# Patient Record
Sex: FEMALE | Race: WHITE | NOT HISPANIC OR LATINO | Employment: OTHER | URBAN - METROPOLITAN AREA
[De-identification: names, ages, dates, MRNs, and addresses within clinical notes are randomized per-mention and may not be internally consistent; named-entity substitution may affect disease eponyms.]

---

## 2017-01-09 ENCOUNTER — OFFICE VISIT (OUTPATIENT)
Dept: FAMILY MEDICINE | Facility: CLINIC | Age: 62
End: 2017-01-09
Payer: COMMERCIAL

## 2017-01-09 VITALS
TEMPERATURE: 98.7 F | WEIGHT: 219.4 LBS | DIASTOLIC BLOOD PRESSURE: 74 MMHG | HEIGHT: 66 IN | HEART RATE: 68 BPM | SYSTOLIC BLOOD PRESSURE: 136 MMHG | BODY MASS INDEX: 35.26 KG/M2

## 2017-01-09 DIAGNOSIS — R82.90 NONSPECIFIC FINDING ON EXAMINATION OF URINE: ICD-10-CM

## 2017-01-09 DIAGNOSIS — N30.00 ACUTE CYSTITIS WITHOUT HEMATURIA: Primary | ICD-10-CM

## 2017-01-09 LAB
ALBUMIN UR-MCNC: NEGATIVE MG/DL
APPEARANCE UR: ABNORMAL
BACTERIA #/AREA URNS HPF: ABNORMAL /HPF
BILIRUB UR QL STRIP: NEGATIVE
COLOR UR AUTO: YELLOW
GLUCOSE UR STRIP-MCNC: NEGATIVE MG/DL
HGB UR QL STRIP: ABNORMAL
KETONES UR STRIP-MCNC: NEGATIVE MG/DL
LEUKOCYTE ESTERASE UR QL STRIP: ABNORMAL
NITRATE UR QL: NEGATIVE
NON-SQ EPI CELLS #/AREA URNS LPF: ABNORMAL /LPF
PH UR STRIP: 7 PH (ref 5–7)
RBC #/AREA URNS AUTO: ABNORMAL /HPF (ref 0–2)
SP GR UR STRIP: 1.01 (ref 1–1.03)
URN SPEC COLLECT METH UR: ABNORMAL
UROBILINOGEN UR STRIP-ACNC: 0.2 EU/DL (ref 0.2–1)
WBC #/AREA URNS AUTO: ABNORMAL /HPF (ref 0–2)

## 2017-01-09 PROCEDURE — 87088 URINE BACTERIA CULTURE: CPT | Performed by: FAMILY MEDICINE

## 2017-01-09 PROCEDURE — 87086 URINE CULTURE/COLONY COUNT: CPT | Mod: 90 | Performed by: FAMILY MEDICINE

## 2017-01-09 PROCEDURE — 81001 URINALYSIS AUTO W/SCOPE: CPT | Performed by: FAMILY MEDICINE

## 2017-01-09 PROCEDURE — 87186 SC STD MICRODIL/AGAR DIL: CPT | Performed by: FAMILY MEDICINE

## 2017-01-09 PROCEDURE — 99213 OFFICE O/P EST LOW 20 MIN: CPT | Performed by: FAMILY MEDICINE

## 2017-01-09 PROCEDURE — 99000 SPECIMEN HANDLING OFFICE-LAB: CPT | Performed by: FAMILY MEDICINE

## 2017-01-09 RX ORDER — CIPROFLOXACIN 500 MG/1
500 TABLET, FILM COATED ORAL 2 TIMES DAILY
Qty: 14 TABLET | Refills: 0 | Status: SHIPPED | OUTPATIENT
Start: 2017-01-09 | End: 2017-04-20

## 2017-01-09 NOTE — PROGRESS NOTES
"  SUBJECTIVE  Mine presents with the following concerns:    SYMPTOMS CC PRESENT ABSENT COMMENT   Increased urinary frequency  X     Increased urinary urgency  X     Pain on urination x X     AGE < 18 OR > 65 OR POSITIVE SYMPTOMS BELOW THIS LINE NEED TO BE SEEN BY PROVIDER   Fever over 101 or chills   X    Sx longer than 7d   X    New flank or back pain   X    Vomiting or severe nausea   X    Abdominal pain   X    4 UTIs in last 12 months   X    Known kidney or bladder abnormality   X    Failure of treatment in last 4 weeks   X    Pyelo in last 3 months   X    Discharged from hospital or NH in last 2 weeks   X    History of kidney stones   X    Urinary catheter (or recent cystoscopy)   X    Unusual Vaginal Symptoms   X    Pregnant   X    STD or new partner without barrier method last 3 months   X    Diabetes   X    Immunosupressed (prednisone or chemo)   X      PAST MEDICAL HISTORY  UTI history indicates rare; cannot remember the last one    REVIEW OF SYSTEMS  Medications updated and reviewed.  Past, family and surgical history is updated and reviewed in the record.  Other than noted above, general, HEENT, respiratory, cardiac and gastrointestinal systems are negative.    OBJECTIVE  /74 mmHg  Pulse 68  Temp(Src) 98.7  F (37.1  C) (Tympanic)  Ht 5' 5.5\" (1.664 m)  Wt 219 lb 6.4 oz (99.519 kg)  BMI 35.94 kg/m2  GENERAL APPEARANCE ADULT: Alert, no acute distress  EYES: PERRL, EOM normal, conjunctiva and lids normal  ABDOMEN: tender suprapubic mild , no CVA tenderness   Lab tests:   Results for orders placed or performed in visit on 01/09/17   *UA reflex to Microscopic and Culture ([facility] and [facility] (except [place] and [place])   Result Value Ref Range    Color Urine Yellow     Appearance Urine Cloudy     Glucose Urine Negative NEG mg/dL    Bilirubin Urine Negative NEG    Ketones Urine Negative NEG mg/dL    Specific Gravity Urine 1.010 1.003 - 1.035    Blood Urine Trace (A) NEG    pH " Urine 7.0 5.0 - 7.0 pH    Protein Albumin Urine Negative NEG mg/dL    Urobilinogen Urine 0.2 0.2 - 1.0 EU/dL    Nitrite Urine Negative NEG    Leukocyte Esterase Urine Moderate (A) NEG    Source Midstream Urine    Urine Microscopic   Result Value Ref Range    WBC Urine 10-25 (A) 0 - 2 /HPF    RBC Urine O - 2 0 - 2 /HPF    Squamous Epithelial /LPF Urine Few FEW /LPF    Bacteria Urine Moderate (A) NEG /HPF       ASSESSMENT  ASSESSMENT:  1. Acute cystitis without hematuria    2. Nonspecific finding on examination of urine        PLAN:  Orders Placed This Encounter     *UA reflex to Microscopic and Culture ([facility] (except [place] and [place])     Urine Microscopic     ciprofloxacin (CIPRO) 500 MG tablet       Continue to push fluids and recheck if not better

## 2017-01-09 NOTE — NURSING NOTE
"Chief Complaint   Patient presents with     UTI     X 4 days.        Initial /74 mmHg  Pulse 68  Temp(Src) 98.7  F (37.1  C) (Tympanic)  Ht 5' 5.5\" (1.664 m)  Wt 219 lb 6.4 oz (99.519 kg)  BMI 35.94 kg/m2 Estimated body mass index is 35.94 kg/(m^2) as calculated from the following:    Height as of this encounter: 5' 5.5\" (1.664 m).    Weight as of this encounter: 219 lb 6.4 oz (99.519 kg).  BP completed using cuff size: henry Corral, CMA      "

## 2017-01-09 NOTE — MR AVS SNAPSHOT
"              After Visit Summary   1/9/2017    Mine Meléndez    MRN: [id number]           Patient Information     Date Of Birth          1955        Visit Information        Provider Department      1/9/2017 4:20 PM Lionel, KEVIN Cabral MD [facility]        Today's Diagnoses     Acute cystitis without hematuria    -  1     Nonspecific finding on examination of urine            Follow-ups after your visit        Who to contact     If you have questions or need follow up information about today's clinic visit or your schedule please contact [facility] directly at [phone].  Normal or non-critical lab and imaging results will be communicated to you by Gigathletehart, letter or phone within 4 business days after the clinic has received the results. If you do not hear from us within 7 days, please contact the clinic through Gigathletehart or phone. If you have a critical or abnormal lab result, we will notify you by phone as soon as possible.  Submit refill requests through [employer] or call your pharmacy and they will forward the refill request to us. Please allow 3 business days for your refill to be completed.          Additional Information About Your Visit        MyChart Information     [employer] gives you secure access to your electronic health record. If you see a primary care provider, you can also send messages to your care team and make appointments. If you have questions, please call your primary care clinic.  If you do not have a primary care provider, please call [phone] and they will assist you.        Care EveryWhere ID     This is your Care EveryWhere ID. This could be used by other organizations to access your [place] medical records  [id number]        Your Vitals Were     Pulse Temperature Height BMI (Body Mass Index)          68 98.7  F (37.1  C) (Tympanic) 5' 5.5\" (1.664 m) 35.94 kg/m2         Blood Pressure from Last 3 Encounters:   01/09/17 136/74   10/11/16 125/70 "   10/04/16 142/68    Weight from Last 3 Encounters:   01/09/17 219 lb 6.4 oz (99.519 kg)   10/04/16 214 lb 6.4 oz (97.251 kg)   09/09/16 213 lb (96.616 kg)              We Performed the Following     *UA reflex to Microscopic and Culture ([facility] (except [place] and [place])     Urine Culture Aerobic Bacterial     Urine Microscopic          Today's Medication Changes          These changes are accurate as of: 1/9/17  4:44 PM.  If you have any questions, ask your nurse or doctor.               Start taking these medicines.        Dose/Directions    ciprofloxacin 500 MG tablet   Commonly known as:  CIPRO   Used for:  Acute cystitis without hematuria   Started by:  KEVIN Flowers MD        Dose:  500 mg   Take 1 tablet (500 mg) by mouth 2 times daily   Quantity:  14 tablet   Refills:  0            Where to get your medicines      These medications were sent to [facility] [address]  [address] [facility] [address]     Phone:  [phone]    - ciprofloxacin 500 MG tablet             Primary Care Provider Office Phone # Fax #    Alysha Tosha Gaviria [phone]       [facility] [address] [facility] [address]        Thank you!     Thank you for choosing [facility]  for your care. Our goal is always to provide you with excellent care. Hearing back from our patients is one way we can continue to improve our services. Please take a few minutes to complete the written survey that you may receive in the mail after your visit with us. Thank you!             Your Updated Medication List - Protect others around you: Learn how to safely use, store and throw away your medicines at www.disposemymeds.org.          This list is accurate as of: 1/9/17  4:44 PM.  Always use your most recent med list.                   Brand Name Dispense Instructions for use    aspirin 81 MG  tablet     100    ONE DAILY       ciprofloxacin 500 MG tablet    CIPRO    14 tablet    Take 1 tablet (500 mg) by mouth 2 times daily       estradiol 0.5 MG tablet    ESTRACE    90 tablet    Take one tablet every 1-3 days as needed for hot flashes       folic acid 1 MG tablet    FOLVITE    90 tablet    Take 1 tablet (1,000 mcg) by mouth daily Must make appt before any further refills       hydrochlorothiazide 25 MG tablet    HYDRODIURIL    90 tablet    Take 1 tablet (25 mg) by mouth every morning       ibuprofen 800 MG tablet    ADVIL/MOTRIN    90 tablet    Take 1 tablet (800 mg) by mouth every 8 hours as needed for pain       levothyroxine 50 MCG tablet    SYNTHROID/LEVOTHROID    90 tablet    Take 1 tablet (50 mcg) by mouth daily       methotrexate 2.5 MG tablet CHEMO     100 tablet    Eight tablets once a week       MULTI-VITAMIN PO      1 daily       omeprazole 20 MG tablet     90 tablet    Take 1 tablet (20 mg) by mouth daily Take 30-60 minutes before a meal.       Potassium Chloride ER 20 MEQ Tbcr     90 tablet    Take 1 tablet (20 mEq) by mouth daily       ranitidine 300 MG tablet    ZANTAC    30 tablet    Take 1 tablet (300 mg) by mouth At Bedtime       simvastatin 20 MG tablet    ZOCOR    90 tablet    Take 1 tablet (20 mg) by mouth At Bedtime

## 2017-01-12 LAB
BACTERIA SPEC CULT: ABNORMAL
MICRO REPORT STATUS: ABNORMAL
MICROORGANISM SPEC CULT: ABNORMAL
SPECIMEN SOURCE: ABNORMAL

## 2017-01-16 ENCOUNTER — OFFICE VISIT (OUTPATIENT)
Dept: RHEUMATOLOGY | Facility: CLINIC | Age: 62
End: 2017-01-16
Payer: COMMERCIAL

## 2017-01-16 VITALS
WEIGHT: 218 LBS | SYSTOLIC BLOOD PRESSURE: 150 MMHG | HEART RATE: 68 BPM | DIASTOLIC BLOOD PRESSURE: 79 MMHG | TEMPERATURE: 97.7 F | BODY MASS INDEX: 35.71 KG/M2 | RESPIRATION RATE: 18 BRPM

## 2017-01-16 DIAGNOSIS — Z79.899 HIGH RISK MEDICATIONS (NOT ANTICOAGULANTS) LONG-TERM USE: Primary | ICD-10-CM

## 2017-01-16 DIAGNOSIS — M05.731 RHEUMATOID ARTHRITIS INVOLVING RIGHT WRIST WITH POSITIVE RHEUMATOID FACTOR (H): ICD-10-CM

## 2017-01-16 LAB
ALBUMIN SERPL-MCNC: 3.5 G/DL (ref 3.4–5)
ALP SERPL-CCNC: 126 U/L (ref 40–150)
ALT SERPL W P-5'-P-CCNC: 30 U/L (ref 0–50)
ANION GAP SERPL CALCULATED.3IONS-SCNC: 9 MMOL/L (ref 3–14)
AST SERPL W P-5'-P-CCNC: 30 U/L (ref 0–45)
BASOPHILS # BLD AUTO: 0.1 10E9/L (ref 0–0.2)
BASOPHILS NFR BLD AUTO: 0.8 %
BILIRUB SERPL-MCNC: 0.5 MG/DL (ref 0.2–1.3)
BUN SERPL-MCNC: 9 MG/DL (ref 7–30)
CALCIUM SERPL-MCNC: 8.7 MG/DL (ref 8.5–10.1)
CHLORIDE SERPL-SCNC: 99 MMOL/L (ref 94–109)
CO2 SERPL-SCNC: 27 MMOL/L (ref 20–32)
CREAT SERPL-MCNC: 0.6 MG/DL (ref 0.52–1.04)
DIFFERENTIAL METHOD BLD: ABNORMAL
EOSINOPHIL # BLD AUTO: 0.2 10E9/L (ref 0–0.7)
EOSINOPHIL NFR BLD AUTO: 1.9 %
ERYTHROCYTE [DISTWIDTH] IN BLOOD BY AUTOMATED COUNT: 13.1 % (ref 10–15)
GFR SERPL CREATININE-BSD FRML MDRD: NORMAL ML/MIN/1.7M2
GLUCOSE SERPL-MCNC: 91 MG/DL (ref 70–99)
HCT VFR BLD AUTO: 41.8 % (ref 35–47)
HGB BLD-MCNC: 14.4 G/DL (ref 11.7–15.7)
LYMPHOCYTES # BLD AUTO: 1.9 10E9/L (ref 0.8–5.3)
LYMPHOCYTES NFR BLD AUTO: 24.5 %
MCH RBC QN AUTO: 34.4 PG (ref 26.5–33)
MCHC RBC AUTO-ENTMCNC: 34.4 G/DL (ref 31.5–36.5)
MCV RBC AUTO: 100 FL (ref 78–100)
MONOCYTES # BLD AUTO: 0.9 10E9/L (ref 0–1.3)
MONOCYTES NFR BLD AUTO: 10.9 %
NEUTROPHILS # BLD AUTO: 4.9 10E9/L (ref 1.6–8.3)
NEUTROPHILS NFR BLD AUTO: 61.9 %
PLATELET # BLD AUTO: 223 10E9/L (ref 150–450)
POTASSIUM SERPL-SCNC: 3.4 MMOL/L (ref 3.4–5.3)
PROT SERPL-MCNC: 7.4 G/DL (ref 6.8–8.8)
RBC # BLD AUTO: 4.18 10E12/L (ref 3.8–5.2)
SODIUM SERPL-SCNC: 135 MMOL/L (ref 133–144)
WBC # BLD AUTO: 7.8 10E9/L (ref 4–11)

## 2017-01-16 PROCEDURE — 85025 COMPLETE CBC W/AUTO DIFF WBC: CPT | Performed by: INTERNAL MEDICINE

## 2017-01-16 PROCEDURE — 80053 COMPREHEN METABOLIC PANEL: CPT | Performed by: INTERNAL MEDICINE

## 2017-01-16 PROCEDURE — 36415 COLL VENOUS BLD VENIPUNCTURE: CPT | Performed by: INTERNAL MEDICINE

## 2017-01-16 PROCEDURE — 99213 OFFICE O/P EST LOW 20 MIN: CPT | Performed by: INTERNAL MEDICINE

## 2017-01-16 RX ORDER — PREDNISONE 1 MG/1
TABLET ORAL
Qty: 40 TABLET | Refills: 1 | Status: SHIPPED | OUTPATIENT
Start: 2017-01-16 | End: 2017-04-20

## 2017-01-16 RX ORDER — PREDNISONE 5 MG/1
TABLET ORAL
Qty: 40 TABLET | Refills: 1 | Status: SHIPPED | OUTPATIENT
Start: 2017-01-16 | End: 2017-04-20

## 2017-01-16 RX ORDER — FOLIC ACID 1 MG/1
1000 TABLET ORAL DAILY
Qty: 90 TABLET | Refills: 3 | Status: SHIPPED | OUTPATIENT
Start: 2017-01-16 | End: 2018-01-29

## 2017-01-16 NOTE — Clinical Note
[facility]  [address] [address]  [phone]      January 18, 2017      Mine Meléndez  [address]  [facility] [address]        Dear Dr. Gene Blount has reviewed the results of your labs of 1/16/17, and has made the following observations:      Labs are normal    Please don't hesitate to contact our office with any additional questions or concerns.           Sincerely,    PAUL TangA  For Matt Mills MD

## 2017-01-17 ENCOUNTER — TELEPHONE (OUTPATIENT)
Dept: FAMILY MEDICINE | Facility: CLINIC | Age: 62
End: 2017-01-17

## 2017-01-17 DIAGNOSIS — B37.31 YEAST INFECTION OF THE VAGINA: Primary | ICD-10-CM

## 2017-01-17 RX ORDER — FLUCONAZOLE 150 MG/1
150 TABLET ORAL ONCE
Qty: 1 TABLET | Refills: 0 | Status: SHIPPED | OUTPATIENT
Start: 2017-01-17 | End: 2017-01-17

## 2017-01-17 NOTE — TELEPHONE ENCOUNTER
Pt has had Diflucan in the past after having ABX and is requesting it again.  Pended.  Advise.  [place]

## 2017-01-17 NOTE — TELEPHONE ENCOUNTER
Reason for Call:  Other prescription    Detailed comments: Patient states she was treated for a UTI and now has a vaginal yeast infection.  She would like medication.  qunb    Phone Number Patient can be reached at: Cell number on file:    Telephone Information:   Mobile [phone]       Best Time: any    Can we leave a detailed message on this number? YES    Call taken on 1/17/2017 at 7:43 AM by Alysha Miles

## 2017-03-06 DIAGNOSIS — M05.731 RHEUMATOID ARTHRITIS INVOLVING RIGHT WRIST WITH POSITIVE RHEUMATOID FACTOR (H): Primary | ICD-10-CM

## 2017-03-06 NOTE — TELEPHONE ENCOUNTER
Ibuprofen 800mg      Last Written Prescription Date: 2/24/16  Last Quantity: 90, # refills: 11  Last Office Visit with [facility], [facility] or [facility] prescribing provider: 8/8/16       Creatinine   Date Value Ref Range Status   01/16/2017 0.60 0.52 - 1.04 mg/dL Final     Lab Results   Component Value Date    AST 30 01/16/2017     Lab Results   Component Value Date    ALT 30 01/16/2017     BP Readings from Last 3 Encounters:   01/16/17 150/79   01/09/17 136/74   10/11/16 125/70     Thank You-  Justa Roberto [facility]

## 2017-03-07 RX ORDER — IBUPROFEN 800 MG/1
800 TABLET, FILM COATED ORAL EVERY 8 HOURS PRN
Qty: 90 TABLET | Refills: 0 | Status: SHIPPED | OUTPATIENT
Start: 2017-03-07 | End: 2017-04-24

## 2017-03-24 ENCOUNTER — TELEPHONE (OUTPATIENT)
Dept: NURSING | Facility: CLINIC | Age: 62
End: 2017-03-24

## 2017-03-24 NOTE — TELEPHONE ENCOUNTER
"Call Type: Triage Call    Presenting Problem: This week having cough but it cough improving .  Hx vertigo .  Dizzy since 12noon  today .  Currently : no fever ,  voided last within the hour.  Triage Note:  Guideline Title: Dizziness or Vertigo  Recommended Disposition: See Provider within 24 hours  Original Inclination: Did not know what to do  Override Disposition:  Intended Action: Go to Urgent Care Center  Physician Contacted: No  Previously evaluated and worsening symptoms interfering with ability to carry out  activities of daily living (ADLs) ?  YES  Any other GI bleeding ? NO  History of stroke OR transient ischemic attack (TIA) AND symptoms are similar ? NO  Signs of dehydration ? NO  Unconscious now ? NO  Severe breathing problems ? NO  New or worsening signs and symptoms that may indicate shock ? NO  Unusual vaginal bleeding occurring at times other than regular menses ? NO  New seizure now or within last 6 hours ? NO  Passing red, black or tarry material from rectum AND onset of new signs and  symptoms of hypovolemia ? NO  Unbearable abdominal/pelvic pain ? NO  Trauma from high-energy mechanism ? NO  Abruptly stopped or decreased dose of corticosteroids ? NO  Vomiting red, bloody or coffee-ground material, more than streaks of blood or  scant amount (not following nosebleed within past day) ? NO  Having sensations of turning or spinning that affects balance AND not responsive  to 4 hours of home care ? NO  New onset of decreased or complete hearing loss (may be related to ringing in  ear/s). ? NO  Vertigo with vomiting AND not responding to 4 hours of home care ? NO  Symptoms worsen with movement of head or looking up AND not previously evaluated ?  NO  Sudden, severe disabling head pain OR caller spontaneously verbalizes \"worst  headache of my life\" ? NO  Chest discomfort associated with shortness of breath, sweating, odd heartbeats or  different heart rate, nausea, vomiting, lightheadedness, or fainting " lasting 5 or  more minutes now or within the last hour ? NO  Chest pain spreading to the shoulders, neck, jaw, in one or both arms, stomach or  back lasting 5 or more minutes now or within the last hour. Pain is NOT  associated with taking a deep breath or a productive cough, movement, or touch to  a localized area. ? NO  Pressure, fullness, squeezing sensation or pain anywhere in the chest lasting 5 or  more minutes now or within the last hour. Pain is NOT associated with taking a  deep breath or a productive cough, movement, or touch to a localized area on the  chest. ? NO  Known or suspected recent alcohol, illicit drug use, or misuse of prescribed  medication within the last 7 days ? NO  New numbness, weakness or paralysis involving face, arm or leg, especially on same  side of body, loss of balance or coordination, confusion or trouble speaking  occurring now or within last 8 hours ? NO  Physician Instructions:  Care Advice: Should not be alone, arrange for support (family member,  friend, etc.).  Call provider immediately if have difficulty walking, vision problems, or  weakness.  SYMPTOM / CONDITION MANAGEMENT  A temporary drop in blood pressure sometimes occurs with a quick change to  an upright position (postural hypotension) and may cause light-headedness  or dizziness. Change position slowly.  Making a habit of rising slowly and  sitting for a few minutes before standing to walk usually relieves the  feeling of faintness.  Lie still in a dimly lit room and avoid any sudden change in position.  Avoid caffeine (coffee, tea, some soft drinks, some energy drinks, and  chocolate), alcohol, and nicotine (any use of tobacco)  using these substances may worsen symptoms.  Call  if patient develops confusion, decreased level of  consciousness, chest pain lasting 5 minutes or more, shortness of breath,  or focal neurologic abnormalities such as facial droop or weakness of one  extremity.  DO NOT drive or  operate dangerous equipment until condition evaluated.

## 2017-04-20 ENCOUNTER — HOSPITAL ENCOUNTER (EMERGENCY)
Facility: CLINIC | Age: 62
Discharge: HOME | End: 2017-04-20
Attending: EMERGENCY MEDICINE | Admitting: EMERGENCY MEDICINE
Payer: COMMERCIAL

## 2017-04-20 ENCOUNTER — APPOINTMENT (OUTPATIENT)
Dept: CT IMAGING | Facility: CLINIC | Age: 62
End: 2017-04-20
Attending: EMERGENCY MEDICINE
Payer: COMMERCIAL

## 2017-04-20 VITALS
TEMPERATURE: 97 F | WEIGHT: 210 LBS | OXYGEN SATURATION: 92 % | HEART RATE: 72 BPM | SYSTOLIC BLOOD PRESSURE: 128 MMHG | DIASTOLIC BLOOD PRESSURE: 82 MMHG | RESPIRATION RATE: 22 BRPM | BODY MASS INDEX: 34.41 KG/M2

## 2017-04-20 DIAGNOSIS — R68.84 JAW PAIN: ICD-10-CM

## 2017-04-20 DIAGNOSIS — M79.601 PAIN OF RIGHT UPPER EXTREMITY: ICD-10-CM

## 2017-04-20 DIAGNOSIS — R10.84 ABDOMINAL PAIN, GENERALIZED: ICD-10-CM

## 2017-04-20 LAB
ALBUMIN SERPL-MCNC: 3.7 G/DL (ref 3.4–5)
ALP SERPL-CCNC: 142 U/L (ref 40–150)
ALT SERPL W P-5'-P-CCNC: 31 U/L (ref 0–50)
ANION GAP SERPL CALCULATED.3IONS-SCNC: 9 MMOL/L (ref 3–14)
AST SERPL W P-5'-P-CCNC: 27 U/L (ref 0–45)
BASOPHILS # BLD AUTO: 0 10E9/L (ref 0–0.2)
BASOPHILS NFR BLD AUTO: 0.4 %
BILIRUB SERPL-MCNC: 0.4 MG/DL (ref 0.2–1.3)
BUN SERPL-MCNC: 9 MG/DL (ref 7–30)
CALCIUM SERPL-MCNC: 9.1 MG/DL (ref 8.5–10.1)
CHLORIDE SERPL-SCNC: 100 MMOL/L (ref 94–109)
CO2 SERPL-SCNC: 31 MMOL/L (ref 20–32)
CREAT SERPL-MCNC: 0.59 MG/DL (ref 0.52–1.04)
DIFFERENTIAL METHOD BLD: ABNORMAL
EOSINOPHIL # BLD AUTO: 0.2 10E9/L (ref 0–0.7)
EOSINOPHIL NFR BLD AUTO: 2.3 %
ERYTHROCYTE [DISTWIDTH] IN BLOOD BY AUTOMATED COUNT: 13.4 % (ref 10–15)
ERYTHROCYTE [SEDIMENTATION RATE] IN BLOOD BY WESTERGREN METHOD: 12 MM/H (ref 0–30)
GFR SERPL CREATININE-BSD FRML MDRD: ABNORMAL ML/MIN/1.7M2
GLUCOSE SERPL-MCNC: 100 MG/DL (ref 70–99)
HCT VFR BLD AUTO: 40.9 % (ref 35–47)
HGB BLD-MCNC: 14.3 G/DL (ref 11.7–15.7)
IMM GRANULOCYTES # BLD: 0 10E9/L (ref 0–0.4)
IMM GRANULOCYTES NFR BLD: 0.1 %
LYMPHOCYTES # BLD AUTO: 1.9 10E9/L (ref 0.8–5.3)
LYMPHOCYTES NFR BLD AUTO: 20.3 %
MCH RBC QN AUTO: 34.4 PG (ref 26.5–33)
MCHC RBC AUTO-ENTMCNC: 35 G/DL (ref 31.5–36.5)
MCV RBC AUTO: 98 FL (ref 78–100)
MONOCYTES # BLD AUTO: 0.7 10E9/L (ref 0–1.3)
MONOCYTES NFR BLD AUTO: 7 %
NEUTROPHILS # BLD AUTO: 6.4 10E9/L (ref 1.6–8.3)
NEUTROPHILS NFR BLD AUTO: 69.9 %
PLATELET # BLD AUTO: 232 10E9/L (ref 150–450)
POTASSIUM SERPL-SCNC: 3.1 MMOL/L (ref 3.4–5.3)
PROT SERPL-MCNC: 7.6 G/DL (ref 6.8–8.8)
RBC # BLD AUTO: 4.16 10E12/L (ref 3.8–5.2)
SODIUM SERPL-SCNC: 140 MMOL/L (ref 133–144)
TROPONIN I SERPL-MCNC: NORMAL UG/L (ref 0–0.04)
TROPONIN I SERPL-MCNC: NORMAL UG/L (ref 0–0.04)
WBC # BLD AUTO: 9.2 10E9/L (ref 4–11)

## 2017-04-20 PROCEDURE — 99285 EMERGENCY DEPT VISIT HI MDM: CPT | Mod: 25

## 2017-04-20 PROCEDURE — 25500064 ZZH RX 255 OP 636: Performed by: EMERGENCY MEDICINE

## 2017-04-20 PROCEDURE — 93005 ELECTROCARDIOGRAM TRACING: CPT | Mod: 76

## 2017-04-20 PROCEDURE — 25000128 H RX IP 250 OP 636: Performed by: EMERGENCY MEDICINE

## 2017-04-20 PROCEDURE — 25000125 ZZHC RX 250: Performed by: EMERGENCY MEDICINE

## 2017-04-20 PROCEDURE — 80053 COMPREHEN METABOLIC PANEL: CPT | Performed by: FAMILY MEDICINE

## 2017-04-20 PROCEDURE — 99285 EMERGENCY DEPT VISIT HI MDM: CPT | Mod: 25 | Performed by: EMERGENCY MEDICINE

## 2017-04-20 PROCEDURE — 93005 ELECTROCARDIOGRAM TRACING: CPT

## 2017-04-20 PROCEDURE — 84484 ASSAY OF TROPONIN QUANT: CPT | Mod: 91 | Performed by: EMERGENCY MEDICINE

## 2017-04-20 PROCEDURE — 85025 COMPLETE CBC W/AUTO DIFF WBC: CPT | Performed by: FAMILY MEDICINE

## 2017-04-20 PROCEDURE — 93010 ELECTROCARDIOGRAM REPORT: CPT | Performed by: EMERGENCY MEDICINE

## 2017-04-20 PROCEDURE — 25000132 ZZH RX MED GY IP 250 OP 250 PS 637: Performed by: EMERGENCY MEDICINE

## 2017-04-20 PROCEDURE — 96374 THER/PROPH/DIAG INJ IV PUSH: CPT

## 2017-04-20 PROCEDURE — 85652 RBC SED RATE AUTOMATED: CPT | Performed by: EMERGENCY MEDICINE

## 2017-04-20 PROCEDURE — 96375 TX/PRO/DX INJ NEW DRUG ADDON: CPT

## 2017-04-20 PROCEDURE — 70498 CT ANGIOGRAPHY NECK: CPT

## 2017-04-20 PROCEDURE — 84484 ASSAY OF TROPONIN QUANT: CPT | Performed by: FAMILY MEDICINE

## 2017-04-20 RX ORDER — LORAZEPAM 2 MG/ML
0.5 INJECTION INTRAMUSCULAR
Status: COMPLETED | OUTPATIENT
Start: 2017-04-20 | End: 2017-04-20

## 2017-04-20 RX ORDER — IOPAMIDOL 755 MG/ML
100 INJECTION, SOLUTION INTRAVASCULAR ONCE
Status: COMPLETED | OUTPATIENT
Start: 2017-04-20 | End: 2017-04-20

## 2017-04-20 RX ORDER — HYDROCODONE BITARTRATE AND ACETAMINOPHEN 5; 325 MG/1; MG/1
TABLET ORAL
Qty: 15 TABLET | Refills: 0 | Status: SHIPPED | OUTPATIENT
Start: 2017-04-20 | End: 2017-07-07

## 2017-04-20 RX ORDER — ASPIRIN 81 MG/1
243 TABLET, CHEWABLE ORAL ONCE
Status: COMPLETED | OUTPATIENT
Start: 2017-04-20 | End: 2017-04-20

## 2017-04-20 RX ORDER — KETOROLAC TROMETHAMINE 30 MG/ML
15 INJECTION, SOLUTION INTRAMUSCULAR; INTRAVENOUS ONCE
Status: COMPLETED | OUTPATIENT
Start: 2017-04-20 | End: 2017-04-20

## 2017-04-20 RX ORDER — HYDROMORPHONE HYDROCHLORIDE 1 MG/ML
0.5 INJECTION, SOLUTION INTRAMUSCULAR; INTRAVENOUS; SUBCUTANEOUS ONCE
Status: COMPLETED | OUTPATIENT
Start: 2017-04-20 | End: 2017-04-20

## 2017-04-20 RX ADMIN — LORAZEPAM 0.5 MG: 2 INJECTION INTRAMUSCULAR; INTRAVENOUS at 17:23

## 2017-04-20 RX ADMIN — ASPIRIN 243 MG: 81 TABLET, CHEWABLE ORAL at 17:21

## 2017-04-20 RX ADMIN — KETOROLAC TROMETHAMINE 15 MG: 30 INJECTION, SOLUTION INTRAMUSCULAR at 20:34

## 2017-04-20 RX ADMIN — HYDROMORPHONE HYDROCHLORIDE 0.5 MG: 1 INJECTION, SOLUTION INTRAMUSCULAR; INTRAVENOUS; SUBCUTANEOUS at 18:51

## 2017-04-20 RX ADMIN — IOPAMIDOL 100 ML: 755 INJECTION, SOLUTION INTRAVENOUS at 18:07

## 2017-04-20 RX ADMIN — SODIUM CHLORIDE 100 ML: 9 INJECTION, SOLUTION INTRAVENOUS at 18:07

## 2017-04-20 NOTE — ED AVS SNAPSHOT
[facility] Emergency Department    [address] [facility] [facility] [address]    Phone:  [phone]    Fax:  [phone]                                       Mine Meléndez   MRN: [id number]    Department:  [facility] Emergency Department   Date of Visit:  4/20/2017           Patient Information     Date Of Birth          1955        Your diagnoses for this visit were:     Jaw pain Right jaw pain, suspect TMJ etiology    Pain of right upper extremity     Abdominal pain, generalized        You were seen by Krishna Pan MD.      Follow-up Information     Schedule an appointment as soon as possible for a visit with Alysha Gaviria MD.    Specialty:  Family Practice    Contact information:    [facility]  [address] [facility] [address]  [phone]          Follow up with [facility] Emergency Department.    Specialty:  EMERGENCY MEDICINE    Why:  If symptoms worsen, or for new problems or concerns    Contact information:    [address] [place] [address] [phone]    Additional information:    The medical center is located at   [address] [place] [address] (between [facility] and   [address], [place] miles north   of [place]).        Follow up with [facility].    Specialty:  ENT    Why:  Follow-up in Ear Nose Throat clinic or with her dentist for evaluation for TMJ syndrome    Contact information:    [address] [place] [id number] [phone]    Additional information:    The medical center is located at   [address] [place] [address] (between [facility] and   [address], four miles north   [facility]).        Discharge Instructions         Use one or more over the counter meds as needed to prevent constipation from opiate analgesic (Vicodin/Norco):     Colace (stool softener)  Fiber supplement  Milk of Magnesia  Miralax powder daily  Dulcolax or Senokot (laxative)    These are available over-the-counter and  can be used together or in combination, as needed to prevent constipation.      Discharge References/Attachments     TEMPOROMANDIBULAR DISORDERS (TMD), UNDERSTANDING (ENGLISH)    TEMPOROMANDIBULAR DISORDERS (TMD), SELF-CARE FOR (ENGLISH)    TEMPOROMANDIBULAR DISORDERS (TMD), PAIN RELIEF METHODS FOR (ENGLISH)    TEMPOROMANDIBULAR DISORDERS (TMD), DENTAL TREATMENT FOR (ENGLISH)    TEMPOROMANDIBULAR JOINT (TMJ) HEAL, HELPING YOUR (ENGLISH)    TMJ SYNDROME (ENGLISH)    ANGINA, WHAT IS (ENGLISH)    ABDOMINAL PAIN, UNKNOWN CAUSE, (FEMALE) (ENGLISH)      24 Hour Appointment Hotline       To make an appointment at any [facility], call [phone]). If you don't have a family doctor or clinic, we will help you find one. [place] clinics are conveniently located to serve the needs of you and your family.             Review of your medicines      START taking        Dose / Directions Last dose taken    HYDROcodone-acetaminophen 5-325 MG per tablet   Commonly known as:  NORCO   Quantity:  15 tablet        1-2 tabs po q 4-6 hrs. prn pain   Refills:  0          Our records show that you are taking the medicines listed below. If these are incorrect, please call your family doctor or clinic.        Dose / Directions Last dose taken    aspirin 81 MG tablet   Quantity:  100        ONE DAILY   Refills:  3        estradiol 0.5 MG tablet   Commonly known as:  ESTRACE   Quantity:  90 tablet        Take one tablet every 1-3 days as needed for hot flashes   Refills:  3        folic acid 1 MG tablet   Commonly known as:  FOLVITE   Dose:  1000 mcg   Quantity:  90 tablet        Take 1 tablet (1,000 mcg) by mouth daily Must make appt before any further refills   Refills:  3        hydrochlorothiazide 25 MG tablet   Commonly known as:  HYDRODIURIL   Dose:  25 mg   Quantity:  90 tablet        Take 1 tablet (25 mg) by mouth every morning   Refills:  3        ibuprofen 800 MG tablet   Commonly known as:  ADVIL/MOTRIN    Dose:  800 mg   Quantity:  90 tablet        Take 1 tablet (800 mg) by mouth every 8 hours as needed for pain   Refills:  0        levothyroxine 50 MCG tablet   Commonly known as:  SYNTHROID/LEVOTHROID   Dose:  50 mcg   Quantity:  90 tablet        Take 1 tablet (50 mcg) by mouth daily   Refills:  3        methotrexate 2.5 MG tablet CHEMO   Quantity:  100 tablet        Eight tablets once a week   Refills:  1        MULTI-VITAMIN PO        1 daily   Refills:  0        omeprazole 20 MG tablet   Dose:  20 mg   Quantity:  90 tablet        Take 1 tablet (20 mg) by mouth daily Take 30-60 minutes before a meal.   Refills:  3        Potassium Chloride ER 20 MEQ Tbcr   Dose:  20 mEq   Quantity:  90 tablet        Take 1 tablet (20 mEq) by mouth daily   Refills:  3        ranitidine 300 MG tablet   Commonly known as:  ZANTAC   Dose:  300 mg   Quantity:  30 tablet        Take 1 tablet (300 mg) by mouth At Bedtime   Refills:  1        simvastatin 20 MG tablet   Commonly known as:  ZOCOR   Dose:  20 mg   Quantity:  90 tablet        Take 1 tablet (20 mg) by mouth At Bedtime   Refills:  3                Prescriptions were sent or printed at these locations (1 Prescription)                   [place], [place] - [address]   [address] [facility] [address]    Telephone:  [phone]   Fax:  [phone]   Hours:                  Printed at Department/Unit printer (1 of 1)         HYDROcodone-acetaminophen (NORCO) 5-325 MG per tablet                Procedures and tests performed during your visit     Procedure/Test Number of Times Performed    CBC with platelets differential 1    CT Head Neck Angio w/o & w Contrast 1    Comprehensive metabolic panel 1    EKG 12 lead 1    EKG 12-lead, tracing only 1    Erythrocyte sedimentation rate auto 1    Troponin I 2      Orders Needing Specimen Collection     None      Pending Results     No orders found from 4/18/2017 to 4/21/2017.             Pending Culture Results     No orders found from 4/18/2017 to 4/21/2017.            Test Results From Your Hospital Stay        4/20/2017  5:13 PM      Component Results     Component Value Ref Range & Units Status    WBC 9.2 4.0 - 11.0 10e9/L Final    RBC Count 4.16 3.8 - 5.2 10e12/L Final    Hemoglobin 14.3 11.7 - 15.7 g/dL Final    Hematocrit 40.9 35.0 - 47.0 % Final    MCV 98 78 - 100 fl Final    MCH 34.4 (H) 26.5 - 33.0 pg Final    MCHC 35.0 31.5 - 36.5 g/dL Final    RDW 13.4 10.0 - 15.0 % Final    Platelet Count 232 150 - 450 10e9/L Final    Diff Method Automated Method  Final    % Neutrophils 69.9 % Final    % Lymphocytes 20.3 % Final    % Monocytes 7.0 % Final    % Eosinophils 2.3 % Final    % Basophils 0.4 % Final    % Immature Granulocytes 0.1 % Final    Absolute Neutrophil 6.4 1.6 - 8.3 10e9/L Final    Absolute Lymphocytes 1.9 0.8 - 5.3 10e9/L Final    Absolute Monocytes 0.7 0.0 - 1.3 10e9/L Final    Absolute Eosinophils 0.2 0.0 - 0.7 10e9/L Final    Absolute Basophils 0.0 0.0 - 0.2 10e9/L Final    Abs Immature Granulocytes 0.0 0 - 0.4 10e9/L Final         4/20/2017  5:27 PM      Component Results     Component Value Ref Range & Units Status    Troponin I ES  0.000 - 0.045 ug/L Final    <0.015  The 99th percentile for upper reference range is 0.045 ug/L.  Troponin values in   the range of 0.045 - 0.120 ug/L may be associated with risks of adverse   clinical events.           4/20/2017  5:27 PM      Component Results     Component Value Ref Range & Units Status    Sodium 140 133 - 144 mmol/L Final    Potassium 3.1 (L) 3.4 - 5.3 mmol/L Final    Chloride 100 94 - 109 mmol/L Final    Carbon Dioxide 31 20 - 32 mmol/L Final    Anion Gap 9 3 - 14 mmol/L Final    Glucose 100 (H) 70 - 99 mg/dL Final    Urea Nitrogen 9 7 - 30 mg/dL Final    Creatinine 0.59 0.52 - 1.04 mg/dL Final    GFR Estimate >90  Non  GFR Calc   >60 mL/min/1.7m2 Final    GFR Estimate If Black >90   GFR  Calc   >60 mL/min/1.7m2 Final    Calcium 9.1 8.5 - 10.1 mg/dL Final    Bilirubin Total 0.4 0.2 - 1.3 mg/dL Final    Albumin 3.7 3.4 - 5.0 g/dL Final    Protein Total 7.6 6.8 - 8.8 g/dL Final    Alkaline Phosphatase 142 40 - 150 U/L Final    ALT 31 0 - 50 U/L Final    AST 27 0 - 45 U/L Final         4/20/2017  7:29 PM      Component Results     Component Value Ref Range & Units Status    Sed Rate 12 0 - 30 mm/h Final         4/20/2017  8:33 PM      Narrative     CT ANGIOGRAM OF THE HEAD AND NECK WITHOUT AND WITH CONTRAST  4/20/2017  6:48 PM     HISTORY: Right jaw pain, dysphagia and frontal headache.    TECHNIQUE:  Precontrast localizing scans were followed by CT  angiography with an injection of 100 mL Omnipaque 370 IV with scans  through the head and neck.  Images were transferred to a separate 3-D  workstation where multiplanar reformations and 3-D images were  created.  Estimates of carotid stenoses are made relative to the  distal internal carotid artery diameters except as noted. Radiation  dose for this scan was reduced using automated exposure control,  adjustment of the mA and/or kV according to patient size, or iterative  reconstruction technique.    COMPARISON: MR angiogram 5/15/2009.    CT HEAD FINDINGS:  No contrast enhancing lesions.  Cerebral blood flow  is grossly normal.    CT ANGIOGRAM HEAD FINDINGS:  Arteries are widely patent with no  aneurysm, significant stenosis, occlusion or intraarterial thrombus.  Venous circulation is unremarkable. There is fetal origin of the right  posterior cerebral artery from the internal carotid, a normal variant.    CT ANGIOGRAM NECK FINDINGS:   Right carotid artery: Tortuous cervical internal carotid artery.  No  significant stenosis.      Left carotid artery: Minimal calcified plaque proximal left internal  carotid artery.  No significant stenosis.      Vertebral arteries: No significant stenosis.      Other findings: None.        Impression     IMPRESSION: No  evidence of arterial stenosis, dissection, occlusion or  aneurysm.     DIONNE WING MD         4/20/2017  7:22 PM      Component Results     Component Value Ref Range & Units Status    Troponin I ES  0.000 - 0.045 ug/L Final    <0.015  The 99th percentile for upper reference range is 0.045 ug/L.  Troponin values in   the range of 0.045 - 0.120 ug/L may be associated with risks of adverse   clinical events.                  Thank you for choosing [facility]       Thank you for choosing [facility] for your care. Our goal is always to provide you with excellent care. Hearing back from our patients is one way we can continue to improve our services. Please take a few minutes to complete the written survey that you may receive in the mail after you visit with us. Thank you!        [facility] [facility] Information     [employer] gives you secure access to your electronic health record. If you see a primary care provider, you can also send messages to your care team and make appointments. If you have questions, please call your primary care clinic.  If you do not have a primary care provider, please call [phone] and they will assist you.        Care EveryWhere ID     This is your Care EveryWhere ID. This could be used by other organizations to access your [facility] medical records  [id number]        After Visit Summary       This is your record. Keep this with you and show to your community pharmacist(s) and doctor(s) at your next visit.

## 2017-04-20 NOTE — ED AVS SNAPSHOT
[facility] Emergency Department    [address] [facility] [address]    Phone:  [phone]    Fax:  [phone]                                       Mine Meléndez   MRN: [id number]    Department:  [facility] Emergency Department   Date of Visit:  4/20/2017           After Visit Summary Signature Page     I have received my discharge instructions, and my questions have been answered. I have discussed any challenges I see with this plan with the nurse or doctor.    ..........................................................................................................................................  Patient/Patient Representative Signature      ..........................................................................................................................................  Patient Representative Print Name and Relationship to Patient    ..................................................               ................................................  Date                                            Time    ..........................................................................................................................................  Reviewed by Signature/Title    ...................................................              ..............................................  Date                                                            Time

## 2017-04-20 NOTE — ED PROVIDER NOTES
"  History     Chief Complaint   Patient presents with     Jaw Pain     right sided, also abdominal pain and lightheadedness     HPI  Mine Meléndez is a 61 year old female who developed insidious onset of right jaw pain radiating to right ear and right head at ~ noon today, ~ 4 hrs ago. Next she developed right shoulder and right arm pain ~ 1.5 hrs later, and shortly after this then developed lower abd pain and belching.  En route to the ED a short time ago she began \"shaking\", felt flushed and had significant fear/anxiety (while driving home from work~ 30  min ago). Still has right jaw pain and right lower arm discomfort/dysesthesia and a mild frontal headache.  She describes the jaw pain is dull or aching, 6/10 in severity and exacerbated by jaw movement or chewing.  She has no facial droop, speech abnormality, visual abnormality, motor weakness or other paresthesias.  Lower abdominal pain was insidious in onset, is poorly localized, is mild, Is nonradiating and is associated with belching.  She has a history of migraines but her headache is different than these.  She has no chest pain, shortness of breath, nausea or diaphoresis.  Earlier this afternoon she had a brief, seconds, episode of central sharp chest pain which promptly resolved.  She has had no cough or URI symptoms or other acute GI/ signs or symptoms.  She has had a rash at the corner of her right mouth for couple days, it is not painful or uncomfortable.  No other facial or neck rash or lesions.  History of shingles involving the left face/left eye in the past.  History of rheumatoid arthritis and she is on methotrexate.    Previous Records Reviewed:  MRI/MRA brain and neck, stroke evaluation, 5/15/09 for left upper extremity and left lower extremity paresthesia: Negative  Echo 5/15/09: Negative/unremarkable.    I have reviewed the Medications, Allergies, Past Medical and Surgical History, and Social History in the Epic system.  Patient Active " Problem List   Diagnosis     DYSPEPSIA     Obesity     Essential hypertension     Hypothyroidism     HYPERLIPIDEMIA LDL GOAL <130     Advanced directives, counseling/discussion     Dermatitis     High risk medications (not anticoagulants) long-term use     Female stress incontinence     Migraine     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)     Past Medical History:   Diagnosis Date     Herpes simplex without mention of complication      Obesity, unspecified      Osteomyelitis of jaw 5/22/2008     Rheumatoid arthritis(714.0)      Trochanteric bursitis of right hip 8/16/2013     Past Surgical History:   Procedure Laterality Date     COLONOSCOPY  1/20/06     HYSTERECTOMY, PAP NO LONGER INDICATED       HYSTERECTOMY, VAGINAL  11/04    TVH,TVT procedure     RELEASE CARPAL TUNNEL Right 10/11/2016    Procedure: RELEASE CARPAL TUNNEL;  Surgeon: Emely Blanca MD;  Location: [facility] OR     SURGICAL HISTORY OF -   1988    tubal ligation     SURGICAL HISTORY OF -   2001    excision gangliion cyst rt heel     No current facility-administered medications for this encounter.      Current Outpatient Prescriptions   Medication     HYDROcodone-acetaminophen (NORCO) 5-325 MG per tablet     methotrexate 2.5 MG tablet CHEMO     folic acid (FOLVITE) 1 MG tablet     omeprazole 20 MG tablet     levothyroxine (SYNTHROID, LEVOTHROID) 50 MCG tablet     Potassium Chloride ER 20 MEQ TBCR     hydrochlorothiazide (HYDRODIURIL) 25 MG tablet     simvastatin (ZOCOR) 20 MG tablet     estradiol (ESTRACE) 0.5 MG tablet     ASPIRIN 81 MG OR TABS     MULTI-VITAMIN OR     ibuprofen (ADVIL/MOTRIN) 800 MG tablet     ranitidine (ZANTAC) 300 MG tablet     Allergies   Allergen Reactions     Codeine Rash     Flagyl [Imidazole Antifungals] Rash     Septra [Sulfamethoxazole W/Trimethoprim] Itching     Puffy eyes, flushed.      Social History   Substance Use Topics     Smoking status: Former Smoker     Years: 25.00     Quit  date: 5/6/2000     Smokeless tobacco: Never Used     Alcohol use Yes      Comment: 6 pack of beer on a weekend     Family History   Problem Relation Age of Onset     DIABETES Father      DIABETES Brother      DIABETES Sister      Hypertension Sister      DIABETES Sister      DIABETES Brother      Hypertension Sister        Review of Systems  As mentioned above in the history present illness.  All other systems were reviewed and are negative.    Physical Exam   BP: 165/86  Pulse: 72  Temp: 97  F (36.1  C)  Resp: 18  Weight: 95.3 kg (210 lb)  SpO2: 98 %    Physical Exam   Constitutional: She is oriented to person, place, and time. She appears well-developed and well-nourished. No distress.   HENT:   Head: Normocephalic and atraumatic.       Right Ear: External ear normal.   Left Ear: External ear normal.   Nose: Nose normal.   Mouth/Throat: Oropharynx is clear and moist. No oropharyngeal exudate.   Small maculopapular rash at the corner of the right mouth. No other facial, ear or OP rash or lesions.  Tenderness over the right TMJ area without palpable click.  No redness, warmth, swelling or crepitance.  No temporal or temporal artery abnormality to palpation over auscultation.   Eyes: Conjunctivae and EOM are normal. Pupils are equal, round, and reactive to light. No scleral icterus.   Neck: Trachea normal, normal range of motion and full passive range of motion without pain. Neck supple. Normal carotid pulses and no JVD present. No spinous process tenderness and no muscular tenderness present. Carotid bruit is not present. No rigidity. No tracheal deviation, no edema, no erythema and normal range of motion present. No thyroid mass and no thyromegaly present.   Cardiovascular: Normal rate, regular rhythm, normal heart sounds and intact distal pulses.  Exam reveals no gallop and no friction rub.    No murmur heard.  Pulmonary/Chest: Effort normal and breath sounds normal. No stridor. No respiratory distress. She has no  wheezes. She has no rales. She exhibits no tenderness.   Abdominal: Soft. Bowel sounds are normal. She exhibits no distension and no mass. There is no tenderness. There is no rebound and no guarding.   Musculoskeletal: Normal range of motion. She exhibits no edema or tenderness.   Lymphadenopathy:     She has no cervical adenopathy.   Neurological: She is alert and oriented to person, place, and time. She has normal strength. No cranial nerve deficit (2-12 intact) or sensory deficit. Coordination normal. GCS eye subscore is 4. GCS verbal subscore is 5. GCS motor subscore is 6.   Skin: Skin is warm and dry. Rash (corner of right mouth) noted. She is not diaphoretic. No erythema. No pallor.   Psychiatric: Her behavior is normal.   Anxious affect.   Nursing note and vitals reviewed.      ED Course     ED Course     Procedures             EKG Interpretation:      Interpreted by Krishna Pan MD  Time reviewed: 1600 pm  Symptoms at time of EKG: right jaw pain, right arm pain  Rhythm: normal sinus   Rate: normal  Axis: normal  Ectopy: none  Conduction: normal  ST Segments/ T Waves: No ST-T wave changes  Q Waves: none  Comparison to prior: Unchanged from 2/6/16  Clinical Impression: normal EKG        Repeat EKG Interpretation:      Interpreted by Krishna Pan MD  Time reviewed: 1913 pm  Symptoms at time of EKG: Right jaw pain   Rhythm: Normal sinus   Rate: Normal  Axis: Normal  Ectopy: Premature ventricular contractions (x2)  Conduction: Normal  ST Segments/ T Waves: No acute ST-T wave changes and no acute ischemic changes  Q Waves: None  Comparison to prior: Other than to PVCs, no significant  change versus initial EKG in the ED  Clinical Impression: no acute changes      Results for orders placed or performed during the hospital encounter of 04/20/17 (from the past 24 hour(s))   CBC with platelets differential   Result Value Ref Range    WBC 9.2 4.0 - 11.0 10e9/L    RBC Count 4.16 3.8 - 5.2 10e12/L    Hemoglobin  14.3 11.7 - 15.7 g/dL    Hematocrit 40.9 35.0 - 47.0 %    MCV 98 78 - 100 fl    MCH 34.4 (H) 26.5 - 33.0 pg    MCHC 35.0 31.5 - 36.5 g/dL    RDW 13.4 10.0 - 15.0 %    Platelet Count 232 150 - 450 10e9/L    Diff Method Automated Method     % Neutrophils 69.9 %    % Lymphocytes 20.3 %    % Monocytes 7.0 %    % Eosinophils 2.3 %    % Basophils 0.4 %    % Immature Granulocytes 0.1 %    Absolute Neutrophil 6.4 1.6 - 8.3 10e9/L    Absolute Lymphocytes 1.9 0.8 - 5.3 10e9/L    Absolute Monocytes 0.7 0.0 - 1.3 10e9/L    Absolute Eosinophils 0.2 0.0 - 0.7 10e9/L    Absolute Basophils 0.0 0.0 - 0.2 10e9/L    Abs Immature Granulocytes 0.0 0 - 0.4 10e9/L   Troponin I   Result Value Ref Range    Troponin I ES  0.000 - 0.045 ug/L     <0.015  The 99th percentile for upper reference range is 0.045 ug/L.  Troponin values in   the range of 0.045 - 0.120 ug/L may be associated with risks of adverse   clinical events.     Comprehensive metabolic panel   Result Value Ref Range    Sodium 140 133 - 144 mmol/L    Potassium 3.1 (L) 3.4 - 5.3 mmol/L    Chloride 100 94 - 109 mmol/L    Carbon Dioxide 31 20 - 32 mmol/L    Anion Gap 9 3 - 14 mmol/L    Glucose 100 (H) 70 - 99 mg/dL    Urea Nitrogen 9 7 - 30 mg/dL    Creatinine 0.59 0.52 - 1.04 mg/dL    GFR Estimate >90  Non  GFR Calc   >60 mL/min/1.7m2    GFR Estimate If Black >90   GFR Calc   >60 mL/min/1.7m2    Calcium 9.1 8.5 - 10.1 mg/dL    Bilirubin Total 0.4 0.2 - 1.3 mg/dL    Albumin 3.7 3.4 - 5.0 g/dL    Protein Total 7.6 6.8 - 8.8 g/dL    Alkaline Phosphatase 142 40 - 150 U/L    ALT 31 0 - 50 U/L    AST 27 0 - 45 U/L   Erythrocyte sedimentation rate auto   Result Value Ref Range    Sed Rate 12 0 - 30 mm/h   CT Head Neck Angio w/o & w Contrast    Narrative    CT ANGIOGRAM OF THE HEAD AND NECK WITHOUT AND WITH CONTRAST  4/20/2017  6:48 PM     HISTORY: Right jaw pain, dysphagia and frontal headache.    TECHNIQUE:  Precontrast localizing scans were followed by  CT  angiography with an injection of 100 mL Omnipaque 370 IV with scans  through the head and neck.  Images were transferred to a separate 3-D  workstation where multiplanar reformations and 3-D images were  created.  Estimates of carotid stenoses are made relative to the  distal internal carotid artery diameters except as noted. Radiation  dose for this scan was reduced using automated exposure control,  adjustment of the mA and/or kV according to patient size, or iterative  reconstruction technique.    COMPARISON: MR angiogram 5/15/2009.    CT HEAD FINDINGS:  No contrast enhancing lesions.  Cerebral blood flow  is grossly normal.    CT ANGIOGRAM HEAD FINDINGS:  Arteries are widely patent with no  aneurysm, significant stenosis, occlusion or intraarterial thrombus.  Venous circulation is unremarkable. There is fetal origin of the right  posterior cerebral artery from the internal carotid, a normal variant.    CT ANGIOGRAM NECK FINDINGS:   Right carotid artery: Tortuous cervical internal carotid artery.  No  significant stenosis.      Left carotid artery: Minimal calcified plaque proximal left internal  carotid artery.  No significant stenosis.      Vertebral arteries: No significant stenosis.      Other findings: None.      Impression    IMPRESSION: No evidence of arterial stenosis, dissection, occlusion or  aneurysm.     DIONNE WING MD   Troponin I   Result Value Ref Range    Troponin I ES  0.000 - 0.045 ug/L     <0.015  The 99th percentile for upper reference range is 0.045 ug/L.  Troponin values in   the range of 0.045 - 0.120 ug/L may be associated with risks of adverse   clinical events.            Medications   aspirin chewable tablet 243 mg (243 mg Oral Given 4/20/17 1721)   LORazepam (ATIVAN) injection 0.5 mg (0.5 mg Intravenous Given 4/20/17 1723)   iopamidol (ISOVUE-370) solution 100 mL (100 mLs Intravenous Given 4/20/17 1807)   sodium chloride 0.9 % for CT scan flush dose 100 mL (100 mLs Intravenous  Given 4/20/17 1807)   HYDROmorphone (PF) (DILAUDID) injection 0.5 mg (0.5 mg Intravenous Given 4/20/17 1851)   ketorolac (TORADOL) injection 15 mg (15 mg Intravenous Given 4/20/17 2034)     7:02 PM - originally not improved after Ativan and aspirin.  She was given Dilaudid and now feeling improved.  We will also give Toradol.  She was reexamined and appears to have very clear right TMJ tenderness.  We discussed the laboratory and CT evaluation results.  Will repeat EKG and repeat troponin.      Assessments & Plan (with Medical Decision Making)   61-year-old female with sudden onset of right TMJ facial pain followed by development of right arm discomfort/dysesthesia and lower abdominal pain with belching which began approximately 4 hours prior to arrival while at work today.  Also has some symptoms of anxiety or driving home from work, shortly prior to arrival. Serial EKGs and troponins were negative. CT/CTA head and neck stroke evaluation was unremarkable, without evidence of mass, hemorrhage, stroke or dissection.  Laboratory evaluation unremarkable.  Doubt atypical ACS/MI, PE, dissection.  Doubt ICH, TIA/CVA or dissection. Doubt temporal arteritis.  She has a small area of rash at the corner of the right mouth and history of HSV, but no other suggestion of herpes/zoster.  After discussion of the differential diagnosis with patient and her family she and they are comfortable with discharge and the disposition plan.  Will have her follow-up in her primary care clinic as soon as possible.  Will refer her to ENT clinic, or she'll follow-up with her dentist, as soon as possible.  Rx Norco use if needed for pain refractory to NSAID. Patient was provided instructions for supportive care and will return as needed for worsened condition or worsening symptoms, or new problems or concerns.      I have reviewed the nursing notes.    I have reviewed the findings, diagnosis, plan and need for follow up with the  patient.    Discharge Medication List as of 4/20/2017  9:35 PM      START taking these medications    Details   HYDROcodone-acetaminophen (NORCO) 5-325 MG per tablet 1-2 tabs po q 4-6 hrs. prn pain, Disp-15 tablet, R-0, Local Print             Final diagnoses:   Jaw pain - Right jaw pain, suspect TMJ etiology   Pain of right upper extremity   Abdominal pain, generalized       4/20/2017   [facility] EMERGENCY DEPARTMENT     Krishna Pan MD  04/20/17 7320

## 2017-04-20 NOTE — ED NOTES
"Pt here with multiple complaints, including jaw pain that increased with eating, abd pain, feeling\"terrible\".   "

## 2017-04-21 NOTE — DISCHARGE INSTRUCTIONS
Use one or more over the counter meds as needed to prevent constipation from opiate analgesic (Vicodin/Norco):     Colace (stool softener)  Fiber supplement  Milk of Magnesia  Miralax powder daily  Dulcolax or Senokot (laxative)    These are available over-the-counter and can be used together or in combination, as needed to prevent constipation.

## 2017-04-24 DIAGNOSIS — M05.731 RHEUMATOID ARTHRITIS INVOLVING RIGHT WRIST WITH POSITIVE RHEUMATOID FACTOR (H): ICD-10-CM

## 2017-04-24 NOTE — TELEPHONE ENCOUNTER
Ibuprofen 800mg      Last Written Prescription Date: 3/7/17  Last Quantity: 90, # refills: 0  Last Office Visit with [facility], [facility] or [facility] prescribing provider: 1/9/17       Creatinine   Date Value Ref Range Status   04/20/2017 0.59 0.52 - 1.04 mg/dL Final     Lab Results   Component Value Date    AST 27 04/20/2017     Lab Results   Component Value Date    ALT 31 04/20/2017     BP Readings from Last 3 Encounters:   04/20/17 128/82   01/16/17 150/79   01/09/17 136/74

## 2017-04-25 RX ORDER — IBUPROFEN 800 MG/1
800 TABLET, FILM COATED ORAL EVERY 8 HOURS PRN
Qty: 90 TABLET | Refills: 7 | Status: SHIPPED | OUTPATIENT
Start: 2017-04-25 | End: 2018-04-30

## 2017-04-25 NOTE — TELEPHONE ENCOUNTER
Ibuprofen        Last Written Prescription Date: 03-07-17  Last Quantity: 90, # refills: 0  Last Office Visit with [facility], [facility] or [facility] prescribing provider: 01-16-17       Creatinine   Date Value Ref Range Status   04/20/2017 0.59 0.52 - 1.04 mg/dL Final     Lab Results   Component Value Date    AST 27 04/20/2017     Lab Results   Component Value Date    ALT 31 04/20/2017     BP Readings from Last 3 Encounters:   04/20/17 128/82   01/16/17 150/79   01/09/17 136/74       Catherine Francisco  [facility] RN

## 2017-05-12 ENCOUNTER — OFFICE VISIT (OUTPATIENT)
Dept: FAMILY MEDICINE | Facility: CLINIC | Age: 62
End: 2017-05-12
Payer: COMMERCIAL

## 2017-05-12 VITALS
HEART RATE: 68 BPM | SYSTOLIC BLOOD PRESSURE: 120 MMHG | DIASTOLIC BLOOD PRESSURE: 62 MMHG | BODY MASS INDEX: 34.2 KG/M2 | HEIGHT: 66 IN | WEIGHT: 212.8 LBS | TEMPERATURE: 97 F

## 2017-05-12 DIAGNOSIS — J06.9 VIRAL URI WITH COUGH: ICD-10-CM

## 2017-05-12 DIAGNOSIS — H65.02 ACUTE SEROUS OTITIS MEDIA OF LEFT EAR, RECURRENCE NOT SPECIFIED: Primary | ICD-10-CM

## 2017-05-12 PROCEDURE — 99214 OFFICE O/P EST MOD 30 MIN: CPT | Performed by: FAMILY MEDICINE

## 2017-05-12 NOTE — NURSING NOTE
"Chief Complaint   Patient presents with     Ear Problem     left ear plugged up X 1 week and sore throat X yesterday morning, throat is better today.      Health Maintenance     pt. due for fit test.         Initial /62 (BP Location: Right arm, Patient Position: Chair, Cuff Size: Adult Large)  Pulse 68  Temp 97  F (36.1  C) (Tympanic)  Ht 5' 5.5\" (1.664 m)  Wt 212 lb 12.8 oz (96.5 kg)  BMI 34.87 kg/m2 Estimated body mass index is 34.87 kg/(m^2) as calculated from the following:    Height as of this encounter: 5' 5.5\" (1.664 m).    Weight as of this encounter: 212 lb 12.8 oz (96.5 kg).  Medication Reconciliation: complete     Nicolasa Corral, CMA      "

## 2017-05-12 NOTE — PROGRESS NOTES
SUBJECTIVE:  Mine Meléndez is a 61 year old female who presents with the following concerns;              Symptoms: cc Present Absent Comment   Fever/Chills   X    Fatigue  X     Muscle Aches   X    Eye Irritation   X    Sneezing   X    Nasal Fabrice/Drg   X    Sinus Pressure/Pain   X    Loss of smell   X    Dental pain   X    Sore Throat  X  BETTER TODAY   Swollen Glands       Ear Pain/Fullness X X   plugged sensation with decreased hearing left ear    Cough  X  SOME DRY COUGH   Wheeze   X    Chest Pain   X    Shortness of breath   X    Rash   X    Other     was on a plane a week ago and not sure if the ear was affected by the plane ride      Symptom duration:  X 1 WEEK   Sympom severity:  ONGOING ISSUE WITH LEFT EAR   Treatments tried:  PEROXIDE, WARM WATER   Contacts:  NONE       Medications updated and reviewed.  Past, family and surgical history is updated and reviewed in the record.  ROS:  Other than noted above, general, HEENT, respiratory, cardiac and gastrointestinal systems are negative.  OBJECTIVE:  GENERAL:  Alert, no acute distress  EYES:  PERRL, EOM normal, conjunctiva and lids normal  HEENT: right TM normal, left TM abnormal, dull, increased vascularity, yellow fluid behind TM, retracted, oropharynx clear  NECK:  No adenopathy,masses or thyromegaly.  RESP:  Lungs clear to auscultation.  CV: normal rate, regular rhythm, no murmur or gallop.  ASSESSMENT:  1. Acute serous otitis media of left ear, recurrence not specified    2. Viral URI with cough        PLAN:  Discussed pathophysiology of this condition and implications.  Questions answered.       Patient Instructions   Continue Eusatachian tube maneuvers. The ear should clear eventually, but may take a week or 2. Let us know if you get severe pain or fever and then we would use an antbiotic.

## 2017-05-12 NOTE — MR AVS SNAPSHOT
"              After Visit Summary   5/12/2017    Mine Meléndez    MRN: [id number]           Patient Information     Date Of Birth          1955        Visit Information        Provider Department      5/12/2017 9:20 AM KEVIN Flowers MD [facility]        Care Instructions    Continue Eusatachian tube maneuvers. The ear should clear eventually, but may take a week or 2. Let us know if you get severe pain or fever and then we would use an antbiotic.        Follow-ups after your visit        Who to contact     If you have questions or need follow up information about today's clinic visit or your schedule please contact [facility] directly at [phone].  Normal or non-critical lab and imaging results will be communicated to you by [employer]C[facility]art, letter or phone within 4 business days after the clinic has received the results. If you do not hear from us within 7 days, please contact the clinic through [employer] or phone. If you have a critical or abnormal lab result, we will notify you by phone as soon as possible.  Submit refill requests through [facility]U or call your pharmacy and they will forward the refill request to us. Please allow 3 business days for your refill to be completed.          Additional Information About Your Visit        MyChart Information     [facility]U gives you secure access to your electronic health record. If you see a primary care provider, you can also send messages to your care team and make appointments. If you have questions, please call your primary care clinic.  If you do not have a primary care provider, please call [phone] and they will assist you.        Care EveryWhere ID     This is your Care EveryWhere ID. This could be used by other organizations to access your [place] medical records  [id number]        Your Vitals Were     Pulse Temperature Height BMI (Body Mass Index)          68 97  F (36.1  C) (Tympanic) 5' 5.5\" (1.664 m) 34.87 kg/m2   "       Blood Pressure from Last 3 Encounters:   05/12/17 120/62   04/20/17 128/82   01/16/17 150/79    Weight from Last 3 Encounters:   05/12/17 212 lb 12.8 oz (96.5 kg)   04/20/17 210 lb (95.3 kg)   01/16/17 218 lb (98.9 kg)              Today, you had the following     No orders found for display       Primary Care Provider Office Phone # Fax #    Alysha Tosha Gaviria [phone]       [facility] [address] [address] [facility] [address]        Thank you!     Thank you for choosing [facility]  for your care. Our goal is always to provide you with excellent care. Hearing back from our patients is one way we can continue to improve our services. Please take a few minutes to complete the written survey that you may receive in the mail after your visit with us. Thank you!             Your Updated Medication List - Protect others around you: Learn how to safely use, store and throw away your medicines at www.disposemymeds.org.          This list is accurate as of: 5/12/17  9:38 AM.  Always use your most recent med list.                   Brand Name Dispense Instructions for use    aspirin 81 MG tablet     100    ONE DAILY       estradiol 0.5 MG tablet    ESTRACE    90 tablet    Take one tablet every 1-3 days as needed for hot flashes       folic acid 1 MG tablet    FOLVITE    90 tablet    Take 1 tablet (1,000 mcg) by mouth daily Must make appt before any further refills       hydrochlorothiazide 25 MG tablet    HYDRODIURIL    90 tablet    Take 1 tablet (25 mg) by mouth every morning       HYDROcodone-acetaminophen 5-325 MG per tablet    NORCO    15 tablet    1-2 tabs po q 4-6 hrs. prn pain       ibuprofen 800 MG tablet    ADVIL/MOTRIN    90 tablet    Take 1 tablet (800 mg) by mouth every 8 hours as needed for pain       levothyroxine 50 MCG tablet    SYNTHROID/LEVOTHROID    90 tablet    Take 1 tablet (50 mcg) by mouth daily       methotrexate 2.5 MG tablet CHEMO     100  tablet    Eight tablets once a week       MULTI-VITAMIN PO      1 daily       omeprazole 20 MG tablet     90 tablet    Take 1 tablet (20 mg) by mouth daily Take 30-60 minutes before a meal.       Potassium Chloride ER 20 MEQ Tbcr     90 tablet    Take 1 tablet (20 mEq) by mouth daily       ranitidine 300 MG tablet    ZANTAC    30 tablet    Take 1 tablet (300 mg) by mouth At Bedtime       simvastatin 20 MG tablet    ZOCOR    90 tablet    Take 1 tablet (20 mg) by mouth At Bedtime

## 2017-05-12 NOTE — PATIENT INSTRUCTIONS
Continue Eusatachian tube maneuvers. The ear should clear eventually, but may take a week or 2. Let us know if you get severe pain or fever and then we would use an antbiotic.

## 2017-06-27 ENCOUNTER — TELEPHONE (OUTPATIENT)
Dept: FAMILY MEDICINE | Facility: CLINIC | Age: 62
End: 2017-06-27

## 2017-06-27 DIAGNOSIS — E78.5 HYPERLIPIDEMIA LDL GOAL <130: ICD-10-CM

## 2017-06-27 DIAGNOSIS — I10 ESSENTIAL HYPERTENSION WITH GOAL BLOOD PRESSURE LESS THAN 140/90: Primary | ICD-10-CM

## 2017-06-27 DIAGNOSIS — E03.9 HYPOTHYROIDISM, UNSPECIFIED TYPE: ICD-10-CM

## 2017-06-27 DIAGNOSIS — E87.6 DIURETIC-INDUCED HYPOKALEMIA: ICD-10-CM

## 2017-06-27 DIAGNOSIS — T50.2X5A DIURETIC-INDUCED HYPOKALEMIA: ICD-10-CM

## 2017-06-27 RX ORDER — POTASSIUM CHLORIDE 1500 MG/1
20 TABLET, EXTENDED RELEASE ORAL DAILY
Qty: 30 TABLET | Refills: 0 | Status: SHIPPED | OUTPATIENT
Start: 2017-06-27 | End: 2017-07-05

## 2017-06-27 RX ORDER — HYDROCHLOROTHIAZIDE 25 MG/1
25 TABLET ORAL EVERY MORNING
Qty: 30 TABLET | Refills: 0 | Status: SHIPPED | OUTPATIENT
Start: 2017-06-27 | End: 2017-07-05

## 2017-06-27 NOTE — TELEPHONE ENCOUNTER
Routing refill request to provider for review/approval because:  Labs out of range:  Potassium was low in the emergency dept in April.   Potassium   Date Value Ref Range Status   04/20/2017 3.1 (L) 3.4 - 5.3 mmol/L Final   ]  Renata Cowart RNC

## 2017-06-27 NOTE — TELEPHONE ENCOUNTER
Hydrochlorothiazide 25mg      Last Written Prescription Date: 6/7/16  Last Fill Quantity: 90, # refills: 3  Last Office Visit with [facility], [facility] or [facility] prescribing provider: 5/12/17  Next 5 appointments (look out 90 days)     Jul 06, 2017  4:00 PM CDT   Office Visit with Alysha [facility]aviria MD   [facility]    [address] [address]   [phone]                   Potassium   Date Value Ref Range Status   04/20/2017 3.1 (L) 3.4 - 5.3 mmol/L Final     Creatinine   Date Value Ref Range Status   04/20/2017 0.59 0.52 - 1.04 mg/dL Final     BP Readings from Last 3 Encounters:   05/12/17 120/62   04/20/17 128/82   01/16/17 150/79       Thank You-  Justa Wagner CPhT  [facility]

## 2017-06-27 NOTE — TELEPHONE ENCOUNTER
Notify patient I renewed her HCTZ and potassium supplement just for one month because I don't want her to run out. /When she was in the ED in April, her potassium level was a bit low, so I want to recheck that before renewing these  For a full year.  I see she is coming to see me next week, and there are a number of meds that are due for refill now in late June, so we will go over all of these.  I am placing a future order for a potassium recheck -- if she wants to make a lab appointment this week, then I would have that result by the time I see her.  We will update her TSH for the thyroid at the same time, also her cholesterol levels. She does not need to fast, but if she can get the blood work done in the morning rather than the afternoon, that would be ideal.  If afternoon works better for her, that is OK.      Alysha Gaviria md

## 2017-06-30 NOTE — TELEPHONE ENCOUNTER
Patient returned our call and I gave her the message and she understood, and has a lab appt for 7/3/17  [facility] [place] [facility]

## 2017-07-03 DIAGNOSIS — T50.2X5A DIURETIC-INDUCED HYPOKALEMIA: ICD-10-CM

## 2017-07-03 DIAGNOSIS — E87.6 DIURETIC-INDUCED HYPOKALEMIA: ICD-10-CM

## 2017-07-03 DIAGNOSIS — E03.9 HYPOTHYROIDISM, UNSPECIFIED TYPE: ICD-10-CM

## 2017-07-03 DIAGNOSIS — E78.5 HYPERLIPIDEMIA LDL GOAL <130: ICD-10-CM

## 2017-07-03 DIAGNOSIS — E03.8 OTHER SPECIFIED HYPOTHYROIDISM: ICD-10-CM

## 2017-07-03 DIAGNOSIS — I10 ESSENTIAL HYPERTENSION WITH GOAL BLOOD PRESSURE LESS THAN 140/90: ICD-10-CM

## 2017-07-03 LAB
CHOLEST SERPL-MCNC: 146 MG/DL
HDLC SERPL-MCNC: 51 MG/DL
LDLC SERPL CALC-MCNC: 70 MG/DL
NONHDLC SERPL-MCNC: 95 MG/DL
POTASSIUM SERPL-SCNC: 3.9 MMOL/L (ref 3.4–5.3)
TRIGL SERPL-MCNC: 126 MG/DL
TSH SERPL DL<=0.005 MIU/L-ACNC: 2.15 MU/L (ref 0.4–4)

## 2017-07-03 PROCEDURE — 84132 ASSAY OF SERUM POTASSIUM: CPT | Performed by: FAMILY MEDICINE

## 2017-07-03 PROCEDURE — 36415 COLL VENOUS BLD VENIPUNCTURE: CPT | Performed by: FAMILY MEDICINE

## 2017-07-03 PROCEDURE — 84443 ASSAY THYROID STIM HORMONE: CPT | Performed by: FAMILY MEDICINE

## 2017-07-03 PROCEDURE — 80061 LIPID PANEL: CPT | Performed by: FAMILY MEDICINE

## 2017-07-05 RX ORDER — HYDROCHLOROTHIAZIDE 25 MG/1
25 TABLET ORAL EVERY MORNING
Qty: 90 TABLET | Refills: 3 | Status: SHIPPED | OUTPATIENT
Start: 2017-07-05 | End: 2018-04-30

## 2017-07-05 RX ORDER — LEVOTHYROXINE SODIUM 50 UG/1
50 TABLET ORAL DAILY
Qty: 90 TABLET | Refills: 3 | Status: SHIPPED | OUTPATIENT
Start: 2017-07-05 | End: 2018-04-30

## 2017-07-05 RX ORDER — POTASSIUM CHLORIDE 1500 MG/1
20 TABLET, EXTENDED RELEASE ORAL DAILY
Qty: 90 TABLET | Refills: 1 | Status: SHIPPED | OUTPATIENT
Start: 2017-07-05 | End: 2018-03-01

## 2017-07-05 RX ORDER — SIMVASTATIN 20 MG
20 TABLET ORAL AT BEDTIME
Qty: 90 TABLET | Refills: 3 | Status: SHIPPED | OUTPATIENT
Start: 2017-07-05 | End: 2018-04-30

## 2017-07-07 ENCOUNTER — OFFICE VISIT (OUTPATIENT)
Dept: FAMILY MEDICINE | Facility: CLINIC | Age: 62
End: 2017-07-07
Payer: COMMERCIAL

## 2017-07-07 VITALS
BODY MASS INDEX: 34.2 KG/M2 | TEMPERATURE: 97.9 F | HEIGHT: 66 IN | WEIGHT: 212.8 LBS | DIASTOLIC BLOOD PRESSURE: 74 MMHG | SYSTOLIC BLOOD PRESSURE: 139 MMHG | HEART RATE: 69 BPM

## 2017-07-07 DIAGNOSIS — N95.1 SYMPTOMATIC MENOPAUSAL OR FEMALE CLIMACTERIC STATES: ICD-10-CM

## 2017-07-07 DIAGNOSIS — H93.12 TINNITUS, LEFT: Primary | ICD-10-CM

## 2017-07-07 DIAGNOSIS — K21.9 GASTROESOPHAGEAL REFLUX DISEASE WITHOUT ESOPHAGITIS: ICD-10-CM

## 2017-07-07 PROCEDURE — 99213 OFFICE O/P EST LOW 20 MIN: CPT | Performed by: FAMILY MEDICINE

## 2017-07-07 RX ORDER — NICOTINE POLACRILEX 4 MG/1
20 GUM, CHEWING ORAL DAILY
Qty: 90 TABLET | Refills: 3 | Status: SHIPPED | OUTPATIENT
Start: 2017-07-07 | End: 2018-11-12

## 2017-07-07 NOTE — NURSING NOTE
"Chief Complaint   Patient presents with     Ear Problem     left ear feels plugged  since May 8th 2017       Initial /69 (BP Location: Right arm, Cuff Size: Adult Large)  Pulse 71  Temp 97.9  F (36.6  C) (Tympanic)  Ht 5' 5.5\" (1.664 m)  Wt 212 lb 12.8 oz (96.5 kg)  Breastfeeding? No  BMI 34.87 kg/m2 Estimated body mass index is 34.87 kg/(m^2) as calculated from the following:    Height as of this encounter: 5' 5.5\" (1.664 m).    Weight as of this encounter: 212 lb 12.8 oz (96.5 kg).  Medication Reconciliation: complete    "

## 2017-07-07 NOTE — PATIENT INSTRUCTIONS
Thank you for choosing [facility] ([facility]  You may be receiving a survey in the mail from [facility] regarding your visit today.  Please take a few minutes to complete and return the survey to let us know how we are doing.      Our Clinic hours are:  Mondays    7:20 am - 7 pm  Tues -  Fri  7:20 am - 5 pm    Clinic Phone: [phone]    The clinic lab opens at 7:30 am Mon - Fri and appointments are required.    [facility]. [phone]  Monday-Thursday 8 am - 7pm  Tues/Wed/Fri 8 am - 5:30 pm

## 2017-07-07 NOTE — MR AVS SNAPSHOT
After Visit Summary   7/7/2017    Mine Meléndez    MRN: [id number]           Patient Information     Date Of Birth          1955        Visit Information        Provider Department      7/7/2017 3:40 PM Alysha Gaviria MD [facility]        Today's Diagnoses     Tinnitus, left    -  1    Symptomatic menopausal or female climacteric states        Gastroesophageal reflux disease without esophagitis          Care Instructions          Thank you for choosing [facility].  You may be receiving a survey in the mail from [facility] Valerio regarding your visit today.  Please take a few minutes to complete and return the survey to let us know how we are doing.      Our Clinic hours are:  Mondays    7:20 am - 7 pm  Tues -  Fri  7:20 am - 5 pm    Clinic Phone: [phone]    The clinic lab opens at 7:30 am Mon - Fri and appointments are required.    [facility]  Ph. [phone]  Monday-Thursday 8 am - 7pm  Tues/Wed/Fri 8 am - 5:30 pm                 Follow-ups after your visit        Who to contact     If you have questions or need follow up information about today's clinic visit or your schedule please contact [facility] directly at [phone].  Normal or non-critical lab and imaging results will be communicated to you by MyChart, letter or phone within 4 business days after the clinic has received the results. If you do not hear from us within 7 days, please contact the clinic through [facility] or phone. If you have a critical or abnormal lab result, we will notify you by phone as soon as possible.  Submit refill requests through [facility] or call your pharmacy and they will forward the refill request to us. Please allow 3 business days for your refill to be completed.          Additional Information About Your Visit        [facility] Information     [facility] gives you secure access to your electronic health record. If you see a primary care provider,  "you can also send messages to your care team and make appointments. If you have questions, please call your primary care clinic.  If you do not have a primary care provider, please call [phone] and they will assist you.        Care EveryWhere ID     This is your Care EveryWhere ID. This could be used by other organizations to access your [facility] medical records  [id number]        Your Vitals Were     Pulse Temperature Height Breastfeeding? BMI (Body Mass Index)       69 97.9  F (36.6  C) (Tympanic) 5' 5.5\" (1.664 m) No 34.87 kg/m2        Blood Pressure from Last 3 Encounters:   07/07/17 139/74   05/12/17 120/62   04/20/17 128/82    Weight from Last 3 Encounters:   07/07/17 212 lb 12.8 oz (96.5 kg)   05/12/17 212 lb 12.8 oz (96.5 kg)   04/20/17 210 lb (95.3 kg)              Today, you had the following     No orders found for display         Today's Medication Changes          These changes are accurate as of: 7/7/17  3:58 PM.  If you have any questions, ask your nurse or doctor.               Stop taking these medicines if you haven't already. Please contact your care team if you have questions.     ranitidine 300 MG tablet   Commonly known as:  ZANTAC   Stopped by:  Alysha Gaviria MD                Where to get your medicines      These medications were sent to [facility] [address]  [address] [facility] [address]     Phone:  [phone]     omeprazole 20 MG tablet                Primary Care Provider Office Phone # Fax #    Alysha Gaviria [phone]       [facility] [address] [facility] [address]        Equal Access to Services     EDA CULLEN : Kendal armenta Soethan, waaxda luqadaha, qaybta kaalmada aderichyyadiego, kelli baltazar. [facility] [phone].    ATENCIÓN: Si habla español, tiene a menjivar disposición servicios gratuitos de asistencia lingüística. " Sohan pena [phone].    We comply with applicable federal civil rights laws and [place] laws. We do not discriminate on the basis of race, color, national origin, age, disability sex, sexual orientation or gender identity.            Thank you!     Thank you for choosing [facility]  for your care. Our goal is always to provide you with excellent care. Hearing back from our patients is one way we can continue to improve our services. Please take a few minutes to complete the written survey that you may receive in the mail after your visit with us. Thank you!             Your Updated Medication List - Protect others around you: Learn how to safely use, store and throw away your medicines at www.disposemymeds.org.          This list is accurate as of: 7/7/17  3:58 PM.  Always use your most recent med list.                   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)       hydrochlorothiazide 25 MG tablet    HYDRODIURIL    90 tablet    Take 1 tablet (25 mg) by mouth every morning    Essential hypertension with goal blood pressure less than 140/90       ibuprofen 800 MG tablet    ADVIL/MOTRIN    90 tablet    Take 1 tablet (800 mg) by mouth every 8 hours as needed for pain    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    Gastroesophageal reflux disease without esophagitis       Potassium Chloride ER 20 MEQ Tbcr     90 tablet    Take 1 tablet (20 mEq) by mouth daily    Diuretic-induced hypokalemia       simvastatin 20 MG tablet    ZOCOR    90 tablet    Take 1 tablet (20 mg) by mouth At Bedtime    Hyperlipidemia LDL goal <130

## 2017-07-07 NOTE — PROGRESS NOTES
"  SUBJECTIVE:                                                    Mine Meléndez is a 62 year old female who presents to clinic today for the following health issues:      Concern -   Chief Complaint   Patient presents with     Ear Problem     left ear feels plugged     Mine had seen Dr. Flowers on May 12 for a feeling of blockage in her left ear, and was noted to have an acute serous otitis media. She was told symptoms would remit within a couple of weeks.  The sense of plugging is somewhat better and she denies pain, does not feel her hearing is impaired, but has been hearing a continuing sound like running water in the ear. She denies any current vertigo.    OBJECTIVE: /74 (Cuff Size: Adult Large)  Pulse 69  Temp 97.9  F (36.6  C) (Tympanic)  Ht 5' 5.5\" (1.664 m)  Wt 212 lb 12.8 oz (96.5 kg)  Breastfeeding? No  BMI 34.87 kg/m2    Right ear is unremarkable.  Left ear shows a normal TM, no fluid or erythema.  Hearing is normal and symmetric bilaterally to the sound of rubbing fingers  Stephens and Rinne testing are normal.    ASSESSMENT: tinnitus left ear    PLAN: She was given patient education information on tinnitus. Symptoms are currently mild, and are unnoticed when she is in conversation or busy, most noted in silence.  It at some point this becomes more bothersome, then we will arrange a full audiology evaluation and referral to ENT.    Alysha Gaviria md  "

## 2017-08-05 DIAGNOSIS — M06.9 RHEUMATOID ARTHRITIS (H): ICD-10-CM

## 2017-08-05 LAB
ALBUMIN SERPL-MCNC: 3.7 G/DL (ref 3.4–5)
ALP SERPL-CCNC: 169 U/L (ref 40–150)
ALT SERPL W P-5'-P-CCNC: 34 U/L (ref 0–50)
ANION GAP SERPL CALCULATED.3IONS-SCNC: 8 MMOL/L (ref 3–14)
AST SERPL W P-5'-P-CCNC: 26 U/L (ref 0–45)
BASOPHILS # BLD AUTO: 0 10E9/L (ref 0–0.2)
BASOPHILS NFR BLD AUTO: 0.4 %
BILIRUB SERPL-MCNC: 0.4 MG/DL (ref 0.2–1.3)
BUN SERPL-MCNC: 13 MG/DL (ref 7–30)
CALCIUM SERPL-MCNC: 9.2 MG/DL (ref 8.5–10.1)
CHLORIDE SERPL-SCNC: 103 MMOL/L (ref 94–109)
CO2 SERPL-SCNC: 28 MMOL/L (ref 20–32)
CREAT SERPL-MCNC: 0.6 MG/DL (ref 0.52–1.04)
DIFFERENTIAL METHOD BLD: ABNORMAL
EOSINOPHIL # BLD AUTO: 0.2 10E9/L (ref 0–0.7)
EOSINOPHIL NFR BLD AUTO: 2 %
ERYTHROCYTE [DISTWIDTH] IN BLOOD BY AUTOMATED COUNT: 13.2 % (ref 10–15)
GFR SERPL CREATININE-BSD FRML MDRD: ABNORMAL ML/MIN/1.7M2
GLUCOSE SERPL-MCNC: 88 MG/DL (ref 70–99)
HCT VFR BLD AUTO: 42.7 % (ref 35–47)
HGB BLD-MCNC: 14.8 G/DL (ref 11.7–15.7)
LYMPHOCYTES # BLD AUTO: 2.1 10E9/L (ref 0.8–5.3)
LYMPHOCYTES NFR BLD AUTO: 24.4 %
MCH RBC QN AUTO: 34.9 PG (ref 26.5–33)
MCHC RBC AUTO-ENTMCNC: 34.7 G/DL (ref 31.5–36.5)
MCV RBC AUTO: 101 FL (ref 78–100)
MONOCYTES # BLD AUTO: 0.6 10E9/L (ref 0–1.3)
MONOCYTES NFR BLD AUTO: 7.1 %
NEUTROPHILS # BLD AUTO: 5.7 10E9/L (ref 1.6–8.3)
NEUTROPHILS NFR BLD AUTO: 66.1 %
PLATELET # BLD AUTO: 259 10E9/L (ref 150–450)
POTASSIUM SERPL-SCNC: 3.4 MMOL/L (ref 3.4–5.3)
PROT SERPL-MCNC: 7.6 G/DL (ref 6.8–8.8)
RBC # BLD AUTO: 4.24 10E12/L (ref 3.8–5.2)
SODIUM SERPL-SCNC: 139 MMOL/L (ref 133–144)
WBC # BLD AUTO: 8.6 10E9/L (ref 4–11)

## 2017-08-05 PROCEDURE — 85025 COMPLETE CBC W/AUTO DIFF WBC: CPT | Performed by: FAMILY MEDICINE

## 2017-08-05 PROCEDURE — 36415 COLL VENOUS BLD VENIPUNCTURE: CPT | Performed by: FAMILY MEDICINE

## 2017-08-05 PROCEDURE — 80053 COMPREHEN METABOLIC PANEL: CPT | Performed by: FAMILY MEDICINE

## 2017-08-07 DIAGNOSIS — Z79.899 HIGH RISK MEDICATIONS (NOT ANTICOAGULANTS) LONG-TERM USE: ICD-10-CM

## 2017-08-07 NOTE — TELEPHONE ENCOUNTER
Methotrexate 2.5mg        Last Written Prescription Date: 01/16/17  Last Fill Quantity: 100,  # refills: 1   Last Office Visit with [facility], [facility] or [facility] prescribing provider: 07/07/17    Ayo Aponte Pharmacy Technician  [facility]

## 2017-08-08 NOTE — TELEPHONE ENCOUNTER
Pt calling to check status of refill she is needing this refill today.   She is out    Emani Ashley  Specialty CSS

## 2017-08-22 ENCOUNTER — NURSE TRIAGE (OUTPATIENT)
Dept: NURSING | Facility: CLINIC | Age: 62
End: 2017-08-22

## 2017-08-22 NOTE — TELEPHONE ENCOUNTER
"Bee sting suffered 5 days ago to inner thigh area remains red, swollen and \"hard\".  May have been slightly irritated with pants today, had seemed slightly improved yesterday.      Additional Information    Normal local reaction to bee, wasp, or yellow jacket sting (all triage questions negative)    Protocols used: BEE OR YELLOW JACKET STING-ADULT-    "

## 2017-09-05 ENCOUNTER — TELEPHONE (OUTPATIENT)
Dept: FAMILY MEDICINE | Facility: CLINIC | Age: 62
End: 2017-09-05

## 2017-09-05 DIAGNOSIS — H93.19 TINNITUS: Primary | ICD-10-CM

## 2017-09-05 NOTE — TELEPHONE ENCOUNTER
Reason for Call: Request for an order or referral:    Order or referral being requested: ENT    Date needed: within one week    Has the patient been seen by the PCP for this problem? YES    Additional comments: She seen Dr. Gaviria about 1 month ago for her ears.  Dr. Gaviria said something about her seeing ENT.  Mine is wondering if she needs the referral and if she does can she get one?  Please advise.    Phone number Patient can be reached at:  Home number on file [phone] (home)    Best Time:  any    Can we leave a detailed message on this number?  YES    Call taken on 9/5/2017 at 4:09 PM by Zayda Christine

## 2017-09-06 ENCOUNTER — MYC MEDICAL ADVICE (OUTPATIENT)
Dept: FAMILY MEDICINE | Facility: CLINIC | Age: 62
End: 2017-09-06

## 2017-09-14 ENCOUNTER — OFFICE VISIT (OUTPATIENT)
Dept: OTOLARYNGOLOGY | Facility: CLINIC | Age: 62
End: 2017-09-14
Payer: COMMERCIAL

## 2017-09-14 ENCOUNTER — OFFICE VISIT (OUTPATIENT)
Dept: AUDIOLOGY | Facility: CLINIC | Age: 62
End: 2017-09-14
Payer: COMMERCIAL

## 2017-09-14 VITALS
HEIGHT: 66 IN | DIASTOLIC BLOOD PRESSURE: 86 MMHG | WEIGHT: 215 LBS | SYSTOLIC BLOOD PRESSURE: 145 MMHG | BODY MASS INDEX: 34.55 KG/M2 | TEMPERATURE: 98.1 F | HEART RATE: 78 BPM

## 2017-09-14 DIAGNOSIS — H93.13 TINNITUS, BILATERAL: Primary | ICD-10-CM

## 2017-09-14 DIAGNOSIS — H90.3 SENSORINEURAL HEARING LOSS, ASYMMETRICAL: Primary | ICD-10-CM

## 2017-09-14 DIAGNOSIS — H90.3 BILATERAL SENSORINEURAL HEARING LOSS: ICD-10-CM

## 2017-09-14 DIAGNOSIS — H81.02 MENIERE'S DISEASE, LEFT EAR: ICD-10-CM

## 2017-09-14 DIAGNOSIS — H93.12 TINNITUS, LEFT EAR: ICD-10-CM

## 2017-09-14 PROCEDURE — 92550 TYMPANOMETRY & REFLEX THRESH: CPT | Performed by: AUDIOLOGIST

## 2017-09-14 PROCEDURE — 99207 ZZC NO CHARGE LOS: CPT | Performed by: AUDIOLOGIST

## 2017-09-14 PROCEDURE — 99243 OFF/OP CNSLTJ NEW/EST LOW 30: CPT | Performed by: OTOLARYNGOLOGY

## 2017-09-14 PROCEDURE — 92557 COMPREHENSIVE HEARING TEST: CPT | Performed by: AUDIOLOGIST

## 2017-09-14 ASSESSMENT — PAIN SCALES - GENERAL: PAINLEVEL: NO PAIN (0)

## 2017-09-14 NOTE — PROGRESS NOTES
AUDIOLOGY REPORT    SUBJECTIVE:  Mine Meléndez is a 62 year old female who was seen in the Audiology Clinic at [facility] for an audiologic evaluation, referred by Dr. Gaviria.  No previous audiograms are available at today's appointment.  The patient reports left ear tinnitus and a plugged sensation since flying in May 2017.History of occupational noise exposure. The patient denies tinnitus in the right ear, bilateral otalgia and bilateral drainage.     OBJECTIVE:  Otoscopic exam indicates ears are clear of cerumen bilaterally     Pure Tone Thresholds assessed using conventional audiometry with good  reliability from 250-8000 Hz bilaterally using insert earphones and circumaural headphones     RIGHT:  normal and moderate sensorineural hearing loss    LEFT:    borderline-normal and moderate sensorineural hearing loss    Tympanogram:    RIGHT: normal eardrum mobility, 1000 Hz ipsi/contra reflex present    LEFT:   normal eardrum mobility, 1000 Hz ipsi/contra reflex present    Speech Reception Threshold:    RIGHT: 30 dB HL    LEFT:   40 dB HL  Word Recognition Score:     RIGHT: 88% at 60 dB HL using W22 recorded word list.    LEFT:   80% at 70 dB HL using W22 recorded word list.      ASSESSMENT:   Moderate rising to mild sensorineural hearing loss bilaterally.     Today s results were discussed with the patient in detail.     PLAN: It is recommended that the patient be seen by Dr. Harris for medical evaluation of their ears and hearing evaluation. Patient was counseled regarding hearing loss and impact on communication. Reviewed possible origins of tinnitus and strategies for management.  Patient is a good candidate for amplification at this time.A [facility] information packet was given to the patient. Please call this clinic with questions regarding these results or recommendations.        Aretha Zamorano M.A. LUIS M-AAA  Clinical audiologist [place] # [id number]  9/14/2017

## 2017-09-14 NOTE — PROGRESS NOTES
"    History of Present Illness - Mine Meléndez is a 62 year old female seen in consultation at the request of Dr. Gaviria for tinnitus. Several years ago she had 2 cases of vertigo. The duration of the episodes was unclear, but seemed to be around 24 hours. She has been experiencing \"humming and ringing\" in her left ear. Denies any recent vertigo episodes or surgeries on ears. She denies any recent change in hearing. She is able to get to sleep despite the tinnitus without difficulty.      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)       Current Medications -   Current Outpatient Prescriptions:      methotrexate 2.5 MG tablet CHEMO, Eight tablets once a week, Disp: 100 tablet, Rfl: 1     omeprazole 20 MG tablet, Take 1 tablet (20 mg) by mouth daily Take 30-60 minutes before a meal., Disp: 90 tablet, Rfl: 3     hydrochlorothiazide (HYDRODIURIL) 25 MG tablet, Take 1 tablet (25 mg) by mouth every morning, Disp: 90 tablet, Rfl: 3     Potassium Chloride ER 20 MEQ TBCR, Take 1 tablet (20 mEq) by mouth daily, Disp: 90 tablet, Rfl: 1     levothyroxine (SYNTHROID/LEVOTHROID) 50 MCG tablet, Take 1 tablet (50 mcg) by mouth daily, Disp: 90 tablet, Rfl: 3     simvastatin (ZOCOR) 20 MG tablet, Take 1 tablet (20 mg) by mouth At Bedtime, Disp: 90 tablet, Rfl: 3     ibuprofen (ADVIL/MOTRIN) 800 MG tablet, Take 1 tablet (800 mg) by mouth every 8 hours as needed for pain, Disp: 90 tablet, Rfl: 7     folic acid (FOLVITE) 1 MG tablet, Take 1 tablet (1,000 mcg) by mouth daily Must make appt before any further refills, Disp: 90 tablet, Rfl: 3     estradiol (ESTRACE) " "0.5 MG tablet, Take one tablet every 1-3 days as needed for hot flashes, Disp: 90 tablet, Rfl: 3     ASPIRIN 81 MG OR TABS, ONE DAILY, Disp: 100, Rfl: 3     MULTI-VITAMIN OR, 1 daily, Disp: , Rfl:     Allergies -   Allergies   Allergen Reactions     Codeine Rash     Flagyl [Imidazole Antifungals] Rash     Septra [Sulfamethoxazole W/Trimethoprim] Itching     Puffy eyes, flushed.        Social History -   Social History     Social History     Marital status:      Spouse name: N/A     Number of children: N/A     Years of education: N/A     Social History Main Topics     Smoking status: Former Smoker     Years: 25.00     Quit date: 5/6/2000     Smokeless tobacco: Never Used     Alcohol use Yes      Comment: 6 pack of beer on a weekend     Drug use: No     Sexual activity: Yes     Partners: Male     Birth control/ protection: Surgical      Comment: [email or web address]     Other Topics Concern     Parent/Sibling W/ Cabg, Mi Or Angioplasty Before 65f 55m? No     Social History Narrative       Family History -   Family History   Problem Relation Age of Onset     DIABETES Father      DIABETES Brother      DIABETES Sister      Hypertension Sister      Hypertension Sister        Review of Systems - As per HPI and PMHx, otherwise 7 system review of the head and neck negative. 10+ system review negative.    Physical Exam  /86 (BP Location: Right arm, Patient Position: Sitting, Cuff Size: Adult Regular)  Pulse 78  Temp 98.1  F (36.7  C) (Oral)  Ht 1.664 m (5' 5.5\")  Wt 97.5 kg (215 lb)  BMI 35.23 kg/m2  General - The patient is well nourished and well developed, and appears to have good nutritional status.  Alert and oriented to person and place, answers questions and cooperates with examination appropriately.   Head and Face - Normocephalic and atraumatic, with no gross asymmetry noted of the contour of the facial features.  The facial nerve is intact, with strong symmetric movements.  Voice and Breathing - The " patient was breathing comfortably without the use of accessory muscles. There was no wheezing, stridor, or stertor.  The patients voice was clear and strong, and had appropriate pitch and quality.  Ears - Bilateral pinna and EACs with normal appearing overlying skin. Tympanic membrane intact with good mobility on pneumatic otoscopy bilaterally. Bony landmarks of the ossicular chain are normal. The tympanic membranes are normal in appearance. No retraction, perforation, or masses.  No fluid or purulence was seen in the external canal or the middle ear.   Eyes - Extraocular movements intact.  Sclera were not icteric or injected, conjunctiva were pink and moist.  Mouth - Examination of the oral cavity showed pink, healthy oral mucosa. No lesions or ulcerations noted.  The tongue was mobile and midline, and the dentition were in good condition.    Throat - The walls of the oropharynx were smooth, pink, moist, symmetric, and had no lesions or ulcerations.  The tonsillar pillars and soft palate were symmetric.  The uvula was midline on elevation.  Neck - Normal midline excursion of the laryngotracheal complex during swallowing.  Full range of motion on passive movement.  Palpation of the occipital, submental, submandibular, internal jugular chain, and supraclavicular nodes did not demonstrate any abnormal lymph nodes or masses.  The carotid pulse was palpable bilaterally.  Palpation of the thyroid was soft and smooth, with no nodules or goiter appreciated.  The trachea was mobile and midline.  Nose - External contour is symmetric, no gross deflection or scars.  Nasal mucosa is pink and moist with no abnormal mucus.  The septum was midline and non-obstructive, turbinates of normal size and position.  No polyps, masses, or purulence noted on examination.    Audiogram 09/16/17  Pure Tone Thresholds assessed using conventional audiometry with good  reliability from 250-8000 Hz bilaterally using insert earphones and  circumaural headphones     RIGHT:  normal and moderate sensorineural hearing loss    LEFT:    borderline-normal and moderate sensorineural hearing loss     Tympanogram:    RIGHT: normal eardrum mobility, 1000 Hz ipsi/contra reflex present    LEFT:   normal eardrum mobility, 1000 Hz ipsi/contra reflex present     Speech Reception Threshold:    RIGHT: 30 dB HL    LEFT:   40 dB HL  Word Recognition Score:     RIGHT: 88% at 60 dB HL using W22 recorded word list.    LEFT:   80% at 70 dB HL using W22 recorded word list.         Assessment - Mine Meléndez is a 62 year old female with complaints of ringing in her left ear. I spent the remainder of today's visit educating the patient about the current theory on the cause of Meniere's Disease and the reasoning behind its treatment.  We discussed the CATS (Caffeine, Alcohol, Tobacco, Salt/Stress) Avoidance Diet, as well as safety measures to be done at home to avoid injury.  Finally, the variability, potential permanent hearing loss, and natural course of Meniere's disease, including 30% incidence of eventual bilateral involvement, was also discussed.  I discussed considering hearing aids because she is in a hearing loss range that I think would benefit from greatly from amplification. She is not fully interested in this option currently, but will arrange a hearing aid consultation should she become interested in the future.        This document serves as a record of the services and decisions personally performed and made by Dr. Mariza Harris MD. It was created on his behalf by Gisela Donnelly, a trained medical scribe. The creation of this document is based the provider's statements to the medical scribe.  Gisela Donnelly 2:26 PM 9/14/2017    Provider:   The information in this document, created by the medical scribe for me, accurately reflects the services I personally performed and the decisions made by me. I have reviewed and approved this document for accuracy prior to  leaving the patient care area.  Dr. Mariza Harris MD 2:26 PM 9/14/2017    Dr. Mariza Harris MD  Otolaryngology  [facility]

## 2017-09-14 NOTE — MR AVS SNAPSHOT
After Visit Summary   9/14/2017    Mine Meléndez    MRN: [id number]           Patient Information     Date Of Birth          1955        Visit Information        Provider Department      9/14/2017 1:30 PM Aretha Zamorano [facility]        Today's Diagnoses     Sensorineural hearing loss, asymmetrical    -  1    Tinnitus, left ear           Follow-ups after your visit        Who to contact     If you have questions or need follow up information about today's clinic visit or your schedule please contact [facility] directly at [phone].  Normal or non-critical lab and imaging results will be communicated to you by Viratechhart, letter or phone within 4 business days after the clinic has received the results. If you do not hear from us within 7 days, please contact the clinic through "[employer]t or phone. If you have a critical or abnormal lab result, we will notify you by phone as soon as possible.  Submit refill requests through Bindo or call your pharmacy and they will forward the refill request to us. Please allow 3 business days for your refill to be completed.          Additional Information About Your Visit        MyChart Information     Bindo gives you secure access to your electronic health record. If you see a primary care provider, you can also send messages to your care team and make appointments. If you have questions, please call your primary care clinic.  If you do not have a primary care provider, please call [phone] and they will assist you.        Care EveryWhere ID     This is your Care EveryWhere ID. This could be used by other organizations to access your [place] medical records  [id number]         Blood Pressure from Last 3 Encounters:   09/14/17 145/86   07/07/17 139/74   05/12/17 120/62    Weight from Last 3 Encounters:   09/14/17 215 lb (97.5 kg)   07/07/17 212 lb 12.8 oz (96.5 kg)   05/12/17 212 lb 12.8 oz (96.5 kg)              We  Performed the Following     AUDIOGRAM/TYMPANOGRAM - INTERFACE     COMPREHENSIVE HEARING TEST     TYMPANOMETRY AND REFLEX THRESHOLD MEASUREMENTS        Primary Care Provider Office Phone # Fax #    Alysha Gaviria [phone] [address] [facility] [address]        Equal Access to Services     GERARDOFRANKY SUBHASH : Hadii zelalem ku hadasho Soomaali, waaxda luqadaha, qaybta kaalmada adeegyada, waxay estrella haydonitapedro lowry nelliluisa baltazar. So [facility] [phone].    ATENCIÓN: Si habla español, tiene a menjivar disposición servicios gratuitos de asistencia lingüística. [facility] [phone].    We comply with applicable federal civil rights laws and [place] laws. We do not discriminate on the basis of race, color, national origin, age, disability sex, sexual orientation or gender identity.            Thank you!     Thank you for choosing [facility]  for your care. Our goal is always to provide you with excellent care. Hearing back from our patients is one way we can continue to improve our services. Please take a few minutes to complete the written survey that you may receive in the mail after your visit with us. Thank you!             Your Updated Medication List - Protect others around you: Learn how to safely use, store and throw away your medicines at www.disposemymeds.org.          This list is accurate as of: 9/14/17  2:57 PM.  Always use your most recent med list.                   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)        hydrochlorothiazide 25 MG tablet    HYDRODIURIL    90 tablet    Take 1 tablet (25 mg) by mouth every morning    Essential hypertension with goal blood pressure less than 140/90       ibuprofen 800 MG tablet    ADVIL/MOTRIN    90 tablet    Take 1 tablet (800 mg) by mouth every 8 hours as needed for pain    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    Gastroesophageal reflux disease without esophagitis       Potassium Chloride ER 20 MEQ Tbcr     90 tablet    Take 1 tablet (20 mEq) by mouth daily    Diuretic-induced hypokalemia       simvastatin 20 MG tablet    ZOCOR    90 tablet    Take 1 tablet (20 mg) by mouth At Bedtime    Hyperlipidemia LDL goal <130

## 2017-09-14 NOTE — NURSING NOTE
"Initial /86 (BP Location: Right arm, Patient Position: Sitting, Cuff Size: Adult Regular)  Pulse 78  Temp 98.1  F (36.7  C) (Oral)  Ht 1.664 m (5' 5.5\")  Wt 97.5 kg (215 lb)  BMI 35.23 kg/m2 Estimated body mass index is 35.23 kg/(m^2) as calculated from the following:    Height as of this encounter: 1.664 m (5' 5.5\").    Weight as of this encounter: 97.5 kg (215 lb). .    Shaylee Fulton CMA    "

## 2017-09-14 NOTE — MR AVS SNAPSHOT
After Visit Summary   9/14/2017    Mine Meléndez    MRN: [id number]           Patient Information     Date Of Birth          1955        Visit Information        Provider Department      9/14/2017 2:00 PM Mariza Harris MD [facility]        Today's Diagnoses     Tinnitus, bilateral    -  1    Bilateral sensorineural hearing loss        Meniere's disease, left ear          Care Instructions    Per Physician's instructions              Follow-ups after your visit        Additional Services     AUDIOLOGY ADULT REFERRAL       Your provider has referred you to: [facility] [phone]   [email or web address]    Specialty Testing:  Audiogram w/Tymps and Reflexes (Comprehensive Audiology Evaluation)                  Who to contact     If you have questions or need follow up information about today's clinic visit or your schedule please contact [facility] directly at [phone].  Normal or non-critical lab and imaging results will be communicated to you by MyChart, letter or phone within 4 business days after the clinic has received the results. If you do not hear from us within 7 days, please contact the clinic through [employer] or phone. If you have a critical or abnormal lab result, we will notify you by phone as soon as possible.  Submit refill requests through Yones or call your pharmacy and they will forward the refill request to us. Please allow 3 business days for your refill to be completed.          Additional Information About Your Visit        MyChart Information     Yones gives you secure access to your electronic health record. If you see a primary care provider, you can also send messages to your care team and make appointments. If you have questions, please call your primary care clinic.  If you do not have a primary care provider, please call [phone] and they will assist you.        Care  "EveryWhere ID     This is your Care EveryWhere ID. This could be used by other organizations to access your [place] medical records  [id number]        Your Vitals Were     Pulse Temperature Height BMI (Body Mass Index)          78 98.1  F (36.7  C) (Oral) 1.664 m (5' 5.5\") 35.23 kg/m2         Blood Pressure from Last 3 Encounters:   09/14/17 145/86   07/07/17 139/74   05/12/17 120/62    Weight from Last 3 Encounters:   09/14/17 97.5 kg (215 lb)   07/07/17 96.5 kg (212 lb 12.8 oz)   05/12/17 96.5 kg (212 lb 12.8 oz)              We Performed the Following     AUDIOLOGY ADULT REFERRAL        Primary Care Provider Office Phone # Fax #    Alysha Tosha Gaviria [phone] [address] [facility] [address]        Equal Access to Services     EDA CULLEN : Hadii aad ku hadasho Soomaali, waaxda luqadaha, qaybta kaalmada adeegyada, waxay rolandin haydonitan essence rasmussen . So [facility] [phone].    ATENCIÓN: Si allen gramajo, tiene a menjivar disposición servicios gratuitos de asistencia lingüística. Llame al [phone].    We comply with applicable federal civil rights laws and [place] laws. We do not discriminate on the basis of race, color, national origin, age, disability sex, sexual orientation or gender identity.            Thank you!     Thank you for choosing [facility]  for your care. Our goal is always to provide you with excellent care. Hearing back from our patients is one way we can continue to improve our services. Please take a few minutes to complete the written survey that you may receive in the mail after your visit with us. Thank you!             Your Updated Medication List - Protect others around you: Learn how to safely use, store and throw away your medicines at www.disposemymeds.org.          This list is accurate as of: 9/14/17 11:59 PM.  Always use your most recent med list.                   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)       hydrochlorothiazide 25 MG tablet    HYDRODIURIL    90 tablet    Take 1 tablet (25 mg) by mouth every morning    Essential hypertension with goal blood pressure less than 140/90       ibuprofen 800 MG tablet    ADVIL/MOTRIN    90 tablet    Take 1 tablet (800 mg) by mouth every 8 hours as needed for pain    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    Gastroesophageal reflux disease without esophagitis       Potassium Chloride ER 20 MEQ Tbcr     90 tablet    Take 1 tablet (20 mEq) by mouth daily    Diuretic-induced hypokalemia       simvastatin 20 MG tablet    ZOCOR    90 tablet    Take 1 tablet (20 mg) by mouth At Bedtime    Hyperlipidemia LDL goal <130

## 2017-09-22 ENCOUNTER — TELEPHONE (OUTPATIENT)
Dept: FAMILY MEDICINE | Facility: CLINIC | Age: 62
End: 2017-09-22

## 2017-09-22 NOTE — TELEPHONE ENCOUNTER
Panel Management Review      Patient has the following on her problem list:     Hypertension   Last three blood pressure readings:  BP Readings from Last 3 Encounters:   09/14/17 145/86   07/07/17 139/74   05/12/17 120/62     Blood pressure: MONITOR    HTN Guidelines:  Age 18-59 BP range:  Less than 140/90  Age 60-85 with Diabetes:  Less than 140/90  Age 60-85 without Diabetes:  less than 150/90        Composite cancer screening  Chart review shows that this patient is due/due soon for the following Mammogram and Fecal Colorectal (FIT)  Summary:    Patient is due/failing the following:   FIT and MAMMOGRAM    Action needed:   Patient needs referral/order: mammogram/fit    Type of outreach:    Phone, left message for patient to call back.     Questions for provider review:    None                                                                                                                                    Irene Rankin MA       Chart routed to Care Team .      ]

## 2017-11-21 ENCOUNTER — TELEPHONE (OUTPATIENT)
Dept: FAMILY MEDICINE | Facility: CLINIC | Age: 62
End: 2017-11-21

## 2017-11-21 NOTE — TELEPHONE ENCOUNTER
11/21/2017    Call Regarding Preventive Health Screening Colonoscopy and Mammogram    Attempt 1    Message on voicemail     Comments:       Outreach   CC

## 2018-01-05 ENCOUNTER — OFFICE VISIT (OUTPATIENT)
Dept: FAMILY MEDICINE | Facility: CLINIC | Age: 63
End: 2018-01-05
Payer: COMMERCIAL

## 2018-01-05 VITALS
HEART RATE: 72 BPM | HEIGHT: 65 IN | BODY MASS INDEX: 34.26 KG/M2 | TEMPERATURE: 97.5 F | WEIGHT: 205.6 LBS | SYSTOLIC BLOOD PRESSURE: 134 MMHG | DIASTOLIC BLOOD PRESSURE: 64 MMHG

## 2018-01-05 DIAGNOSIS — R82.90 NONSPECIFIC FINDING ON EXAMINATION OF URINE: ICD-10-CM

## 2018-01-05 DIAGNOSIS — N30.01 ACUTE CYSTITIS WITH HEMATURIA: ICD-10-CM

## 2018-01-05 DIAGNOSIS — Z12.31 ENCOUNTER FOR SCREENING MAMMOGRAM FOR BREAST CANCER: ICD-10-CM

## 2018-01-05 DIAGNOSIS — Z12.11 ENCOUNTER FOR FIT (FECAL IMMUNOCHEMICAL TEST) SCREENING: ICD-10-CM

## 2018-01-05 DIAGNOSIS — R30.0 DYSURIA: Primary | ICD-10-CM

## 2018-01-05 LAB
ALBUMIN UR-MCNC: NEGATIVE MG/DL
APPEARANCE UR: ABNORMAL
BACTERIA #/AREA URNS HPF: ABNORMAL /HPF
BILIRUB UR QL STRIP: NEGATIVE
COLOR UR AUTO: YELLOW
GLUCOSE UR STRIP-MCNC: NEGATIVE MG/DL
HGB UR QL STRIP: ABNORMAL
KETONES UR STRIP-MCNC: NEGATIVE MG/DL
LEUKOCYTE ESTERASE UR QL STRIP: ABNORMAL
NITRATE UR QL: NEGATIVE
PH UR STRIP: 6.5 PH (ref 5–7)
RBC #/AREA URNS AUTO: ABNORMAL /HPF
SOURCE: ABNORMAL
SP GR UR STRIP: 1.01 (ref 1–1.03)
SPECIMEN SOURCE: NORMAL
UROBILINOGEN UR STRIP-ACNC: 0.2 EU/DL (ref 0.2–1)
WBC #/AREA URNS AUTO: ABNORMAL /HPF
WET PREP SPEC: NORMAL

## 2018-01-05 PROCEDURE — 87088 URINE BACTERIA CULTURE: CPT | Performed by: NURSE PRACTITIONER

## 2018-01-05 PROCEDURE — 87186 SC STD MICRODIL/AGAR DIL: CPT | Performed by: NURSE PRACTITIONER

## 2018-01-05 PROCEDURE — 87210 SMEAR WET MOUNT SALINE/INK: CPT | Performed by: NURSE PRACTITIONER

## 2018-01-05 PROCEDURE — 87086 URINE CULTURE/COLONY COUNT: CPT | Performed by: NURSE PRACTITIONER

## 2018-01-05 PROCEDURE — 99213 OFFICE O/P EST LOW 20 MIN: CPT | Performed by: NURSE PRACTITIONER

## 2018-01-05 PROCEDURE — 81001 URINALYSIS AUTO W/SCOPE: CPT | Performed by: NURSE PRACTITIONER

## 2018-01-05 RX ORDER — NITROFURANTOIN 25; 75 MG/1; MG/1
100 CAPSULE ORAL 2 TIMES DAILY
Qty: 20 CAPSULE | Refills: 0 | Status: SHIPPED | OUTPATIENT
Start: 2018-01-05 | End: 2018-01-15

## 2018-01-05 ASSESSMENT — ENCOUNTER SYMPTOMS
COUGH: 0
SORE THROAT: 0
HEMATURIA: 0
NAUSEA: 0
DIZZINESS: 0
FEVER: 0
SHORTNESS OF BREATH: 0
EYE DISCHARGE: 0
CONSTIPATION: 0
CHILLS: 0
DIAPHORESIS: 0
SINUS PRESSURE: 0
DYSURIA: 1
FREQUENCY: 1
EYE ITCHING: 0
LIGHT-HEADEDNESS: 0
DIARRHEA: 0
WHEEZING: 0
RHINORRHEA: 0
HEADACHES: 0
FATIGUE: 0

## 2018-01-05 NOTE — NURSING NOTE
"Chief Complaint   Patient presents with     UTI       Initial /64 (BP Location: Left arm, Patient Position: Sitting, Cuff Size: Adult Large)  Pulse 72  Temp 97.5  F (36.4  C) (Tympanic)  Ht 5' 5.25\" (1.657 m)  Wt 205 lb 9.6 oz (93.3 kg)  BMI 33.95 kg/m2 Estimated body mass index is 33.95 kg/(m^2) as calculated from the following:    Height as of this encounter: 5' 5.25\" (1.657 m).    Weight as of this encounter: 205 lb 9.6 oz (93.3 kg).  Medication Reconciliation: complete     April RIN Rucker      "

## 2018-01-05 NOTE — PROGRESS NOTES
SUBJECTIVE:   Mine Meléndez is a 62 year old female who presents to clinic today for the following health issues:      URINARY TRACT SYMPTOMS      Duration: 1 week    Description  dysuria, frequency, urgency and odor    Intensity:  severe    Accompanying signs and symptoms:  Fever/chills: no   Flank pain no   Nausea and vomiting: no   Vaginal symptoms: none  Abdominal/Pelvic Pain: no     History  History of frequent UTI's: no   History of kidney stones: no   Sexually Active: YES  Possibility of pregnancy: No    Precipitating or alleviating factors: None    Therapies tried and outcome: increase fluid intake and cranberry capsules   Outcome: not effective        Problem list and histories reviewed & adjusted, as indicated.  Additional history: as documented    Current Outpatient Prescriptions   Medication Sig Dispense Refill     nitroFURantoin, macrocrystal-monohydrate, (MACROBID) 100 MG capsule Take 1 capsule (100 mg) by mouth 2 times daily for 10 days 20 capsule 0     methotrexate 2.5 MG tablet CHEMO Eight tablets once a week 100 tablet 1     omeprazole 20 MG tablet Take 1 tablet (20 mg) by mouth daily Take 30-60 minutes before a meal. 90 tablet 3     hydrochlorothiazide (HYDRODIURIL) 25 MG tablet Take 1 tablet (25 mg) by mouth every morning 90 tablet 3     Potassium Chloride ER 20 MEQ TBCR Take 1 tablet (20 mEq) by mouth daily 90 tablet 1     levothyroxine (SYNTHROID/LEVOTHROID) 50 MCG tablet Take 1 tablet (50 mcg) by mouth daily 90 tablet 3     simvastatin (ZOCOR) 20 MG tablet Take 1 tablet (20 mg) by mouth At Bedtime 90 tablet 3     ibuprofen (ADVIL/MOTRIN) 800 MG tablet Take 1 tablet (800 mg) by mouth every 8 hours as needed for pain 90 tablet 7     folic acid (FOLVITE) 1 MG tablet Take 1 tablet (1,000 mcg) by mouth daily Must make appt before any further refills 90 tablet 3     estradiol (ESTRACE) 0.5 MG tablet Take one tablet every 1-3 days as needed for hot flashes 90 tablet 3     ASPIRIN 81 MG OR TABS ONE  "DAILY 100 3     MULTI-VITAMIN OR 1 daily       Allergies   Allergen Reactions     Codeine Rash     Flagyl [Imidazole Antifungals] Rash     Septra [Sulfamethoxazole W/Trimethoprim] Itching     Puffy eyes, flushed.        Reviewed and updated as needed this visit by clinical staffTobacco  Allergies  Meds  Med Hx  Surg Hx  Fam Hx  Soc Hx      Reviewed and updated as needed this visit by Provider        Has been going to the BR more frequently for the last 10 days. No fevers or chills. Has been taking cranberry pills and that really is not helping.     Needs to get set up for mammogram. Does self breast exams without area of concern.     Needs FIT test as well.       ROS:  Review of Systems   Constitutional: Negative for chills, diaphoresis, fatigue and fever.   HENT: Negative for ear discharge, ear pain, hearing loss, rhinorrhea, sinus pressure and sore throat.    Eyes: Negative for discharge and itching.   Respiratory: Negative for cough, shortness of breath and wheezing.    Gastrointestinal: Negative for constipation, diarrhea and nausea.   Genitourinary: Positive for dysuria, frequency and urgency. Negative for enuresis, hematuria, vaginal discharge and vaginal pain.   Skin: Negative for rash.   Neurological: Negative for dizziness, light-headedness and headaches.         OBJECTIVE:     /64 (BP Location: Left arm, Patient Position: Sitting, Cuff Size: Adult Large)  Pulse 72  Temp 97.5  F (36.4  C) (Tympanic)  Ht 5' 5.25\" (1.657 m)  Wt 205 lb 9.6 oz (93.3 kg)  BMI 33.95 kg/m2  Body mass index is 33.95 kg/(m^2).  Physical Exam   Constitutional: She appears well-developed.   HENT:   Right Ear: Tympanic membrane and external ear normal.   Left Ear: Tympanic membrane and external ear normal.   Nose: No mucosal edema or rhinorrhea.   Cardiovascular: Normal rate, regular rhythm and normal heart sounds.    Pulmonary/Chest: Effort normal and breath sounds normal.   Abdominal: Soft. Bowel sounds are normal. "   Neurological: She is alert.   Skin: Skin is warm and dry.   Psychiatric: She has a normal mood and affect.       ASSESSMENT/PLAN:   1. Dysuria  - UA reflex to Microscopic and Culture  - Wet prep  - Urine Microscopic    2. Encounter for FIT (fecal immunochemical test) screening  Order placed, plans to call per self and set up  FIT test given  - Fecal colorectal cancer screen FIT; Future    3. Encounter for screening mammogram for breast cancer  Order placed, plans to call per self and set up  - MA Screening Digital Bilateral; Future    4. Nonspecific finding on examination of urine  - Urine Culture Aerobic Bacterial    5. Acute cystitis with hematuria  Patient counseled regarding prevention of UTI's  Patient instructed to return for fever, vomiting, rash, flank/back pain or if symptoms not resolved in 2 days  Will send urine for C&S  - nitroFURantoin, macrocrystal-monohydrate, (MACROBID) 100 MG capsule; Take 1 capsule (100 mg) by mouth 2 times daily for 10 days  Dispense: 20 capsule; Refill: 0        ISRAEL Rabago [facility]

## 2018-01-05 NOTE — PATIENT INSTRUCTIONS
These are general instructions and may not be specific to you. Please call, email or follow up if you have any questions or concerns.     Urinary Tract Infections in Women    Urinary tract infections (UTIs) are most often caused by bacteria (germs). These bacteria enter the urinary tract. The bacteria may come from outside the body. Or they may travel from the skin outside the rectum or vagina into the urethra. Female anatomy makes it easy for bacteria from the bowel to enter a woman s urinary tract, which is the most common source of UTI. This means women develop UTIs more often than men. Pain in or around the urinary tract is a common UTI symptom. But the only way to know for sure if you have a UTI for the healthcare provider to test your urine. The two tests that may be done are the urinalysis and urine culture.  Types of UTIs    Cystitis: A bladder infection (cystitis) is the most common UTI in women. You may have urgent or frequent urination. You may also have pain, burning when you urinate, and bloody urine.    Urethritis: This is an inflamed urethra, which is the tube that carries urine from the bladder to outside the body. You may have lower stomach or back pain. You may also have urgent or frequent urination.    Pyelonephritis: This is a kidney infection. If not treated, it can be serious and damage your kidneys. In severe cases, you may be hospitalized. You may have a fever and lower back pain.  Medicines to treat a UTI  Most UTIs are treated with antibiotics. These kill the bacteria. The length of time you need to take them depends on the type of infection. It may be as short as 3 days. If you have repeated UTIs, a low-dose antibiotic may be needed for several months. Take antibiotics exactly as directed. Don t stop taking them until all of the medicine is gone. If you stop taking the antibiotic too soon, the infection may not go away, and you may develop a resistance to the antibiotic. This can make it  much harder to treat.  Lifestyle changes to treat and prevent UTIs  The lifestyle changes below will help get rid of your UTI. They may also help prevent future UTIs.    Drink plenty of fluids. This includes water, juice, or other caffeine-free drinks. Fluids help flush bacteria out of your body.    Empty your bladder. Always empty your bladder when you feel the urge to urinate. And always urinate before going to sleep. Urine that stays in your bladder can lead to infection. Try to urinate before and after sex as well.    Practice good personal hygiene. Wipe yourself from front to back after using the toilet. This helps keep bacteria from getting into the urethra.    Use condoms during sex. These help prevent UTIs caused by sexually transmitted bacteria. Also, avoid using spermicides during sex. These can increase the risk of UTIs. Choose other forms of birth control instead. For women who tend to get UTIs after sex, a low-dose of a preventive antibiotic may be used. Be sure to discuss this option with your healthcare provider.    Follow up with your healthcare provider as directed. He or she may test to make sure the infection has cleared. If needed, more treatment may be started.  Date Last Reviewed: 1/1/2017 2000-2017 [employer] [address] [place] [address], [place] [address]. All rights reserved. This information is not intended as a substitute for professional medical care. Always follow your healthcare professional's instructions.

## 2018-01-05 NOTE — MR AVS SNAPSHOT
After Visit Summary   1/5/2018    Mine Meléndez    MRN: [id number]           Patient Information     Date Of Birth          1955        Visit Information        Provider Department      1/5/2018 3:20 PM Ara Mendoza APRN [facility]        Today's Diagnoses     Dysuria    -  1    Encounter for FIT (fecal immunochemical test) screening        Encounter for screening mammogram for breast cancer        Nonspecific finding on examination of urine        Acute cystitis with hematuria          Care Instructions    These are general instructions and may not be specific to you. Please call, email or follow up if you have any questions or concerns.     Urinary Tract Infections in Women    Urinary tract infections (UTIs) are most often caused by bacteria (germs). These bacteria enter the urinary tract. The bacteria may come from outside the body. Or they may travel from the skin outside the rectum or vagina into the urethra. Female anatomy makes it easy for bacteria from the bowel to enter a woman s urinary tract, which is the most common source of UTI. This means women develop UTIs more often than men. Pain in or around the urinary tract is a common UTI symptom. But the only way to know for sure if you have a UTI for the healthcare provider to test your urine. The two tests that may be done are the urinalysis and urine culture.  Types of UTIs    Cystitis: A bladder infection (cystitis) is the most common UTI in women. You may have urgent or frequent urination. You may also have pain, burning when you urinate, and bloody urine.    Urethritis: This is an inflamed urethra, which is the tube that carries urine from the bladder to outside the body. You may have lower stomach or back pain. You may also have urgent or frequent urination.    Pyelonephritis: This is a kidney infection. If not treated, it can be serious and damage your kidneys. In severe cases, you may be  hospitalized. You may have a fever and lower back pain.  Medicines to treat a UTI  Most UTIs are treated with antibiotics. These kill the bacteria. The length of time you need to take them depends on the type of infection. It may be as short as 3 days. If you have repeated UTIs, a low-dose antibiotic may be needed for several months. Take antibiotics exactly as directed. Don t stop taking them until all of the medicine is gone. If you stop taking the antibiotic too soon, the infection may not go away, and you may develop a resistance to the antibiotic. This can make it much harder to treat.  Lifestyle changes to treat and prevent UTIs  The lifestyle changes below will help get rid of your UTI. They may also help prevent future UTIs.    Drink plenty of fluids. This includes water, juice, or other caffeine-free drinks. Fluids help flush bacteria out of your body.    Empty your bladder. Always empty your bladder when you feel the urge to urinate. And always urinate before going to sleep. Urine that stays in your bladder can lead to infection. Try to urinate before and after sex as well.    Practice good personal hygiene. Wipe yourself from front to back after using the toilet. This helps keep bacteria from getting into the urethra.    Use condoms during sex. These help prevent UTIs caused by sexually transmitted bacteria. Also, avoid using spermicides during sex. These can increase the risk of UTIs. Choose other forms of birth control instead. For women who tend to get UTIs after sex, a low-dose of a preventive antibiotic may be used. Be sure to discuss this option with your healthcare provider.    Follow up with your healthcare provider as directed. He or she may test to make sure the infection has cleared. If needed, more treatment may be started.  Date Last Reviewed: 1/1/2017 2000-2017 [employer]. [address] [place] [address], [place] [address]. All rights reserved. This information is not intended as a  "substitute for professional medical care. Always follow your healthcare professional's instructions.                Follow-ups after your visit        Future tests that were ordered for you today     Open Future Orders        Priority Expected Expires Ordered    MA Screening Digital Bilateral Routine  1/5/2019 1/5/2018    Fecal colorectal cancer screen FIT Routine 1/26/2018 3/30/2018 1/5/2018            Who to contact     Normal or non-critical lab and imaging results will be communicated to you by BlueNote Networkshart, letter or phone within 4 business days after the clinic has received the results. If you do not hear from us within 7 days, please contact the clinic through [email or web address] or phone. If you have a critical or abnormal lab result, we will notify you by phone as soon as possible.  Submit refill requests through [employer] or call your pharmacy and they will forward the refill request to us. Please allow 3 business days for your refill to be completed.          If you need to speak with a  for additional information , please call: [phone]           Additional Information About Your Visit        [employer] Information     [employer] gives you secure access to your electronic health record. If you see a primary care provider, you can also send messages to your care team and make appointments. If you have questions, please call your primary care clinic.  If you do not have a primary care provider, please call [phone] and they will assist you.        Care EveryWhere ID     This is your Care EveryWhere ID. This could be used by other organizations to access your [facility] medical records  [id number]        Your Vitals Were     Pulse Temperature Height BMI (Body Mass Index)          72 97.5  F (36.4  C) (Tympanic) 5' 5.25\" (1.657 m) 33.95 kg/m2         Blood Pressure from Last 3 Encounters:   01/05/18 134/64   09/14/17 145/86   07/07/17 139/74    Weight from Last 3 Encounters:   01/05/18 205 lb 9.6 oz (93.3 " kg)   09/14/17 215 lb (97.5 kg)   07/07/17 212 lb 12.8 oz (96.5 kg)              We Performed the Following     UA reflex to Microscopic and Culture     Urine Culture Aerobic Bacterial     Urine Microscopic     Wet prep          Today's Medication Changes          These changes are accurate as of: 1/5/18  3:34 PM.  If you have any questions, ask your nurse or doctor.               Start taking these medicines.        Dose/Directions    nitroFURantoin (macrocrystal-monohydrate) 100 MG capsule   Commonly known as:  MACROBID   Used for:  Acute cystitis with hematuria   Started by:  Ara Mendoza APRN CNP        Dose:  100 mg   Take 1 capsule (100 mg) by mouth 2 times daily for 10 days   Quantity:  20 capsule   Refills:  0            Where to get your medicines      These medications were sent to [facility] [place], [place] - [address]  [address] [facility] [address]     Phone:  [phone]     nitroFURantoin (macrocrystal-monohydrate) 100 MG capsule                Primary Care Provider Fax #    Physician No Ref-Primary [phone]       No address on file        Equal Access to Services     FRANKY CULLEN : Hadii zelalem norman hadivannao Soethan, waaxda luqadaha, qaybta kaalmada aderichyyada, kelli baltazar. So [facility] [phone].    ATENCIÓN: Si habla español, tiene a menjivar disposición servicios gratuitos de asistencia lingüística. Llame al [phone].    We comply with applicable federal civil rights laws and [place] laws. We do not discriminate on the basis of race, color, national origin, age, disability, sex, sexual orientation, or gender identity.            Thank you!     Thank you for choosing [facility]  for your care. Our goal is always to provide you with excellent care. Hearing back from our patients is one way we can continue to improve our services. Please take a few minutes to complete the written survey that  you may receive in the mail after your visit with us. Thank you!             Your Updated Medication List - Protect others around you: Learn how to safely use, store and throw away your medicines at www.disposemymeds.org.          This list is accurate as of: 1/5/18  3:34 PM.  Always use your most recent med list.                   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)       hydrochlorothiazide 25 MG tablet    HYDRODIURIL    90 tablet    Take 1 tablet (25 mg) by mouth every morning    Essential hypertension with goal blood pressure less than 140/90       ibuprofen 800 MG tablet    ADVIL/MOTRIN    90 tablet    Take 1 tablet (800 mg) by mouth every 8 hours as needed for pain    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)       levothyroxine 50 MCG tablet    SYNTHROID/LEVOTHROID    90 tablet    Take 1 tablet (50 mcg) by mouth daily    Other specified hypothyroidism       methotrexate 2.5 MG tablet CHEMO     100 tablet    Eight tablets once a week    High risk medications (not anticoagulants) long-term use       MULTI-VITAMIN PO      1 daily        nitroFURantoin (macrocrystal-monohydrate) 100 MG capsule    MACROBID    20 capsule    Take 1 capsule (100 mg) by mouth 2 times daily for 10 days    Acute cystitis with hematuria       omeprazole 20 MG tablet     90 tablet    Take 1 tablet (20 mg) by mouth daily Take 30-60 minutes before a meal.    Gastroesophageal reflux disease without esophagitis       Potassium Chloride ER 20  MEQ Tbcr     90 tablet    Take 1 tablet (20 mEq) by mouth daily    Diuretic-induced hypokalemia       simvastatin 20 MG tablet    ZOCOR    90 tablet    Take 1 tablet (20 mg) by mouth At Bedtime    Hyperlipidemia LDL goal <130

## 2018-01-07 LAB
BACTERIA SPEC CULT: ABNORMAL
Lab: ABNORMAL
SPECIMEN SOURCE: ABNORMAL

## 2018-01-08 DIAGNOSIS — N30.01 ACUTE CYSTITIS WITH HEMATURIA: Primary | ICD-10-CM

## 2018-01-08 NOTE — PROGRESS NOTES
Mine,     Your urine culture is positive. You are on the right antibiotic. Make sure to take the full course of the antibiotic. Please return 3 days after your antibiotic is finished to resubmit a urine sample to make sure that infection is gone.    Please call or email with any additional questions or concerns  ISRAEL Quinteros CNP

## 2018-01-12 NOTE — TELEPHONE ENCOUNTER
Per outreach, patient is aware of overdue screening and will call on their own time for scheduling.     Thanks      Outreach ,  Dahiana Ni

## 2018-01-24 ENCOUNTER — RADIANT APPOINTMENT (OUTPATIENT)
Dept: MAMMOGRAPHY | Facility: CLINIC | Age: 63
End: 2018-01-24
Attending: NURSE PRACTITIONER
Payer: COMMERCIAL

## 2018-01-24 DIAGNOSIS — Z12.31 ENCOUNTER FOR SCREENING MAMMOGRAM FOR BREAST CANCER: ICD-10-CM

## 2018-01-24 PROCEDURE — 77067 SCR MAMMO BI INCL CAD: CPT | Mod: TC

## 2018-01-25 DIAGNOSIS — M05.731 RHEUMATOID ARTHRITIS INVOLVING RIGHT WRIST WITH POSITIVE RHEUMATOID FACTOR (H): ICD-10-CM

## 2018-01-25 NOTE — TELEPHONE ENCOUNTER
Left message for pt to call back and let staff know if she was going to follow up with another rheumatologist so refill request could be sent to them or what her plan is.   Janette BECERRIL RN BSN PHN  [facility]

## 2018-01-26 DIAGNOSIS — M05.731 RHEUMATOID ARTHRITIS INVOLVING RIGHT WRIST WITH POSITIVE RHEUMATOID FACTOR (H): Primary | ICD-10-CM

## 2018-01-26 NOTE — TELEPHONE ENCOUNTER
Patient is scheduled with Dr Baltazar on 5-1-18. We will route the request for folic acid to Dr Baltazar. She will call in here methotrexate and we can route this as well. Nadira JOHNSON

## 2018-01-26 NOTE — TELEPHONE ENCOUNTER
Patient called back. Making appointment with Dr. Baltazar for further rheumatology care. Called rheum clinic and was told earliest appointment was June- wanted to confirm this information prior to making and double check that MARY Rodriguez had not gotten another provider yet, discussed that no provider on site currently, and encouraged patient to make earliest appointment offered. Explained to patient to take first open appointment and if able place name on wait list for sooner appointment availability. Will route request for methotrexate to  as patient's primary MD retired. Is establishing new PCP, making appointment for that and being seen as soon as possible. Patient unsure if new PCP will take over methotrexate until rheumatology appointment however. Folic acid will be purchased OTC if needed in mean time per her report.      Will route this message to Dr. Baltazar as well, if able to fill methotrexate temporarily. Otherwise patient will address with new PCP at new patient appointment.     Vladimir LINN   [facility] RN

## 2018-01-26 NOTE — TELEPHONE ENCOUNTER
Left message on answering machine for patient to call back.  Dilma Dominguez RN  [facility] [place]

## 2018-01-29 DIAGNOSIS — M05.731 RHEUMATOID ARTHRITIS INVOLVING RIGHT WRIST WITH POSITIVE RHEUMATOID FACTOR (H): ICD-10-CM

## 2018-01-29 RX ORDER — FOLIC ACID 1 MG/1
1000 TABLET ORAL DAILY
Qty: 90 TABLET | Refills: 3 | OUTPATIENT
Start: 2018-01-29

## 2018-01-29 RX ORDER — FOLIC ACID 1 MG/1
1000 TABLET ORAL DAILY
Qty: 90 TABLET | Refills: 2 | Status: SHIPPED | OUTPATIENT
Start: 2018-01-29 | End: 2018-04-30

## 2018-01-29 NOTE — TELEPHONE ENCOUNTER
Rheumatology team: please call to notify Ms. Meléndez that the folic acid was refilled.  Methotrexate will require labs as I noted in the methotrexate refill request.      Nasir Baltazar MD  1/29/2018 5:09 PM

## 2018-01-29 NOTE — TELEPHONE ENCOUNTER
Rheumatology team: please call to notify Ms. Meléndez that the methotrexate refill request was received and refused.  Labs are needed prior to the refill.  Lab orders are in and can be done at any [place] lab.  Labs need to be done every 3 months.  After the labs are done, and if they are okay, I will send in the methotrexate prescription.    Nasir Baltazar MD  1/29/2018 5:09 PM

## 2018-01-31 DIAGNOSIS — M05.731 RHEUMATOID ARTHRITIS INVOLVING RIGHT WRIST WITH POSITIVE RHEUMATOID FACTOR (H): ICD-10-CM

## 2018-01-31 LAB
ALBUMIN SERPL-MCNC: 3.6 G/DL (ref 3.4–5)
ALP SERPL-CCNC: 131 U/L (ref 40–150)
ALT SERPL W P-5'-P-CCNC: 29 U/L (ref 0–50)
AST SERPL W P-5'-P-CCNC: 28 U/L (ref 0–45)
BASOPHILS # BLD AUTO: 0 10E9/L (ref 0–0.2)
BASOPHILS NFR BLD AUTO: 0.2 %
BILIRUB DIRECT SERPL-MCNC: 0.1 MG/DL (ref 0–0.2)
BILIRUB SERPL-MCNC: 0.4 MG/DL (ref 0.2–1.3)
CREAT SERPL-MCNC: 0.6 MG/DL (ref 0.52–1.04)
DIFFERENTIAL METHOD BLD: ABNORMAL
EOSINOPHIL # BLD AUTO: 0.3 10E9/L (ref 0–0.7)
EOSINOPHIL NFR BLD AUTO: 3.1 %
ERYTHROCYTE [DISTWIDTH] IN BLOOD BY AUTOMATED COUNT: 13.9 % (ref 10–15)
GFR SERPL CREATININE-BSD FRML MDRD: >90 ML/MIN/1.7M2
HCT VFR BLD AUTO: 41.1 % (ref 35–47)
HGB BLD-MCNC: 14 G/DL (ref 11.7–15.7)
LYMPHOCYTES # BLD AUTO: 2.1 10E9/L (ref 0.8–5.3)
LYMPHOCYTES NFR BLD AUTO: 23 %
MCH RBC QN AUTO: 34.6 PG (ref 26.5–33)
MCHC RBC AUTO-ENTMCNC: 34.1 G/DL (ref 31.5–36.5)
MCV RBC AUTO: 102 FL (ref 78–100)
MONOCYTES # BLD AUTO: 0.8 10E9/L (ref 0–1.3)
MONOCYTES NFR BLD AUTO: 8.5 %
NEUTROPHILS # BLD AUTO: 5.8 10E9/L (ref 1.6–8.3)
NEUTROPHILS NFR BLD AUTO: 65.2 %
PLATELET # BLD AUTO: 209 10E9/L (ref 150–450)
PROT SERPL-MCNC: 7.3 G/DL (ref 6.8–8.8)
RBC # BLD AUTO: 4.05 10E12/L (ref 3.8–5.2)
WBC # BLD AUTO: 9 10E9/L (ref 4–11)

## 2018-01-31 PROCEDURE — 85025 COMPLETE CBC W/AUTO DIFF WBC: CPT | Performed by: INTERNAL MEDICINE

## 2018-01-31 PROCEDURE — 82565 ASSAY OF CREATININE: CPT | Performed by: INTERNAL MEDICINE

## 2018-01-31 PROCEDURE — 36415 COLL VENOUS BLD VENIPUNCTURE: CPT | Performed by: INTERNAL MEDICINE

## 2018-01-31 PROCEDURE — 80076 HEPATIC FUNCTION PANEL: CPT | Performed by: INTERNAL MEDICINE

## 2018-01-31 NOTE — TELEPHONE ENCOUNTER
I informed patient that she does need to go in to get labs done in order to refill the methotrexate prescription. She said she will go into [facility] today to get that done as she does need this medication.  Christal Lujan MA

## 2018-02-01 ENCOUNTER — OFFICE VISIT (OUTPATIENT)
Dept: FAMILY MEDICINE | Facility: CLINIC | Age: 63
End: 2018-02-01
Payer: COMMERCIAL

## 2018-02-01 VITALS
HEIGHT: 65 IN | HEART RATE: 84 BPM | SYSTOLIC BLOOD PRESSURE: 134 MMHG | DIASTOLIC BLOOD PRESSURE: 70 MMHG | TEMPERATURE: 97.4 F | BODY MASS INDEX: 34.35 KG/M2 | WEIGHT: 206.2 LBS

## 2018-02-01 DIAGNOSIS — Z23 ENCOUNTER FOR IMMUNIZATION: ICD-10-CM

## 2018-02-01 DIAGNOSIS — R30.0 DYSURIA: Primary | ICD-10-CM

## 2018-02-01 LAB
ALBUMIN UR-MCNC: NEGATIVE MG/DL
APPEARANCE UR: CLEAR
BILIRUB UR QL STRIP: NEGATIVE
COLOR UR AUTO: YELLOW
GLUCOSE UR STRIP-MCNC: NEGATIVE MG/DL
HGB UR QL STRIP: ABNORMAL
KETONES UR STRIP-MCNC: NEGATIVE MG/DL
LEUKOCYTE ESTERASE UR QL STRIP: ABNORMAL
NITRATE UR QL: NEGATIVE
NON-SQ EPI CELLS #/AREA URNS LPF: NORMAL /LPF
PH UR STRIP: 5.5 PH (ref 5–7)
RBC #/AREA URNS AUTO: NORMAL /HPF
SOURCE: ABNORMAL
SP GR UR STRIP: 1.01 (ref 1–1.03)
UROBILINOGEN UR STRIP-ACNC: 0.2 EU/DL (ref 0.2–1)
WBC #/AREA URNS AUTO: NORMAL /HPF

## 2018-02-01 PROCEDURE — 87086 URINE CULTURE/COLONY COUNT: CPT | Performed by: NURSE PRACTITIONER

## 2018-02-01 PROCEDURE — 99213 OFFICE O/P EST LOW 20 MIN: CPT | Mod: 25 | Performed by: NURSE PRACTITIONER

## 2018-02-01 PROCEDURE — 90715 TDAP VACCINE 7 YRS/> IM: CPT | Performed by: NURSE PRACTITIONER

## 2018-02-01 PROCEDURE — 90471 IMMUNIZATION ADMIN: CPT | Performed by: NURSE PRACTITIONER

## 2018-02-01 PROCEDURE — 87088 URINE BACTERIA CULTURE: CPT | Performed by: NURSE PRACTITIONER

## 2018-02-01 PROCEDURE — 87186 SC STD MICRODIL/AGAR DIL: CPT | Performed by: NURSE PRACTITIONER

## 2018-02-01 PROCEDURE — 81001 URINALYSIS AUTO W/SCOPE: CPT | Performed by: NURSE PRACTITIONER

## 2018-02-01 RX ORDER — CEPHALEXIN 500 MG/1
500 CAPSULE ORAL 3 TIMES DAILY
Qty: 9 CAPSULE | Refills: 0 | Status: SHIPPED | OUTPATIENT
Start: 2018-02-01 | End: 2018-02-04

## 2018-02-01 RX ORDER — METHOTREXATE 2.5 MG/1
20 TABLET ORAL WEEKLY
Qty: 96 TABLET | Refills: 1 | Status: SHIPPED | OUTPATIENT
Start: 2018-02-01 | End: 2018-05-01

## 2018-02-01 NOTE — MR AVS SNAPSHOT
After Visit Summary   2/1/2018    Mine Meléndez    MRN: [id number]           Patient Information     Date Of Birth          1955        Visit Information        Provider Department      2/1/2018 3:40 PM Vy Beckman APRN CNP [facility]        Today's Diagnoses     Dysuria    -  1    Encounter for immunization          Care Instructions    UA showed just mild changes, but since you have symptoms start Keflex 500 mg 3 times daily for 3 days  Added urine culture, if normal no additional treatment will be needed       Drink more fluids                 Follow-ups after your visit        Your next 10 appointments already scheduled     May 01, 2018  3:00 PM CDT   New Visit with Nasir Baltazar MD   [facility] ([facility])    [address] [place] [address] [phone]              Who to contact     If you have questions or need follow up information about today's clinic visit or your schedule please contact [facility] directly at [phone].  Normal or non-critical lab and imaging results will be communicated to you by Stereotaxishart, letter or phone within 4 business days after the clinic has received the results. If you do not hear from us within 7 days, please contact the clinic through Stereotaxishart or phone. If you have a critical or abnormal lab result, we will notify you by phone as soon as possible.  Submit refill requests through [employer] or call your pharmacy and they will forward the refill request to us. Please allow 3 business days for your refill to be completed.          Additional Information About Your Visit        Stere[employer]axishart Information     [employer] gives you secure access to your electronic health record. If you see a primary care provider, you can also send messages to your care team and make appointments. If you have questions, please call your primary care clinic.  If you do not have a primary  "care provider, please call [phone] and they will assist you.        Care EveryWhere ID     This is your Care EveryWhere ID. This could be used by other organizations to access your [facility] medical records  [id number]        Your Vitals Were     Pulse Temperature Height BMI (Body Mass Index)          84 97.4  F (36.3  C) (Tympanic) 5' 5.25\" (1.657 m) 34.05 kg/m2         Blood Pressure from Last 3 Encounters:   02/01/18 134/70   01/05/18 134/64   09/14/17 145/86    Weight from Last 3 Encounters:   02/01/18 206 lb 3.2 oz (93.5 kg)   01/05/18 205 lb 9.6 oz (93.3 kg)   09/14/17 215 lb (97.5 kg)              We Performed the Following     *UA reflex to Microscopic and Culture ([place] [facility] (except [place] and [place])     TDAP VACCINE (ADACEL)     Urine Culture Aerobic Bacterial     Urine Microscopic          Today's Medication Changes          These changes are accurate as of 2/1/18  4:07 PM.  If you have any questions, ask your nurse or doctor.               Start taking these medicines.        Dose/Directions    cephALEXin 500 MG capsule   Commonly known as:  KEFLEX   Used for:  Dysuria   Started by:  Vy Beckman APRN CNP        Dose:  500 mg   Take 1 capsule (500 mg) by mouth 3 times daily for 3 days   Quantity:  9 capsule   Refills:  0            Where to get your medicines      These medications were sent to [facility] [place], [place] - [address]  [address] [facility] [address]     Phone:  [phone]     cephALEXin 500 MG capsule                Primary Care Provider Fax #    Physician No Ref-Primary [phone]       No address on file        Equal Access to Services     EDA CULLEN AH: Kendal Garcia, karen van, katie kaalmada essenceyadiego, kelli baltazar. So [facility] [phone].    ATENCIÓN: Si habla español, tiene a menjivar disposición servicios gratuitos de asistencia lingüística. " Sohan pena [phone].    We comply with applicable federal civil rights laws and [place] laws. We do not discriminate on the basis of race, color, national origin, age, disability, sex, sexual orientation, or gender identity.            Thank you!     Thank you for choosing [facility]  for your care. Our goal is always to provide you with excellent care. Hearing back from our patients is one way we can continue to improve our services. Please take a few minutes to complete the written survey that you may receive in the mail after your visit with us. Thank you!             Your Updated Medication List - Protect others around you: Learn how to safely use, store and throw away your medicines at www.disposemymeds.org.          This list is accurate as of 2/1/18  4:07 PM.  Always use your most recent med list.                   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)       hydrochlorothiazide 25 MG tablet    HYDRODIURIL    90 tablet    Take 1 tablet (25 mg) by mouth every morning    Essential hypertension with goal blood pressure less than 140/90       ibuprofen 800 MG tablet    ADVIL/MOTRIN    90 tablet    Take 1 tablet (800 mg) by mouth every 8 hours as needed for pain    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 Take all 8 tablets on the same day of each week.    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    Gastroesophageal reflux disease without esophagitis       Potassium Chloride ER 20 MEQ Tbcr     90 tablet    Take 1 tablet (20 mEq) by mouth daily    Diuretic-induced hypokalemia       simvastatin 20 MG tablet    ZOCOR    90 tablet    Take 1 tablet (20 mg) by mouth At Bedtime    Hyperlipidemia LDL goal <130

## 2018-02-01 NOTE — PROGRESS NOTES
"  SUBJECTIVE:   Mine Meléndez is a 62 year old female who presents to clinic today for the following health issues: burning pain at the end of urination. Denies abdominal pain, flank pain, nausea, vomiting and fevers. Symptoms stated 2 days ago. Recently had UTI, urine culture grew E. Coli, the patient was treated with Macrobid.    URINARY TRACT SYMPTOMS  Onset: 2 days     Description:   Painful urination (Dysuria): YES  Blood in urine (Hematuria): no   Delay in urine (Hesitency): no     Intensity: moderate    Progression of Symptoms:  same    Accompanying Signs & Symptoms:  Fever/chills: no   Flank pain no   Nausea and vomiting: no   Any vaginal symptoms: none  Abdominal/Pelvic Pain: no     History:   History of frequent UTI's: YES- had one in the beginning of Jan   History of kidney stones: no   Sexually Active: YES  Possibility of pregnancy: No    Precipitating factors:   None     Therapies Tried and outcome: none     Problem list and histories reviewed & adjusted, as indicated.  Additional history: as documented    Labs reviewed in EPIC    Reviewed and updated as needed this visit by clinical staff       Reviewed and updated as needed this visit by Provider         ROS:  Constitutional, HEENT, cardiovascular, pulmonary, gi and gu systems are negative, except as otherwise noted.    OBJECTIVE:     /70  Pulse 84  Temp 97.4  F (36.3  C) (Tympanic)  Ht 5' 5.25\" (1.657 m)  Wt 206 lb 3.2 oz (93.5 kg)  BMI 34.05 kg/m2  Body mass index is 34.05 kg/(m^2).  GENERAL: healthy, alert and no distress  ABDOMEN: soft, nontender  PSYCH: mentation appears normal, affect normal/bright    Diagnostic Test Results:  Urinalysis - just trace of leukocyte estrace, not really consistent with UTI, but considering her symptoms I recommended short course treatment with antibiotic  urine culture pending     ASSESSMENT/PLAN:     1. Dysuria  - UA reflex to Microscopic and Culture ([facility] (except [place] and " [place])- just trace of leukocyte estrace, not really consistent with UTI, but considering her symptoms I recommended short course treatment with antibiotic.  urine culture pending   - TDAP VACCINE (ADACEL)  - Urine Microscopic  - cephALEXin (KEFLEX) 500 MG capsule; Take 1 capsule (500 mg) by mouth 3 times daily for 3 days  Dispense: 9 capsule; Refill: 0  - Urine Culture Aerobic Bacterial    See Patient Instructions    ISRAEL Nobles [facility]

## 2018-02-01 NOTE — PATIENT INSTRUCTIONS
UA showed just mild changes, but since you have symptoms start Keflex 500 mg 3 times daily for 3 days  Added urine culture, if normal no additional treatment will be needed       Drink more fluids

## 2018-02-01 NOTE — TELEPHONE ENCOUNTER
RN called and left message for patient regarding labs and medication refill. Advised patient to call clinic with any questions or concerns.    LUDIVINA Andrade

## 2018-02-01 NOTE — TELEPHONE ENCOUNTER
Rheumatology team: Please notify Ms. Meléndez that labs did not show evidence of methotrexate toxicity.  I have refilled methotrexate.  Please continue with labs every 3 months.    Nasir Baltazar MD  2/1/2018 9:28 AM

## 2018-02-03 LAB
BACTERIA SPEC CULT: ABNORMAL
Lab: ABNORMAL
SPECIMEN SOURCE: ABNORMAL

## 2018-03-01 DIAGNOSIS — T50.2X5A DIURETIC-INDUCED HYPOKALEMIA: ICD-10-CM

## 2018-03-01 DIAGNOSIS — E87.6 DIURETIC-INDUCED HYPOKALEMIA: ICD-10-CM

## 2018-03-01 NOTE — TELEPHONE ENCOUNTER
"Requested Prescriptions   Pending Prescriptions Disp Refills     Potassium Chloride ER 20 MEQ TBCR  Last Written Prescription Date:  07/05/2017  Last Fill Quantity: 90,  # refills: 1   Last office visit: 2/1/2018 with prescribing provider:  Karuna   Future Office Visit:     90 tablet 1     Sig: Take 1 tablet (20 mEq) by mouth daily    Potassium Supplements Protocol Passed    3/1/2018 10:50 AM       Passed - Recent or future visit with authorizing provider's specialty    Patient had office visit in the last year or has a visit in the next 30 days with authorizing provider.  See \"Patient Info\" tab in inbasket, or \"Choose Columns\" in Meds & Orders section of the refill encounter.            Passed - Patient is age 18 or older       Passed - Normal serum potassium in past 12 months    Recent Labs   Lab Test  08/05/17   1150   POTASSIUM  3.4                    Alfredo Lamas RT (R)    "

## 2018-03-02 RX ORDER — POTASSIUM CHLORIDE 1500 MG/1
20 TABLET, EXTENDED RELEASE ORAL DAILY
Qty: 90 TABLET | Refills: 0 | Status: SHIPPED | OUTPATIENT
Start: 2018-03-02 | End: 2018-05-07

## 2018-03-02 NOTE — TELEPHONE ENCOUNTER
Prescription approved per [facility] [place] Refill Protocol.    Sejal CASTILLO RN

## 2018-04-12 ENCOUNTER — TELEPHONE (OUTPATIENT)
Dept: FAMILY MEDICINE | Facility: CLINIC | Age: 63
End: 2018-04-12

## 2018-04-12 NOTE — LETTER
[facility]  [address] [address]  [phone]        April 12, 2018  Mine Meléndez  [address]  [facility] [address]    Dear Mine,    I care about your health and have reviewed your health plan. I have reviewed your medical conditions, medication list, and lab results and am making recommendations based on this review, to better manage your health.    You are in particular need of attention regarding:  -Colon Cancer Screening    I am recommending that you:  -schedule a COLONOSCOPY to look for colon cancer (due every 10 years or 5 years in higher risk situations.)   Colon cancer is now the second leading cause of death in the [place] for both men and women and there are over 130,000 new cases and 50,000 deaths per year from colon cancer.  Colonoscopies can prevent 90-95% of these deaths.  Problem lesions can be removed before they ever become cancer.  This test is not only looking for cancer, but also getting rid of precancerious lesions.  If you do not wish to do a colonoscopy or cannot afford to do one, at this time, there is another option. It is called a FIT test or Fecal Immunochemical Occult Blood Test (take home stool sample kit).  It does not replace the colonoscopy for colorectal cancer screening, but it can detect hidden bleeding in the lower colon.  It does need to be repeated every year and if a positive result is obtained, you would be referred for a colonoscopy.  If you have completed either one of these tests at another facility, please have the records sent to our clinic so that we can best coordinate your care.    Here is a list of Health Maintenance topics that are due now or due soon:  Health Maintenance Due   Topic Date Due     FIT Q1 YR  05/26/1963     ADVANCE DIRECTIVE PLANNING Q5 YRS  04/15/2016       Please call us at [phone] (or use [email or web address]) to address the above recommendations.     Thank you for trusting  [facility] and we appreciate the opportunity to serve you.  We look forward to supporting your healthcare needs in the future.    Healthy Regards,    [facility] [place] team

## 2018-04-12 NOTE — TELEPHONE ENCOUNTER
Panel Management Review      Composite cancer screening  Chart review shows that this patient is due/due soon for the following Fecal Colorectal (FIT)  Summary:    Patient is due/failing the following:   FIT    Action needed:   Patient has fit test at home that she needs to do     Type of outreach:    Sent letter.    Questions for provider review:    None                                                                                                                                    Lupe Rodriguez

## 2018-04-30 ENCOUNTER — OFFICE VISIT (OUTPATIENT)
Dept: FAMILY MEDICINE | Facility: CLINIC | Age: 63
End: 2018-04-30
Payer: COMMERCIAL

## 2018-04-30 VITALS
SYSTOLIC BLOOD PRESSURE: 139 MMHG | BODY MASS INDEX: 33.82 KG/M2 | HEIGHT: 65 IN | HEART RATE: 64 BPM | TEMPERATURE: 97.4 F | WEIGHT: 203 LBS | DIASTOLIC BLOOD PRESSURE: 74 MMHG | RESPIRATION RATE: 18 BRPM

## 2018-04-30 DIAGNOSIS — N95.1 MENOPAUSAL SYNDROME (HOT FLASHES): ICD-10-CM

## 2018-04-30 DIAGNOSIS — E78.5 HYPERLIPIDEMIA LDL GOAL <130: ICD-10-CM

## 2018-04-30 DIAGNOSIS — E87.6 DIURETIC-INDUCED HYPOKALEMIA: ICD-10-CM

## 2018-04-30 DIAGNOSIS — T50.2X5A DIURETIC-INDUCED HYPOKALEMIA: ICD-10-CM

## 2018-04-30 DIAGNOSIS — I10 ESSENTIAL HYPERTENSION WITH GOAL BLOOD PRESSURE LESS THAN 140/90: ICD-10-CM

## 2018-04-30 DIAGNOSIS — E87.6 HYPOKALEMIA: ICD-10-CM

## 2018-04-30 DIAGNOSIS — M05.731 RHEUMATOID ARTHRITIS INVOLVING RIGHT WRIST WITH POSITIVE RHEUMATOID FACTOR (H): ICD-10-CM

## 2018-04-30 DIAGNOSIS — H81.02 MENIERE'S DISEASE, LEFT: ICD-10-CM

## 2018-04-30 DIAGNOSIS — E03.8 OTHER SPECIFIED HYPOTHYROIDISM: ICD-10-CM

## 2018-04-30 DIAGNOSIS — M79.662 PAIN OF LEFT LOWER LEG: Primary | ICD-10-CM

## 2018-04-30 PROCEDURE — 80061 LIPID PANEL: CPT | Performed by: FAMILY MEDICINE

## 2018-04-30 PROCEDURE — 36415 COLL VENOUS BLD VENIPUNCTURE: CPT | Performed by: FAMILY MEDICINE

## 2018-04-30 PROCEDURE — 99214 OFFICE O/P EST MOD 30 MIN: CPT | Performed by: FAMILY MEDICINE

## 2018-04-30 PROCEDURE — 84443 ASSAY THYROID STIM HORMONE: CPT | Performed by: FAMILY MEDICINE

## 2018-04-30 PROCEDURE — 80048 BASIC METABOLIC PNL TOTAL CA: CPT | Performed by: FAMILY MEDICINE

## 2018-04-30 RX ORDER — IBUPROFEN 800 MG/1
800 TABLET, FILM COATED ORAL EVERY 8 HOURS PRN
Qty: 90 TABLET | Refills: 7 | Status: SHIPPED | OUTPATIENT
Start: 2018-04-30 | End: 2018-08-14

## 2018-04-30 RX ORDER — HYDROCHLOROTHIAZIDE 25 MG/1
25 TABLET ORAL EVERY MORNING
Qty: 90 TABLET | Refills: 3 | Status: SHIPPED | OUTPATIENT
Start: 2018-04-30 | End: 2019-09-04

## 2018-04-30 RX ORDER — VENLAFAXINE HYDROCHLORIDE 37.5 MG/1
37.5 CAPSULE, EXTENDED RELEASE ORAL DAILY
Qty: 90 CAPSULE | Refills: 1 | Status: SHIPPED | OUTPATIENT
Start: 2018-04-30 | End: 2018-09-13

## 2018-04-30 RX ORDER — SIMVASTATIN 20 MG
20 TABLET ORAL AT BEDTIME
Qty: 90 TABLET | Refills: 3 | Status: SHIPPED | OUTPATIENT
Start: 2018-04-30 | End: 2019-04-17

## 2018-04-30 RX ORDER — LEVOTHYROXINE SODIUM 50 UG/1
50 TABLET ORAL DAILY
Qty: 90 TABLET | Refills: 3 | Status: SHIPPED | OUTPATIENT
Start: 2018-04-30 | End: 2019-04-17

## 2018-04-30 RX ORDER — FOLIC ACID 1 MG/1
1000 TABLET ORAL DAILY
Qty: 90 TABLET | Refills: 2 | Status: SHIPPED | OUTPATIENT
Start: 2018-04-30 | End: 2019-02-19

## 2018-04-30 RX ORDER — IBUPROFEN 800 MG/1
800 TABLET, FILM COATED ORAL EVERY 8 HOURS PRN
Qty: 90 TABLET | Refills: 7 | Status: CANCELLED | OUTPATIENT
Start: 2018-04-30

## 2018-04-30 ASSESSMENT — PAIN SCALES - GENERAL: PAINLEVEL: NO PAIN (0)

## 2018-04-30 NOTE — PROGRESS NOTES
"  SUBJECTIVE:   Mine Meléndez is a 62 year old female who presents to clinic today for the following health issues:  Chief Complaint   Patient presents with     Establish Care     Recheck Medication     Patient was completely off Estradiol for hot flashes and about a month ago they came back and seem to be worse. Patient would like to discuss if still needed.          New Patient/Transfer of Care  Hyperlipidemia Follow-Up      Rate your low fat/cholesterol diet?: fair    Taking statin?  Yes, possible muscle aches from statin    Other lipid medications/supplements?:  none    Hypertension Follow-up      Outpatient blood pressures are not being checked.    Low Salt Diet: sea salt    Hypothyroidism Follow-up      Since last visit, patient describes the following symptoms: Weight stable, no hair loss, no skin changes, no constipation, no loose stools      Amount of exercise or physical activity: None    Problems taking medications regularly: No    Medication side effects: muscle aches    Diet: regular (no restrictions) and low salt      Patient also bothered more by  Hearing loss, dizziness and tinnitus.  Saw Dr. Junior last year and was diagnosed with meniere's disease.  She didn't really understand the diagnosis and wanted to know if more could be done.    Patient stopped her estradiol a few months ago after weaning to every three days.  She is now bothered increasingly by hot flashes/night sweats.  Wondering what can be done.  We discussed the increased risk of heart disease with age and that I'd hesitate to put her back on hormones at this time, but she would be willing to try venlafaxine.      She also gets left leg pain - on further questioning this happens when she's walking and then it resolves when she stops walking or rests.       /74  Pulse 64  Temp 97.4  F (36.3  C) (Tympanic)  Resp 18  Ht 5' 5.25\" (1.657 m)  Wt 203 lb (92.1 kg)  Breastfeeding? No  BMI 33.52 kg/m2  EXAM: GENERAL APPEARANCE: " Alert, no acute distress  HENT: Ears and TMs normal, oral mucosa and posterior oropharynx normal  RESP: lungs clear to auscultation   CV: normal rate, regular rhythm, no murmur or gallop  ABDOMEN: soft, no organomegaly, masses or tenderness      ASSESSMENT/PLAN:      ICD-10-CM    1. Pain of left lower leg M79.662 US ISABELLA Doppler No Exercise   2. 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 Essential hypertension with goal blood pressure less than 140/90 I10 hydrochlorothiazide (HYDRODIURIL) 25 MG tablet     Basic metabolic panel   4. Other specified hypothyroidism E03.8 levothyroxine (SYNTHROID/LEVOTHROID) 50 MCG tablet     TSH with free T4 reflex   5. Hyperlipidemia LDL goal <130 E78.5 simvastatin (ZOCOR) 20 MG tablet     Lipid panel reflex to direct LDL Non-fasting   6. Meniere's disease, left H81.02 OTOLARYNGOLOGY REFERRAL   7. Menopausal syndrome (hot flashes) N95.1 venlafaxine (EFFEXOR-XR) 37.5 MG 24 hr capsule   1.  ISABELLA to r/o PAD    2.  Has visit with Dr. Baltazar tomorrow to establish care    3.  HYPERTENSION fairly well controlled.      4.  Due for TSH, refill levothyroxine at current dose, adjust prn    5.  Lipids have been well controlled in the past, check lipids today, refill simvastatin.     6.  Recommend ENT evaluation with the [facility] clinic.     7.  For the hot flashes/night sweats, will start effexor - she will let me know in a month or two how this is working and we may need to adjust the dose.     Britany Norton M.D.      Patient Instructions         Thank you for choosing [facility].  You may be receiving a survey in the mail from [employer] regarding your visit today.  Please take a few minutes to complete and return the survey to let us know how we are doing.      Our Clinic hours are:  Mondays    7:20 am - 7 pm  Tues -  Fri  7:20 am - 5 pm    Clinic Phone:  [phone]    The clinic lab opens at 7:30 am Mon - Fri and appointments are required.    [facility]. [phone]  Monday-Thursday 8 am - 7pm  Tues/Wed/Fri 8 am - 5:30 pm

## 2018-04-30 NOTE — LETTER
May 1, 2018      Mine Meléndez  [address] [facility] [address]        Dear ,    We are writing to inform you of your test results.    continue to do the great job losing weight.  This will prolong having to start diabetic medications.  Your blood sugar is in the borderline diabetic range at 111.  Normal is less than 100.  Your potassium runs a little bit low as it has been.  Remainder of labs are normal.    Results for orders placed or performed in visit on 04/30/18   TSH with free T4 reflex   Result Value Ref Range    TSH 2.87 0.40 - 4.00 mU/L   Lipid panel reflex to direct LDL Non-fasting   Result Value Ref Range    Cholesterol 163 <200 mg/dL    Triglycerides 170 (H) <150 mg/dL    HDL Cholesterol 55 >49 mg/dL    LDL Cholesterol Calculated 74 <100 mg/dL    Non HDL Cholesterol 108 <130 mg/dL   Basic metabolic panel   Result Value Ref Range    Sodium 135 133 - 144 mmol/L    Potassium 3.1 (L) 3.4 - 5.3 mmol/L    Chloride 100 94 - 109 mmol/L    Carbon Dioxide 30 20 - 32 mmol/L    Anion Gap 5 3 - 14 mmol/L    Glucose 111 (H) 70 - 99 mg/dL    Urea Nitrogen 9 7 - 30 mg/dL    Creatinine 0.51 (L) 0.52 - 1.04 mg/dL    GFR Estimate >90 >60 mL/min/1.7m2    GFR Estimate If Black >90 >60 mL/min/1.7m2    Calcium 8.9 8.5 - 10.1 mg/dL         If you have any questions or concerns, please call the clinic at the number listed above.       Sincerely,        Britany Norton MD

## 2018-04-30 NOTE — PATIENT INSTRUCTIONS
Thank you for choosing [facility].  You may be receiving a survey in the mail from [facility] regarding your visit today.  Please take a few minutes to complete and return the survey to let us know how we are doing.      Our Clinic hours are:  Mondays    7:20 am - 7 pm  Tues -  Fri  7:20 am - 5 pm    Clinic Phone: [phone]    The clinic lab opens at 7:30 am Mon - Fri and appointments are required.    [facility]. [phone]  Monday-Thursday 8 am - 7pm  Tues/Wed/Fri 8 am - 5:30 pm

## 2018-04-30 NOTE — MR AVS SNAPSHOT
After Visit Summary   4/30/2018    Mine Meléndez    MRN: [id number]           Patient Information     Date Of Birth          1955        Visit Information        Provider Department      4/30/2018 3:40 PM Britany Norton MD [facility]        Today's Diagnoses     Pain of left lower leg    -  1    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)        Essential hypertension with goal blood pressure less than 140/90        Other specified hypothyroidism        Hyperlipidemia LDL goal <130        Meniere's disease, left        Menopausal syndrome (hot flashes)          Care Instructions          Thank you for choosing [facility].  You may be receiving a survey in the mail from [employer] regarding your visit today.  Please take a few minutes to complete and return the survey to let us know how we are doing.      Our Clinic hours are:  Mondays    7:20 am - 7 pm  Tues -  Fri  7:20 am - 5 pm    Clinic Phone: [phone]    The clinic lab opens at 7:30 am Mon - Fri and appointments are required.    [facility]. [phone]  Monday-Thursday 8 am - 7pm  Tues/Wed/Fri 8 am - 5:30 pm                 Follow-ups after your visit        Additional Services     OTOLARYNGOLOGY REFERRAL       Your provider has referred you to: N: [facility] [phone] [email or web address]    Please be aware that coverage of these services is subject to the terms and limitations of your health insurance plan.  Call member services at your health plan with any benefit or coverage questions.      Please bring the following with you to your appointment:    (1) Any X-Rays, CTs or MRIs which have been performed.  Contact the facility where they were done to arrange for  prior to your scheduled appointment.   (2) List of current medications  (3) This referral request   (4) Any documents/labs given to you for  "this referral                  Your next 10 appointments already scheduled     May 01, 2018  3:00 PM CDT   [facility]    [address] [place] [address] [phone]              Future tests that were ordered for you today     Open Future Orders        Priority Expected Expires Ordered    US ISABELLA Doppler No Exercise Routine  4/30/2019 4/30/2018            Who to contact     If you have questions or need follow up information about today's clinic visit or your schedule please contact [facility] directly at [phone].  Normal or non-critical lab and imaging results will be communicated to you by MyChart, letter or phone within 4 business days after the clinic has received the results. If you do not hear from us within 7 days, please contact the clinic through [employer] or phone. If you have a critical or abnormal lab result, we will notify you by phone as soon as possible.  Submit refill requests through [employer] or call your pharmacy and they will forward the refill request to us. Please allow 3 business days for your refill to be completed.          Additional Information About Your Visit        [employer] Information     [employer] gives you secure access to your electronic health record. If you see a primary care provider, you can also send messages to your care team and make appointments. If you have questions, please call your primary care clinic.  If you do not have a primary care provider, please call [phone] and they will assist you.        Care EveryWhere ID     This is your Care EveryWhere ID. This could be used by other organizations to access your [facility] medical records  [id number]        Your Vitals Were     Pulse Temperature Respirations Height Breastfeeding? BMI (Body Mass Index)    64 97.4  F (36.3  C) (Tympanic) 18 5' 5.25\" (1.657 m) No 33.52 kg/m2       Blood Pressure " from Last 3 Encounters:   04/30/18 139/74   02/01/18 134/70   01/05/18 134/64    Weight from Last 3 Encounters:   04/30/18 203 lb (92.1 kg)   02/01/18 206 lb 3.2 oz (93.5 kg)   01/05/18 205 lb 9.6 oz (93.3 kg)              We Performed the Following     Basic metabolic panel     Lipid panel reflex to direct LDL Non-fasting     OTOLARYNGOLOGY REFERRAL     TSH with free T4 reflex          Today's Medication Changes          These changes are accurate as of 4/30/18  4:00 PM.  If you have any questions, ask your nurse or doctor.               Start taking these medicines.        Dose/Directions    venlafaxine 37.5 MG 24 hr capsule   Commonly known as:  EFFEXOR-XR   Used for:  Menopausal syndrome (hot flashes)   Started by:  Britany Norton MD        Dose:  37.5 mg   Take 1 capsule (37.5 mg) by mouth daily   Quantity:  90 capsule   Refills:  1         Stop taking these medicines if you haven't already. Please contact your care team if you have questions.     estradiol 0.5 MG tablet   Commonly known as:  ESTRACE   Stopped by:  Britany Norton MD                Where to get your medicines      These medications [facility] [address] [address] [address] [facility] [address]  [address] [facility] [address]     Phone:  [phone]     folic acid 1 MG tablet    hydrochlorothiazide 25 MG tablet    ibuprofen 800 MG tablet    levothyroxine 50 MCG tablet    simvastatin 20 MG tablet    venlafaxine 37.5 MG 24 hr capsule                Primary Care Provider Fax #    Physician No Ref-Primary [phone]       No address on file        Equal Access to Services     FRANKY CULLEN AH: Hadscar Garcia, waaxda luqadaha, qaybta kaalmada fifi, kelli baltazar. So [facility] [phone].    ATENCIÓN: Si habla español, tiene a menjivar disposición servicios gratuitos de asistencia lingüística. Llame al [phone].    We comply with applicable federal civil rights  laws and [place] laws. We do not discriminate on the basis of race, color, national origin, age, disability, sex, sexual orientation, or gender identity.            Thank you!     Thank you for choosing [facility]  for your care. Our goal is always to provide you with excellent care. Hearing back from our patients is one way we can continue to improve our services. Please take a few minutes to complete the written survey that you may receive in the mail after your visit with us. Thank you!             Your Updated Medication List - Protect others around you: Learn how to safely use, store and throw away your medicines at www.disposemymeds.org.          This list is accurate as of 4/30/18  4:00 PM.  Always use your most recent med list.                   Brand Name Dispense Instructions for use Diagnosis    aspirin 81 MG tablet     100    ONE DAILY    Sensation, tingling       folic acid 1 MG tablet    FOLVITE    90 tablet    Take 1 tablet (1,000 mcg) by mouth daily    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)       hydrochlorothiazide 25 MG tablet    HYDRODIURIL    90 tablet    Take 1 tablet (25 mg) by mouth every morning    Essential hypertension with goal blood pressure less than 140/90       ibuprofen 800 MG tablet    ADVIL/MOTRIN    90 tablet    Take 1 tablet (800 mg) by mouth every 8 hours as needed for pain    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 Take all 8 tablets on the same day of each week.    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)       MULTI-VITAMIN PO      1 daily        omeprazole 20 MG tablet     90 tablet    Take 1 tablet (20 mg) by  mouth daily Take 30-60 minutes before a meal.    Gastroesophageal reflux disease without esophagitis       Potassium Chloride ER 20 MEQ Tbcr     90 tablet    Take 1 tablet (20 mEq) by mouth daily    Diuretic-induced hypokalemia       simvastatin 20 MG tablet    ZOCOR    90 tablet    Take 1 tablet (20 mg) by mouth At Bedtime    Hyperlipidemia LDL goal <130       venlafaxine 37.5 MG 24 hr capsule    EFFEXOR-XR    90 capsule    Take 1 capsule (37.5 mg) by mouth daily    Menopausal syndrome (hot flashes)

## 2018-04-30 NOTE — NURSING NOTE
"Chief Complaint   Patient presents with     Establish Care     Recheck Medication     Patient was completely off Estradiol for hot flashes and about a month ago they came back and seem to be worse. Patient would like to discuss if still needed.        Initial /74  Pulse 64  Temp 97.4  F (36.3  C) (Tympanic)  Resp 18  Ht 5' 5.25\" (1.657 m)  Wt 203 lb (92.1 kg)  Breastfeeding? No  BMI 33.52 kg/m2 Estimated body mass index is 33.52 kg/(m^2) as calculated from the following:    Height as of this encounter: 5' 5.25\" (1.657 m).    Weight as of this encounter: 203 lb (92.1 kg).  Medication Reconciliation: complete    "

## 2018-04-30 NOTE — TELEPHONE ENCOUNTER
"Requested Prescriptions   Pending Prescriptions Disp Refills     ibuprofen (ADVIL/MOTRIN) 800 MG tablet  Last Written Prescription Date:  04/25/2017  Last Fill Quantity: 90,  # refills: 7   Last office visit: 02/01/2018 with prescribing provider:  marj   Future Office Visit:   Next 5 appointments (look out 90 days)     Apr 30, 2018  3:40 PM CDT   Office Visit with Britany Norton MD   [facility]    [address]  [facility] [address]   [phone]                  90 tablet 7     Sig: Take 1 tablet (800 mg) by mouth every 8 hours as needed for pain    NSAID Medications Failed    4/30/2018  2:15 PM       Failed - Recent (12 mo) or future (30 days) visit within the authorizing provider's specialty    Patient had office visit in the last 12 months or has a visit in the next 30 days with authorizing provider or within the authorizing provider's specialty.  See \"Patient Info\" tab in inbasket, or \"Choose Columns\" in Meds & Orders section of the refill encounter.           Passed - Blood pressure under 140/90 in past 12 months    BP Readings from Last 3 Encounters:   02/01/18 134/70   01/05/18 134/64   09/14/17 145/86                Passed - Normal ALT on file in past 12 months    Recent Labs   Lab Test  01/31/18   1555   ALT  29            Passed - Normal AST on file in past 12 months    Recent Labs   Lab Test  01/31/18   1555   AST  28            Passed - Patient is age 6-64 years       Passed - Normal CBC on file in past 12 months    Recent Labs   Lab Test  01/31/18   1555   WBC  9.0   RBC  4.05   HGB  14.0   HCT  41.1   PLT  209            Passed - No active pregnancy on record       Passed - Normal serum creatinine on file in past 12 months    Recent Labs   Lab Test  01/31/18   1555   CR  0.60            Passed - No positive pregnancy test in past 12 months        Alfredo Lamas RT (R)    "

## 2018-05-01 ENCOUNTER — OFFICE VISIT (OUTPATIENT)
Dept: RHEUMATOLOGY | Facility: CLINIC | Age: 63
End: 2018-05-01
Payer: COMMERCIAL

## 2018-05-01 ENCOUNTER — RADIANT APPOINTMENT (OUTPATIENT)
Dept: GENERAL RADIOLOGY | Facility: CLINIC | Age: 63
End: 2018-05-01
Attending: INTERNAL MEDICINE
Payer: COMMERCIAL

## 2018-05-01 VITALS
OXYGEN SATURATION: 95 % | WEIGHT: 205.4 LBS | DIASTOLIC BLOOD PRESSURE: 69 MMHG | BODY MASS INDEX: 34.22 KG/M2 | HEART RATE: 93 BPM | SYSTOLIC BLOOD PRESSURE: 173 MMHG | HEIGHT: 65 IN | RESPIRATION RATE: 14 BRPM

## 2018-05-01 DIAGNOSIS — M05.9 SEROPOSITIVE RHEUMATOID ARTHRITIS (H): ICD-10-CM

## 2018-05-01 DIAGNOSIS — M05.9 SEROPOSITIVE RHEUMATOID ARTHRITIS (H): Primary | ICD-10-CM

## 2018-05-01 DIAGNOSIS — Z79.899 HIGH RISK MEDICATIONS (NOT ANTICOAGULANTS) LONG-TERM USE: ICD-10-CM

## 2018-05-01 LAB
ALBUMIN SERPL-MCNC: 3.7 G/DL (ref 3.4–5)
ALP SERPL-CCNC: 127 U/L (ref 40–150)
ALT SERPL W P-5'-P-CCNC: 28 U/L (ref 0–50)
ANION GAP SERPL CALCULATED.3IONS-SCNC: 5 MMOL/L (ref 3–14)
AST SERPL W P-5'-P-CCNC: 21 U/L (ref 0–45)
BASOPHILS # BLD AUTO: 0.1 10E9/L (ref 0–0.2)
BASOPHILS NFR BLD AUTO: 0.8 %
BILIRUB DIRECT SERPL-MCNC: 0.1 MG/DL (ref 0–0.2)
BILIRUB SERPL-MCNC: 0.4 MG/DL (ref 0.2–1.3)
BUN SERPL-MCNC: 9 MG/DL (ref 7–30)
CALCIUM SERPL-MCNC: 8.9 MG/DL (ref 8.5–10.1)
CHLORIDE SERPL-SCNC: 100 MMOL/L (ref 94–109)
CHOLEST SERPL-MCNC: 163 MG/DL
CO2 SERPL-SCNC: 30 MMOL/L (ref 20–32)
CREAT SERPL-MCNC: 0.51 MG/DL (ref 0.52–1.04)
CREAT SERPL-MCNC: 0.55 MG/DL (ref 0.52–1.04)
CRP SERPL-MCNC: 10.1 MG/L (ref 0–8)
DIFFERENTIAL METHOD BLD: ABNORMAL
EOSINOPHIL # BLD AUTO: 0.2 10E9/L (ref 0–0.7)
EOSINOPHIL NFR BLD AUTO: 3.1 %
ERYTHROCYTE [DISTWIDTH] IN BLOOD BY AUTOMATED COUNT: 13.9 % (ref 10–15)
ERYTHROCYTE [SEDIMENTATION RATE] IN BLOOD BY WESTERGREN METHOD: 13 MM/H (ref 0–30)
GFR SERPL CREATININE-BSD FRML MDRD: >90 ML/MIN/1.7M2
GFR SERPL CREATININE-BSD FRML MDRD: >90 ML/MIN/1.7M2
GLUCOSE SERPL-MCNC: 111 MG/DL (ref 70–99)
HCT VFR BLD AUTO: 42.7 % (ref 35–47)
HDLC SERPL-MCNC: 55 MG/DL
HGB BLD-MCNC: 14.6 G/DL (ref 11.7–15.7)
LDLC SERPL CALC-MCNC: 74 MG/DL
LYMPHOCYTES # BLD AUTO: 1.8 10E9/L (ref 0.8–5.3)
LYMPHOCYTES NFR BLD AUTO: 23.4 %
MCH RBC QN AUTO: 34.7 PG (ref 26.5–33)
MCHC RBC AUTO-ENTMCNC: 34.2 G/DL (ref 31.5–36.5)
MCV RBC AUTO: 101 FL (ref 78–100)
MONOCYTES # BLD AUTO: 0.9 10E9/L (ref 0–1.3)
MONOCYTES NFR BLD AUTO: 10.9 %
NEUTROPHILS # BLD AUTO: 4.8 10E9/L (ref 1.6–8.3)
NEUTROPHILS NFR BLD AUTO: 61.8 %
NONHDLC SERPL-MCNC: 108 MG/DL
PLATELET # BLD AUTO: 226 10E9/L (ref 150–450)
POTASSIUM SERPL-SCNC: 3.1 MMOL/L (ref 3.4–5.3)
PROT SERPL-MCNC: 7.8 G/DL (ref 6.8–8.8)
RBC # BLD AUTO: 4.21 10E12/L (ref 3.8–5.2)
SODIUM SERPL-SCNC: 135 MMOL/L (ref 133–144)
TRIGL SERPL-MCNC: 170 MG/DL
TSH SERPL DL<=0.005 MIU/L-ACNC: 2.87 MU/L (ref 0.4–4)
WBC # BLD AUTO: 7.8 10E9/L (ref 4–11)

## 2018-05-01 PROCEDURE — 86140 C-REACTIVE PROTEIN: CPT | Performed by: INTERNAL MEDICINE

## 2018-05-01 PROCEDURE — 86704 HEP B CORE ANTIBODY TOTAL: CPT | Performed by: INTERNAL MEDICINE

## 2018-05-01 PROCEDURE — 99214 OFFICE O/P EST MOD 30 MIN: CPT | Performed by: INTERNAL MEDICINE

## 2018-05-01 PROCEDURE — 73630 X-RAY EXAM OF FOOT: CPT | Mod: 50

## 2018-05-01 PROCEDURE — 82565 ASSAY OF CREATININE: CPT | Performed by: INTERNAL MEDICINE

## 2018-05-01 PROCEDURE — 87340 HEPATITIS B SURFACE AG IA: CPT | Performed by: INTERNAL MEDICINE

## 2018-05-01 PROCEDURE — 85652 RBC SED RATE AUTOMATED: CPT | Performed by: INTERNAL MEDICINE

## 2018-05-01 PROCEDURE — 80076 HEPATIC FUNCTION PANEL: CPT | Performed by: INTERNAL MEDICINE

## 2018-05-01 PROCEDURE — 36415 COLL VENOUS BLD VENIPUNCTURE: CPT | Performed by: INTERNAL MEDICINE

## 2018-05-01 PROCEDURE — 86200 CCP ANTIBODY: CPT | Performed by: INTERNAL MEDICINE

## 2018-05-01 PROCEDURE — 85025 COMPLETE CBC W/AUTO DIFF WBC: CPT | Performed by: INTERNAL MEDICINE

## 2018-05-01 PROCEDURE — 73130 X-RAY EXAM OF HAND: CPT | Mod: 50

## 2018-05-01 RX ORDER — METHOTREXATE 2.5 MG/1
20 TABLET ORAL WEEKLY
Qty: 96 TABLET | Refills: 1 | Status: SHIPPED | OUTPATIENT
Start: 2018-05-01 | End: 2018-08-14

## 2018-05-01 NOTE — NURSING NOTE
"Chief Complaint   Patient presents with     Consult     RA, patient states she does have flares but she will go to bed and by morning she is better       Initial /69  Pulse 93  Resp 14  Ht 1.657 m (5' 5.25\")  Wt 93.2 kg (205 lb 6.4 oz)  SpO2 95%  BMI 33.92 kg/m2 Estimated body mass index is 33.92 kg/(m^2) as calculated from the following:    Height as of this encounter: 1.657 m (5' 5.25\").    Weight as of this encounter: 93.2 kg (205 lb 6.4 oz).  BP completed using cuff size: regular         RAPID3 (0-30) Cumulative Score  6.0          RAPID3 Weighted Score (divide #4 by 3 and that is the weighted score)  2.0         "

## 2018-05-01 NOTE — MR AVS SNAPSHOT
After Visit Summary   5/1/2018    Mine Meléndez    MRN: [id number]           Patient Information     Date Of Birth          1955        Visit Information        Provider Department      5/1/2018 3:00 PM Nasir Baltazar MD [facility]        Today's Diagnoses     Seropositive rheumatoid arthritis (H)    -  1      Care Instructions    Reduce ibuprofen to 400mg twice daily x2weeks, then 200mg twice daily x2weeks, then stop.     Rheumatology    Dr. Nasir [place] [facility]   (Monday)  [address]  [place] [address]       [facility]   (Tuesday)  [address]  [place] [address]    [facility]   (Wed., Thurs., and Friday)  [address] [place] [address]    Phone number: [phone]  Thank you for choosing [facility].  Rachel Uriarte CMA            Follow-ups after your visit        Your next 10 appointments already scheduled     Aug 10, 2018  3:30 PM CDT   LAB with  LAB   [facility]    [address] [address]   [phone]           Please do not eat 10-12 hours before your appointment if you are coming in fasting for labs on lipids, cholesterol, or glucose (sugar). This does not apply to pregnant women. Water, hot tea and black coffee (with nothing added) are okay. Do not drink other fluids, diet soda or chew gum.            Aug 14, 2018  2:40 PM CDT   Return Visit with Nasir Baltazar MD   [facility] ([facility]    [address] [id number] [phone]              Future tests that were ordered for you today     Open Future Orders        Priority Expected Expires Ordered    CBC with platelets differential Routine 7/26/2018 8/29/2018 5/1/2018    Creatinine Routine 7/26/2018 8/29/2018 5/1/2018    Hepatic panel Routine 7/26/2018 8/29/2018 5/1/2018    XR Hand Bilateral G/E 3 Views  "Routine 5/1/2018 5/1/2019 5/1/2018    XR Foot Bilateral G/E 3 Views Routine 5/1/2018 5/1/2019 5/1/2018    US ISABELLA Doppler No Exercise Routine  4/30/2019 4/30/2018            Who to contact     If you have questions or need follow up information about today's clinic visit or your schedule please contact [facility] JAMAL MARTÍNEZ directly at [phone].  Normal or non-critical lab and imaging results will be communicated to you by [employer], letter or phone within 4 business days after the clinic has received the results. If you do not hear from us within 7 days, please contact the clinic through [employer] or phone. If you have a critical or abnormal lab result, we will notify you by phone as soon as possible.  Submit refill requests through [employer] or call your pharmacy and they will forward the refill request to us. Please allow 3 business days for your refill to be completed.          Additional Information About Your Visit        [employer] Information     [employer] gives you secure access to your electronic health record. If you see a primary care provider, you can also send messages to your care team and make appointments. If you have questions, please call your primary care clinic.  If you do not have a primary care provider, please call [phone] and they will assist you.        Care EveryWhere ID     This is your Care EveryWhere ID. This could be used by other organizations to access your [facility] medical records  [id number]        Your Vitals Were     Pulse Respirations Height Pulse Oximetry BMI (Body Mass Index)       93 14 1.657 m (5' 5.25\") 95% 33.92 kg/m2        Blood Pressure from Last 3 Encounters:   05/01/18 173/69   04/30/18 139/74   02/01/18 134/70    Weight from Last 3 Encounters:   05/01/18 93.2 kg (205 lb 6.4 oz)   04/30/18 92.1 kg (203 lb)   02/01/18 93.5 kg (206 lb 3.2 oz)              We Performed the Following     CBC with platelets differential     Creatinine     CRP inflammation     " Cyclic Citrullinated Peptide Antibody IgG     Erythrocyte sedimentation rate auto     Hepatic panel     Hepatitis B core antibody     Hepatitis B surface antigen          Where to get your medicines      These medications were sent to [facility] [place], [place] - [address] [facility] [address]  [address] [facility] [address]     Phone:  [phone]     methotrexate sodium 2.5 MG Tabs          Primary Care Provider Fax #    Physician No Ref-Primary [phone]       No address on file        Equal Access to Services     EDA CULLEN : Hadii aad ku hadasho Soomaali, waaxda luqadaha, qaybta kaalmada adeegyada, waxay idiin hayaan essence rasmussen . So [facility] [phone].    ATENCIÓN: Si habla español, tiene a menjivar disposición servicios gratuitos de asistencia lingüística. Llame al [phone].    We comply with applicable federal civil rights laws and [place] laws. We do not discriminate on the basis of race, color, national origin, age, disability, sex, sexual orientation, or gender identity.            Thank you!     Thank you for choosing [facility]  for your care. Our goal is always to provide you with excellent care. Hearing back from our patients is one way we can continue to improve our services. Please take a few minutes to complete the written survey that you may receive in the mail after your visit with us. Thank you!             Your Updated Medication List - Protect others around you: Learn how to safely use, store and throw away your medicines at www.disposemymeds.org.          This list is accurate as of 5/1/18  3:35 PM.  Always use your most recent med list.                   Brand Name Dispense Instructions for use Diagnosis    aspirin 81 MG tablet     100    ONE DAILY    Sensation, tingling       BIOTIN PO           folic acid 1 MG tablet    FOLVITE    90 tablet    Take 1 tablet (1,000 mcg) by mouth daily    Rheumatoid arthritis involving  right wrist with positive rheumatoid factor (H)       hydrochlorothiazide 25 MG tablet    HYDRODIURIL    90 tablet    Take 1 tablet (25 mg) by mouth every morning    Essential hypertension with goal blood pressure less than 140/90       ibuprofen 800 MG tablet    ADVIL/MOTRIN    90 tablet    Take 1 tablet (800 mg) by mouth every 8 hours as needed for pain    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 Take all 8 tablets on the same day of each week.    Seropositive rheumatoid arthritis (H)       MULTI-VITAMIN PO      1 daily        omeprazole 20 MG tablet     90 tablet    Take 1 tablet (20 mg) by mouth daily Take 30-60 minutes before a meal.    Gastroesophageal reflux disease without esophagitis       Potassium Chloride ER 20 MEQ Tbcr     90 tablet    Take 1 tablet (20 mEq) by mouth daily    Diuretic-induced hypokalemia       simvastatin 20 MG tablet    ZOCOR    90 tablet    Take 1 tablet (20 mg) by mouth At Bedtime    Hyperlipidemia LDL goal <130       venlafaxine 37.5 MG 24 hr capsule    EFFEXOR-XR    90 capsule    Take 1 capsule (37.5 mg) by mouth daily    Menopausal syndrome (hot flashes)

## 2018-05-01 NOTE — PROGRESS NOTES
"Rheumatology Clinic Visit      Mine Meléndez MRN# [id number]   YOB: 1955 Age: 62 year old      Date of visit: 5/01/18   PCP: No Ref-Primary, Physician    Chief Complaint   Patient presents with:  Consult: RA, patient states she does have flares but she will go to bed and by morning she is better      Assessment and Plan     1. Seropositive Rheumatoid Arthritis (RF+, CCP unknown): Dx'd with RA prior to 2002. Previously on SSZ (stomatitis). Currently on MTX 20mg once weekly and folic acid 1mg daily.  Check labs and x-rays as noted below.  Overall doing well at this time.  Note that she is also taking ibuprofen 800 mg twice daily that she says has been a long-term rheumatoid arthritis treatment for her; advised that she reduce ibuprofen to 400 mg twice daily for 2 weeks, then 200 mg twice daily for 2 weeks, then stop.  Also noted that she has intermittent \"flares\" that are not necessarily consistent with rheumatoid arthritis in that she has them in the mid forearm and mid humerus; but she also has them in the right wrist; only affecting the right upper extremity and if they do occur it is significant enough that she has to stop her activity; she goes to sleep and it is resolved by the next morning when she wakes up.    - Continue Methotrexate 20mg once weekly   - Continue folic acid 1mg daily  - Reduce ibuprofen to 400mg BID [id number], then 200mg BID [id number], then stop  - X-rays today: bilateral hands/feet  - Labs today: CBC, Hepatic panel, ESR, CRP, hepatitis B, CCP  - Labs in 3 months: CBC, Creatinine, Hepatic Panel, ESR, CRP    # Methotrexate Risks and Benefits: The risks and benefits of methotrexate were discussed in detail and the patient verbalized understanding.  The risks discussed include, but are not limited to, the risk for hypersensitivity, anaphylaxis, anaphylactoid reactions, infections, bone marrow suppression, renal toxicity, hepatotoxicity, pulmonary toxicity, malignancy, impaired " fertility, GI upset, alopecia, and oral and nasal sores.  Folic acid supplementation is recommended during methotrexate therapy to help prevent some of the side effects. Pregnancy prevention and planning was discussed; it is recommended that women of childbearing potential use reliable contraception during therapy.  The risks of taking both methotrexate and alcohol were reviewed; complete alcohol avoidance was discussed.  Routine laboratory monitoring is required during methotrexate therapy. Taking MTX once weekly, all within a 24 hour period was stressed and the patient verbalized this instruction back to me.  I encouraged reviewing the package insert and asking any questions about the medication.    # NSAIDs:  The risks and benefits of NSAIDs were discussed in detail and the patient verbalized understanding.  The risks discussed include, but are not limited to, the risk for hypersensitivity, anaphylaxis, GI upset, gastric ulcer, nephropathy, and an increased risk for cardiovascular events.      2. Hypertersion: Patient to follow up with Primary Care provider regarding elevated blood pressure.     Ms. Meléndez verbalized agreement with and understanding of the rational for the diagnosis and treatment plan.  All questions were answered to best of my ability and the patient's satisfaction. Ms. Meléndez was advised to contact the clinic with any questions that may arise after the clinic visit.      Thank you for involving me in the care of the patient    Return to clinic: 3 months      HPI   Mine Meléndez is a 62 year old female with a past medical history significant for hypertension, hyperlipidemia, hypothyroidism, migraines, and rheumatoid arthritis who presents for follow-up of rheumatoid arthritis.  Note that this is Ms. Meléndez's first clinic visit with me.      1/16/2017 rheumatology clinic note by Dr. Matt Mills documents seropositive rheumatoid arthritis.  Flaring at that time that was treated with  prednisone.  On methotrexate 20 mg once weekly.    Today, Ms. Meléndez reports that she is currently doing well on methotrexate 20 mg once weekly.  Previously had carpal tunnel syndrome that was resolved with carpal tunnel release surgery.  No joint pain or morning stiffness when she wakes up.  No gelling phenomenon.  Approximately once every other day to once every week she experiences pain in her forearm or mid humerus, or wrist, of the right upper extremity only; when this occurs she goes to bed and wakes up in the morning with symptoms resolved.    Denies fevers, chills, nausea, vomiting, constipation, diarrhea. No abdominal pain. No chest pain/pressure, palpitations, or shortness of breath. No LE swelling. No neck pain. No oral or nasal sores.  No rash. No sicca symptoms. No photosensitivity or photophobia. No eye pain or redness. No history of inflammatory eye disease.  No history of DVT, pulmonary embolism, or miscarriage.   No history of serositis.  No history of Raynaud's Phenomenon.  No seizure history.  No known renal disorder.      Tobacco: None  EtOH: 4 drinks per day on the weekends  Drugs: None  Occupation: [employer]    ROS   GEN: No fevers, chills, night sweats, or weight change  SKIN: No itching, rashes, sores  HEENT: No oral or nasal ulcers.  CV: No chest pain, pressure, palpitations, or dyspnea on exertion.  PULM: No SOB, wheeze, cough.  GI: No nausea, vomiting, constipation, diarrhea. No blood in stool. No abdominal pain.  : No blood in urine.  MSK: See HPI.  NEURO: No numbness, tingling, or weakness.  EXT: No LE swelling  PSYCH: Negative    Active Problem List     Patient Active Problem List   Diagnosis     DYSPEPSIA     Obesity     Essential hypertension     Hypothyroidism     HYPERLIPIDEMIA LDL GOAL <130     Advanced directives, counseling/discussion     Dermatitis     High risk medications (not anticoagulants) long-term use     Female stress incontinence     Migraine     Rheumatoid  arthritis involving right wrist with positive rheumatoid factor (H)     Past Medical History     Past Medical History:   Diagnosis Date     Herpes simplex without mention of complication      Obesity, unspecified      Osteomyelitis of jaw 5/22/2008     Rheumatoid arthritis(714.0)      Trochanteric bursitis of right hip 8/16/2013     Past Surgical History     Past Surgical History:   Procedure Laterality Date     COLONOSCOPY  1/20/06     HYSTERECTOMY, PAP NO LONGER INDICATED       HYSTERECTOMY, VAGINAL  11/04    TVH,TVT procedure     RELEASE CARPAL TUNNEL Right 10/11/2016    Procedure: RELEASE CARPAL TUNNEL;  Surgeon: Emely Blanca MD;  Location: [facility] OR     SURGICAL HISTORY OF -   1988    tubal ligation     SURGICAL HISTORY OF -   2001    excision gangliion cyst rt heel     Allergy     Allergies   Allergen Reactions     Codeine Rash     Flagyl [Imidazole Antifungals] Rash     Septra [Sulfamethoxazole W/Trimethoprim] Itching     Puffy eyes, flushed.      Current Medication List     Current Outpatient Prescriptions   Medication Sig     ASPIRIN 81 MG OR TABS ONE DAILY     BIOTIN PO      folic acid (FOLVITE) 1 MG tablet Take 1 tablet (1,000 mcg) by mouth daily     hydrochlorothiazide (HYDRODIURIL) 25 MG tablet Take 1 tablet (25 mg) by mouth every morning     ibuprofen (ADVIL/MOTRIN) 800 MG tablet Take 1 tablet (800 mg) by mouth every 8 hours as needed for pain     levothyroxine (SYNTHROID/LEVOTHROID) 50 MCG tablet Take 1 tablet (50 mcg) by mouth daily     methotrexate sodium 2.5 MG TABS Take 20 mg by mouth once a week . Take all 8 tablets on the same day of each week.     MULTI-VITAMIN OR 1 daily     omeprazole 20 MG tablet Take 1 tablet (20 mg) by mouth daily Take 30-60 minutes before a meal.     Potassium Chloride ER 20 MEQ TBCR Take 1 tablet (20 mEq) by mouth daily     simvastatin (ZOCOR) 20 MG tablet Take 1 tablet (20 mg) by mouth At Bedtime     venlafaxine (EFFEXOR-XR) 37.5 MG 24 hr capsule Take 1 capsule  "(37.5 mg) by mouth daily     No current facility-administered medications for this visit.          Social History   See HPI    Family History     Family History   Problem Relation Age of Onset     DIABETES Father      Dementia Father      DIABETES Brother      DIABETES Sister      Hypertension Sister      Hypertension Sister      Physical Exam     Temp Readings from Last 3 Encounters:   04/30/18 97.4  F (36.3  C) (Tympanic)   02/01/18 97.4  F (36.3  C) (Tympanic)   01/05/18 97.5  F (36.4  C) (Tympanic)     BP Readings from Last 5 Encounters:   05/01/18 173/69   04/30/18 139/74   02/01/18 134/70   01/05/18 134/64   09/14/17 145/86     Pulse Readings from Last 1 Encounters:   05/01/18 93     Resp Readings from Last 1 Encounters:   05/01/18 14     Estimated body mass index is 33.92 kg/(m^2) as calculated from the following:    Height as of this encounter: 1.657 m (5' 5.25\").    Weight as of this encounter: 93.2 kg (205 lb 6.4 oz).    GEN: NAD  HEENT: MMM. No oral lesions. Anicteric, noninjected sclera  CV: S1, S2. RRR. No m/r/g.  PULM: CTA bilaterally. No w/c.  ABD: +BS.   MSK: MCPs, PIPs, wrists, elbows, shoulders, knees, ankles, and MTPs without swelling or tenderness to palpation.  Hips nontender to palpation.      NEURO: UE and LE strengths 5/5 and equal bilaterally.   SKIN: No rash  EXT: No LE edema  PSYCH: Alert. Appropriate.    Labs / Imaging (select studies)     CBC  Recent Labs   Lab Test  01/31/18   1555  08/05/17   1150  04/20/17   1700   WBC  9.0  8.6  9.2   RBC  4.05  4.24  4.16   HGB  14.0  14.8  14.3   HCT  41.1  42.7  40.9   MCV  102*  101*  98   RDW  13.9  13.2  13.4   PLT  209  259  232   MCH  34.6*  34.9*  34.4*   MCHC  34.1  34.7  35.0   NEUTROPHIL  65.2  66.1  69.9   LYMPH  23.0  24.4  20.3   MONOCYTE  8.5  7.1  7.0   EOSINOPHIL  3.1  2.0  2.3   BASOPHIL  0.2  0.4  0.4   ANEU  5.8  5.7  6.4   ALYM  2.1  2.1  1.9   SHYLA  0.8  0.6  0.7   AEOS  0.3  0.2  0.2   ABAS  0.0  0.0  0.0     CMP  Recent Labs "   Lab Test  04/30/18   1601  01/31/18   1555  08/05/17   1150  07/03/17   0803  04/20/17   1700   NA  135   --   139   --   140   POTASSIUM  3.1*   --   3.4  3.9  3.1*   CHLORIDE  100   --   103   --   100   CO2  30   --   28   --   31   ANIONGAP  5   --   8   --   9   GLC  111*   --   88   --   100*   BUN  9   --   13   --   9   CR  0.51*  0.60  0.60   --   0.59   GFRESTIMATED  >90  >90  >90  Non African American GFR Calc     --   >90  Non  GFR Calc     GFRESTBLACK  >90  >90  >90  African American GFR Calc     --   >90   GFR Calc     ROSELYN  8.9   --   9.2   --   9.1   BILITOTAL   --   0.4  0.4   --   0.4   ALBUMIN   --   3.6  3.7   --   3.7   PROTTOTAL   --   7.3  7.6   --   7.6   ALKPHOS   --   131  169*   --   142   AST   --   28  26   --   27   ALT   --   29  34   --   31     Calcium/VitaminD  Recent Labs   Lab Test  04/30/18   1601  08/05/17   1150  04/20/17   1700   ROSELYN  8.9  9.2  9.1     ESR/CRP  Recent Labs   Lab Test  04/20/17   1700  09/14/10   1606   SED  12  20     Hepatitis C  Recent Labs   Lab Test  09/11/14   1650   HCVAB  Negative     Lyme confirmation testing by Western Blot  Recent Labs   Lab Test  05/22/13   1540   LYWG  Negative Reference range: Negative (Note) Band(s) present: NONE (Insufficient number of bands for positive result) INTERPRETIVE INFORMATION: Borrelia Burgdorferi Ab, IgG Western Blot  For this assay, a positive result is reported when any 5 or more of the following 10 bands are present: 18, 23, 28, 30, 39, 41, 45, 58, 66, or 93 kDa.  All other banding patterns are reported as negative.   LYWM  Negative Reference range: Negative (Note) Band(s) present: NONE (Insufficient number of bands for positive result) INTERPRETIVE INFORMATION: Borrelia Burgdorferi Antibody, IgM Western Blot  For this assay, a positive result is reported when any 2 or more of the following bands are present: 23, 39, or 41 kDa. All other banding patterns are reported as negative.  Performed by [facility], [address] [place] [id number] [phone] [email or web address], Comfort Vega MD, Lab. Director     Immunization History     Immunization History   Administered Date(s) Administered     Influenza (H1N1) 12/31/2009     Influenza (IIV3) PF 11/03/2004, 10/30/2012, 10/20/2013, 10/03/2016     Influenza Vaccine IM 3yrs+ 4 Valent IIV4 10/23/2017     TD (ADULT, 7+) 04/28/1995     TDAP Vaccine (Adacel) 12/05/2007, 02/01/2018     Zoster vaccine, live 06/07/2016          Chart documentation done in part with Dragon Voice recognition Software. Although reviewed after completion, some word and grammatical error may remain.    Nasir Baltazar MD

## 2018-05-01 NOTE — PATIENT INSTRUCTIONS
Reduce ibuprofen to 400mg twice daily x2weeks, then 200mg twice daily x2weeks, then stop.     Rheumatology    Dr. Nasir [place] [facility]   (Monday)  [address]  [place] [address]       [facility]   (Tuesday)  [address] [place] [address]    [facility]   (Wed., Thurs., and Friday)  [address] [place] [address]    Phone number: [phone]  Thank you for choosing [facility].  Rachel Uriarte CMA

## 2018-05-02 LAB
HBV CORE AB SERPL QL IA: NONREACTIVE
HBV SURFACE AG SERPL QL IA: NONREACTIVE

## 2018-05-03 LAB — CCP AB SER IA-ACNC: 182 U/ML

## 2018-05-07 RX ORDER — POTASSIUM CHLORIDE 1500 MG/1
20 TABLET, EXTENDED RELEASE ORAL 2 TIMES DAILY
Qty: 180 TABLET | Refills: 3 | Status: SHIPPED | OUTPATIENT
Start: 2018-05-07 | End: 2019-09-04

## 2018-05-07 NOTE — PROGRESS NOTES
Potassium is low.  Recommend increased potassium chloride supplement to twice a day and recheck lab only in 2-3 weeks.    Britany Norton M.D.

## 2018-05-15 NOTE — PROGRESS NOTES
"MyChart message sent:  \"Ms. Meléndez,    Labs showed a positive CCP antibody that is consistent with rheumatoid arthritis and suggest an increased risk for bone erosions.  X-rays showed erosions in the feet that are consistent with rheumatoid arthritis.  These x-rays will serve as the new baseline and will be reevaluated in the future to help guide treatment.    Sincerely,  Nasir Baltazar MD  5/15/2018 5:27 AM\""

## 2018-05-18 ENCOUNTER — OFFICE VISIT (OUTPATIENT)
Dept: FAMILY MEDICINE | Facility: CLINIC | Age: 63
End: 2018-05-18
Payer: COMMERCIAL

## 2018-05-18 ENCOUNTER — RADIANT APPOINTMENT (OUTPATIENT)
Dept: GENERAL RADIOLOGY | Facility: CLINIC | Age: 63
End: 2018-05-18
Attending: NURSE PRACTITIONER
Payer: COMMERCIAL

## 2018-05-18 VITALS
DIASTOLIC BLOOD PRESSURE: 73 MMHG | HEART RATE: 80 BPM | HEIGHT: 65 IN | TEMPERATURE: 99.5 F | BODY MASS INDEX: 33.32 KG/M2 | SYSTOLIC BLOOD PRESSURE: 134 MMHG | RESPIRATION RATE: 12 BRPM | OXYGEN SATURATION: 94 % | WEIGHT: 200 LBS

## 2018-05-18 DIAGNOSIS — M54.2 NECK PAIN: ICD-10-CM
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
## 2018-05-18 DIAGNOSIS — M54.6 ACUTE BILATERAL THORACIC BACK PAIN: ICD-10-CM

## 2018-05-18 PROCEDURE — 72070 X-RAY EXAM THORAC SPINE 2VWS: CPT | Mod: FY

## 2018-05-18 PROCEDURE — 72040 X-RAY EXAM NECK SPINE 2-3 VW: CPT | Mod: FY

## 2018-05-18 PROCEDURE — 99214 OFFICE O/P EST MOD 30 MIN: CPT | Performed by: NURSE PRACTITIONER

## 2018-05-18 RX ORDER — OXYCODONE AND ACETAMINOPHEN 5; 325 MG/1; MG/1
1-2 TABLET ORAL EVERY 6 HOURS PRN
Qty: 12 TABLET | Refills: 0 | Status: SHIPPED | OUTPATIENT
Start: 2018-05-18 | End: 2018-09-13

## 2018-05-18 ASSESSMENT — PAIN SCALES - GENERAL: PAINLEVEL: EXTREME PAIN (8)
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
## 2018-05-18 NOTE — PATIENT INSTRUCTIONS
We will call you with the results of your X rays    Ice packs to neck and back will be helpful.    If you experience any visual changes, increased headache, numbness or tingling, overall worsening of your condition then go to the ER.     Back Pain (Acute or Chronic)    Back pain is one of the most common problems. The good news is that most people feel better in 1 to 2 weeks, and most of the rest in 1 to 2 months. Most people can remain active.  People experience and describe pain differently; not everyone is the same.    The pain can be sharp, stabbing, shooting, aching, cramping or burning.    Movement, standing, bending, lifting, sitting, or walking may worsen pain.    It can be localized to one spot or area, or it can be more generalized.    It can spread or radiate upwards, to the front, or go down your arms or legs (sciatica).    It can cause muscle spasm.  Most of the time, mechanical problems with the muscles or spine cause the pain. Mechanical problems are usually caused by an injury to the muscles or ligaments. While illness can cause back pain, it is usually not caused by a serious illness. Mechanical problems include:     Physical activity such as sports, exercise, work, or normal activity    Overexertion, lifting, pushing, pulling incorrectly or too aggressively    Sudden twisting, bending, or stretching from an accident, or accidental movement    Poor posture    Stretching or moving wrong, without noticing pain at the time    Poor coordination, lack of regular exercise (check with your doctor about this)    Spinal disc disease or arthritis    Stress  Pain can also be related to pregnancy, or illness like appendicitis, bladder or kidney infections, pelvic infections, and many other things.  Acute back pain usually gets better in 1 to 2 weeks. Back pain related to disk disease, arthritis in the spinal joints or spinal stenosis (narrowing of the spinal canal) can become chronic and last for months or  years.  Unless you had a physical injury (for example, a car accident or fall) X-rays are usually not needed for the initial evaluation of back pain. If pain continues and does not respond to medical treatment, X-rays and other tests may be needed.  Home care  Try these home care recommendations:    When in bed, try to find a position of comfort. A firm mattress is best. Try lying flat on your back with pillows under your knees. You can also try lying on your side with your knees bent up towards your chest and a pillow between your knees.    At first, do not try to stretch out the sore spots. If there is a strain, it is not like the good soreness you get after exercising without an injury. In this case, stretching may make it worse.    Avoid prolong sitting, long car rides, or travel. This puts more stress on the lower back than standing or walking.    During the first 24 to 72 hours after an acute injury or flare up of chronic back pain, apply an ice pack to the painful area for 20 minutes and then remove it for 20 minutes. Do this over a period of 60 to 90 minutes or several times a day. This will reduce swelling and pain. Wrap the ice pack in a thin towel or plastic to protect your skin.    You can start with ice, then switch to heat. Heat (hot shower, hot bath, or heating pad) reduces pain and works well for muscle spasms. Heat can be applied to the painful area for 20 minutes then remove it for 20 minutes. Do this over a period of 60 to 90 minutes or several times a day. Do not sleep on a heating pad. It can lead to skin burns or tissue damage.    You can alternate ice and heat therapy. Talk with your doctor about the best treatment for your back pain.    Therapeutic massage can help relax the back muscles without stretching them.    Be aware of safe lifting methods and do not lift anything without stretching first.  Medicines  Talk to your doctor before using medicine, especially if you have other medical  problems or are taking other medicines.    You may use over-the-counter medicine as directed on the bottle to control pain, unless another pain medicine was prescribed. If you have chronic conditions like diabetes, liver or kidney disease, stomach ulcers, or gastrointestinal bleeding, or are taking blood thinners, talk to your doctor before taking any medicine.    Be careful if you are given a prescription medicines, narcotics, or medicine for muscle spasms. They can cause drowsiness, affect your coordination, reflexes, and judgement. Do not drive or operate heavy machinery.  Follow-up care  Follow up with your healthcare provider, or as advised.   A radiologist will review any X-rays that were taken. Your provide will notify you of any new findings that may affect your care.  Call 911  Call emergency services if any of the following occur:    Trouble breathing    Confusion    Very drowsy or trouble awakening    Fainting or loss of consciousness    Rapid or very slow heart rate    Loss of bowel or bladder control  When to seek medical advice  Call your healthcare provider right away if any of these occur:     Pain becomes worse or spreads to your legs    Weakness or numbness in one or both legs    Numbness in the groin or genital area  Date Last Reviewed: 7/1/2016 2000-2017 [employer]. [address] [place] [address], [place] [address]. All rights reserved. This information is not intended as a substitute for professional medical care. Always follow your healthcare professional's instructions.

## 2018-05-22 ENCOUNTER — HOSPITAL ENCOUNTER (OUTPATIENT)
Dept: ULTRASOUND IMAGING | Facility: CLINIC | Age: 63
Discharge: HOME | End: 2018-05-22
Attending: FAMILY MEDICINE | Admitting: FAMILY MEDICINE
Payer: COMMERCIAL

## 2018-05-22 DIAGNOSIS — M79.662 PAIN OF LEFT LOWER LEG: ICD-10-CM

## 2018-05-22 PROCEDURE — 93922 UPR/L XTREMITY ART 2 LEVELS: CPT

## 2018-06-04 ENCOUNTER — TELEPHONE (OUTPATIENT)
Dept: FAMILY MEDICINE | Facility: CLINIC | Age: 63
End: 2018-06-04

## 2018-06-04 NOTE — TELEPHONE ENCOUNTER
Reason for Call:  Form, our goal is to have forms completed with 72 hours, however, some forms may require a visit or additional information.    Type of letter, form or note:  disability    Who is the form from?: Patient    Where did the form come from: Patient or family brought in       What clinic location was the form placed at?: [facility]    Where the form was placed: [facility]    What number is listed as a contact on the form?: [phone]       Additional comments:     Call taken on 6/4/2018 at 11:27 AM by Ivis Parnell

## 2018-06-05 NOTE — TELEPHONE ENCOUNTER
Left message for patient to return call. Per Casandra Garcia patient needs to schedule a follow up appointment next week to complete form. Please verify that people is not working. Form placed in green team forms bin. Please give back to Casandra Garcia once patient has scheduled appointment.    Supriya Rudd   [facility] [place]

## 2018-07-30 DIAGNOSIS — M05.9 SEROPOSITIVE RHEUMATOID ARTHRITIS (H): ICD-10-CM

## 2018-07-30 NOTE — TELEPHONE ENCOUNTER
Requested Prescriptions   Pending Prescriptions Disp Refills     methotrexate sodium 2.5 MG TABS 96 tablet 1     Sig: Take 20 mg by mouth once a week . Take all 8 tablets on the same day of each week.    There is no refill protocol information for this order        Last Written Prescription Date:  5/1/2018  Last Fill Quantity: 96,  # refills: 1   Last office visit: 5/18/2018 with prescribing provider:  Radha   Future Office Visit:   Next 5 appointments (look out 90 days)     Aug 14, 2018  2:40 PM CDT   Return Visit with Nasir Baltazar MD   [facility] ([facility])    [address] [place] [id number] [phone]

## 2018-08-01 RX ORDER — METHOTREXATE 2.5 MG/1
20 TABLET ORAL WEEKLY
Qty: 96 TABLET | Refills: 1 | OUTPATIENT
Start: 2018-08-01

## 2018-08-01 NOTE — TELEPHONE ENCOUNTER
Methotrexate refill request received and refused.  Ms. Meléndez should already have a refill available.     Nasir Baltazar MD  8/1/2018 5:30 PM

## 2018-08-10 DIAGNOSIS — M05.9 SEROPOSITIVE RHEUMATOID ARTHRITIS (H): ICD-10-CM

## 2018-08-10 DIAGNOSIS — E87.6 HYPOKALEMIA: ICD-10-CM

## 2018-08-10 LAB
ALBUMIN SERPL-MCNC: 3.5 G/DL (ref 3.4–5)
ALP SERPL-CCNC: 133 U/L (ref 40–150)
ALT SERPL W P-5'-P-CCNC: 34 U/L (ref 0–50)
AST SERPL W P-5'-P-CCNC: 35 U/L (ref 0–45)
BASOPHILS # BLD AUTO: 0.1 10E9/L (ref 0–0.2)
BASOPHILS NFR BLD AUTO: 0.7 %
BILIRUB DIRECT SERPL-MCNC: 0.2 MG/DL (ref 0–0.2)
BILIRUB SERPL-MCNC: 0.7 MG/DL (ref 0.2–1.3)
CREAT SERPL-MCNC: 0.65 MG/DL (ref 0.52–1.04)
DIFFERENTIAL METHOD BLD: ABNORMAL
EOSINOPHIL # BLD AUTO: 0.2 10E9/L (ref 0–0.7)
EOSINOPHIL NFR BLD AUTO: 2.2 %
ERYTHROCYTE [DISTWIDTH] IN BLOOD BY AUTOMATED COUNT: 13.1 % (ref 10–15)
GFR SERPL CREATININE-BSD FRML MDRD: >90 ML/MIN/1.7M2
HCT VFR BLD AUTO: 45.4 % (ref 35–47)
HGB BLD-MCNC: 15.1 G/DL (ref 11.7–15.7)
LYMPHOCYTES # BLD AUTO: 2.2 10E9/L (ref 0.8–5.3)
LYMPHOCYTES NFR BLD AUTO: 31 %
MCH RBC QN AUTO: 33.8 PG (ref 26.5–33)
MCHC RBC AUTO-ENTMCNC: 33.3 G/DL (ref 31.5–36.5)
MCV RBC AUTO: 102 FL (ref 78–100)
MONOCYTES # BLD AUTO: 0.4 10E9/L (ref 0–1.3)
MONOCYTES NFR BLD AUTO: 5.9 %
NEUTROPHILS # BLD AUTO: 4.2 10E9/L (ref 1.6–8.3)
NEUTROPHILS NFR BLD AUTO: 60.2 %
PLATELET # BLD AUTO: 232 10E9/L (ref 150–450)
POTASSIUM SERPL-SCNC: 3.9 MMOL/L (ref 3.4–5.3)
PROT SERPL-MCNC: 7.8 G/DL (ref 6.8–8.8)
RBC # BLD AUTO: 4.47 10E12/L (ref 3.8–5.2)
WBC # BLD AUTO: 7 10E9/L (ref 4–11)

## 2018-08-10 PROCEDURE — 82565 ASSAY OF CREATININE: CPT | Performed by: INTERNAL MEDICINE

## 2018-08-10 PROCEDURE — 36415 COLL VENOUS BLD VENIPUNCTURE: CPT | Performed by: INTERNAL MEDICINE

## 2018-08-10 PROCEDURE — 85025 COMPLETE CBC W/AUTO DIFF WBC: CPT | Performed by: INTERNAL MEDICINE

## 2018-08-10 PROCEDURE — 80076 HEPATIC FUNCTION PANEL: CPT | Performed by: INTERNAL MEDICINE

## 2018-08-10 PROCEDURE — 84132 ASSAY OF SERUM POTASSIUM: CPT | Performed by: INTERNAL MEDICINE

## 2018-08-14 ENCOUNTER — OFFICE VISIT (OUTPATIENT)
Dept: RHEUMATOLOGY | Facility: CLINIC | Age: 63
End: 2018-08-14
Payer: COMMERCIAL

## 2018-08-14 VITALS
BODY MASS INDEX: 33.36 KG/M2 | DIASTOLIC BLOOD PRESSURE: 81 MMHG | TEMPERATURE: 97.7 F | HEART RATE: 71 BPM | WEIGHT: 202 LBS | SYSTOLIC BLOOD PRESSURE: 129 MMHG | OXYGEN SATURATION: 95 %

## 2018-08-14 DIAGNOSIS — Z79.899 HIGH RISK MEDICATIONS (NOT ANTICOAGULANTS) LONG-TERM USE: ICD-10-CM

## 2018-08-14 DIAGNOSIS — M05.9 SEROPOSITIVE RHEUMATOID ARTHRITIS (H): Primary | ICD-10-CM

## 2018-08-14 PROCEDURE — 99213 OFFICE O/P EST LOW 20 MIN: CPT | Performed by: INTERNAL MEDICINE

## 2018-08-14 RX ORDER — METHOTREXATE 2.5 MG/1
20 TABLET ORAL WEEKLY
Qty: 96 TABLET | Refills: 2 | Status: SHIPPED | OUTPATIENT
Start: 2018-08-14 | End: 2019-02-19

## 2018-08-14 ASSESSMENT — PAIN SCALES - GENERAL: PAINLEVEL: MILD PAIN (2)

## 2018-08-14 NOTE — PROGRESS NOTES
"Rheumatology Clinic Visit      Mine Meléndez MRN# [id number]   YOB: 1955 Age: 63 year old      Date of visit: 8/14/18     Chief Complaint   Patient presents with:  Arthritis: 3 month follow up for RA    Assessment and Plan     1. Seropositive Erosive Rheumatoid Arthritis (RF+, ): Dx'd with RA prior to 2002. Previously on SSZ (stomatitis). Currently on MTX 20mg once weekly and folic acid 1mg daily. No longer using scheduled ibuprofen; now just using PRN.  Erosive changes seen on 2018 foot x-rays; plan to re-eval with x-rays in 2020.  Note that she has intermittent \"flares\" that are not necessarily consistent with rheumatoid arthritis in that she has them in the mid forearm and mid humerus; but she also has them in the right wrist; only affecting the right upper extremity and if they do occur it is significant enough that she has to stop her activity; she goes to sleep and it is resolved by the next morning when she wakes up; or ibuprofen 200mg once resolves the symptoms.    - Continue Methotrexate 20mg once weekly   - Continue folic acid 1mg daily  - Labs in 3 months: CBC, Creatinine, Hepatic Panel  - Labs in 6 months: CBC, Creatinine, Hepatic Panel, ESR, CRP     2. Elevated blood pressure:  Patient to follow up with Primary Care provider regarding elevated blood pressure.     Ms. Meléndez verbalized agreement with and understanding of the rational for the diagnosis and treatment plan.  All questions were answered to best of my ability and the patient's satisfaction. Ms. Meléndez was advised to contact the clinic with any questions that may arise after the clinic visit.      Thank you for involving me in the care of the patient    Return to clinic: 3 months      HPI   Mine Meléndez is a 63 year old female with a past medical history significant for hypertension, hyperlipidemia, hypothyroidism, migraines, and rheumatoid arthritis who presents for follow-up of rheumatoid arthritis.     Today, Ms. " Medhat reports that she is doing okay.  No joint pain or morning stiffness.  Approximately once every other week she has some pain in her forearm, mid humerus, or wrist that resolves with ibuprofen 200 mg once.  She is no longer taking ibuprofen on a scheduled basis and says that she is happy she is no longer doing that.  No issues with  strength.    Denies fevers, chills, nausea, vomiting, constipation, diarrhea. No abdominal pain. No chest pain/pressure, palpitations, or shortness of breath. No LE swelling. No neck pain. No oral or nasal sores.  No rash. No sicca symptoms.     Tobacco: None  EtOH: 4 drinks per day on the weekends  Drugs: None  Occupation: Assembles items    ROS   GEN: No fevers, chills, night sweats, or weight change  SKIN: No itching, rashes, sores  HEENT: No oral or nasal ulcers.  CV: No chest pain, pressure, palpitations, or dyspnea on exertion.  PULM: No SOB, wheeze, cough.  GI: No nausea, vomiting, constipation, diarrhea. No blood in stool. No abdominal pain.  : No blood in urine.  MSK: See HPI.  NEURO: No numbness, tingling, or weakness.  EXT: No LE swelling  PSYCH: Negative    Active Problem List     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)     Past Medical History     Past Medical History:   Diagnosis Date     Herpes simplex without mention of complication      Obesity, unspecified      Osteomyelitis of jaw 5/22/2008     Rheumatoid arthritis(714.0)      Trochanteric bursitis of right hip 8/16/2013     Past Surgical History     Past Surgical History:   Procedure Laterality Date     COLONOSCOPY  1/20/06     HYSTERECTOMY, PAP NO LONGER  INDICATED       HYSTERECTOMY, VAGINAL  11/04    TVH,TVT procedure     RELEASE CARPAL TUNNEL Right 10/11/2016    Procedure: RELEASE CARPAL TUNNEL;  Surgeon: Emely Blanca MD;  Location: [facility]     SURGICAL HISTORY OF -   1988    tubal ligation     SURGICAL HISTORY OF -   2001    excision gangliion cyst rt heel     Allergy     Allergies   Allergen Reactions     Codeine Rash     Flagyl [Metronidazole] Rash     Septra [Sulfamethoxazole W/Trimethoprim] Itching     Puffy eyes, flushed.      Current Medication List     Current Outpatient Prescriptions   Medication Sig     ASPIRIN 81 MG OR TABS ONE DAILY     BIOTIN PO      folic acid (FOLVITE) 1 MG tablet Take 1 tablet (1,000 mcg) by mouth daily     hydrochlorothiazide (HYDRODIURIL) 25 MG tablet Take 1 tablet (25 mg) by mouth every morning     ibuprofen (ADVIL/MOTRIN) 800 MG tablet Take 1 tablet (800 mg) by mouth every 8 hours as needed for pain (Patient not taking: Reported on 5/18/2018)     levothyroxine (SYNTHROID/LEVOTHROID) 50 MCG tablet Take 1 tablet (50 mcg) by mouth daily     methotrexate sodium 2.5 MG TABS Take 20 mg by mouth once a week . Take all 8 tablets on the same day of each week.     MULTI-VITAMIN OR 1 daily     omeprazole 20 MG tablet Take 1 tablet (20 mg) by mouth daily Take 30-60 minutes before a meal.     oxyCODONE-acetaminophen (PERCOCET) 5-325 MG per tablet Take 1-2 tablets by mouth every 6 hours as needed for moderate to severe pain     Potassium Chloride ER 20 MEQ TBCR Take 1 tablet (20 mEq) by mouth 2 times daily     simvastatin (ZOCOR) 20 MG tablet Take 1 tablet (20 mg) by mouth At Bedtime     venlafaxine (EFFEXOR-XR) 37.5 MG 24 hr capsule Take 1 capsule (37.5 mg) by mouth daily     No current facility-administered medications for this visit.          Social History   See HPI    Family History     Family History   Problem Relation Age of Onset     Diabetes Father      Dementia Father      Diabetes Brother      Diabetes Sister       "Hypertension Sister      Hypertension Sister      Physical Exam     Temp Readings from Last 3 Encounters:   05/18/18 99.5  F (37.5  C) (Tympanic)   04/30/18 97.4  F (36.3  C) (Tympanic)   02/01/18 97.4  F (36.3  C) (Tympanic)     BP Readings from Last 5 Encounters:   05/18/18 134/73   05/01/18 173/69   04/30/18 139/74   02/01/18 134/70   01/05/18 134/64     Pulse Readings from Last 1 Encounters:   05/18/18 80     Resp Readings from Last 1 Encounters:   05/18/18 12     Estimated body mass index is 33.03 kg/(m^2) as calculated from the following:    Height as of 5/18/18: 1.657 m (5' 5.25\").    Weight as of 5/18/18: 90.7 kg (200 lb).    GEN: NAD  HEENT: MMM. No oral lesions. Anicteric, noninjected sclera  CV: S1, S2. RRR. No m/r/g.  PULM: CTA bilaterally. No w/c.  MSK: MCPs, PIPs, wrists, elbows, shoulders, knees, ankles, and MTPs without swelling or tenderness to palpation.  Hips nontender to palpation.      NEURO: UE and LE strengths 5/5 and equal bilaterally.   SKIN: No rash  EXT: No LE edema  PSYCH: Alert. Appropriate.    Labs / Imaging (select studies)   RF/CCP  Recent Labs   Lab Test  05/01/18   1538   CCPIGG  182*     CBC  Recent Labs   Lab Test  08/10/18   0837  05/01/18   1538  01/31/18   1555   WBC  7.0  7.8  9.0   RBC  4.47  4.21  4.05   HGB  15.1  14.6  14.0   HCT  45.4  42.7  41.1   MCV  102*  101*  102*   RDW  13.1  13.9  13.9   PLT  232  226  209   MCH  33.8*  34.7*  34.6*   MCHC  33.3  34.2  34.1   NEUTROPHIL  60.2  61.8  65.2   LYMPH  31.0  23.4  23.0   MONOCYTE  5.9  10.9  8.5   EOSINOPHIL  2.2  3.1  3.1   BASOPHIL  0.7  0.8  0.2   ANEU  4.2  4.8  5.8   ALYM  2.2  1.8  2.1   SHYLA  0.4  0.9  0.8   AEOS  0.2  0.2  0.3   ABAS  0.1  0.1  0.0     CMP  Recent Labs   Lab Test  08/10/18   0837  05/01/18   1538  04/30/18   1601  01/31/18   1555  08/05/17   1150   04/20/17   1700   NA   --    --   135   --   139   --   140   POTASSIUM  3.9   --   3.1*   --   3.4   < >  3.1*   CHLORIDE   --    --   100   --   " 103   --   100   CO2   --    --   30   --   28   --   31   ANIONGAP   --    --   5   --   8   --   9   GLC   --    --   111*   --   88   --   100*   BUN   --    --   9   --   13   --   9   CR  0.65  0.55  0.51*  0.60  0.60   --   0.59   GFRESTIMATED  >90  >90  >90  >90  >90  Non African American GFR Calc     --   >90  Non  GFR Calc     GFRESTBLACK  >90  >90  >90  >90  >90  African American GFR Calc     --   >90   GFR Calc     ROSELYN   --    --   8.9   --   9.2   --   9.1   BILITOTAL  0.7  0.4   --   0.4  0.4   --   0.4   ALBUMIN  3.5  3.7   --   3.6  3.7   --   3.7   PROTTOTAL  7.8  7.8   --   7.3  7.6   --   7.6   ALKPHOS  133  127   --   131  169*   --   142   AST  35  21   --   28  26   --   27   ALT  34  28   --   29  34   --   31    < > = values in this interval not displayed.     Calcium/VitaminD  Recent Labs   Lab Test  04/30/18   1601  08/05/17   1150  04/20/17   1700   ROSELYN  8.9  9.2  9.1     ESR/CRP  Recent Labs   Lab Test  05/01/18   1538  04/20/17   1700  09/14/10   1606   SED  13  12  20   CRP  10.1*   --    --      Hepatitis B  Recent Labs   Lab Test  05/01/18   1538   HBCAB  Nonreactive   HEPBANG  Nonreactive     Hepatitis C  Recent Labs   Lab Test  09/11/14   1650   HCVAB  Negative     Lyme confirmation testing by Western Blot  Recent Labs   Lab Test  05/22/13   1540   LYWG  Negative Reference range: Negative (Note) Band(s) present: NONE (Insufficient number of bands for positive result) INTERPRETIVE INFORMATION: Borrelia Burgdorferi Ab, IgG Western Blot  For this assay, a positive result is reported when any 5 or more of the following 10 bands are present: 18, 23, 28, 30, 39, 41, 45, 58, 66, or 93 kDa.  All other banding patterns are reported as negative.   LYW  Negative Reference range: Negative (Note) Band(s) present: NONE (Insufficient number of bands for positive result) INTERPRETIVE INFORMATION: Borrelia Burgdorferi Antibody, IgM Western Blot  For this assay, a  positive result is reported when any 2 or more of the following bands are present: 23, 39, or 41 kDa. All other banding patterns are reported as negative. Performed by [facility], [facility] [place] [address] [phone] [email or web address], Comfort Vega MD, Lab. Director     Immunization History     Immunization History   Administered Date(s) Administered     Influenza (H1N1) 12/31/2009     Influenza (IIV3) PF 11/03/2004, 10/30/2012, 10/20/2013, 10/03/2016     Influenza Vaccine IM 3yrs+ 4 Valent IIV4 10/23/2017     TD (ADULT, 7+) 04/28/1995     TDAP Vaccine (Adacel) 12/05/2007, 02/01/2018     Zoster vaccine, live 06/07/2016          Chart documentation done in part with Dragon Voice recognition Software. Although reviewed after completion, some word and grammatical error may remain.    Nasir Baltazar MD

## 2018-08-14 NOTE — MR AVS SNAPSHOT
After Visit Summary   8/14/2018    Mine Meléndez    MRN: [id number]           Patient Information     Date Of Birth          1955        Visit Information        Provider Department      8/14/2018 2:40 PM Nasir Baltazar MD [facility]        Today's Diagnoses     Seropositive rheumatoid arthritis (H)           Follow-ups after your visit        Your next 10 appointments already scheduled     Nov 14, 2018  4:00 PM CST   [facility]    [address] [address]   [phone]           Please do not eat 10-12 hours before your appointment if you are coming in fasting for labs on lipids, cholesterol, or glucose (sugar). This does not apply to pregnant women. Water, hot tea and black coffee (with nothing added) are okay. Do not drink other fluids, diet soda or chew gum.            Feb 15, 2019  4:00 PM CST   LAB with [facility] LAB   [facility]    [address] [address]   [phone]           Please do not eat 10-12 hours before your appointment if you are coming in fasting for labs on lipids, cholesterol, or glucose (sugar). This does not apply to pregnant women. Water, hot tea and black coffee (with nothing added) are okay. Do not drink other fluids, diet soda or chew gum.            Feb 19, 2019  3:00 PM CST   Return Visit with Nasir Baltazar MD   [facility] ([facility])    [address] [place] [address] [id number] [phone]              Who to contact     If you have questions or need follow up information about today's clinic visit or your schedule please contact [facility] directly at [phone].  Normal or non-critical lab and imaging results will be communicated to you by MyChart, letter or phone within 4 business days after the clinic has  received the results. If you do not hear from us within 7 days, please contact the clinic through [email or web address] or phone. If you have a critical or abnormal lab result, we will notify you by phone as soon as possible.  Submit refill requests through [email or web address] or call your pharmacy and they will forward the refill request to us. Please allow 3 business days for your refill to be completed.          Additional Information About Your Visit        [employer] Information     [email or web address] gives you secure access to your electronic health record. If you see a primary care provider, you can also send messages to your care team and make appointments. If you have questions, please call your primary care clinic.  If you do not have a primary care provider, please call [phone] and they will assist you.        Care EveryWhere ID     This is your Care EveryWhere ID. This could be used by other organizations to access your [place] medical records  [id number]        Your Vitals Were     Pulse Temperature Pulse Oximetry BMI (Body Mass Index)          71 97.7  F (36.5  C) (Oral) 95% 33.36 kg/m2         Blood Pressure from Last 3 Encounters:   08/14/18 129/81   05/18/18 134/73   05/01/18 173/69    Weight from Last 3 Encounters:   08/14/18 91.6 kg (202 lb)   05/18/18 90.7 kg (200 lb)   05/01/18 93.2 kg (205 lb 6.4 oz)              Today, you had the following     No orders found for display         Where to get your medicines      These medications were sent to [facility] [place], [place] - [address]  [address] [address] [facility] [address]     Phone:  [phone]     methotrexate sodium 2.5 MG Tabs          Primary Care Provider Fax #    Physician No Ref-Primary [phone]       No address on file        Equal Access to Services     EDA CULLEN : Kendal Garcia, wafrancoise van, qaybta kaalmakelli schaffer. So [facility]  [phone].    ATENCIÓN: Si allen gramajo, tiene a menjivar disposición servicios gratuitos de asistencia lingüística. Sohan pena [phone].    We comply with applicable federal civil rights laws and [place] laws. We do not discriminate on the basis of race, color, national origin, age, disability, sex, sexual orientation, or gender identity.            Thank you!     Thank you for choosing [facility]  for your care. Our goal is always to provide you with excellent care. Hearing back from our patients is one way we can continue to improve our services. Please take a few minutes to complete the written survey that you may receive in the mail after your visit with us. Thank you!             Your Updated Medication List - Protect others around you: Learn how to safely use, store and throw away your medicines at www.disposemymeds.org.          This list is accurate as of 8/14/18  3:12 PM.  Always use your most recent med list.                   Brand Name Dispense Instructions for use Diagnosis    aspirin 81 MG tablet     100    ONE DAILY    Sensation, tingling       BIOTIN PO           folic acid 1 MG tablet    FOLVITE    90 tablet    Take 1 tablet (1,000 mcg) by mouth daily    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)       hydrochlorothiazide 25 MG tablet    HYDRODIURIL    90 tablet    Take 1 tablet (25 mg) by mouth every morning    Essential hypertension with goal blood pressure less than 140/90       levothyroxine 50 MCG tablet    SYNTHROID/LEVOTHROID    90 tablet    Take 1 tablet (50 mcg) by mouth daily    Other specified hypothyroidism       methotrexate sodium 2.5 MG Tabs     96 tablet    Take 8 tablets (20 mg) by mouth once a week . Take all 8 tablets on the same day of each week.    Seropositive rheumatoid arthritis (H)       MULTI-VITAMIN PO      1 daily        omeprazole 20 MG tablet     90 tablet    Take 1 tablet (20 mg) by mouth daily Take 30-60  minutes before a meal.    Gastroesophageal reflux disease without esophagitis       oxyCODONE-acetaminophen 5-325 MG per tablet    PERCOCET    12 tablet    Take 1-2 tablets by mouth every 6 hours as needed for moderate to severe pain    Neck pain, Acute bilateral thoracic back pain       Potassium Chloride ER 20 MEQ Tbcr     180 tablet    Take 1 tablet (20 mEq) by mouth 2 times daily    Diuretic-induced hypokalemia       simvastatin 20 MG tablet    ZOCOR    90 tablet    Take 1 tablet (20 mg) by mouth At Bedtime    Hyperlipidemia LDL goal <130       venlafaxine 37.5 MG 24 hr capsule    EFFEXOR-XR    90 capsule    Take 1 capsule (37.5 mg) by mouth daily    Menopausal syndrome (hot flashes)

## 2018-08-14 NOTE — NURSING NOTE
Mine Meléndez was evaluated in a specialty clinic today at which time her blood pressure was noted to be elevated.  She  has been advised to follow up with her primary provider or with a nurse only visit. We are also routing this message to her primary provider as an FYI.      Mai Farias CMA.

## 2018-08-14 NOTE — NURSING NOTE
"Chief Complaint   Patient presents with     Arthritis     3 month follow up for RA       Initial /81  Pulse 71  Temp 97.7  F (36.5  C) (Oral)  Wt 91.6 kg (202 lb)  SpO2 95%  BMI 33.36 kg/m2 Estimated body mass index is 33.36 kg/(m^2) as calculated from the following:    Height as of 5/18/18: 1.657 m (5' 5.25\").    Weight as of this encounter: 91.6 kg (202 lb)..  BP completed using cuff size: keesha Farias CMA    "

## 2018-08-22 ENCOUNTER — TELEPHONE (OUTPATIENT)
Dept: FAMILY MEDICINE | Facility: CLINIC | Age: 63
End: 2018-08-22

## 2018-08-22 NOTE — TELEPHONE ENCOUNTER
Panel Management Review      Patient has the following on her problem list:     Hypertension   Last three blood pressure readings:  BP Readings from Last 3 Encounters:   08/14/18 129/81   05/18/18 134/73   05/01/18 173/69     Blood pressure: Passed    HTN Guidelines:  Age 18-59 BP range:  Less than 140/90  Age 60-85 with Diabetes:  Less than 140/90  Age 60-85 without Diabetes:  less than 150/90      Composite cancer screening  Chart review shows that this patient is due/due soon for the following Fecal Colorectal (FIT)  Summary:    Patient is due/failing the following:   FIT    Action needed:   Patient needs referral/order: fit    Type of outreach:    Phone, spoke to patient.  I left detailed msg on patients personal cell phone re: fit    Questions for provider review:    None                                                                                                                                    Irene Rankin MA       Chart routed to Care Team .

## 2018-09-10 PROCEDURE — 82274 ASSAY TEST FOR BLOOD FECAL: CPT | Performed by: NURSE PRACTITIONER

## 2018-09-13 ENCOUNTER — OFFICE VISIT (OUTPATIENT)
Dept: FAMILY MEDICINE | Facility: CLINIC | Age: 63
End: 2018-09-13
Payer: COMMERCIAL

## 2018-09-13 VITALS
HEART RATE: 66 BPM | DIASTOLIC BLOOD PRESSURE: 74 MMHG | SYSTOLIC BLOOD PRESSURE: 144 MMHG | TEMPERATURE: 98.6 F | BODY MASS INDEX: 33.32 KG/M2 | HEIGHT: 65 IN | OXYGEN SATURATION: 97 % | RESPIRATION RATE: 18 BRPM | WEIGHT: 200 LBS

## 2018-09-13 DIAGNOSIS — N39.0 URINARY TRACT INFECTION, ACUTE: ICD-10-CM

## 2018-09-13 DIAGNOSIS — R30.0 DYSURIA: Primary | ICD-10-CM

## 2018-09-13 DIAGNOSIS — N95.1 MENOPAUSAL SYNDROME (HOT FLASHES): ICD-10-CM

## 2018-09-13 LAB
ALBUMIN UR-MCNC: NEGATIVE MG/DL
AMORPH CRY #/AREA URNS HPF: ABNORMAL /HPF
APPEARANCE UR: CLEAR
BACTERIA #/AREA URNS HPF: ABNORMAL /HPF
BILIRUB UR QL STRIP: NEGATIVE
COLOR UR AUTO: YELLOW
GLUCOSE UR STRIP-MCNC: NEGATIVE MG/DL
HGB UR QL STRIP: ABNORMAL
KETONES UR STRIP-MCNC: NEGATIVE MG/DL
LEUKOCYTE ESTERASE UR QL STRIP: ABNORMAL
NITRATE UR QL: NEGATIVE
NON-SQ EPI CELLS #/AREA URNS LPF: ABNORMAL /LPF
PH UR STRIP: 7 PH (ref 5–7)
RBC #/AREA URNS AUTO: ABNORMAL /HPF
SOURCE: ABNORMAL
SP GR UR STRIP: 1.01 (ref 1–1.03)
UROBILINOGEN UR STRIP-ACNC: 0.2 EU/DL (ref 0.2–1)
WBC #/AREA URNS AUTO: ABNORMAL /HPF

## 2018-09-13 PROCEDURE — 87086 URINE CULTURE/COLONY COUNT: CPT | Performed by: FAMILY MEDICINE

## 2018-09-13 PROCEDURE — 99214 OFFICE O/P EST MOD 30 MIN: CPT | Performed by: FAMILY MEDICINE

## 2018-09-13 PROCEDURE — 87088 URINE BACTERIA CULTURE: CPT | Performed by: FAMILY MEDICINE

## 2018-09-13 PROCEDURE — 81001 URINALYSIS AUTO W/SCOPE: CPT | Performed by: FAMILY MEDICINE

## 2018-09-13 PROCEDURE — 87186 SC STD MICRODIL/AGAR DIL: CPT | Performed by: FAMILY MEDICINE

## 2018-09-13 RX ORDER — CIPROFLOXACIN 250 MG/1
250 TABLET, FILM COATED ORAL 2 TIMES DAILY
Qty: 6 TABLET | Refills: 0 | Status: SHIPPED | OUTPATIENT
Start: 2018-09-13 | End: 2018-11-12

## 2018-09-13 RX ORDER — GABAPENTIN 100 MG/1
CAPSULE ORAL
Qty: 90 CAPSULE | Refills: 0 | Status: SHIPPED | OUTPATIENT
Start: 2018-09-13 | End: 2018-10-15 | Stop reason: DRUGHIGH

## 2018-09-13 ASSESSMENT — PAIN SCALES - GENERAL: PAINLEVEL: MODERATE PAIN (4)

## 2018-09-13 NOTE — PATIENT INSTRUCTIONS
Thank you for choosing [facility].  You may be receiving a survey in the mail from [facility] regarding your visit today.  Please take a few minutes to complete and return the survey to let us know how we are doing.      Our Clinic hours are:  Mondays    7:20 am - 7 pm  Tues - Fri  7:20 am - 5 pm    Clinic Phone: [phone]    The clinic lab opens at 7:30 am Mon - Fri and appointments are required.    [facility]. [phone]  Monday  8 am - 7pm  Tues - Fri 8 am - 5:30 pm

## 2018-09-13 NOTE — MR AVS SNAPSHOT
After Visit Summary   9/13/2018    Mine Meléndez    MRN: [id number]           Patient Information     Date Of Birth          1955        Visit Information        Provider Department      9/13/2018 3:00 PM Britany Norton MD [facility]        Today's Diagnoses     Dysuria    -  1    Menopausal syndrome (hot flashes)        Urinary tract infection, acute          Care Instructions          Thank you for choosing [facility].  You may be receiving a survey in the mail from [employer] regarding your visit today.  Please take a few minutes to complete and return the survey to let us know how we are doing.      Our Clinic hours are:  Mondays    7:20 am - 7 pm  Tues - Fri  7:20 am - 5 pm    Clinic Phone: [phone]    The clinic lab opens at 7:30 am Mon - Fri and appointments are required.    [facility]. [phone]  Monday  8 am - 7pm  Tues - Fri 8 am - 5:30 pm                 Follow-ups after your visit        Your next 10 appointments already scheduled     Nov 14, 2018  4:00 PM CST   LAB with CL LAB   [facility] ([facility])    [address] [address]   [phone]           Please do not eat 10-12 hours before your appointment if you are coming in fasting for labs on lipids, cholesterol, or glucose (sugar). This does not apply to pregnant women. Water, hot tea and black coffee (with nothing added) are okay. Do not drink other fluids, diet soda or chew gum.            Feb 15, 2019  4:00 PM CST   LAB with CL LAB   [facility] ([facility])    [address] [address]   [phone]           Please do not eat 10-12 hours before your appointment if you are coming in fasting for labs on lipids, cholesterol, or glucose (sugar). This does not apply to pregnant women. Water, hot tea and black coffee (with nothing added) are okay. Do not  "drink other fluids, diet soda or chew gum.            Feb 19, 2019  3:00 PM CST   Return Visit with Nasir Baltazar MD   [facility] ([facility])    [address] [place] [address] [phone]              Who to contact     If you have questions or need follow up information about today's clinic visit or your schedule please contact [facility] directly at [phone].  Normal or non-critical lab and imaging results will be communicated to you by [employer], letter or phone within 4 business days after the clinic has received the results. If you do not hear from us within 7 days, please contact the clinic through [employer] or phone. If you have a critical or abnormal lab result, we will notify you by phone as soon as possible.  Submit refill requests through [employer] or call your pharmacy and they will forward the refill request to us. Please allow 3 business days for your refill to be completed.          Additional Information About Your Visit        [employer] Information     [employer] gives you secure access to your electronic health record. If you see a primary care provider, you can also send messages to your care team and make appointments. If you have questions, please call your primary care clinic.  If you do not have a primary care provider, please call [phone] and they will assist you.        Care EveryWhere ID     This is your Care EveryWhere ID. This could be used by other organizations to access your [place] medical records  [id number]        Your Vitals Were     Pulse Temperature Respirations Height Pulse Oximetry Breastfeeding?    66 98.6  F (37  C) (Tympanic) 18 5' 5.25\" (1.657 m) 97% No    BMI (Body Mass Index)                   33.03 kg/m2            Blood Pressure from Last 3 Encounters:   09/13/18 144/74   08/14/18 129/81   05/18/18 134/73    Weight from Last 3 Encounters:   09/13/18 200 lb (90.7 kg)   08/14/18 202 lb " (91.6 kg)   05/18/18 200 lb (90.7 kg)              We Performed the Following     *UA reflex to Microscopic and Culture ([place] [facility] (except [place] and [place])     Urine Culture Aerobic Bacterial     Urine Microscopic          Today's Medication Changes          These changes are accurate as of 9/13/18  3:43 PM.  If you have any questions, ask your nurse or doctor.               Start taking these medicines.        Dose/Directions    ciprofloxacin 250 MG tablet   Commonly known as:  CIPRO   Used for:  Urinary tract infection, acute   Started by:  Britany Norton MD        Dose:  250 mg   Take 1 tablet (250 mg) by mouth 2 times daily   Quantity:  6 tablet   Refills:  0       gabapentin 100 MG capsule   Commonly known as:  NEURONTIN   Used for:  Menopausal syndrome (hot flashes)   Started by:  Britany Norton MD        100 mg nightly for three nights, then 200 mg nightly for three nights, then 300 mg nightly   Quantity:  90 capsule   Refills:  0            Where to get your medicines      These medications were sent to [facility] [address] [address]  [id number] [employer] [address] | [employer] [facility] [address]     Phone:  [phone]     ciprofloxacin 250 MG tablet    gabapentin 100 MG capsule                Primary Care Provider Fax #    Physician No Ref-Primary [phone]       No address on file        Equal Access to Services     EDA CULLEN : Kendal mayero Somaryali, waaxda luqadaha, qaybta kaalmada adeegyada, kelli baltazar. So [facility] [phone].    ATENCIÓN: Si habla español, tiene a emnjivar disposición servicios gratuitos de asistencia lingüística. Llame al [phone].    We comply with applicable federal civil rights laws and [place] laws. We do not discriminate on the basis of race, color, national origin, age, disability, sex, sexual orientation, or gender identity.            Thank you!     Thank you for choosing  [facility]  for your care. Our goal is always to provide you with excellent care. Hearing back from our patients is one way we can continue to improve our services. Please take a few minutes to complete the written survey that you may receive in the mail after your visit with us. Thank you!             Your Updated Medication List - Protect others around you: Learn how to safely use, store and throw away your medicines at www.disposemymeds.org.          This list is accurate as of 9/13/18  3:43 PM.  Always use your most recent med list.                   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)       gabapentin 100 MG capsule    NEURONTIN    90 capsule    100 mg nightly for three nights, then 200 mg nightly for three nights, then 300 mg nightly    Menopausal syndrome (hot flashes)       hydrochlorothiazide 25 MG tablet    HYDRODIURIL    90 tablet    Take 1 tablet (25 mg) by mouth every morning    Essential hypertension with goal blood pressure less than 140/90       levothyroxine 50 MCG tablet    SYNTHROID/LEVOTHROID    90 tablet    Take 1 tablet (50 mcg) by mouth daily    Other specified hypothyroidism       methotrexate sodium 2.5 MG Tabs     96 tablet    Take 8 tablets (20 mg) by mouth once a week . Take all 8 tablets on the same day of each week.    Seropositive rheumatoid arthritis (H)       MULTI-VITAMIN PO      1 daily        omeprazole 20 MG tablet     90 tablet    Take 1 tablet (20 mg) by mouth daily Take 30-60 minutes before a meal.    Gastroesophageal reflux disease without  esophagitis       Potassium Chloride ER 20 MEQ Tbcr     180 tablet    Take 1 tablet (20 mEq) by mouth 2 times daily    Diuretic-induced hypokalemia       simvastatin 20 MG tablet    ZOCOR    90 tablet    Take 1 tablet (20 mg) by mouth At Bedtime    Hyperlipidemia LDL goal <130

## 2018-09-13 NOTE — PROGRESS NOTES
"  SUBJECTIVE:   Mine Meléndez is a 63 year old female who presents to clinic today for the following health issues:      Medication Followup of effexor    Taking Medication as prescribed: yes    Side Effects:  None    Medication Helping Symptoms:  NO-not helping and she took the last one 3 weeks ago and haven't noticed a difference.    Took the effexor for several months but really noticed no difference, still having night sweats, hot flashes all day.    We again discussed that at her age I'd like to avoid restarting HRT/estrogens due to increased risk of breast cancer, heart disease, etc. Patient agrees with this and is agreeable to trying something different.      Discussed options and we'll try gabapentin, initially only at bedtime.      URINARY TRACT SYMPTOMS  Onset: 1 week ago    Description:   Painful urination (Dysuria): YES  Blood in urine (Hematuria): no   Delay in urine (Hesitency): no     Intensity: varies each time she goes    Progression of Symptoms:  intermittent and waxing and waning    Accompanying Signs & Symptoms:  Fever/chills: no   Flank pain no   Nausea and vomiting: no   Any vaginal symptoms: vaginal odor and vaginal itching  Abdominal/Pelvic Pain: no     History:   History of frequent UTI's: YES  History of kidney stones: no   Sexually Active: YES  Possibility of pregnancy: No    Precipitating factors:       Therapies Tried and outcome: cranberry tablets      /74  Pulse 66  Temp 98.6  F (37  C) (Tympanic)  Resp 18  Ht 5' 5.25\" (1.657 m)  Wt 200 lb (90.7 kg)  SpO2 97%  Breastfeeding? No  BMI 33.03 kg/m2  EXAM: GENERAL APPEARANCE: Alert, no acute distress  RESP: lungs clear to auscultation   CV: normal rate, regular rhythm, no murmur or gallop  ABDOMEN: soft, no organomegaly, masses or tenderness    ASSESSMENT/PLAN:      ICD-10-CM    1. Dysuria R30.0 *UA reflex to Microscopic and Culture ([place] [facility] (except [place] and [place])     Urine Microscopic   2. " Menopausal syndrome (hot flashes) N95.1 gabapentin (NEURONTIN) 100 MG capsule   3. Urinary tract infection, acute N39.0 ciprofloxacin (CIPRO) 250 MG tablet     Urine Culture Aerobic Bacterial     Treat the probable UTI, culture is pending.    For the hot flashes will start gabapentin 100 mg at night and titrate to 300 mg at bedtime, could go to 900 mg today daily (300 three times daily) if needed.  She'll let me know in a few weeks how she's doing.    Britany Norton M.D.      Patient Instructions         Thank you for choosing [facility].  You may be receiving a survey in the mail from [email or web address] regarding your visit today.  Please take a few minutes to complete and return the survey to let us know how we are doing.      Our Clinic hours are:  Mondays    7:20 am - 7 pm  Tues -  Fri  7:20 am - 5 pm    Clinic Phone: [phone]    The clinic lab opens at 7:30 am Mon - Fri and appointments are required.    [facility]. [phone]  Monday  8 am - 7pm  Tues - Fri 8 am - 5:30 pm

## 2018-09-14 LAB
BACTERIA SPEC CULT: ABNORMAL
Lab: ABNORMAL
SPECIMEN SOURCE: ABNORMAL

## 2018-09-15 LAB — HEMOCCULT STL QL IA: NEGATIVE

## 2018-09-17 DIAGNOSIS — Z12.11 ENCOUNTER FOR FIT (FECAL IMMUNOCHEMICAL TEST) SCREENING: ICD-10-CM

## 2018-09-29 ENCOUNTER — MYC MEDICAL ADVICE (OUTPATIENT)
Dept: FAMILY MEDICINE | Facility: CLINIC | Age: 63
End: 2018-09-29

## 2018-10-01 NOTE — TELEPHONE ENCOUNTER
Provider covering Dr Norton, please see her mychart note asking about this supplement. (sorry if it is hard to read)  Thanks,   Renata Cowart RNC

## 2018-10-15 ENCOUNTER — MYC MEDICAL ADVICE (OUTPATIENT)
Dept: FAMILY MEDICINE | Facility: CLINIC | Age: 63
End: 2018-10-15

## 2018-10-15 DIAGNOSIS — N95.1 MENOPAUSAL SYNDROME (HOT FLASHES): ICD-10-CM

## 2018-10-15 RX ORDER — GABAPENTIN 100 MG/1
CAPSULE ORAL
Qty: 90 CAPSULE | Refills: 0 | Status: CANCELLED | OUTPATIENT
Start: 2018-10-15

## 2018-10-15 RX ORDER — GABAPENTIN 300 MG/1
300 CAPSULE ORAL AT BEDTIME
Qty: 90 CAPSULE | Refills: 1 | Status: SHIPPED | OUTPATIENT
Start: 2018-10-15 | End: 2019-04-17

## 2018-10-17 DIAGNOSIS — N95.1 MENOPAUSAL SYNDROME (HOT FLASHES): ICD-10-CM

## 2018-10-17 RX ORDER — GABAPENTIN 300 MG/1
300 CAPSULE ORAL AT BEDTIME
Qty: 90 CAPSULE | Refills: 1 | OUTPATIENT
Start: 2018-10-17

## 2018-10-17 NOTE — TELEPHONE ENCOUNTER
Neurontin      Last Written Prescription Date:  09/13/18  Last Fill Quantity: 90,   # refills: 1  Last Office Visit: 09/13/18  BECKY Norton  Future Office visit:       Routing refill request to provider for review/approval because:  Drug not on the [facility], P or [facility] refill protocol or controlled substance

## 2018-10-30 ENCOUNTER — MYC MEDICAL ADVICE (OUTPATIENT)
Dept: FAMILY MEDICINE | Facility: CLINIC | Age: 63
End: 2018-10-30

## 2018-11-05 DIAGNOSIS — M05.731 RHEUMATOID ARTHRITIS INVOLVING RIGHT WRIST WITH POSITIVE RHEUMATOID FACTOR (H): ICD-10-CM

## 2018-11-05 RX ORDER — FOLIC ACID 1 MG/1
1000 TABLET ORAL DAILY
Qty: 90 TABLET | Refills: 2 | Status: CANCELLED | OUTPATIENT
Start: 2018-11-05

## 2018-11-12 ENCOUNTER — OFFICE VISIT (OUTPATIENT)
Dept: FAMILY MEDICINE | Facility: CLINIC | Age: 63
End: 2018-11-12
Payer: COMMERCIAL

## 2018-11-12 VITALS
SYSTOLIC BLOOD PRESSURE: 130 MMHG | HEIGHT: 65 IN | WEIGHT: 197 LBS | DIASTOLIC BLOOD PRESSURE: 70 MMHG | HEART RATE: 81 BPM | OXYGEN SATURATION: 95 % | TEMPERATURE: 98.1 F | RESPIRATION RATE: 16 BRPM | BODY MASS INDEX: 32.82 KG/M2

## 2018-11-12 DIAGNOSIS — R30.0 DYSURIA: ICD-10-CM

## 2018-11-12 DIAGNOSIS — N39.0 URINARY TRACT INFECTION WITHOUT HEMATURIA, SITE UNSPECIFIED: Primary | ICD-10-CM

## 2018-11-12 DIAGNOSIS — Z23 NEED FOR VACCINATION: ICD-10-CM

## 2018-11-12 DIAGNOSIS — N95.1 MENOPAUSAL SYNDROME (HOT FLASHES): ICD-10-CM

## 2018-11-12 LAB

## 2018-11-12 PROCEDURE — 99213 OFFICE O/P EST LOW 20 MIN: CPT | Mod: 25 | Performed by: NURSE PRACTITIONER

## 2018-11-12 PROCEDURE — 87086 URINE CULTURE/COLONY COUNT: CPT | Performed by: NURSE PRACTITIONER

## 2018-11-12 PROCEDURE — 87088 URINE BACTERIA CULTURE: CPT | Mod: 59 | Performed by: NURSE PRACTITIONER

## 2018-11-12 PROCEDURE — 81001 URINALYSIS AUTO W/SCOPE: CPT | Performed by: NURSE PRACTITIONER

## 2018-11-12 PROCEDURE — 90471 IMMUNIZATION ADMIN: CPT | Performed by: NURSE PRACTITIONER

## 2018-11-12 PROCEDURE — 90670 PCV13 VACCINE IM: CPT | Performed by: NURSE PRACTITIONER

## 2018-11-12 PROCEDURE — 87186 SC STD MICRODIL/AGAR DIL: CPT | Performed by: NURSE PRACTITIONER

## 2018-11-12 RX ORDER — NITROFURANTOIN 25; 75 MG/1; MG/1
100 CAPSULE ORAL 2 TIMES DAILY
Qty: 14 CAPSULE | Refills: 0 | Status: SHIPPED | OUTPATIENT
Start: 2018-11-12 | End: 2019-04-17

## 2018-11-12 NOTE — PATIENT INSTRUCTIONS
Pneumonia was given today.    Urinary Tract Infection         What is a urinary tract infection?   A urinary tract infection (UTI) is an infection in the urinary tract. The urinary tract includes the:   kidneys   ureters (the tubes draining urine from the kidneys to the bladder)   bladder   urethra (the tube that drains urine from the bladder).   Any or all of these parts of the urinary tract can get infected.   How does it occur?   Urinary tract infection is usually caused by bacteria. Normally the urinary tract does not have any bacteria or other organisms in it. Bacteria that cause UTI often spread from the rectum or vagina to the urethra and then to the bladder or kidneys. Urinary tract infection is more common in women than men because the urethra is shorter in women. This makes it easier for bacteria to move up to the bladder. Sometimes bacteria spread from another part of the body through the bloodstream to the urinary tract.   Some of the things that can lead to an infection are:   a blockage in the urinary tract, such as a kidney stone   sexual activity   getting older, when it may get harder to empty and flush out the bladder completely.   Women are more likely to have an infection if they:   are newly sexually active or have a new sex partner   are past menopause   are pregnant   have a history of diabetes, a problem with the immune system, sickle-cell anemia, stroke, kidney stones, or any illness that makes it hard to empty the bladder completely.   What are the symptoms?   The symptoms of UTI may include:   urinating more often   feeling an urgent need to urinate   pain or discomfort (burning) when you urinate   urine that smells bad   pain in the lower pelvis, stomach, lower back, or side   urine that looks cloudy or reddish   fever or chills   sweats   nausea and vomiting   leaking of urine   change in amount of urine, either more or less   pain during sex.   How is it diagnosed?   Your  healthcare provider will ask about your symptoms and medical history. Your provider will examine you. The exam may include a pelvic exam. Your provider will check for tenderness of the bladder or kidney. A sample of your urine may be tested for bacteria and pus. If you are having fever and are feeling very ill, you may have a blood test to look for signs of more serious infection.   If you keep having infections or symptoms after treatment, your provider may suggest these tests:   An intravenous pyelogram (IVP). An IVP is a special type of X-ray of the kidneys, ureters, and bladder.   An ultrasound scan to look at the urinary tract.   A cystoscopy. This is an exam of the inside of the urethra and bladder with a small lighted instrument. It is usually done by a specialist called a urologist.   How is it treated?   UTIs are usually treated with antibiotics. Your provider can also prescribe a medicine called Pyridium to relieve burning and discomfort. (Pyridium turns your urine a dark orange color.)   If the infection is causing fever, pain, or vomiting or you have a severe kidney infection, you may need to stay at the hospital for treatment.   How long will the effects last?   With antibiotic treatment, the symptoms of a bacterial infection stop in 1 to 3 days. Take all of the antibiotic your healthcare provider prescribes, even after the symptoms go away. If you stop taking your medicine before the scheduled end of treatment, the infection may come back   Without treatment, the infection can last a long time. If it is not treated, the infection can permanently damage the bladder and kidneys, or it may spread to the blood. If the infection spreads to the blood, it can be fatal.   How can I take care of myself?   Follow your healthcare provider's treatment. Take all of the antibiotic that your healthcare provider prescribes, even when you feel better. Do not take medicine left over from previous prescriptions.   Drink  more fluids, especially water, to help flush bacteria from your system.   If you have a fever:   Take aspirin or acetaminophen to control the fever. Check with your healthcare provider before you give any medicine that contains aspirin or salicylates to a child or teen. This includes medicines like baby aspirin, some cold medicines, and Pepto Bismol. Children and teens who take aspirin are at risk for a serious illness called Reye's syndrome.   Keep a daily record of your temperature.   A hot water bottle or an electric heating pad on a low setting can help relieve cramps or lower abdominal or back pain. Keep a cloth between your skin and the hot water bottle or heating pad so that you don't burn your skin.   Soaking in a tub for 20 to 30 minutes may help relieve any back or abdominal pain.   Follow your healthcare provider's directions for a follow-up urine test. Your provider may want to test your urine soon after you finish taking the antibiotic.   Call your healthcare provider right away if:   You keep having symptoms after taking an antibiotic for 2 days.   Your symptoms get worse.   You have a fever of 101.5? F (38.6? C) or higher.   You have new vomiting.   You have new pain in your side, back, or belly.   You have any symptoms that worry you.   How can I help prevent urinary tract infection?   You can help prevent UTIs if you:   Drink lots of fluids every day.   Don't wait to go to the bathroom when you feel the need to urinate.   Empty your bladder completely when you urinate.   Use good hygiene when you use the toilet. For example, wipe from front to back to keep rectal bacteria from getting into the vagina and urethra.   Avoid using irritating cosmetics or chemicals in the area of the vagina and urethra (such as strong soaps, feminine hygiene sprays or douches, or scented napkins or panty liners).   Practice safe sex. Always use latex or polyurethane condoms.   Urinate soon after sex.   Keep your genital  area clean.   Wear underwear that is all cotton or has a cotton crotch. Pantyhose should also have a cotton crotch. Cotton allows better air circulation than nylon. Change underwear and pantyhose every day.     Published by [employer].  This content is reviewed periodically and is subject to change as new health information becomes available. The information is intended to inform and educate and is not a replacement for medical evaluation, advice, diagnosis or treatment by a healthcare professional.   Developed by Renetta Denton RN, [place], and [employer].   ? 2010 [facility] and/or its affiliates. All Rights Reserved.   Copyright   [employer] 2011              Thank you for choosing [facility].  You may be receiving a survey in the mail from [employer] regarding your visit today.  Please take a few minutes to complete and return the survey to let us know how we are doing.      Our Clinic hours are:  Mondays    7:20 am - 7 pm  Tues -  Fri  7:20 am - 5 pm    Clinic Phone: [phone]    The clinic lab opens at 7:30 am Mon - Fri and appointments are required.    [facility]  Ph. [phone]  Monday  8 am - 7pm  Tues - Fri 8 am - 5:30 pm

## 2018-11-12 NOTE — PROGRESS NOTES
SUBJECTIVE:   Mine Meléndez is a 63 year old female who presents to clinic today for the following health issues:      URINARY TRACT SYMPTOMS      Duration: couple weeks    Description  dysuria, frequency, urgency, odor and cloudy    Intensity:  moderate    Accompanying signs and symptoms:  Fever/chills: no   Flank pain no   Nausea and vomiting: no   Vaginal symptoms: none  Abdominal/Pelvic Pain: YES    History  History of frequent UTI's: YES  History of kidney stones: no   Sexually Active: YES  Possibility of pregnancy: No    Precipitating or alleviating factors: she wears a pad for urinary incontinent and she does not always wipe front to back.    Therapies tried and outcome: cranberry juice  and increase fluid intake   Outcome: not helping      She is wondering about a natural supplement for hot flashes.    Problem list and histories reviewed & adjusted, as indicated.  Additional history: was hoping her symptoms would improve but they didn't.  Persistent hot flashes and didn't like medications she's tried in the past, wondering about any natural remedies to try.      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)     Past Surgical History:   Procedure Laterality Date     COLONOSCOPY  1/20/06     HYSTERECTOMY, PAP NO LONGER INDICATED       HYSTERECTOMY, VAGINAL  11/04    TVH,TVT procedure     RELEASE CARPAL TUNNEL Right 10/11/2016    Procedure: RELEASE CARPAL TUNNEL;  Surgeon: Emely Blanca MD;  Location: [facility]     SURGICAL HISTORY OF -   1988    tubal ligation     SURGICAL HISTORY OF -   2001    excision gangliion cyst rt heel       Social History   Substance  Use Topics     Smoking status: Former Smoker     Years: 25.00     Quit date: 5/6/2000     Smokeless tobacco: Never Used     Alcohol use Yes      Comment: 6 pack of beer on a weekend     Family History   Problem Relation Age of Onset     Diabetes Father      Dementia Father      Diabetes Brother      Diabetes Sister      Hypertension Sister      Hypertension Sister          Current Outpatient Prescriptions   Medication Sig Dispense Refill     ASPIRIN 81 MG OR TABS ONE DAILY 100 3     BIOTIN PO        folic acid (FOLVITE) 1 MG tablet Take 1 tablet (1,000 mcg) by mouth daily 90 tablet 2     hydrochlorothiazide (HYDRODIURIL) 25 MG tablet Take 1 tablet (25 mg) by mouth every morning 90 tablet 3     levothyroxine (SYNTHROID/LEVOTHROID) 50 MCG tablet Take 1 tablet (50 mcg) by mouth daily 90 tablet 3     methotrexate sodium 2.5 MG TABS Take 8 tablets (20 mg) by mouth once a week . Take all 8 tablets on the same day of each week. 96 tablet 2     MULTI-VITAMIN OR 1 daily       nitroFURantoin, macrocrystal-monohydrate, (MACROBID) 100 MG capsule Take 1 capsule (100 mg) by mouth 2 times daily 14 capsule 0     Potassium Chloride ER 20 MEQ TBCR Take 1 tablet (20 mEq) by mouth 2 times daily 180 tablet 3     simvastatin (ZOCOR) 20 MG tablet Take 1 tablet (20 mg) by mouth At Bedtime 90 tablet 3     gabapentin (NEURONTIN) 300 MG capsule Take 1 capsule (300 mg) by mouth At Bedtime (Patient not taking: Reported on 11/12/2018) 90 capsule 1     Allergies   Allergen Reactions     Codeine Rash     Flagyl [Metronidazole] Rash     Septra [Sulfamethoxazole W/Trimethoprim] Itching     Puffy eyes, flushed.      BP Readings from Last 3 Encounters:   11/12/18 130/70   09/13/18 144/74   08/14/18 129/81    Wt Readings from Last 3 Encounters:   11/12/18 197 lb (89.4 kg)   09/13/18 200 lb (90.7 kg)   08/14/18 202 lb (91.6 kg)                    Reviewed and updated as needed this visit by clinical staff  Tobacco  Allergies  Meds  Med Hx  Surg Hx   "Fam Hx  Soc Hx      Reviewed and updated as needed this visit by Provider          ROS: 10 point ROS neg other than the symptoms noted above in the HPI.    OBJECTIVE:     /70 (BP Location: Right arm, Patient Position: Chair, Cuff Size: Adult Large)  Pulse 81  Temp 98.1  F (36.7  C) (Oral)  Resp 16  Ht 5' 5.25\" (1.657 m)  Wt 197 lb (89.4 kg)  LMP 11/12/2004 (Approximate)  SpO2 95%  BMI 32.53 kg/m2  Body mass index is 32.53 kg/(m^2).  GENERAL: healthy, alert and no distress  NECK: no adenopathy, no asymmetry  RESP: lungs clear to auscultation - no rales, rhonchi or wheezes  CV: regular rate and rhythm, normal S1 S2, no S3 or S4, no murmur  ABDOMEN: soft, nontender, no hepatosplenomegaly, no masses and bowel sounds normal, no CVA tenderness  MS: no gross musculoskeletal defects noted      Diagnostic Test Results:  Results for orders placed or performed in visit on 11/12/18 (from the past 24 hour(s))   UA reflex to Microscopic and Culture   Result Value Ref Range    Color Urine Yellow     Appearance Urine Clear     Glucose Urine Negative NEG^Negative mg/dL    Bilirubin Urine Negative NEG^Negative    Ketones Urine Negative NEG^Negative mg/dL    Specific Gravity Urine 1.010 1.003 - 1.035    Blood Urine Small (A) NEG^Negative    pH Urine 7.0 5.0 - 7.0 pH    Protein Albumin Urine Negative NEG^Negative mg/dL    Urobilinogen Urine 0.2 0.2 - 1.0 EU/dL    Nitrite Urine Negative NEG^Negative    Leukocyte Esterase Urine Moderate (A) NEG^Negative    Source Midstream Urine    Urine Microscopic   Result Value Ref Range    WBC Urine 25-50 (A) OTO5^0 - 5 /HPF    RBC Urine 2-5 (A) OTO2^O - 2 /HPF    Squamous Epithelial /LPF Urine Few FEW^Few /LPF    Bacteria Urine Moderate (A) NEG^Negative /HPF       ASSESSMENT/PLAN:             1. Urinary tract infection without hematuria, site unspecified    - Urine Culture Aerobic Bacterial  - nitroFURantoin, macrocrystal-monohydrate, (MACROBID) 100 MG capsule; Take 1 capsule (100 mg) " by mouth 2 times daily  Dispense: 14 capsule; Refill: 0  Discussed how to take the medication(s), expected outcomes, potential side effects.    2. Dysuria    - UA reflex to Microscopic and Culture  - Urine Microscopic    3. Need for vaccination    - Pneumococcal vaccine 13 valent PCV13 IM (Prevnar) [05970]  - 1st  Administration  [09669]    4. Hot flashes    She could  try black cohosh, Estroven or Amberen.    See Patient Instructions  Follow up if symptoms persist or worsen and as needed.    Patient Instructions               Pneumonia was given today.    Urinary Tract Infection         What is a urinary tract infection?   A urinary tract infection (UTI) is an infection in the urinary tract. The urinary tract includes the:   kidneys   ureters (the tubes draining urine from the kidneys to the bladder)   bladder   urethra (the tube that drains urine from the bladder).   Any or all of these parts of the urinary tract can get infected.   How does it occur?   Urinary tract infection is usually caused by bacteria. Normally the urinary tract does not have any bacteria or other organisms in it. Bacteria that cause UTI often spread from the rectum or vagina to the urethra and then to the bladder or kidneys. Urinary tract infection is more common in women than men because the urethra is shorter in women. This makes it easier for bacteria to move up to the bladder. Sometimes bacteria spread from another part of the body through the bloodstream to the urinary tract.   Some of the things that can lead to an infection are:   a blockage in the urinary tract, such as a kidney stone   sexual activity   getting older, when it may get harder to empty and flush out the bladder completely.   Women are more likely to have an infection if they:   are newly sexually active or have a new sex partner   are past menopause   are pregnant   have a history of diabetes, a problem with the immune system, sickle-cell anemia, stroke, kidney stones,  or any illness that makes it hard to empty the bladder completely.   What are the symptoms?   The symptoms of UTI may include:   urinating more often   feeling an urgent need to urinate   pain or discomfort (burning) when you urinate   urine that smells bad   pain in the lower pelvis, stomach, lower back, or side   urine that looks cloudy or reddish   fever or chills   sweats   nausea and vomiting   leaking of urine   change in amount of urine, either more or less   pain during sex.   How is it diagnosed?   Your healthcare provider will ask about your symptoms and medical history. Your provider will examine you. The exam may include a pelvic exam. Your provider will check for tenderness of the bladder or kidney. A sample of your urine may be tested for bacteria and pus. If you are having fever and are feeling very ill, you may have a blood test to look for signs of more serious infection.   If you keep having infections or symptoms after treatment, your provider may suggest these tests:   An intravenous pyelogram (IVP). An IVP is a special type of X-ray of the kidneys, ureters, and bladder.   An ultrasound scan to look at the urinary tract.   A cystoscopy. This is an exam of the inside of the urethra and bladder with a small lighted instrument. It is usually done by a specialist called a urologist.   How is it treated?   UTIs are usually treated with antibiotics. Your provider can also prescribe a medicine called Pyridium to relieve burning and discomfort. (Pyridium turns your urine a dark orange color.)   If the infection is causing fever, pain, or vomiting or you have a severe kidney infection, you may need to stay at the hospital for treatment.   How long will the effects last?   With antibiotic treatment, the symptoms of a bacterial infection stop in 1 to 3 days. Take all of the antibiotic your healthcare provider prescribes, even after the symptoms go away. If you stop taking your medicine before the scheduled  end of treatment, the infection may come back   Without treatment, the infection can last a long time. If it is not treated, the infection can permanently damage the bladder and kidneys, or it may spread to the blood. If the infection spreads to the blood, it can be fatal.   How can I take care of myself?   Follow your healthcare provider's treatment. Take all of the antibiotic that your healthcare provider prescribes, even when you feel better. Do not take medicine left over from previous prescriptions.   Drink more fluids, especially water, to help flush bacteria from your system.   If you have a fever:   Take aspirin or acetaminophen to control the fever. Check with your healthcare provider before you give any medicine that contains aspirin or salicylates to a child or teen. This includes medicines like baby aspirin, some cold medicines, and Pepto Bismol. Children and teens who take aspirin are at risk for a serious illness called Reye's syndrome.   Keep a daily record of your temperature.   A hot water bottle or an electric heating pad on a low setting can help relieve cramps or lower abdominal or back pain. Keep a cloth between your skin and the hot water bottle or heating pad so that you don't burn your skin.   Soaking in a tub for 20 to 30 minutes may help relieve any back or abdominal pain.   Follow your healthcare provider's directions for a follow-up urine test. Your provider may want to test your urine soon after you finish taking the antibiotic.   Call your healthcare provider right away if:   You keep having symptoms after taking an antibiotic for 2 days.   Your symptoms get worse.   You have a fever of 101.5? F (38.6? C) or higher.   You have new vomiting.   You have new pain in your side, back, or belly.   You have any symptoms that worry you.   How can I help prevent urinary tract infection?   You can help prevent UTIs if you:   Drink lots of fluids every day.   Don't wait to go to the bathroom when  you feel the need to urinate.   Empty your bladder completely when you urinate.   Use good hygiene when you use the toilet. For example, wipe from front to back to keep rectal bacteria from getting into the vagina and urethra.   Avoid using irritating cosmetics or chemicals in the area of the vagina and urethra (such as strong soaps, feminine hygiene sprays or douches, or scented napkins or panty liners).   Practice safe sex. Always use latex or polyurethane condoms.   Urinate soon after sex.   Keep your genital area clean.   Wear underwear that is all cotton or has a cotton crotch. Pantyhose should also have a cotton crotch. Cotton allows better air circulation than nylon. Change underwear and pantyhose every day.     Published by [employer].  This content is reviewed periodically and is subject to change as new health information becomes available. The information is intended to inform and educate and is not a replacement for medical evaluation, advice, diagnosis or treatment by a healthcare professional.   Developed by Renetta Denton RN, [place], and [employer].   ? 2010 [facility] and/or its affiliates. All Rights Reserved.   Copyright   [employer] 2011              Thank you for choosing [facility].  You may be receiving a survey in the mail from [employer] regarding your visit today.  Please take a few minutes to complete and return the survey to let us know how we are doing.      Our Clinic hours are:  Mondays    7:20 am - 7 pm  Tues -  Fri  7:20 am - 5 pm    Clinic Phone: [phone]    The clinic lab opens at 7:30 am Mon - Fri and appointments are required.    [facility]  Ph. [phone]  Monday  8 am - 7pm  Tues - Fri 8 am - 5:30 pm             ISRAEL Potter CNP  [facility] [place]

## 2018-11-12 NOTE — MR AVS SNAPSHOT
After Visit Summary   11/12/2018    Mine Meléndez    MRN: [id number]           Patient Information     Date Of Birth          1955        Visit Information        Provider Department      11/12/2018 4:40 PM Slime Oconnell APRN [facility]        Today's Diagnoses     Urinary tract infection without hematuria, site unspecified    -  1    Dysuria        Need for vaccination          Care Instructions                Pneumonia was given today.    Urinary Tract Infection         What is a urinary tract infection?   A urinary tract infection (UTI) is an infection in the urinary tract. The urinary tract includes the:   kidneys   ureters (the tubes draining urine from the kidneys to the bladder)   bladder   urethra (the tube that drains urine from the bladder).   Any or all of these parts of the urinary tract can get infected.   How does it occur?   Urinary tract infection is usually caused by bacteria. Normally the urinary tract does not have any bacteria or other organisms in it. Bacteria that cause UTI often spread from the rectum or vagina to the urethra and then to the bladder or kidneys. Urinary tract infection is more common in women than men because the urethra is shorter in women. This makes it easier for bacteria to move up to the bladder. Sometimes bacteria spread from another part of the body through the bloodstream to the urinary tract.   Some of the things that can lead to an infection are:   a blockage in the urinary tract, such as a kidney stone   sexual activity   getting older, when it may get harder to empty and flush out the bladder completely.   Women are more likely to have an infection if they:   are newly sexually active or have a new sex partner   are past menopause   are pregnant   have a history of diabetes, a problem with the immune system, sickle-cell anemia, stroke, kidney stones, or any illness that makes it hard to empty the bladder completely.    What are the symptoms?   The symptoms of UTI may include:   urinating more often   feeling an urgent need to urinate   pain or discomfort (burning) when you urinate   urine that smells bad   pain in the lower pelvis, stomach, lower back, or side   urine that looks cloudy or reddish   fever or chills   sweats   nausea and vomiting   leaking of urine   change in amount of urine, either more or less   pain during sex.   How is it diagnosed?   Your healthcare provider will ask about your symptoms and medical history. Your provider will examine you. The exam may include a pelvic exam. Your provider will check for tenderness of the bladder or kidney. A sample of your urine may be tested for bacteria and pus. If you are having fever and are feeling very ill, you may have a blood test to look for signs of more serious infection.   If you keep having infections or symptoms after treatment, your provider may suggest these tests:   An intravenous pyelogram (IVP). An IVP is a special type of X-ray of the kidneys, ureters, and bladder.   An ultrasound scan to look at the urinary tract.   A cystoscopy. This is an exam of the inside of the urethra and bladder with a small lighted instrument. It is usually done by a specialist called a urologist.   How is it treated?   UTIs are usually treated with antibiotics. Your provider can also prescribe a medicine called Pyridium to relieve burning and discomfort. (Pyridium turns your urine a dark orange color.)   If the infection is causing fever, pain, or vomiting or you have a severe kidney infection, you may need to stay at the hospital for treatment.   How long will the effects last?   With antibiotic treatment, the symptoms of a bacterial infection stop in 1 to 3 days. Take all of the antibiotic your healthcare provider prescribes, even after the symptoms go away. If you stop taking your medicine before the scheduled end of treatment, the infection may come back   Without treatment,  the infection can last a long time. If it is not treated, the infection can permanently damage the bladder and kidneys, or it may spread to the blood. If the infection spreads to the blood, it can be fatal.   How can I take care of myself?   Follow your healthcare provider's treatment. Take all of the antibiotic that your healthcare provider prescribes, even when you feel better. Do not take medicine left over from previous prescriptions.   Drink more fluids, especially water, to help flush bacteria from your system.   If you have a fever:   Take aspirin or acetaminophen to control the fever. Check with your healthcare provider before you give any medicine that contains aspirin or salicylates to a child or teen. This includes medicines like baby aspirin, some cold medicines, and Pepto Bismol. Children and teens who take aspirin are at risk for a serious illness called Reye's syndrome.   Keep a daily record of your temperature.   A hot water bottle or an electric heating pad on a low setting can help relieve cramps or lower abdominal or back pain. Keep a cloth between your skin and the hot water bottle or heating pad so that you don't burn your skin.   Soaking in a tub for 20 to 30 minutes may help relieve any back or abdominal pain.   Follow your healthcare provider's directions for a follow-up urine test. Your provider may want to test your urine soon after you finish taking the antibiotic.   Call your healthcare provider right away if:   You keep having symptoms after taking an antibiotic for 2 days.   Your symptoms get worse.   You have a fever of 101.5? F (38.6? C) or higher.   You have new vomiting.   You have new pain in your side, back, or belly.   You have any symptoms that worry you.   How can I help prevent urinary tract infection?   You can help prevent UTIs if you:   Drink lots of fluids every day.   Don't wait to go to the bathroom when you feel the need to urinate.   Empty your bladder completely when  you urinate.   Use good hygiene when you use the toilet. For example, wipe from front to back to keep rectal bacteria from getting into the vagina and urethra.   Avoid using irritating cosmetics or chemicals in the area of the vagina and urethra (such as strong soaps, feminine hygiene sprays or douches, or scented napkins or panty liners).   Practice safe sex. Always use latex or polyurethane condoms.   Urinate soon after sex.   Keep your genital area clean.   Wear underwear that is all cotton or has a cotton crotch. Pantyhose should also have a cotton crotch. Cotton allows better air circulation than nylon. Change underwear and pantyhose every day.     Published by [employer] ([employer]  This content is reviewed periodically and is subject to change as new health information becomes available. The information is intended to inform and educate and is not a replacement for medical evaluation, advice, diagnosis or treatment by a healthcare professional.   Developed by Renetta Denton RN, [place], and [employer] ([employer]).   ? 2010 [facility] and/or its affiliates. All Rights Reserved.   Copyright   [employer] 2011              Thank you for choosing [facility].  You may be receiving a survey in the mail from [employer] regarding your visit today.  Please take a few minutes to complete and return the survey to let us know how we are doing.      Our Clinic hours are:  Mondays    7:20 am - 7 pm  Tues -  Fri  7:20 am - 5 pm    Clinic Phone: [phone]    The clinic lab opens at 7:30 am Mon - Fri and appointments are required.    [facility]. [phone]  Monday  8 am - 7pm  Tues - Fri 8 am - 5:30 pm                 Follow-ups after your visit        Follow-up notes from your care team     Return in about 2 days (around 11/14/2018) for or sooner if symptoms persist or worsen.      Your next 10 appointments already scheduled     Nov 14, 2018  4:00 PM CST   LAB with  LAB   [facility]  [place] ([facility])    [address] [address]   [phone]           Please do not eat 10-12 hours before your appointment if you are coming in fasting for labs on lipids, cholesterol, or glucose (sugar). This does not apply to pregnant women. Water, hot tea and black coffee (with nothing added) are okay. Do not drink other fluids, diet soda or chew gum.            Feb 15, 2019  4:00 PM CST   LAB with CL LAB   [facility]    [address] [address]   [phone]           Please do not eat 10-12 hours before your appointment if you are coming in fasting for labs on lipids, cholesterol, or glucose (sugar). This does not apply to pregnant women. Water, hot tea and black coffee (with nothing added) are okay. Do not drink other fluids, diet soda or chew gum.            Feb 19, 2019  3:00 PM CST   [facility]    [address] [place] [id number] [phone]              Who to contact     If you have questions or need follow up information about today's clinic visit or your schedule please contact [facility] directly at [phone].  Normal or non-critical lab and imaging results will be communicated to you by MyChart, letter or phone within 4 business days after the clinic has received the results. If you do not hear from us within 7 days, please contact the clinic through [employer] or phone. If you have a critical or abnormal lab result, we will notify you by phone as soon as possible.  Submit refill requests through [employer] or call your pharmacy and they will forward the refill request to us. Please allow 3 business days for your refill to be completed.          Additional Information About Your Visit        [employer] Information     [employer] gives you secure access to  "your electronic health record. If you see a primary care provider, you can also send messages to your care team and make appointments. If you have questions, please call your primary care clinic.  If you do not have a primary care provider, please call [phone] and they will assist you.        Care EveryWhere ID     This is your Care EveryWhere ID. This could be used by other organizations to access your [place] medical records  [id number]        Your Vitals Were     Pulse Temperature Respirations Height Last Period Pulse Oximetry    81 98.1  F (36.7  C) (Oral) 16 5' 5.25\" (1.657 m) 11/12/2004 (Approximate) 95%    BMI (Body Mass Index)                   32.53 kg/m2            Blood Pressure from Last 3 Encounters:   11/12/18 130/70   09/13/18 144/74   08/14/18 129/81    Weight from Last 3 Encounters:   11/12/18 197 lb (89.4 kg)   09/13/18 200 lb (90.7 kg)   08/14/18 202 lb (91.6 kg)              We Performed the Following     1st  Administration  [88329]     Pneumococcal vaccine 13 valent PCV13 IM (Prevnar) [47381]     UA reflex to Microscopic and Culture     Urine Culture Aerobic Bacterial     Urine Microscopic          Today's Medication Changes          These changes are accurate as of 11/12/18  5:07 PM.  If you have any questions, ask your nurse or doctor.               Start taking these medicines.        Dose/Directions    nitroFURantoin (macrocrystal-monohydrate) 100 MG capsule   Commonly known as:  MACROBID   Used for:  Urinary tract infection without hematuria, site unspecified   Started by:  Slime Oconnell APRN CNP        Dose:  100 mg   Take 1 capsule (100 mg) by mouth 2 times daily   Quantity:  14 capsule   Refills:  0            Where to get your medicines      These medications were sent to [facility] [place], [place] - [address]  [address], [place] [address]     Phone:  [phone]     nitroFURantoin (macrocrystal-monohydrate) 100 " MG capsule                Primary Care Provider Office Phone # Fax #    Britany Norton [phone] [address] [facility] [address]        Equal Access to Services     GERARDOFRANKY SUBHASH : Kendal zelalem norman leylao Radha, wajimenezda luqadaha, qaybta kaalmada fifi, kelli ramirezmahamed giovanny. So [facility] [phone].    ATENCIÓN: Si habla español, tiene a menjivar disposición servicios gratuitos de asistencia lingüística. Llame al [phone].    We comply with applicable federal civil rights laws and [place] laws. We do not discriminate on the basis of race, color, national origin, age, disability, sex, sexual orientation, or gender identity.            Thank you!     Thank you for choosing [facility]  for your care. Our goal is always to provide you with excellent care. Hearing back from our patients is one way we can continue to improve our services. Please take a few minutes to complete the written survey that you may receive in the mail after your visit with us. Thank you!             Your Updated Medication List - Protect others around you: Learn how to safely use, store and throw away your medicines at www.disposemymeds.org.          This list is accurate as of 11/12/18  5:07 PM.  Always use your most recent med list.                   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 Take all 8 tablets on the same day of each week.    Seropositive rheumatoid arthritis (H)       MULTI-VITAMIN PO      1 daily        nitroFURantoin (macrocrystal-monohydrate) 100 MG capsule    MACROBID    14 capsule    Take 1 capsule (100 mg) by mouth 2 times daily    Urinary tract infection without hematuria, site unspecified       Potassium Chloride ER 20 MEQ Tbcr     180 tablet    Take 1 tablet (20 mEq) by mouth 2 times daily    Diuretic-induced hypokalemia       simvastatin 20 MG tablet    ZOCOR    90 tablet    Take 1 tablet (20 mg) by mouth At Bedtime    Hyperlipidemia LDL goal <130

## 2018-11-15 LAB
BACTERIA SPEC CULT: ABNORMAL
BACTERIA SPEC CULT: ABNORMAL
Lab: ABNORMAL
SPECIMEN SOURCE: ABNORMAL

## 2018-11-16 RX ORDER — CIPROFLOXACIN 500 MG/1
500 TABLET, FILM COATED ORAL 2 TIMES DAILY
Qty: 6 TABLET | Refills: 0 | Status: SHIPPED | OUTPATIENT
Start: 2018-11-16 | End: 2019-04-17

## 2018-11-20 DIAGNOSIS — M05.731 RHEUMATOID ARTHRITIS INVOLVING RIGHT WRIST WITH POSITIVE RHEUMATOID FACTOR (H): ICD-10-CM

## 2018-11-20 LAB
ALBUMIN SERPL-MCNC: 3.4 G/DL (ref 3.4–5)
ALP SERPL-CCNC: 136 U/L (ref 40–150)
ALT SERPL W P-5'-P-CCNC: 33 U/L (ref 0–50)
AST SERPL W P-5'-P-CCNC: 33 U/L (ref 0–45)
BASOPHILS # BLD AUTO: 0.1 10E9/L (ref 0–0.2)
BASOPHILS NFR BLD AUTO: 0.5 %
BILIRUB DIRECT SERPL-MCNC: 0.1 MG/DL (ref 0–0.2)
BILIRUB SERPL-MCNC: 0.3 MG/DL (ref 0.2–1.3)
CREAT SERPL-MCNC: 0.62 MG/DL (ref 0.52–1.04)
DIFFERENTIAL METHOD BLD: ABNORMAL
EOSINOPHIL # BLD AUTO: 0.3 10E9/L (ref 0–0.7)
EOSINOPHIL NFR BLD AUTO: 3.2 %
ERYTHROCYTE [DISTWIDTH] IN BLOOD BY AUTOMATED COUNT: 13.2 % (ref 10–15)
GFR SERPL CREATININE-BSD FRML MDRD: >90 ML/MIN/1.7M2
HCT VFR BLD AUTO: 40.9 % (ref 35–47)
HGB BLD-MCNC: 14.1 G/DL (ref 11.7–15.7)
LYMPHOCYTES # BLD AUTO: 2.3 10E9/L (ref 0.8–5.3)
LYMPHOCYTES NFR BLD AUTO: 23.1 %
MCH RBC QN AUTO: 34.6 PG (ref 26.5–33)
MCHC RBC AUTO-ENTMCNC: 34.5 G/DL (ref 31.5–36.5)
MCV RBC AUTO: 100 FL (ref 78–100)
MONOCYTES # BLD AUTO: 1.1 10E9/L (ref 0–1.3)
MONOCYTES NFR BLD AUTO: 11 %
NEUTROPHILS # BLD AUTO: 6.1 10E9/L (ref 1.6–8.3)
NEUTROPHILS NFR BLD AUTO: 62.2 %
PLATELET # BLD AUTO: 232 10E9/L (ref 150–450)
PROT SERPL-MCNC: 7.2 G/DL (ref 6.8–8.8)
RBC # BLD AUTO: 4.08 10E12/L (ref 3.8–5.2)
WBC # BLD AUTO: 9.8 10E9/L (ref 4–11)

## 2018-11-20 PROCEDURE — 36415 COLL VENOUS BLD VENIPUNCTURE: CPT | Performed by: INTERNAL MEDICINE

## 2018-11-20 PROCEDURE — 82565 ASSAY OF CREATININE: CPT | Performed by: INTERNAL MEDICINE

## 2018-11-20 PROCEDURE — 85025 COMPLETE CBC W/AUTO DIFF WBC: CPT | Performed by: INTERNAL MEDICINE

## 2018-11-20 PROCEDURE — 80076 HEPATIC FUNCTION PANEL: CPT | Performed by: INTERNAL MEDICINE

## 2018-12-21 ENCOUNTER — TELEPHONE (OUTPATIENT)
Dept: FAMILY MEDICINE | Facility: CLINIC | Age: 63
End: 2018-12-21

## 2018-12-21 NOTE — TELEPHONE ENCOUNTER
Form needs completion/signature for [employer] [place] [employer] ADA accomidations -  Paperwork placed in forms box.    Rosalina Zhang  Clinic Station [place] [facility]

## 2019-02-15 DIAGNOSIS — M05.731 RHEUMATOID ARTHRITIS INVOLVING RIGHT WRIST WITH POSITIVE RHEUMATOID FACTOR (H): ICD-10-CM

## 2019-02-15 LAB
ALBUMIN SERPL-MCNC: 3.6 G/DL (ref 3.4–5)
ALP SERPL-CCNC: 122 U/L (ref 40–150)
ALT SERPL W P-5'-P-CCNC: 28 U/L (ref 0–50)
AST SERPL W P-5'-P-CCNC: 28 U/L (ref 0–45)
BASOPHILS # BLD AUTO: 0 10E9/L (ref 0–0.2)
BASOPHILS NFR BLD AUTO: 0.5 %
BILIRUB DIRECT SERPL-MCNC: 0.2 MG/DL (ref 0–0.2)
BILIRUB SERPL-MCNC: 0.5 MG/DL (ref 0.2–1.3)
CREAT SERPL-MCNC: 0.6 MG/DL (ref 0.52–1.04)
DIFFERENTIAL METHOD BLD: ABNORMAL
EOSINOPHIL # BLD AUTO: 0.2 10E9/L (ref 0–0.7)
EOSINOPHIL NFR BLD AUTO: 2.5 %
ERYTHROCYTE [DISTWIDTH] IN BLOOD BY AUTOMATED COUNT: 13.4 % (ref 10–15)
GFR SERPL CREATININE-BSD FRML MDRD: >90 ML/MIN/{1.73_M2}
HCT VFR BLD AUTO: 41.6 % (ref 35–47)
HGB BLD-MCNC: 13.9 G/DL (ref 11.7–15.7)
LYMPHOCYTES # BLD AUTO: 2.3 10E9/L (ref 0.8–5.3)
LYMPHOCYTES NFR BLD AUTO: 29.9 %
MCH RBC QN AUTO: 34.2 PG (ref 26.5–33)
MCHC RBC AUTO-ENTMCNC: 33.4 G/DL (ref 31.5–36.5)
MCV RBC AUTO: 102 FL (ref 78–100)
MONOCYTES # BLD AUTO: 0.5 10E9/L (ref 0–1.3)
MONOCYTES NFR BLD AUTO: 6.6 %
NEUTROPHILS # BLD AUTO: 4.6 10E9/L (ref 1.6–8.3)
NEUTROPHILS NFR BLD AUTO: 60.5 %
PLATELET # BLD AUTO: 224 10E9/L (ref 150–450)
PROT SERPL-MCNC: 7.2 G/DL (ref 6.8–8.8)
RBC # BLD AUTO: 4.07 10E12/L (ref 3.8–5.2)
WBC # BLD AUTO: 7.6 10E9/L (ref 4–11)

## 2019-02-15 PROCEDURE — 80076 HEPATIC FUNCTION PANEL: CPT | Performed by: INTERNAL MEDICINE

## 2019-02-15 PROCEDURE — 85025 COMPLETE CBC W/AUTO DIFF WBC: CPT | Performed by: INTERNAL MEDICINE

## 2019-02-15 PROCEDURE — 82565 ASSAY OF CREATININE: CPT | Performed by: INTERNAL MEDICINE

## 2019-02-15 PROCEDURE — 36415 COLL VENOUS BLD VENIPUNCTURE: CPT | Performed by: INTERNAL MEDICINE

## 2019-02-19 ENCOUNTER — ANCILLARY PROCEDURE (OUTPATIENT)
Dept: GENERAL RADIOLOGY | Facility: CLINIC | Age: 64
End: 2019-02-19
Attending: INTERNAL MEDICINE
Payer: COMMERCIAL

## 2019-02-19 ENCOUNTER — OFFICE VISIT (OUTPATIENT)
Dept: RHEUMATOLOGY | Facility: CLINIC | Age: 64
End: 2019-02-19
Payer: COMMERCIAL

## 2019-02-19 VITALS
DIASTOLIC BLOOD PRESSURE: 75 MMHG | WEIGHT: 194 LBS | HEIGHT: 65 IN | OXYGEN SATURATION: 97 % | BODY MASS INDEX: 32.32 KG/M2 | RESPIRATION RATE: 16 BRPM | HEART RATE: 82 BPM | SYSTOLIC BLOOD PRESSURE: 145 MMHG

## 2019-02-19 DIAGNOSIS — M79.601 PAIN IN BOTH UPPER EXTREMITIES: ICD-10-CM

## 2019-02-19 DIAGNOSIS — M05.9 SEROPOSITIVE RHEUMATOID ARTHRITIS (H): ICD-10-CM

## 2019-02-19 DIAGNOSIS — M79.602 PAIN IN BOTH UPPER EXTREMITIES: ICD-10-CM

## 2019-02-19 DIAGNOSIS — M05.9 SEROPOSITIVE RHEUMATOID ARTHRITIS (H): Primary | ICD-10-CM

## 2019-02-19 DIAGNOSIS — Z23 NEED FOR PNEUMOCOCCAL VACCINE: ICD-10-CM

## 2019-02-19 DIAGNOSIS — M25.50 MULTIPLE JOINT PAIN: ICD-10-CM

## 2019-02-19 PROCEDURE — 72050 X-RAY EXAM NECK SPINE 4/5VWS: CPT

## 2019-02-19 PROCEDURE — 99214 OFFICE O/P EST MOD 30 MIN: CPT | Mod: 25 | Performed by: INTERNAL MEDICINE

## 2019-02-19 PROCEDURE — 90471 IMMUNIZATION ADMIN: CPT | Performed by: INTERNAL MEDICINE

## 2019-02-19 PROCEDURE — 90732 PPSV23 VACC 2 YRS+ SUBQ/IM: CPT | Performed by: INTERNAL MEDICINE

## 2019-02-19 RX ORDER — METHOTREXATE 2.5 MG/1
20 TABLET ORAL WEEKLY
Qty: 96 TABLET | Refills: 2 | Status: SHIPPED | OUTPATIENT
Start: 2019-02-19 | End: 2019-08-19

## 2019-02-19 RX ORDER — FOLIC ACID 1 MG/1
1000 TABLET ORAL DAILY
Qty: 90 TABLET | Refills: 2 | Status: SHIPPED | OUTPATIENT
Start: 2019-02-19 | End: 2019-08-19

## 2019-02-19 ASSESSMENT — PAIN SCALES - GENERAL: PAINLEVEL: MILD PAIN (2)

## 2019-02-19 ASSESSMENT — MIFFLIN-ST. JEOR: SCORE: 1429.82

## 2019-02-19 NOTE — PATIENT INSTRUCTIONS
Please go to your local pharmacy for the shingles vaccine (Shingrix); you will also need a 2nd dose 2-6 months after the initial dose.       [facility]  Locations Clinic Hours Telephone Number     [facility]  [address] [address]     Wednesday: 7:20AM - 4:00PM  Thursday:     7:20AM - 4:00PM     Friday:          7:20AM - 11:00AM       To schedule an appointment with  Dr. Baltazar,  please contact  Specialty [phone]       [facility]  [address]  [place] [address]       Monday:       7:20AM - 4:00PM        [facility]  [address] [place] [address]       Tuesday:      7:20AM - 4:00PM

## 2019-02-19 NOTE — PROGRESS NOTES
"Rheumatology Clinic Visit      Mine Meléndez MRN# [id number]   YOB: 1955 Age: 63 year old      Date of visit: 2/19/19     Chief Complaint   Patient presents with:  Arthritis: 6 months f/u RA    Assessment and Plan     1. Seropositive Erosive Rheumatoid Arthritis (RF+, ): Dx'd with RA prior to 2002. Previously on SSZ (stomatitis). Currently on MTX 20mg once weekly and folic acid 1mg daily. No longer using scheduled ibuprofen; now just using PRN.  Erosive changes seen on 2018 foot x-rays; plan to re-eval with x-rays in 2020.  Note that she has intermittent \"flares\" that are not necessarily consistent with rheumatoid arthritis in that she has them in the mid forearm and mid humerus; but she also has them in the right wrist; only affecting the right upper extremity and if they do occur it is significant enough that she has to stop her activity; she goes to sleep and it is resolved by the next morning when she wakes up; or ibuprofen 200mg once resolves the symptoms.  See #2.   - Continue Methotrexate 20mg once weekly   - Continue folic acid 1mg daily  - Labs in 3 months: CBC, Creatinine, Hepatic Panel  - Labs in 6 months: CBC, Creatinine, Hepatic Panel, ESR, CRP     2. Flares of right forearm and/or right mid-upper arm pain: has occurred once on the left.  From record review, this was evaluated with imaging back in 2010 without etiology identified.  This occurs approximately once per month.  Symptoms resolved within hours or after she goes to sleep and wakes up the next morning.  When she flares, whether be in the forearm or the upper arm, she finds it difficult to raise her arm.  Question if spine involvement given the bilateral nature since it has happened once on the left.  Check x-rays of the cervical spine today.  We discussed the option of getting an MRI of the cervical spine versus starting with physical therapy to develop a home exercise/stretching regimen; after a thorough conversation she " would like to start with physical therapy and at the cervical spine x-rays.  Note that she does not have neck pain.   - Cervical spine x-ray that includes flexion-extension views  - PT referral    3. Elevated blood pressure:  Mine to follow up with Primary Care provider regarding elevated blood pressure.     4.  Vaccinations: Vaccinations reviewed with Ms. Meléndez.  Risks and benefits of vaccinations were discussed. CDC stance on shingrix when on moderate to high immunosuppression reviewed.   - Influenza: encouraged yearly vaccination  - [id number]: up to date  - [id number]: will receive today  - Shingrix: advised that she receive; she plans to get at a pharmacy    Ms. Meléndez verbalized agreement with and understanding of the rational for the diagnosis and treatment plan.  All questions were answered to best of my ability and the patient's satisfaction. Ms. Meléndez was advised to contact the clinic with any questions that may arise after the clinic visit.      Thank you for involving me in the care of the patient    Return to clinic: 6 months      HPI   Mine Meléndez is a 63 year old female with a past medical history significant for hypertension, hyperlipidemia, hypothyroidism, migraines, and rheumatoid arthritis who presents for follow-up of rheumatoid arthritis.     Today, Ms. Meléndez reports that she is doing okay.  No joint pain or morning stiffness.  Approximately once monthly she has some pain in her forearm, mid humerus, or wrist that resolves with ibuprofen 200 mg once; or she goes to sleep and it is resolved by the time she wakes up.  She has had it happen once in her left arm.  No neck pain.  No neck stiffness.  She does not feel like pain radiates from her neck.  She cannot pain in her forearm without pain in her upper arm.  Regardless of where the pain is, the affected arm is difficult to raise above her head because of pain, but she says that the pain is located either in the mid humerus or the mid  forearm.  Looking back through the records it appears that she has had this issue since at least 2010 and she says that that is possible.    Denies fevers, chills, nausea, vomiting, constipation, diarrhea. No abdominal pain. No chest pain/pressure, palpitations, or shortness of breath. No LE swelling. No neck pain. No oral or nasal sores.  No rash. No sicca symptoms.     Tobacco: None  EtOH: 4 drinks per day on the weekends  Drugs: None  Occupation: [employer]; plans to retire in June ROS   GEN: No fevers, chills, night sweats, or weight change  SKIN: No itching, rashes, sores  HEENT: No oral or nasal ulcers.  CV: No chest pain, pressure, palpitations, or dyspnea on exertion.  PULM: No SOB, wheeze, cough.  GI: No nausea, vomiting, constipation, diarrhea. No blood in stool. No abdominal pain.  : No blood in urine.  MSK: See HPI.  NEURO: No numbness, tingling, or weakness.  EXT: No LE swelling  PSYCH: Negative    Active Problem List     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)     Past Medical History     Past Medical History:   Diagnosis Date     Herpes simplex without mention of complication      Obesity, unspecified      Osteomyelitis of jaw 5/22/2008     Rheumatoid arthritis(714.0)      Trochanteric bursitis of right hip 8/16/2013     Past Surgical History     Past Surgical History:   Procedure Laterality Date     COLONOSCOPY  1/20/06     HYSTERECTOMY, PAP NO LONGER INDICATED       HYSTERECTOMY, VAGINAL  11/04    TVH,TVT procedure     RELEASE CARPAL TUNNEL Right 10/11/2016    Procedure: RELEASE CARPAL TUNNEL;  Surgeon: Emely Blanca MD;  Location:  [facility]     SURGICAL HISTORY OF -   1988    tubal ligation     SURGICAL HISTORY OF -   2001    excision gangliion cyst rt heel     Allergy     Allergies   Allergen Reactions     Codeine Rash     Flagyl [Metronidazole] Rash     Septra [Sulfamethoxazole W/Trimethoprim] Itching     Puffy eyes, flushed.      Current Medication List     Current Outpatient Medications   Medication Sig     ASPIRIN 81 MG OR TABS ONE DAILY     BIOTIN PO      ciprofloxacin (CIPRO) 500 MG tablet Take 1 tablet (500 mg) by mouth 2 times daily     folic acid (FOLVITE) 1 MG tablet Take 1 tablet (1,000 mcg) by mouth daily     gabapentin (NEURONTIN) 300 MG capsule Take 1 capsule (300 mg) by mouth At Bedtime     hydrochlorothiazide (HYDRODIURIL) 25 MG tablet Take 1 tablet (25 mg) by mouth every morning     levothyroxine (SYNTHROID/LEVOTHROID) 50 MCG tablet Take 1 tablet (50 mcg) by mouth daily     methotrexate sodium 2.5 MG TABS Take 8 tablets (20 mg) by mouth once a week . Take all 8 tablets on the same day of each week.     MULTI-VITAMIN OR 1 daily     nitroFURantoin, macrocrystal-monohydrate, (MACROBID) 100 MG capsule Take 1 capsule (100 mg) by mouth 2 times daily     Potassium Chloride ER 20 MEQ TBCR Take 1 tablet (20 mEq) by mouth 2 times daily     simvastatin (ZOCOR) 20 MG tablet Take 1 tablet (20 mg) by mouth At Bedtime     No current facility-administered medications for this visit.          Social History   See HPI    Family History     Family History   Problem Relation Age of Onset     Diabetes Father      Dementia Father      Diabetes Brother      Diabetes Sister      Hypertension Sister      Hypertension Sister      Physical Exam     Temp Readings from Last 3 Encounters:   11/12/18 98.1  F (36.7  C) (Oral)   09/13/18 98.6  F (37  C) (Tympanic)   08/14/18 97.7  F (36.5  C) (Oral)     BP Readings from Last 5 Encounters:   02/19/19 145/75   11/12/18 130/70   09/13/18 144/74   08/14/18 129/81   05/18/18 134/73     Pulse Readings from Last 1  "Encounters:   02/19/19 82     Resp Readings from Last 1 Encounters:   02/19/19 16     Estimated body mass index is 32.04 kg/m  as calculated from the following:    Height as of this encounter: 1.657 m (5' 5.25\").    Weight as of this encounter: 88 kg (194 lb).    GEN: NAD  HEENT: MMM. No oral lesions. Anicteric, noninjected sclera  CV: S1, S2. RRR. No m/r/g.  PULM: CTA bilaterally. No w/c.  MSK: MCPs, PIPs, wrists, elbows, shoulders, knees, ankles, and MTPs without swelling or tenderness to palpation.  Hips nontender to palpation.      NEURO: UE and LE strengths 5/5 and equal bilaterally.   SKIN: No rash  EXT: No LE edema  PSYCH: Alert. Appropriate.    Labs / Imaging (select studies)   RF/CCP  Recent Labs   Lab Test 05/01/18  1538   CCPIGG 182*     CBC  Recent Labs   Lab Test 02/15/19  1555 11/20/18  1555 08/10/18  0837   WBC 7.6 9.8 7.0   RBC 4.07 4.08 4.47   HGB 13.9 14.1 15.1   HCT 41.6 40.9 45.4   * 100 102*   RDW 13.4 13.2 13.1    232 232   MCH 34.2* 34.6* 33.8*   MCHC 33.4 34.5 33.3   NEUTROPHIL 60.5 62.2 60.2   LYMPH 29.9 23.1 31.0   MONOCYTE 6.6 11.0 5.9   EOSINOPHIL 2.5 3.2 2.2   BASOPHIL 0.5 0.5 0.7   ANEU 4.6 6.1 4.2   ALYM 2.3 2.3 2.2   SHYLA 0.5 1.1 0.4   AEOS 0.2 0.3 0.2   ABAS 0.0 0.1 0.1     CMP  Recent Labs   Lab Test 02/15/19  1555 11/20/18  1555 08/10/18  0837  04/30/18  1601  08/05/17  1150  04/20/17  1700   NA  --   --   --   --  135  --  139  --  140   POTASSIUM  --   --  3.9  --  3.1*  --  3.4   < > 3.1*   CHLORIDE  --   --   --   --  100  --  103  --  100   CO2  --   --   --   --  30  --  28  --  31   ANIONGAP  --   --   --   --  5  --  8  --  9   GLC  --   --   --   --  111*  --  88  --  100*   BUN  --   --   --   --  9  --  13  --  9   CR 0.60 0.62 0.65   < > 0.51*   < > 0.60  --  0.59   GFRESTIMATED >90 >90 >90   < > >90   < > >90  Non  GFR Calc    --  >90  Non  GFR Calc     GFRESTBLACK >90 >90 >90   < > >90   < > >90   GFR " Calc    --  >90   GFR Calc     ROSELYN  --   --   --   --  8.9  --  9.2  --  9.1   BILITOTAL 0.5 0.3 0.7   < >  --    < > 0.4  --  0.4   ALBUMIN 3.6 3.4 3.5   < >  --    < > 3.7  --  3.7   PROTTOTAL 7.2 7.2 7.8   < >  --    < > 7.6  --  7.6   ALKPHOS 122 136 133   < >  --    < > 169*  --  142   AST 28 33 35   < >  --    < > 26  --  27   ALT 28 33 34   < >  --    < > 34  --  31    < > = values in this interval not displayed.     Calcium/VitaminD  Recent Labs   Lab Test 04/30/18  1601 08/05/17  1150 04/20/17  1700   ROSELYN 8.9 9.2 9.1     ESR/CRP  Recent Labs   Lab Test 05/01/18  1538 04/20/17  1700   SED 13 12   CRP 10.1*  --      Lipid Panel  Recent Labs   Lab Test 04/30/18  1601 07/03/17  0803 06/07/16  1021  09/09/13  0857 05/25/12  0800 04/04/11  0658   CHOL 163 146 153   < > 169 177 154   TRIG 170* 126  --   --  132 102 87   HDL 55 51 55   < > 58 56 61   LDL 74 70 83   < > 85 101 75   VLDL  --   --   --   --  26 20 17   CHOLHDLRATIO  --   --   --   --  3.0 3.0 3.0   NHDL 108 95  --   --   --   --   --     < > = values in this interval not displayed.     Hepatitis B  Recent Labs   Lab Test 05/01/18  1538   HBCAB Nonreactive   HEPBANG Nonreactive     Hepatitis C  Recent Labs   Lab Test 09/11/14  1650   HCVAB Negative     Lyme confirmation testing by Western Blot  Recent Labs   Lab Test 05/22/13  1540   LYWG Negative Reference range: Negative (Note) Band(s) present: NONE (Insufficient number of bands for positive result) INTERPRETIVE INFORMATION: Borrelia Burgdorferi Ab, IgG Western Blot  For this assay, a positive result is reported when any 5 or more of the following 10 bands are present: 18, 23, 28, 30, 39, 41, 45, 58, 66, or 93 kDa.  All other banding patterns are reported as negative.   LYWM Negative Reference range: Negative (Note) Band(s) present: NONE (Insufficient number of bands for positive result) INTERPRETIVE INFORMATION: Borrelia Burgdorferi Antibody, IgM Western Blot  For this assay, a  positive result is reported when any 2 or more of the following bands are present: 23, 39, or 41 kDa. All other banding patterns are reported as negative. Performed by [facility], [facility] [place] [address] [phone] [email or web address], Comfort Vega MD, Lab. Director     Immunization History     Immunization History   Administered Date(s) Administered     Influenza (H1N1) 12/31/2009     Influenza (IIV3) PF 11/03/2004, 10/30/2012, 10/20/2013, 10/03/2016     Influenza Vaccine IM 3yrs+ 4 Valent IIV4 10/23/2017     Pneumo Conj 13-V (2010&after) 11/12/2018     Pneumococcal 23 valent 02/19/2019     TD (ADULT, 7+) 04/28/1995     TDAP Vaccine (Adacel) 12/05/2007, 02/01/2018     Zoster vaccine, live 06/07/2016          Chart documentation done in part with Dragon Voice recognition Software. Although reviewed after completion, some word and grammatical error may remain.    Nasir Baltazar MD

## 2019-02-20 ENCOUNTER — MYC MEDICAL ADVICE (OUTPATIENT)
Dept: RHEUMATOLOGY | Facility: CLINIC | Age: 64
End: 2019-02-20

## 2019-02-20 NOTE — LETTER
Certification of Health Care Provider  Family Medical Leave Act (FMLA)      Patient's name: Mine Meléndez    Provider's name and business address:  Nasir Baltazar MD  [address] [place] [id number]  Phone: [phone]  Fax: [phone]    PART A:  MEDICAL FACTS  1)  Approximate date condition commenced:  Before 2002    Probable duration of condition:  2098     Aram below as applicable:  Was the patient admitted for an overnight stay in a hospital, hospice, or residential medical care facility?  no.    Date(s) you treated the patient for condition: last office visit: 2/19/2019    Will the patient need to have treatment visits at least twice per year due to the                     condition? yes    Was medication, other than over-the-counter medication prescribed?  yes    Was the patient referred to other health care provider(s) for evaluation or treatment     (e.g., physical therapist)?  yes:  State the nature of such treatments and expected duration of treatment: PT       2)  Is the medical condition pregnancy?  no.      3)  Is the employee unable to perform any of his/her job functions due to the     condition: no.      4)  Describe other relevant medical facts, if any, related to the condition which the employee seeks leave (such as medical facts may include symptoms, diagnosis, or any regimen of continuing treatment such as the use of specialized equipment):   Rheumatoid arthritis may flare resulting in joint pain and stiffness that prevents ability to work      PART B: AMOUNT OF LEAVE NEEDED  5)  Will the employee be incapacitated for a single continuous period of time due to his/her medical condition, including any time for treatment and recovery?  no.    6)  Will the employee need to attend follow-up treatment appointments or work part-time or on a reduced schedule because of the employee's medical condition?  yes:  Follow up appointments every 2-6 months.    Estimate treatment  schedule, if any, including the dates of any scheduled appointments and the time required for each appointment, including any recovery period: Follow up appointments every 2-6 months; 3 hours for each appointment       Estimate the part-time or reduced work schedule the employee needs, if any: NA    7) Will the condition cause episodic flare ups periodically preventing the employee from performing his/her job functions? yes:  Is it medically necessary for the patient to be absent from work during the flare-ups?  yes:  Explain: rheumatoid arthritis flares may cause joint pain and swelling     Based upon the patient's medical history and your knowledge of the medical condition, estimate the frequency of flare-ups and the duration of related incapacity that the patient may have over the next six months (e.g., 1 episode every 3 months lasting 1-2 days):    Frequency: once monthly  Duration: 4-48 hours    ================================================================    TO BE COMPLETED BY THE HEALTH CARE PROVIDER:    Signature:  Nasir Baltazar ________________________________________  Date:   2/20/2019

## 2019-04-17 ENCOUNTER — OFFICE VISIT (OUTPATIENT)
Dept: FAMILY MEDICINE | Facility: CLINIC | Age: 64
End: 2019-04-17
Payer: COMMERCIAL

## 2019-04-17 VITALS
OXYGEN SATURATION: 97 % | RESPIRATION RATE: 14 BRPM | WEIGHT: 196.2 LBS | DIASTOLIC BLOOD PRESSURE: 73 MMHG | SYSTOLIC BLOOD PRESSURE: 144 MMHG | TEMPERATURE: 98.4 F | HEIGHT: 65 IN | HEART RATE: 83 BPM | BODY MASS INDEX: 32.69 KG/M2

## 2019-04-17 DIAGNOSIS — I10 ESSENTIAL HYPERTENSION WITH GOAL BLOOD PRESSURE LESS THAN 140/90: ICD-10-CM

## 2019-04-17 DIAGNOSIS — D22.9 ATYPICAL MOLE: Primary | ICD-10-CM

## 2019-04-17 DIAGNOSIS — E03.8 OTHER SPECIFIED HYPOTHYROIDISM: ICD-10-CM

## 2019-04-17 DIAGNOSIS — T50.2X5A DIURETIC-INDUCED HYPOKALEMIA: ICD-10-CM

## 2019-04-17 DIAGNOSIS — E03.9 HYPOTHYROIDISM, UNSPECIFIED TYPE: ICD-10-CM

## 2019-04-17 DIAGNOSIS — E78.5 HYPERLIPIDEMIA LDL GOAL <130: ICD-10-CM

## 2019-04-17 DIAGNOSIS — E87.6 DIURETIC-INDUCED HYPOKALEMIA: ICD-10-CM

## 2019-04-17 LAB
ANION GAP SERPL CALCULATED.3IONS-SCNC: 3 MMOL/L (ref 3–14)
BUN SERPL-MCNC: 16 MG/DL (ref 7–30)
CALCIUM SERPL-MCNC: 9 MG/DL (ref 8.5–10.1)
CHLORIDE SERPL-SCNC: 102 MMOL/L (ref 94–109)
CHOLEST SERPL-MCNC: 159 MG/DL
CO2 SERPL-SCNC: 31 MMOL/L (ref 20–32)
CREAT SERPL-MCNC: 0.61 MG/DL (ref 0.52–1.04)
GFR SERPL CREATININE-BSD FRML MDRD: >90 ML/MIN/{1.73_M2}
GLUCOSE SERPL-MCNC: 120 MG/DL (ref 70–99)
HDLC SERPL-MCNC: 63 MG/DL
LDLC SERPL CALC-MCNC: 76 MG/DL
NONHDLC SERPL-MCNC: 96 MG/DL
POTASSIUM SERPL-SCNC: 3.3 MMOL/L (ref 3.4–5.3)
SODIUM SERPL-SCNC: 136 MMOL/L (ref 133–144)
TRIGL SERPL-MCNC: 102 MG/DL
TSH SERPL DL<=0.005 MIU/L-ACNC: 2.04 MU/L (ref 0.4–4)

## 2019-04-17 PROCEDURE — 99214 OFFICE O/P EST MOD 30 MIN: CPT | Performed by: FAMILY MEDICINE

## 2019-04-17 PROCEDURE — 80061 LIPID PANEL: CPT | Performed by: FAMILY MEDICINE

## 2019-04-17 PROCEDURE — 36415 COLL VENOUS BLD VENIPUNCTURE: CPT | Performed by: FAMILY MEDICINE

## 2019-04-17 PROCEDURE — 84443 ASSAY THYROID STIM HORMONE: CPT | Performed by: FAMILY MEDICINE

## 2019-04-17 PROCEDURE — 80048 BASIC METABOLIC PNL TOTAL CA: CPT | Performed by: FAMILY MEDICINE

## 2019-04-17 RX ORDER — LISINOPRIL/HYDROCHLOROTHIAZIDE 10-12.5 MG
1 TABLET ORAL DAILY
Qty: 90 TABLET | Refills: 3 | Status: SHIPPED | OUTPATIENT
Start: 2019-04-17 | End: 2020-04-14

## 2019-04-17 RX ORDER — SIMVASTATIN 20 MG
20 TABLET ORAL AT BEDTIME
Qty: 90 TABLET | Refills: 3 | Status: SHIPPED | OUTPATIENT
Start: 2019-04-17 | End: 2020-05-06

## 2019-04-17 RX ORDER — LEVOTHYROXINE SODIUM 50 UG/1
50 TABLET ORAL DAILY
Qty: 90 TABLET | Refills: 3 | Status: SHIPPED | OUTPATIENT
Start: 2019-04-17 | End: 2020-07-08

## 2019-04-17 ASSESSMENT — PAIN SCALES - GENERAL: PAINLEVEL: MODERATE PAIN (4)

## 2019-04-17 ASSESSMENT — MIFFLIN-ST. JEOR: SCORE: 1439.8

## 2019-04-17 NOTE — PATIENT INSTRUCTIONS
Discontinue hydrochlorothiazide  And potassium - I'll have your potassium level back tomorrow.      Start lisinopril/hctz 10/12.5    Recheck blood pressure and kidney function (blood work) in 2 weeks with the RN.  Nurse and lab appointment for that    The new Shingrix is supposed to be more effective at preventing shingles.  Even if you had Zostavax, this is recommended. I encourage people to check with their pharmacy (or ours) and have them run it through to check the cost.  It's often better covered through your pharmacy benefit.  It is a two part vaccine series - one now and one in 2 months.

## 2019-04-17 NOTE — NURSING NOTE
"Chief Complaint   Patient presents with     Generalized Body Aches     irregular body aches on and off for the last year she describes as dull aches/cramping, usually occuring at night. She has discussed this with her rheumatologist who suggested PT.      Derm Problem     -requesting an evaluation of a spot on back of head and dark spot on right outer thigh.       Initial /73 (BP Location: Right arm, Patient Position: Chair, Cuff Size: Adult Regular)   Pulse 83   Temp 98.4  F (36.9  C) (Tympanic)   Resp 14   Ht 1.657 m (5' 5.25\")   Wt 89 kg (196 lb 3.2 oz)   LMP 11/12/2004 (Approximate)   SpO2 97%   BMI 32.40 kg/m   Estimated body mass index is 32.4 kg/m  as calculated from the following:    Height as of this encounter: 1.657 m (5' 5.25\").    Weight as of this encounter: 89 kg (196 lb 3.2 oz).    Medication Reconciliation: complete  Lory Craft MA  "

## 2019-04-17 NOTE — PROGRESS NOTES
"  SUBJECTIVE:   Mine Meléndez is a 63 year old female who presents to clinic today for the following   health issues:    Chief Complaint   Patient presents with     Generalized Body Aches     irregular body aches on and off for the last year she describes as dull aches/cramping, usually occuring at night. She has discussed this with her rheumatologist who suggested PT.      Derm Problem     -requesting an evaluation of a spot on back of head and dark spot on right outer thigh.       Mostly bothered by right arm, shoulder, neck - usually comes on as the day goes on and then is better by morning.  Has not done PT yet, wanted to discuss this with me. 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       A few lesions she'd like looked at today.     Hyperlipidemia Follow-Up      Rate your low fat/cholesterol diet?: good    Taking statin?  Yes, no muscle aches from statin    Other lipid medications/supplements?:  none    Hypertension Follow-up      Outpatient blood pressures are not being checked.    Low Salt Diet: no added salt    BP Readings from Last 6 Encounters:   04/17/19 144/73   02/19/19 145/75   11/12/18 130/70   09/13/18 144/74   08/14/18 129/81   05/18/18 134/73         Hypothyroidism Follow-up      Since last visit, patient describes the following symptoms: Weight stable, no hair loss, no skin changes, no constipation, no loose stools        /73 (BP Location: Right arm, Patient Position: Chair, Cuff Size: Adult Regular)   Pulse 83   Temp 98.4  F (36.9  C) (Tympanic)   Resp 14   Ht 1.657 m (5' 5.25\")   Wt 89 kg (196 lb 3.2 oz)   LMP 11/12/2004 (Approximate)   SpO2 97%   BMI 32.40 kg/m    EXAM: GENERAL APPEARANCE: Alert, no acute distress  RESP: lungs clear to auscultation   CV: normal rate, regular rhythm, no murmur or gallop  ABDOMEN: soft, no organomegaly, masses or tenderness  MS: back exam: tender to palpation right upper trapezius  shoulder exam: appearance normal, range " of motion normal, strength in abduction normal  Skin:  Nape of neck there is a 3 mm white papule without concerning features (?cyst/milia)  Right lateral thigh there is a 1 cm irregularly shaped, irregularly pigmented lesion    ASSESSMENT/PLAN:      ICD-10-CM    1. Atypical mole D22.9 DERMATOLOGY REFERRAL   2. Hypothyroidism, unspecified type E03.9 TSH with free T4 reflex   3. Other specified hypothyroidism E03.8 levothyroxine (SYNTHROID/LEVOTHROID) 50 MCG tablet   4. Essential hypertension with goal blood pressure less than 140/90 I10 Basic metabolic panel     lisinopril-hydrochlorothiazide (PRINZIDE/ZESTORETIC) 10-12.5 MG tablet     Basic metabolic panel   5. Diuretic-induced hypokalemia E87.6     T50.2X5A    6. Hyperlipidemia LDL goal <130 E78.5 simvastatin (ZOCOR) 20 MG tablet     Lipid panel reflex to direct LDL Fasting     Recommend dermatology apt to r/o atypical mole/melanoma.     Due for labs today.   Blood pressure is uncontrolled- change to lisinopril/hctz.  She's been on potassium and I would like her to stop that (unless potassium is very low) with the addition of the lisinopril. Recheck blood pressure and blood work in 2 weeks.         Patient Instructions   Discontinue hydrochlorothiazide  And potassium - I'll have your potassium level back tomorrow.      Start lisinopril/hctz 10/12.5    Recheck blood pressure and kidney function (blood work) in 2 weeks with the RN.  Nurse and lab appointment for that    The new Shingrix is supposed to be more effective at preventing shingles.  Even if you had Zostavax, this is recommended. I encourage people to check with their pharmacy (or ours) and have them run it through to check the cost.  It's often better covered through your pharmacy benefit.  It is a two part vaccine series - one now and one in 2 months.      Britany Norton M.D.

## 2019-04-18 NOTE — RESULT ENCOUNTER NOTE
Lipids look good, potassium is just slightly low, continue with plan we discussed yesterday.      Non-fasting blood sugar is okay.      Kidney function is good.  Thyroid function is in normal range.     Britany Norton M.D.

## 2019-05-06 ENCOUNTER — HOSPITAL ENCOUNTER (OUTPATIENT)
Dept: PHYSICAL THERAPY | Facility: CLINIC | Age: 64
Setting detail: THERAPIES SERIES
End: 2019-05-06
Attending: INTERNAL MEDICINE
Payer: COMMERCIAL

## 2019-05-06 PROCEDURE — 97140 MANUAL THERAPY 1/> REGIONS: CPT | Mod: GP | Performed by: PHYSICAL THERAPIST

## 2019-05-06 PROCEDURE — 97161 PT EVAL LOW COMPLEX 20 MIN: CPT | Mod: GP | Performed by: PHYSICAL THERAPIST

## 2019-05-06 PROCEDURE — 97110 THERAPEUTIC EXERCISES: CPT | Mod: GP | Performed by: PHYSICAL THERAPIST

## 2019-05-06 NOTE — PROGRESS NOTES
Mine ROMERO Medhat  1955 Physical Therapy Initial Evaluation  05/06/19 1500   General Information   Type of Visit Initial OP Ortho PT Evaluation   Start of Care Date 05/06/19   Referring Physician Nasir Baltazar MD   Patient/Family Goals Statement Get rid of neck and arm pain   Orders Evaluate and Treat   Date of Order 02/19/19   Medical Diagnosis Pain in both upper extremities / Multiple joint pain   Surgical/Medical history reviewed Yes   Precautions/Limitations no known precautions/limitations   Body Part(s)   Body Part(s) Cervical Spine;Shoulder   Presentation and Etiology   Pertinent history of current problem (include personal factors and/or comorbidities that impact the POC) Has pain about 1 time per month. Will get pain in neck, upper trapezius, and upper arms bilaterally. No original injury and no pain right now. Rest relieves pain. Most of the time happens in the evening and will last for hours. / Comorbidites - Migraine, Obesity, Hypothyroidism, HTN, Rheumatoid arthritis   Impairments A. Pain   Functional Limitations perform activities of daily living   Symptom Location Cervical spine and Bilateral shoulders and lateral upper arms   How/Where did it occur From insidious onset   Onset date of current episode/exacerbation 02/19/19  (Date of Order)   Chronicity Recurrent   Pain rating (0-10 point scale) Best (/10);Worst (/10)   Best (/10) 0   Pain quality C. Aching   Frequency of pain/symptoms B. Intermittent   Pain/symptoms are: Worse during the night   Pain/symptoms exacerbated by G. Certain positions   Pain/symptoms eased by C. Rest;E. Changing positions   Progression of symptoms since onset: Unchanged   Prior Level of Function   Functional Level Prior Comment Independent   Current Level of Function   Patient role/employment history A. Employed   Employment Comments    Fall Risk Screen   Fall screen completed by PT   Have you fallen 2 or more times in the past year? No   Have you fallen and  had an injury in the past year? No   Is patient a fall risk? No   Abuse Screen (yes response referral indicated)   Feels Unsafe at Home or Work/School no   Feels Threatened by Someone no   Does Anyone Try to Keep You From Having Contact with Others or Doing Things Outside Your Home? no   Physical Signs of Abuse Present no   Cervical Spine   Posture Forward head and shoulders seated posture   Cervical Flexion ROM 58   Cervical Extension ROM 47   Cervical Right Side Bending ROM 31   Cervical Left Side Bending ROM 30   Cervical Right Rotation ROM 60   Cervical Left Rotation ROM 63   Shoulder AROM Screen Full and symmetrical for flexion, abduction, IR, and ER   Shoulder Shrug (C2-C4) Strength 5/5 B   Shoulder Abd (C5) Strength 4/5 B   Shoulder ER (C5, C6) Strength 5/5 B   Shoulder IR (C5, C6) Strength 5/5 B   Elbow Flexion (C5, C6) Strength 5/5 B   Elbow Extension (C7) Strength 5/5 B   Wrist Extension (C6) Strength 5/5 B   Wrist Flexion (C7) Strength 5/5 B   Thumb Abd (C8) Strength 5/5 B   5th Finger Add (T1) Strength 5/5 B   Upper Trapezius Flexibility Moderately restricted B   Levator Scapula Flexibility Moderately restricted B   Spurling Test Negative   Cervical Distraction Test Negative   Palpation Hypertonicity and tenderness to palpation over UT and LS B. Restricted motion C3-C6.   UE Neural Tension UE Neural Tension Tests   ULTT II (Median and Musculocutaneous and Axillary) Negative   ULTT III (Radial) Negative   ULTT IV (Ulnar) Negative   Shoulder Objective Findings   Side (if bilateral, select both right and left) Right   Neer's Test Negative B   Gonzalez-Krish Test Negative B   Dazey's Test Negative B   Crossover Test Negative B   Planned Therapy Interventions   Planned Therapy Interventions joint mobilization;manual therapy;neuromuscular re-education;ROM;strengthening;stretching   Planned Modality Interventions   Planned Modality Interventions Cryotherapy;Hot packs;Iontophoresis   Clinical Impression    Criteria for Skilled Therapeutic Interventions Met yes, treatment indicated   PT Diagnosis Cervical spine and bilateral shoulder pain likely due to neck musculature muscle spasm   Influenced by the following impairments Pain, flexibility deficits   Functional limitations due to impairments Difficulty sleeping, reaching   Clinical Presentation Stable/Uncomplicated   Clinical Presentation Rationale Several comorbidities impacting PT / 1-2 body systems / Stable   Clinical Decision Making (Complexity) Low complexity   Therapy Frequency 1 time/week   Predicted Duration of Therapy Intervention (days/wks) 8 weeks   Risk & Benefits of therapy have been explained Yes   Patient, Family & other staff in agreement with plan of care Yes   Education Assessment   Preferred Learning Style Listening;Reading;Demonstration;Pictures/video   Barriers to Learning No barriers   ORTHO GOALS   PT Ortho Eval Goals 1;2;3;4   Ortho Goal 1   Goal Identifier HEP   Goal Description Pt will be independent in HEP in order to achieve and maintain long term treatment goals.   Target Date 06/06/19   Ortho Goal 2   Goal Identifier Lift arm   Goal Description Pt will be have full forward flexion and reach items overhead.   Target Date 07/01/19   Ortho Goal 3   Goal Identifier Sleep   Goal Description Pt will be able to sleep through the night without waking up because of shoulder pain.   Target Date 07/01/19   Ortho Goal 4   Goal Identifier Roll over   Goal Description Pt will be able to roll over in bed without an increase in shoulder pain 1-2/10   Target Date 07/01/19   Total Evaluation Time   PT Airam, Low Complexity Minutes (89196) 21     Logan Thomas, PT, DPT

## 2019-05-09 ENCOUNTER — OFFICE VISIT (OUTPATIENT)
Dept: DERMATOLOGY | Facility: CLINIC | Age: 64
End: 2019-05-09
Attending: FAMILY MEDICINE
Payer: COMMERCIAL

## 2019-05-09 ENCOUNTER — TRANSFERRED RECORDS (OUTPATIENT)
Dept: HEALTH INFORMATION MANAGEMENT | Facility: CLINIC | Age: 64
End: 2019-05-09

## 2019-05-09 VITALS — HEIGHT: 66 IN | HEART RATE: 84 BPM | OXYGEN SATURATION: 96 % | BODY MASS INDEX: 32.15 KG/M2

## 2019-05-09 DIAGNOSIS — D48.5 NEOPLASM OF UNCERTAIN BEHAVIOR OF SKIN: Primary | ICD-10-CM

## 2019-05-09 PROCEDURE — 99203 OFFICE O/P NEW LOW 30 MIN: CPT | Mod: 25 | Performed by: PHYSICIAN ASSISTANT

## 2019-05-09 PROCEDURE — 11106 INCAL BX SKN SINGLE LES: CPT | Performed by: PHYSICIAN ASSISTANT

## 2019-05-09 PROCEDURE — 88305 TISSUE EXAM BY PATHOLOGIST: CPT | Mod: TC | Performed by: PHYSICIAN ASSISTANT

## 2019-05-09 NOTE — PROGRESS NOTES
"HPI:   Chief complaints: Mine Meléndez is a 63 year old female who presents for evaluation of a spot on the leg. Area is irregular and has been changing. PCP recommended she get it checked.   Condition present for:  1 year.   Previous treatments include: none  -no personal h/o skin CA  Shx: retiring at the end of the month!     Review Of Systems  Eyes: negative  Ears/Nose/Throat: negative  Respiratory: No shortness of breath, dyspnea on exertion, cough, or hemoptysis  Cardiovascular: negative  Gastrointestinal: negative  Genitourinary: negative  Musculoskeletal: negative  Neurologic: negative  Psychiatric: negative        PHYSICAL EXAM:    Pulse 84   Ht 1.664 m (5' 5.5\")   LMP 11/12/2004 (Approximate)   SpO2 96%   BMI 32.15 kg/m    Skin exam performed as follows: Type 2 skin. Mood appropriate  Alert and Oriented X 3. Well developed, well nourished in no distress.  General appearance: Normal  Head including face: Normal  Eyes: conjunctiva and lids: Normal  Mouth: Lips, teeth, gums: Normal  Neck: Normal  Chest-breast/axillae: Normal  Back: Normal  Spleen and liver: Normal  Cardiovascular: Exam of peripheral vascular system by observation for swelling, varicosities, edema: Normal  Genitalia: groin, buttocks: Normal  Extremities: digits/nails (clubbing): Normal  Eccrine and Apocrine glands: Normal  Right upper extremity: Normal  Left upper extremity: Normal  Right lower extremity: Normal  Left lower extremity: Normal  Skin: Scalp and body hair: See below    1. 8 x 6 mm irregular brown black macule on the right lateral thigh    ASSESSMENT/PLAN:     1. R/o melanoma on the right lateral thigh. Deep saucerization in typical fashion .  Area cleaned with betadyne and anesthetized with 1% lidocaine with epi .  Dermablade used to remove the lesion and sent to pathology. Bleeding was cauterized. Pt tolerated procedure well.  2. Mine to follow up with Primary Care provider regarding elevated blood " pressure.          Follow-up: pending path  CC:   Scribed By: Justa Frankel MS, PAVALERI

## 2019-05-09 NOTE — PATIENT INSTRUCTIONS
Wound Care Instructions     FOR SUPERFICIAL WOUNDS     [facility] [phone]    [facility] [phone]    Right thigh                   AFTER 24 HOURS YOU SHOULD REMOVE THE BANDAGE AND BEGIN DAILY DRESSING CHANGES AS FOLLOWS:     1) Remove Dressing.     2) Clean and dry the area with tap water using a Q-tip or sterile gauze pad.     3) Apply Vaseline, Aquaphor, Polysporin ointment or Bacitracin ointment over entire wound.  Do NOT use Neosporin ointment.     4) Cover the wound with a band-aid, or a sterile non-stick gauze pad and micropore paper tape      REPEAT THESE INSTRUCTIONS AT LEAST ONCE A DAY UNTIL THE WOUND HAS COMPLETELY HEALED.    It is an old wives tale that a wound heals better when it is exposed to air and allowed to dry out. The wound will heal faster with a better cosmetic result if it is kept moist with ointment and covered with a bandage.    **Do not let the wound dry out.**      Supplies Needed:      *Cotton tipped applicators (Q-tips)    *Polysporin Ointment or Bacitracin Ointment (NOT NEOSPORIN)    *Band-aids or non-stick gauze pads and micropore paper tape.      PATIENT INFORMATION:    During the healing process you will notice a number of changes. All wounds develop a small halo of redness surrounding the wound.  This means healing is occurring. Severe itching with extensive redness usually indicates sensitivity to the ointment or bandage tape used to dress the wound.  You should call our office if this develops.      Swelling  and/or discoloration around your surgical site is common, particularly when performed around the eye.    All wounds normally drain.  The larger the wound the more drainage there will be.  After 7-10 days, you will notice the wound beginning to shrink and new skin will begin to grow.  The wound is healed when you can see skin has formed over the entire area.  A healed wound has a healthy, shiny look to the surface and is red to dark  pink in color to normalize.  Wounds may take approximately 4-6 weeks to heal.  Larger wounds may take 6-8 weeks.  After the wound is healed you may discontinue dressing changes.    You may experience a sensation of tightness as your wound heals. This is normal and will gradually subside.    Your healed wound may be sensitive to temperature changes. This sensitivity improves with time, but if you re having a lot of discomfort, try to avoid temperature extremes.    Patients frequently experience itching after their wound appears to have healed because of the continue healing under the skin.  Plain Vaseline will help relieve the itching.        POSSIBLE COMPLICATIONS    BLEEDIN. Leave the bandage in place.  2. Use tightly rolled up gauze or a cloth to apply direct pressure over the bandage for 30  minutes.  3. Reapply pressure for an additional 30 minutes if necessary  4. Use additional gauze and tape to maintain pressure once the bleeding has stopped.

## 2019-05-09 NOTE — LETTER
"    5/9/2019         RE: Mine Meléndez  [address] [facility] [address]        Dear Colleague,    Thank you for referring your patient, Mine Meléndez, to the [facility]. Please see a copy of my visit note below.    HPI:   Chief complaints: Mine Meléndez is a 63 year old female who presents for evaluation of a spot on the leg. Area is irregular and has been changing. PCP recommended she get it checked.   Condition present for:  1 year.   Previous treatments include: none  -no personal h/o skin CA  Shx: retiring at the end of the month!     Review Of Systems  Eyes: negative  Ears/Nose/Throat: negative  Respiratory: No shortness of breath, dyspnea on exertion, cough, or hemoptysis  Cardiovascular: negative  Gastrointestinal: negative  Genitourinary: negative  Musculoskeletal: negative  Neurologic: negative  Psychiatric: negative        PHYSICAL EXAM:    Pulse 84   Ht 1.664 m (5' 5.5\")   LMP 11/12/2004 (Approximate)   SpO2 96%   BMI 32.15 kg/m     Skin exam performed as follows: Type 2 skin. Mood appropriate  Alert and Oriented X 3. Well developed, well nourished in no distress.  General appearance: Normal  Head including face: Normal  Eyes: conjunctiva and lids: Normal  Mouth: Lips, teeth, gums: Normal  Neck: Normal  Chest-breast/axillae: Normal  Back: Normal  Spleen and liver: Normal  Cardiovascular: Exam of peripheral vascular system by observation for swelling, varicosities, edema: Normal  Genitalia: groin, buttocks: Normal  Extremities: digits/nails (clubbing): Normal  Eccrine and Apocrine glands: Normal  Right upper extremity: Normal  Left upper extremity: Normal  Right lower extremity: Normal  Left lower extremity: Normal  Skin: Scalp and body hair: See below    1. 8 x 6 mm irregular brown black macule on the right lateral thigh    ASSESSMENT/PLAN:     1. R/o melanoma on the right lateral thigh. Deep saucerization in typical fashion .  Area cleaned with betadyne and anesthetized " with 1% lidocaine with epi .  Dermablade used to remove the lesion and sent to pathology. Bleeding was cauterized. Pt tolerated procedure well.  2. Mine to follow up with Primary Care provider regarding elevated blood pressure.          Follow-up: pending path  CC:   Scribed By: Justa Frankel MS, PAVALERI        Again, thank you for allowing me to participate in the care of your patient.        Sincerely,        Justa Frankel PA-C

## 2019-05-09 NOTE — NURSING NOTE
"Chief Complaint   Patient presents with     Derm Problem     spot on leg       Vitals:    05/09/19 1632   Pulse: 84   SpO2: 96%   Height: 1.664 m (5' 5.5\")     Wt Readings from Last 1 Encounters:   04/17/19 89 kg (196 lb 3.2 oz)       Ara Mckeon LPN.................5/9/2019    "

## 2019-05-13 LAB — COPATH REPORT: NORMAL

## 2019-05-14 ENCOUNTER — TELEPHONE (OUTPATIENT)
Dept: DERMATOLOGY | Facility: CLINIC | Age: 64
End: 2019-05-14

## 2019-05-14 NOTE — TELEPHONE ENCOUNTER
I spoke to Mine today and gave her results. She is scheduled for [facility] to re excise. She said that she was already placed on the wait list. Nadira JOHNSON Rn

## 2019-05-14 NOTE — TELEPHONE ENCOUNTER
Reason for Call:  Other     Detailed comments: Pt spoke with Justa last night and was told someone would be calling her this morning to schedule Mohs. (appt is now scheduled for 06/04 and also added to wait list). Told pt if there was any more information to be shared, the RN would call her.     Phone Number Patient can be reached at: Cell number on file:    Telephone Information:   Mobile [phone]       Best Time: Any    Can we leave a detailed message on this number? YES    Call taken on 5/14/2019 at 9:48 AM by Denise Behrendt

## 2019-05-20 ENCOUNTER — OFFICE VISIT (OUTPATIENT)
Dept: DERMATOLOGY | Facility: CLINIC | Age: 64
End: 2019-05-20
Payer: COMMERCIAL

## 2019-05-20 VITALS
HEIGHT: 66 IN | BODY MASS INDEX: 31.67 KG/M2 | SYSTOLIC BLOOD PRESSURE: 124 MMHG | HEART RATE: 88 BPM | DIASTOLIC BLOOD PRESSURE: 73 MMHG

## 2019-05-20 DIAGNOSIS — C43.71 MALIGNANT MELANOMA OF RIGHT THIGH (H): Primary | ICD-10-CM

## 2019-05-20 PROCEDURE — 17313 MOHS 1 STAGE T/A/L: CPT | Performed by: DERMATOLOGY

## 2019-05-20 PROCEDURE — 88341 IMHCHEM/IMCYTCHM EA ADD ANTB: CPT | Performed by: DERMATOLOGY

## 2019-05-20 PROCEDURE — 13121 CMPLX RPR S/A/L 2.6-7.5 CM: CPT | Mod: 51 | Performed by: DERMATOLOGY

## 2019-05-20 PROCEDURE — 88342 IMHCHEM/IMCYTCHM 1ST ANTB: CPT | Mod: 59 | Performed by: DERMATOLOGY

## 2019-05-20 NOTE — PATIENT INSTRUCTIONS
Sutured Wound Care     [facility]: [phone]    [facility]: [phone]    Right thigh      ? No strenuous activity for 48 hours. Resume moderate activity in 48 hours. No heavy exercising until you are seen for follow up in one week.     ? Take Tylenol as needed for discomfort.                         ? Do not drink alcoholic beverages for 48 hours.     ? Keep the pressure bandage in place for 24 hours. If the bandage becomes blood tinged or loose, reinforce it with gauze and tape.        (Refer to the reverse side of this page for management of bleeding).    ? Remove pressure bandage in 24 hours     ? Leave the flat bandage in place until your follow up appointment.    ? Keep the bandage dry. Wash around it carefully.    ? If the tape becomes soiled or starts to come off, reinforce it with additional paper tape.    ? Do not smoke for 3 weeks; smoking is detrimental to wound healing.    ? It is normal to have swelling and bruising around the surgical site. The bruising will fade in approximately 10-14 days. Elevate the area to reduce swelling.    ? Numbness, itchiness and sensitivity to temperature changes can occur after surgery and may take up to 18 months to normalize.      POSSIBLE COMPLICATIONS    BLEEDIN. Leave the bandage in place.  2. Use tightly rolled up gauze or a cloth to apply direct pressure over the bandage for 20   minutes.  3. Reapply pressure for an additional 20 minutes if necessary  4. Call the office or go to the nearest emergency room if pressure fails to stop the bleeding.  5. Use additional gauze and tape to maintain pressure once the bleeding has stopped.        PAIN:    1. Post operative pain should slowly get better, never worse.  2. A severe increase in pain may indicate a problem. Call the office if this occurs.    In case of emergency phone:Dr Castro [phone]

## 2019-05-20 NOTE — PROGRESS NOTES
Mine Meléndez is a 63 year old year old female patient here today for evaluation and managment of 0.3mm melanoma right lat thigh.  .  Patient reports the following modifying factors none.  Associated symptoms: none.  Patient has no other skin complaints today.  Remainder of the HPI, Meds, PMH, Allergies, FH, and SH was reviewed in chart.      Past Medical History:   Diagnosis Date     Herpes simplex without mention of complication      Malignant melanoma (H)      Obesity, unspecified      Osteomyelitis of jaw 2008     Rheumatoid arthritis(714.0)      Trochanteric bursitis of right hip 2013       Past Surgical History:   Procedure Laterality Date     COLONOSCOPY  06     HYSTERECTOMY, PAP NO LONGER INDICATED       HYSTERECTOMY, VAGINAL      TVH,TVT procedure     RELEASE CARPAL TUNNEL Right 10/11/2016    Procedure: RELEASE CARPAL TUNNEL;  Surgeon: Emely Blanca MD;  Location: [facility]     SURGICAL HISTORY OF -       tubal ligation     SURGICAL HISTORY OF -       excision gangliion cyst rt heel        Family History   Problem Relation Age of Onset     Diabetes Father      Dementia Father      Diabetes Brother      Diabetes Sister      Hypertension Sister      Hypertension Sister        Social History     Socioeconomic History     Marital status:      Spouse name: Not on file     Number of children: Not on file     Years of education: Not on file     Highest education level: Not on file   Occupational History     Not on file   Social Needs     Financial resource strain: Not on file     Food insecurity:     Worry: Not on file     Inability: Not on file     Transportation needs:     Medical: Not on file     Non-medical: Not on file   Tobacco Use     Smoking status: Former Smoker     Years: 25.00     Last attempt to quit: 2000     Years since quittin.0     Smokeless tobacco: Never Used   Substance and Sexual Activity     Alcohol use: Yes     Comment: 6 pack of beer on a  weekend     Drug use: No     Sexual activity: Yes     Partners: Male     Birth control/protection: Surgical     Comment: hyster.partial   Lifestyle     Physical activity:     Days per week: Not on file     Minutes per session: Not on file     Stress: Not on file   Relationships     Social connections:     Talks on phone: Not on file     Gets together: Not on file     Attends Denominational service: Not on file     Active member of club or organization: Not on file     Attends meetings of clubs or organizations: Not on file     Relationship status: Not on file     Intimate partner violence:     Fear of current or ex partner: Not on file     Emotionally abused: Not on file     Physically abused: Not on file     Forced sexual activity: Not on file   Other Topics Concern     Parent/sibling w/ CABG, MI or angioplasty before 65F 55M? No   Social History Narrative     Not on file       Outpatient Encounter Medications as of 5/20/2019   Medication Sig Dispense Refill     ASPIRIN 81 MG OR TABS ONE DAILY 100 3     BIOTIN PO        folic acid (FOLVITE) 1 MG tablet Take 1 tablet (1,000 mcg) by mouth daily 90 tablet 2     hydrochlorothiazide (HYDRODIURIL) 25 MG tablet Take 1 tablet (25 mg) by mouth every morning 90 tablet 3     levothyroxine (SYNTHROID/LEVOTHROID) 50 MCG tablet Take 1 tablet (50 mcg) by mouth daily 90 tablet 3     lisinopril-hydrochlorothiazide (PRINZIDE/ZESTORETIC) 10-12.5 MG tablet Take 1 tablet by mouth daily 90 tablet 3     methotrexate sodium 2.5 MG TABS Take 8 tablets (20 mg) by mouth once a week . Take all 8 tablets on the same day of each week. 96 tablet 2     MULTI-VITAMIN OR 1 daily       Potassium Chloride ER 20 MEQ TBCR Take 1 tablet (20 mEq) by mouth 2 times daily 180 tablet 3     simvastatin (ZOCOR) 20 MG tablet Take 1 tablet (20 mg) by mouth At Bedtime 90 tablet 3     No facility-administered encounter medications on file as of 5/20/2019.              Review Of Systems  Skin: As above  Eyes:  "negative  Ears/Nose/Throat: negative  Respiratory: No shortness of breath, dyspnea on exertion, cough, or hemoptysis  Cardiovascular: negative  Gastrointestinal: negative  Genitourinary: negative  Musculoskeletal: negative  Neurologic: negative  Psychiatric: negative  Hematologic/Lymphatic/Immunologic: negative  Endocrine: negative      O:   NAD, WDWN, Alert & Oriented, Mood & Affect wnl, Vitals stable   Here today alone   /73 (BP Location: Left arm, Patient Position: Sitting, Cuff Size: Adult Large)   Pulse 88   Ht 1.676 m (5' 6\")   LMP 11/12/2004 (Approximate)   BMI 31.67 kg/m     General appearance normal   Vitals stable   Alert, oriented and in no acute distress      Following lymph nodes palpated: Occipital, Cervical, Supraclavicular , inguinal, femoral n olda   R lat thigh 1.8x1.3cm ulcer      Eyes: Conjunctivae/lids:Normal     ENT: Lips, buccal mucosa, tongue: normal    MSK:Normal    Cardiovascular: peripheral edema none    Pulm: Breathing Normal    Neuro/Psych: Orientation:Normal; Mood/Affect:Normal      A/P:  1. 0.3mm mleanoma  [place] discussed with patient  [place] sent out today    MELANOMA DISCUSSED WITH PATIENT:  I discussed the specifics of tumor, prognosis, metachronous melanoma, self exam, and genetics with the patient. I explained the need for monthly skin exams including and taught the patient how to do this. Patient was asked about new or changing moles . I discussed with patient signs and symptoms that could arise in the setting of recurrent locoregional or metastatic disease. In addition, the need to undergo every 4 month dermatologic full skin survey and evaluation given that patients with a diagnosis of melanoma are at risk of recurrence (local and distant) and of subsequent de trixie melanoma.  . I reviewed treatment options, including a discussion of wide excision (the gold standard) versus Mohs surgery with MART-1 immunostains.     Note: MART-1 (Melanoma Antigen Recognized by " T-cells) antibody immunostaining was used during Mohs surgery as per standard protocol, in addition to routine processing of all specimens with hematoxylin and eosin. The peripheral margins/edges were processed with the MART-1 stain (4 specimens total). The center was examined with hematoxylin & eosin and MART-1 immunostains. The patient was informed of the procedure and its risk/benefits during the consent for the procedure.    One or more of the reagents used in immunohistochemical testing in this case may not have been cleared or approved by the U.S. Food and Drug Administration (FDA). The FDA has determined that such clearance or approval is not necessary. These tests are used for clinical purposes. They should not be regarded as investigational or for research. These reagents  performance characteristics have been determined by [facility]. This laboratory is certified under the Clinical Laboratory Improvement Amendments of 1988 (CLIA-88) as qualified to perform high complexity clinical laboratory testing.    After [facility] discussed with patient, decision for Mohs surgery was made. Indication for Mohs was aggressive histology. Patient confirmed the site with Dr. Castro. After anesthesia with LEC, the tumor was excised using standard Mohs technique in 1 stages(s).  MART 1 stains were performed on 4 specimens. CLEAR MARGINS OBTAINED and Final defect size was 2.7 x 2.3 cm.     REPAIR COMPLEX: Because of the tightness of the surrounding skin and Because of the size and full thickness nature of the defect, a complex closure was planned. After LEC anesthesia and prep, Burow's triangles were excised in the relaxed skin tension lines. The wound edges were widely undermined by dissection in the subcutaneous plane until adequate tissue mobility was obtained. Hemostasis was obtained. The wound edges were closed in a layered fashion using Vicryl and Fast Absorbing Plain Gut sutures. Postoperative length was  5.3 cm.   EBL minimal; complications none; wound care routine.  The patient was discharged in good condition and will return in one week for wound evaluation.      BENIGN LESIONS DISCUSSED WITH PATIENT:  I discussed the specifics of tumor, prognosis, and genetics of benign lesions.  I explained that treatment of these lesions would be purely cosmetic and not medically neccessary.  I discussed with patient different removal options including excision, cautery and /or laser.      Nature and genetics of benign skin lesions dicussed with patient.  Signs and Symptoms of skin cancer discussed with patient.  ABCDEs of melanoma reviewed with patient.  Patient encouraged to perform monthly skin exams.  UV precautions reviewed with patient.  Patient to follow up with Primary Care provider regarding elevated blood pressure.  Skin care regimen reviewed with patient: Eliminate harsh soaps, i.e. Dial, zest, irsih spring; Mild soaps such as Cetaphil or Dove sensitive skin, avoid hot or cold showers, aggressive use of emollients including vanicream, cetaphil or cerave discussed with patient.    Risks of non-melanoma skin cancer discussed with patient   Return to clinic 4 months

## 2019-05-20 NOTE — LETTER
5/20/2019         RE: Mine Meléndez  [address]  [facility] [address]        Dear Colleague,    Thank you for referring your patient, Mine Meléndez, to the [facility]. Please see a copy of my visit note below.    Mine Meléndez is a 63 year old year old female patient here today for evaluation and managment of 0.3mm melanoma right lat thigh.  .  Patient reports the following modifying factors none.  Associated symptoms: none.  Patient has no other skin complaints today.  Remainder of the HPI, Meds, PMH, Allergies, FH, and SH was reviewed in chart.      Past Medical History:   Diagnosis Date     Herpes simplex without mention of complication      Malignant melanoma (H)      Obesity, unspecified      Osteomyelitis of jaw 5/22/2008     Rheumatoid arthritis(714.0)      Trochanteric bursitis of right hip 8/16/2013       Past Surgical History:   Procedure Laterality Date     COLONOSCOPY  1/20/06     HYSTERECTOMY, PAP NO LONGER INDICATED       HYSTERECTOMY, VAGINAL  11/04    TVH,TVT procedure     RELEASE CARPAL TUNNEL Right 10/11/2016    Procedure: RELEASE CARPAL TUNNEL;  Surgeon: Emely Blanca MD;  Location: [facility] OR     SURGICAL HISTORY OF -   1988    tubal ligation     SURGICAL HISTORY OF -   2001    excision gangliion cyst rt heel        Family History   Problem Relation Age of Onset     Diabetes Father      Dementia Father      Diabetes Brother      Diabetes Sister      Hypertension Sister      Hypertension Sister        Social History     Socioeconomic History     Marital status:      Spouse name: Not on file     Number of children: Not on file     Years of education: Not on file     Highest education level: Not on file   Occupational History     Not on file   Social Needs     Financial resource strain: Not on file     Food insecurity:     Worry: Not on file     Inability: Not on file     Transportation needs:     Medical: Not on file     Non-medical: Not on file   Tobacco  Use     Smoking status: Former Smoker     Years: 25.00     Last attempt to quit: 2000     Years since quittin.0     Smokeless tobacco: Never Used   Substance and Sexual Activity     Alcohol use: Yes     Comment: 6 pack of beer on a weekend     Drug use: No     Sexual activity: Yes     Partners: Male     Birth control/protection: Surgical     Comment: hyster.partial   Lifestyle     Physical activity:     Days per week: Not on file     Minutes per session: Not on file     Stress: Not on file   Relationships     Social connections:     Talks on phone: Not on file     Gets together: Not on file     Attends Spiritism service: Not on file     Active member of club or organization: Not on file     Attends meetings of clubs or organizations: Not on file     Relationship status: Not on file     Intimate partner violence:     Fear of current or ex partner: Not on file     Emotionally abused: Not on file     Physically abused: Not on file     Forced sexual activity: Not on file   Other Topics Concern     Parent/sibling w/ CABG, MI or angioplasty before 65F 55M? No   Social History Narrative     Not on file       Outpatient Encounter Medications as of 2019   Medication Sig Dispense Refill     ASPIRIN 81 MG OR TABS ONE DAILY 100 3     BIOTIN PO        folic acid (FOLVITE) 1 MG tablet Take 1 tablet (1,000 mcg) by mouth daily 90 tablet 2     hydrochlorothiazide (HYDRODIURIL) 25 MG tablet Take 1 tablet (25 mg) by mouth every morning 90 tablet 3     levothyroxine (SYNTHROID/LEVOTHROID) 50 MCG tablet Take 1 tablet (50 mcg) by mouth daily 90 tablet 3     lisinopril-hydrochlorothiazide (PRINZIDE/ZESTORETIC) 10-12.5 MG tablet Take 1 tablet by mouth daily 90 tablet 3     methotrexate sodium 2.5 MG TABS Take 8 tablets (20 mg) by mouth once a week . Take all 8 tablets on the same day of each week. 96 tablet 2     MULTI-VITAMIN OR 1 daily       Potassium Chloride ER 20 MEQ TBCR Take 1 tablet (20 mEq) by mouth 2 times daily 180  "tablet 3     simvastatin (ZOCOR) 20 MG tablet Take 1 tablet (20 mg) by mouth At Bedtime 90 tablet 3     No facility-administered encounter medications on file as of 5/20/2019.              Review Of Systems  Skin: As above  Eyes: negative  Ears/Nose/Throat: negative  Respiratory: No shortness of breath, dyspnea on exertion, cough, or hemoptysis  Cardiovascular: negative  Gastrointestinal: negative  Genitourinary: negative  Musculoskeletal: negative  Neurologic: negative  Psychiatric: negative  Hematologic/Lymphatic/Immunologic: negative  Endocrine: negative      O:   NAD, WDWN, Alert & Oriented, Mood & Affect wnl, Vitals stable   Here today alone   /73 (BP Location: Left arm, Patient Position: Sitting, Cuff Size: Adult Large)   Pulse 88   Ht 1.676 m (5' 6\")   LMP 11/12/2004 (Approximate)   BMI 31.67 kg/m      General appearance normal   Vitals stable   Alert, oriented and in no acute distress      Following lymph nodes palpated: Occipital, Cervical, Supraclavicular , inguinal, femoral n olda   R lat thigh 1.8x1.3cm ulcer      Eyes: Conjunctivae/lids:Normal     ENT: Lips, buccal mucosa, tongue: normal    MSK:Normal    Cardiovascular: peripheral edema none    Pulm: Breathing Normal    Neuro/Psych: Orientation:Normal; Mood/Affect:Normal      A/P:  1. 0.3mm mleanoma  [place] discussed with patient  [place] sent out today    MELANOMA DISCUSSED WITH PATIENT:  I discussed the specifics of tumor, prognosis, metachronous melanoma, self exam, and genetics with the patient. I explained the need for monthly skin exams including and taught the patient how to do this. Patient was asked about new or changing moles . I discussed with patient signs and symptoms that could arise in the setting of recurrent locoregional or metastatic disease. In addition, the need to undergo every 4 month dermatologic full skin survey and evaluation given that patients with a diagnosis of melanoma are at risk of recurrence (local and distant) " and of subsequent de trixie melanoma.  . I reviewed treatment options, including a discussion of wide excision (the gold standard) versus Mohs surgery with MART-1 immunostains.     Note: MART-1 (Melanoma Antigen Recognized by T-cells) antibody immunostaining was used during Mohs surgery as per standard protocol, in addition to routine processing of all specimens with hematoxylin and eosin. The peripheral margins/edges were processed with the MART-1 stain (4 specimens total). The center was examined with hematoxylin & eosin and MART-1 immunostains. The patient was informed of the procedure and its risk/benefits during the consent for the procedure.    One or more of the reagents used in immunohistochemical testing in this case may not have been cleared or approved by the U.S. Food and Drug Administration (FDA). The FDA has determined that such clearance or approval is not necessary. These tests are used for clinical purposes. They should not be regarded as investigational or for research. These reagents  performance characteristics have been determined by [facility]. This laboratory is certified under the Clinical Laboratory Improvement Amendments of 1988 (CLIA-88) as qualified to perform high complexity clinical laboratory testing.    After [facility] discussed with patient, decision for Mohs surgery was made. Indication for Mohs was aggressive histology. Patient confirmed the site with Dr. Castro. After anesthesia with LEC, the tumor was excised using standard Mohs technique in 1 stages(s).  MART 1 stains were performed on 4 specimens. CLEAR MARGINS OBTAINED and Final defect size was 2.7 x 2.3 cm.     REPAIR COMPLEX: Because of the tightness of the surrounding skin and Because of the size and full thickness nature of the defect, a complex closure was planned. After LEC anesthesia and prep, Burow's triangles were excised in the relaxed skin tension lines. The wound edges were widely undermined by  dissection in the subcutaneous plane until adequate tissue mobility was obtained. Hemostasis was obtained. The wound edges were closed in a layered fashion using Vicryl and Fast Absorbing Plain Gut sutures. Postoperative length was 5.3 cm.   EBL minimal; complications none; wound care routine.  The patient was discharged in good condition and will return in one week for wound evaluation.      BENIGN LESIONS DISCUSSED WITH PATIENT:  I discussed the specifics of tumor, prognosis, and genetics of benign lesions.  I explained that treatment of these lesions would be purely cosmetic and not medically neccessary.  I discussed with patient different removal options including excision, cautery and /or laser.      Nature and genetics of benign skin lesions dicussed with patient.  Signs and Symptoms of skin cancer discussed with patient.  ABCDEs of melanoma reviewed with patient.  Patient encouraged to perform monthly skin exams.  UV precautions reviewed with patient.  Patient to follow up with Primary Care provider regarding elevated blood pressure.  Skin care regimen reviewed with patient: Eliminate harsh soaps, i.e. Dial, zest, irsih spring; Mild soaps such as Cetaphil or Dove sensitive skin, avoid hot or cold showers, aggressive use of emollients including vanicream, cetaphil or cerave discussed with patient.    Risks of non-melanoma skin cancer discussed with patient   Return to clinic 4 months      Again, thank you for allowing me to participate in the care of your patient.        Sincerely,        Pankaj Castro MD

## 2019-05-22 ENCOUNTER — TELEPHONE (OUTPATIENT)
Dept: DERMATOLOGY | Facility: CLINIC | Age: 64
End: 2019-05-22

## 2019-05-22 DIAGNOSIS — M05.9 SEROPOSITIVE RHEUMATOID ARTHRITIS (H): ICD-10-CM

## 2019-05-22 DIAGNOSIS — I10 ESSENTIAL HYPERTENSION WITH GOAL BLOOD PRESSURE LESS THAN 140/90: ICD-10-CM

## 2019-05-22 LAB
ALBUMIN SERPL-MCNC: 3.9 G/DL (ref 3.4–5)
ALP SERPL-CCNC: 132 U/L (ref 40–150)
ALT SERPL W P-5'-P-CCNC: 25 U/L (ref 0–50)
ANION GAP SERPL CALCULATED.3IONS-SCNC: 3 MMOL/L (ref 3–14)
AST SERPL W P-5'-P-CCNC: 21 U/L (ref 0–45)
BASOPHILS # BLD AUTO: 0 10E9/L (ref 0–0.2)
BASOPHILS NFR BLD AUTO: 0.3 %
BILIRUB DIRECT SERPL-MCNC: 0.2 MG/DL (ref 0–0.2)
BILIRUB SERPL-MCNC: 1.3 MG/DL (ref 0.2–1.3)
BUN SERPL-MCNC: 13 MG/DL (ref 7–30)
CALCIUM SERPL-MCNC: 9.3 MG/DL (ref 8.5–10.1)
CHLORIDE SERPL-SCNC: 99 MMOL/L (ref 94–109)
CO2 SERPL-SCNC: 32 MMOL/L (ref 20–32)
CREAT SERPL-MCNC: 0.58 MG/DL (ref 0.52–1.04)
DIFFERENTIAL METHOD BLD: ABNORMAL
EOSINOPHIL # BLD AUTO: 0.3 10E9/L (ref 0–0.7)
EOSINOPHIL NFR BLD AUTO: 2.9 %
ERYTHROCYTE [DISTWIDTH] IN BLOOD BY AUTOMATED COUNT: 13.7 % (ref 10–15)
GFR SERPL CREATININE-BSD FRML MDRD: >90 ML/MIN/{1.73_M2}
GLUCOSE SERPL-MCNC: 123 MG/DL (ref 70–99)
HCT VFR BLD AUTO: 41.5 % (ref 35–47)
HGB BLD-MCNC: 14.4 G/DL (ref 11.7–15.7)
LYMPHOCYTES # BLD AUTO: 1.4 10E9/L (ref 0.8–5.3)
LYMPHOCYTES NFR BLD AUTO: 13 %
MCH RBC QN AUTO: 34.9 PG (ref 26.5–33)
MCHC RBC AUTO-ENTMCNC: 34.7 G/DL (ref 31.5–36.5)
MCV RBC AUTO: 101 FL (ref 78–100)
MONOCYTES # BLD AUTO: 0.8 10E9/L (ref 0–1.3)
MONOCYTES NFR BLD AUTO: 7.5 %
NEUTROPHILS # BLD AUTO: 7.9 10E9/L (ref 1.6–8.3)
NEUTROPHILS NFR BLD AUTO: 76.3 %
PLATELET # BLD AUTO: 244 10E9/L (ref 150–450)
POTASSIUM SERPL-SCNC: 3.4 MMOL/L (ref 3.4–5.3)
PROT SERPL-MCNC: 7.6 G/DL (ref 6.8–8.8)
RBC # BLD AUTO: 4.13 10E12/L (ref 3.8–5.2)
SODIUM SERPL-SCNC: 134 MMOL/L (ref 133–144)
WBC # BLD AUTO: 10.4 10E9/L (ref 4–11)

## 2019-05-22 PROCEDURE — 80076 HEPATIC FUNCTION PANEL: CPT | Performed by: INTERNAL MEDICINE

## 2019-05-22 PROCEDURE — 36415 COLL VENOUS BLD VENIPUNCTURE: CPT | Performed by: INTERNAL MEDICINE

## 2019-05-22 PROCEDURE — 85025 COMPLETE CBC W/AUTO DIFF WBC: CPT | Performed by: INTERNAL MEDICINE

## 2019-05-22 PROCEDURE — 80048 BASIC METABOLIC PNL TOTAL CA: CPT | Performed by: FAMILY MEDICINE

## 2019-05-22 NOTE — TELEPHONE ENCOUNTER
Pt calling to talk to the RN in derm about the MOHS procedure she done on 5/20/19.    Please call-     Britany Boothe  Clinic Station

## 2019-05-22 NOTE — TELEPHONE ENCOUNTER
Spoke to patient regarding her Mohs procedure on 5/20/19.   Patient states warm around the site. Patient mentioned that it may be caused from the tape as this happened to her before.  No increased pain, no discharge, no red streaking coming from the site. Does have some itchiness around the incision.  Patient questioning if she can apply ice.    Patient advised to apply ice around the incision but not on it, apply aquaphor or hydrocortisone cream around the sight for itchiness.  Patient was informed to call back if symptoms worsen.   Patient informed she can schedule a nurse only visit to have bandage changed.  Patient verbalized understanding.    Rosalina Francisco RN

## 2019-05-23 DIAGNOSIS — E66.9 OBESITY WITH SERIOUS COMORBIDITY, UNSPECIFIED CLASSIFICATION, UNSPECIFIED OBESITY TYPE: Primary | ICD-10-CM

## 2019-05-23 NOTE — RESULT ENCOUNTER NOTE
This was a non-fasting specimen, but your last two blood sugars have been elevated.  I would like to see what an HgbA1c is (this is a three month average of blood sugars looking for diabetes).  I will add that as a lab only order and you can have it done next time you have blood work drawn.    Britany Norton M.D.

## 2019-05-28 ENCOUNTER — ALLIED HEALTH/NURSE VISIT (OUTPATIENT)
Dept: DERMATOLOGY | Facility: CLINIC | Age: 64
End: 2019-05-28
Payer: COMMERCIAL

## 2019-05-28 DIAGNOSIS — Z48.01 ENCOUNTER FOR CHANGE OR REMOVAL OF SURGICAL WOUND DRESSING: Primary | ICD-10-CM

## 2019-05-28 LAB — LAB SCANNED RESULT: NORMAL

## 2019-05-28 PROCEDURE — 99207 ZZC NO CHARGE NURSE ONLY: CPT

## 2019-05-28 NOTE — PROGRESS NOTES
Patient returned to clinic for post surgery 1 week follow up bandage change to right thigh . Patient has no complaints, denies pain. Bandage removed, area cleansed with normal saline. Site is healing and wound edges approximating well. Reapplied new steri strips and paper tape. Advised to watch for signs/symptoms of infection; spreading redness,drainage, odor, fever. Call or report promptly to clinic. Patient given written instructions and informed to return to clinic as needed.    Patient verbalized understanding.     Vladimir LINN RN   Specialty Clinics

## 2019-05-28 NOTE — PATIENT INSTRUCTIONS

## 2019-05-29 ENCOUNTER — TELEPHONE (OUTPATIENT)
Dept: DERMATOLOGY | Facility: CLINIC | Age: 64
End: 2019-05-29

## 2019-05-29 NOTE — LETTER
[facility]  [address] [facility] [address]  Phone: [phone]    May 29, 2019    Mine Meléndez                                                                                                                [address] [facility] [address]            Dear Ms. Meléndez,    Your melanoma test showed that your melanoma was a low risk Class 1 score in which    the 5-year metastatic free survival rate was determined to be 97%.    Thank you,      Pankaj Castro MD/ [facility]

## 2019-05-29 NOTE — TELEPHONE ENCOUNTER
----- Message from Pankaj Castro MD sent at 5/29/2019  7:11 AM CDT -----  Your melanoma test showed that your melanoma was a low risk Class 1 score in which the 5-year metastatic free survival rate was determined to be 97%.

## 2019-07-29 ENCOUNTER — MYC MEDICAL ADVICE (OUTPATIENT)
Dept: FAMILY MEDICINE | Facility: CLINIC | Age: 64
End: 2019-07-29

## 2019-07-29 NOTE — TELEPHONE ENCOUNTER
Area was itchy and has been relieved with Benadryl.  Area is not weepy but states they look like liquid papules that would weep if she rubbed them.  No pain or burning.  Has had for 3 days.  See picture below.  KPaMadeleine

## 2019-07-29 NOTE — TELEPHONE ENCOUNTER
I'm sorry, without knowing more about the rash, this is impossible to answer.    Britany Norton M.D.

## 2019-08-01 ENCOUNTER — DOCUMENTATION ONLY (OUTPATIENT)
Dept: PHYSICAL THERAPY | Facility: CLINIC | Age: 64
End: 2019-08-01

## 2019-08-01 NOTE — PROGRESS NOTES
Physical Therapy Discharge Note    Patient Name: Mine Meléndez  MR#: [id number]  : 1955  MD name: Nasir Baltazar  Diagnosis: Pain in both upper extremities / Multiple joint pain  Eval date: 2019  Reporting period : 2019  Number of visits: 1    Subjective:  Patient attended eval only and did not return.  Pain scale and function unknown due to patient didn't return to PT    Objective:  Current objective measures unknown due to patient didn't return.  Treatment has consisted of Stretching and strengthening exercise education / Soft tissue mobilization / Joint mobilization    Assessment:  Goals not met due to patient didn't return.     Plan:  Discharge with [facility].  Therapist: Logan Thomas, PT, DPT

## 2019-08-05 ENCOUNTER — OFFICE VISIT (OUTPATIENT)
Dept: FAMILY MEDICINE | Facility: CLINIC | Age: 64
End: 2019-08-05
Payer: COMMERCIAL

## 2019-08-05 VITALS
TEMPERATURE: 98.7 F | SYSTOLIC BLOOD PRESSURE: 123 MMHG | OXYGEN SATURATION: 93 % | WEIGHT: 190 LBS | HEART RATE: 65 BPM | HEIGHT: 65 IN | BODY MASS INDEX: 31.65 KG/M2 | DIASTOLIC BLOOD PRESSURE: 70 MMHG | RESPIRATION RATE: 18 BRPM

## 2019-08-05 DIAGNOSIS — M05.9 SEROPOSITIVE RHEUMATOID ARTHRITIS (H): ICD-10-CM

## 2019-08-05 DIAGNOSIS — L25.9 CONTACT DERMATITIS, UNSPECIFIED CONTACT DERMATITIS TYPE, UNSPECIFIED TRIGGER: Primary | ICD-10-CM

## 2019-08-05 DIAGNOSIS — E66.9 OBESITY WITH SERIOUS COMORBIDITY, UNSPECIFIED CLASSIFICATION, UNSPECIFIED OBESITY TYPE: ICD-10-CM

## 2019-08-05 LAB
ALBUMIN SERPL-MCNC: 3.5 G/DL (ref 3.4–5)
ALP SERPL-CCNC: 125 U/L (ref 40–150)
ALT SERPL W P-5'-P-CCNC: 27 U/L (ref 0–50)
AST SERPL W P-5'-P-CCNC: 27 U/L (ref 0–45)
BASOPHILS # BLD AUTO: 0 10E9/L (ref 0–0.2)
BASOPHILS NFR BLD AUTO: 0.4 %
BILIRUB DIRECT SERPL-MCNC: 0.2 MG/DL (ref 0–0.2)
BILIRUB SERPL-MCNC: 0.6 MG/DL (ref 0.2–1.3)
CREAT SERPL-MCNC: 0.58 MG/DL (ref 0.52–1.04)
CRP SERPL-MCNC: 10.4 MG/L (ref 0–8)
DIFFERENTIAL METHOD BLD: ABNORMAL
EOSINOPHIL # BLD AUTO: 0.2 10E9/L (ref 0–0.7)
EOSINOPHIL NFR BLD AUTO: 3 %
ERYTHROCYTE [DISTWIDTH] IN BLOOD BY AUTOMATED COUNT: 13.6 % (ref 10–15)
ERYTHROCYTE [SEDIMENTATION RATE] IN BLOOD BY WESTERGREN METHOD: 10 MM/H (ref 0–30)
GFR SERPL CREATININE-BSD FRML MDRD: >90 ML/MIN/{1.73_M2}
HBA1C MFR BLD: 5.3 % (ref 0–5.6)
HCT VFR BLD AUTO: 44.1 % (ref 35–47)
HGB BLD-MCNC: 14.8 G/DL (ref 11.7–15.7)
LYMPHOCYTES # BLD AUTO: 1.5 10E9/L (ref 0.8–5.3)
LYMPHOCYTES NFR BLD AUTO: 20.6 %
MCH RBC QN AUTO: 34.3 PG (ref 26.5–33)
MCHC RBC AUTO-ENTMCNC: 33.6 G/DL (ref 31.5–36.5)
MCV RBC AUTO: 102 FL (ref 78–100)
MONOCYTES # BLD AUTO: 0.6 10E9/L (ref 0–1.3)
MONOCYTES NFR BLD AUTO: 7.7 %
NEUTROPHILS # BLD AUTO: 5 10E9/L (ref 1.6–8.3)
NEUTROPHILS NFR BLD AUTO: 68.3 %
PLATELET # BLD AUTO: 230 10E9/L (ref 150–450)
PROT SERPL-MCNC: 7.7 G/DL (ref 6.8–8.8)
RBC # BLD AUTO: 4.32 10E12/L (ref 3.8–5.2)
WBC # BLD AUTO: 7.3 10E9/L (ref 4–11)

## 2019-08-05 PROCEDURE — 80076 HEPATIC FUNCTION PANEL: CPT | Performed by: INTERNAL MEDICINE

## 2019-08-05 PROCEDURE — 83036 HEMOGLOBIN GLYCOSYLATED A1C: CPT | Performed by: FAMILY MEDICINE

## 2019-08-05 PROCEDURE — 85652 RBC SED RATE AUTOMATED: CPT | Performed by: INTERNAL MEDICINE

## 2019-08-05 PROCEDURE — 86140 C-REACTIVE PROTEIN: CPT | Performed by: INTERNAL MEDICINE

## 2019-08-05 PROCEDURE — 85025 COMPLETE CBC W/AUTO DIFF WBC: CPT | Performed by: INTERNAL MEDICINE

## 2019-08-05 PROCEDURE — 36415 COLL VENOUS BLD VENIPUNCTURE: CPT | Performed by: INTERNAL MEDICINE

## 2019-08-05 PROCEDURE — 99213 OFFICE O/P EST LOW 20 MIN: CPT | Performed by: FAMILY MEDICINE

## 2019-08-05 PROCEDURE — 82565 ASSAY OF CREATININE: CPT | Performed by: INTERNAL MEDICINE

## 2019-08-05 RX ORDER — HYDROCORTISONE 2.5 %
CREAM (GRAM) TOPICAL 2 TIMES DAILY
Qty: 3.5 G | Refills: 0 | Status: SHIPPED | OUTPATIENT
Start: 2019-08-05 | End: 2019-09-04

## 2019-08-05 ASSESSMENT — MIFFLIN-ST. JEOR: SCORE: 1406.67

## 2019-08-05 NOTE — PATIENT INSTRUCTIONS
Our Clinic hours are:  Mondays    7:20 am - 7 pm  Tues -  Fri  7:20 am - 5 pm    Clinic Phone: [phone]    The clinic lab opens at 7:30 am Mon - Fri and appointments are required.    [facility]. [phone]  Monday  8 am - 7pm  Tues - Fri 8 am - 5:30 pm

## 2019-08-05 NOTE — PROGRESS NOTES
"Subjective     Mine Meléndez is a 64 year old female who presents to clinic today for the following health issues:    HPI   Rash  Onset: x 1 week     Description:   Location: Right side of face by her lip area   Character: red  Itching (Pruritis): YES    Progression of Symptoms:  same    Accompanying Signs & Symptoms:  Fever: no   Body aches or joint pain: no   Sore throat symptoms: no   Recent cold symptoms: no     History:   Previous similar rash: no     Precipitating factors:   Exposure to similar rash: no   New exposures: None   Recent travel: no     Alleviating factors:  none    Therapies Tried and outcome: Cortizone cream - helped with itching           Reviewed and updated as needed this visit by Provider         Review of Systems         Objective    /70   Pulse 65   Temp 98.7  F (37.1  C)   Resp 18   Ht 1.657 m (5' 5.25\")   Wt 86.2 kg (190 lb)   LMP 11/12/2004 (Approximate)   SpO2 93%   BMI 31.38 kg/m    Body mass index is 31.38 kg/m .  Physical Exam               Further history obtained, clarified or corrected by physician:  She complains of itchy rash on her face for 9 days.  It started little bubbles and blisters then turned into red papules and then confluence.  It has been itchy the entire time.  She has used over-the-counter cortisone cream.    OBJECTIVE:  /70   Pulse 65   Temp 98.7  F (37.1  C)   Resp 18   Ht 1.657 m (5' 5.25\")   Wt 86.2 kg (190 lb)   LMP 11/12/2004 (Approximate)   SpO2 93%   BMI 31.38 kg/m    LUNGS: clear to auscultation, normal breath sounds  CV: RRR without murmur  ABD: BS+, soft, nontender, no masses, no hepatosplenomegaly  SKIN: No rashes or abnormalities, except for an irregular patch of erythema with some small papules just on the right cheek near the corner of the mouth    ASSESSMENT:  Contact dermatitis, unspecified contact dermatitis type, unspecified trigger    PLAN:  Orders Placed This Encounter     hydrocortisone 2.5 % cream         "

## 2019-08-19 ENCOUNTER — OFFICE VISIT (OUTPATIENT)
Dept: RHEUMATOLOGY | Facility: CLINIC | Age: 64
End: 2019-08-19
Payer: COMMERCIAL

## 2019-08-19 VITALS
HEART RATE: 76 BPM | TEMPERATURE: 98.6 F | WEIGHT: 191.2 LBS | DIASTOLIC BLOOD PRESSURE: 82 MMHG | SYSTOLIC BLOOD PRESSURE: 138 MMHG | OXYGEN SATURATION: 95 % | BODY MASS INDEX: 31.57 KG/M2

## 2019-08-19 DIAGNOSIS — M05.9 SEROPOSITIVE RHEUMATOID ARTHRITIS (H): Primary | ICD-10-CM

## 2019-08-19 DIAGNOSIS — H54.7 VISION PROBLEM: ICD-10-CM

## 2019-08-19 DIAGNOSIS — Z79.899 HIGH RISK MEDICATION USE: ICD-10-CM

## 2019-08-19 PROCEDURE — 99214 OFFICE O/P EST MOD 30 MIN: CPT | Performed by: INTERNAL MEDICINE

## 2019-08-19 RX ORDER — FOLIC ACID 1 MG/1
1000 TABLET ORAL DAILY
Qty: 90 TABLET | Refills: 2 | Status: SHIPPED | OUTPATIENT
Start: 2019-08-19 | End: 2020-04-20

## 2019-08-19 RX ORDER — METHOTREXATE 2.5 MG/1
20 TABLET ORAL WEEKLY
Qty: 96 TABLET | Refills: 2 | Status: SHIPPED | OUTPATIENT
Start: 2019-08-19 | End: 2020-04-20

## 2019-08-19 ASSESSMENT — PAIN SCALES - GENERAL: PAINLEVEL: NO PAIN (0)

## 2019-08-19 NOTE — PROGRESS NOTES
"Rheumatology Clinic Visit      Mine Meléndez MRN# [id number]   YOB: 1955 Age: 64 year old      Date of visit: 8/19/19     Chief Complaint   Patient presents with:  RECHECK: 6 mtth follow up re: RA    Assessment and Plan     1. Seropositive Erosive Rheumatoid Arthritis (RF+, ): Dx'd with RA prior to 2002. Previously on SSZ (stomatitis). Currently on MTX 20mg once weekly and folic acid 1mg daily.  Currently doing well.  No change to her RA regimen.  - Continue Methotrexate 20mg once weekly   - Continue folic acid 1mg daily  - Labs in 3 months: CBC, Creatinine, Hepatic Panel  - Labs in 6 months: CBC, Creatinine, Hepatic Panel, ESR, CRP     2.  History of flares of right forearm and/or right mid-upper arm pain: Had occurred once on the left.  From record review, this was evaluated with imaging back in 2010 without etiology identified.  This occurs approximately once per month.  Symptoms resolved within hours or after she goes to sleep and wakes up the next morning.  When she flares, whether be in the forearm or the upper arm, she finds it difficult to raise her arm.  Question if spine involvement given the bilateral nature since it has happened once on the left.  X-rays and physical therapy were done.  Since physical therapy exercises were started she has not had a \"flare\"     3. History suggestive of right 3rd digit trigger finger: She provided a history consistent with possible right third digit trigger finger.  This occurred once and has not recurred.  We reviewed the suspected diagnosis.  Not an issue today    4.  Vision issues:  Has been having sensation of curtains falling over her vision from the superior aspect that has occurred twice when she goes from a well at area to a dark environment and resolves within hours.  Asymptomatic currently.  I advised her to see ophthalmology for evaluation and she says that she will call today to schedule an appointment.    5.  Vaccinations: Vaccinations " "reviewed with Ms. Meléndez.  Risks and benefits of vaccinations were discussed.   - Influenza: encouraged yearly vaccination  - [id number]: up to date  - [id number]: up to date  - Shingrix: Up to date    Ms. Meléndez verbalized agreement with and understanding of the rational for the diagnosis and treatment plan.  All questions were answered to best of my ability and the patient's satisfaction. Ms. Meléndez was advised to contact the clinic with any questions that may arise after the clinic visit.      Thank you for involving me in the care of the patient    Return to clinic: 6 months      HPI   Mine Meléndez is a 64 year old female with a past medical history significant for hypertension, hyperlipidemia, hypothyroidism, migraines, and rheumatoid arthritis who presents for follow-up of rheumatoid arthritis.     Today, Ms. Meléndez reports that she is doing well.  No joint pain or morning stiffness.  Tolerating methotrexate well.  The \"flares\" that she was having of her forearms have resolved ever since she went to physical therapy; she has been doing exercises inconsistently at home.  She also notes that she has had 2 episodes where it seems like a curtain is following over her eyes from the superior aspect when she goes from well environment to a dark environment, and this resolves within hours spontaneously; she has not seen her eye doctor regarding this.  No vision issues at this time.  She also reports having her right third digit lock when she woke up one morning and she had to force it open, causing some pain but then there was no recurrence afterward.     Denies fevers, chills, nausea, vomiting, constipation, diarrhea. No abdominal pain. No chest pain/pressure, palpitations, or shortness of breath. No LE swelling. No neck pain. No oral or nasal sores.  No rash. No sicca symptoms.     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  CV: No chest pain, pressure, palpitations, or dyspnea on exertion.  PULM: No SOB, wheeze, cough.  GI: No nausea, vomiting, constipation, diarrhea. No blood in stool. No abdominal pain.  : No blood in urine.  MSK: See HPI.  NEURO: No numbness, tingling, or weakness.  EXT: No LE swelling  PSYCH: Negative    Active Problem List     Patient Active Problem List   Diagnosis     Seropositive rheumatoid arthritis (H)     DYSPEPSIA     Obesity     Essential hypertension     Hypothyroidism     HYPERLIPIDEMIA LDL GOAL <130     Advanced directives, counseling/discussion     Dermatitis     High risk medications (not anticoagulants) long-term use     Female stress incontinence     Migraine     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)     Past Medical History     Past Medical History:   Diagnosis Date     Herpes simplex without mention of complication      Malignant melanoma (H)      Obesity, unspecified      Osteomyelitis of jaw 5/22/2008     Rheumatoid arthritis(714.0)      Trochanteric bursitis of right hip 8/16/2013     Past Surgical History     Past Surgical History:   Procedure Laterality Date     COLONOSCOPY  1/20/06     HYSTERECTOMY, PAP NO LONGER INDICATED       HYSTERECTOMY, VAGINAL  11/04    TVH,TVT procedure     RELEASE CARPAL TUNNEL Right 10/11/2016    Procedure: RELEASE CARPAL TUNNEL;  Surgeon: Emely Blanca MD;  Location: [facility]     SURGICAL HISTORY OF -   1988    tubal ligation     SURGICAL HISTORY OF -   2001    excision gangliion cyst rt heel     Allergy     Allergies   Allergen Reactions     Codeine Rash     Flagyl [Metronidazole] Rash     Septra [Sulfamethoxazole W/Trimethoprim] Itching     Puffy eyes, flushed.      Current Medication List     Current Outpatient Medications   Medication Sig     ASPIRIN 81 MG OR TABS ONE DAILY     BIOTIN PO      folic  "acid (FOLVITE) 1 MG tablet Take 1 tablet (1,000 mcg) by mouth daily     levothyroxine (SYNTHROID/LEVOTHROID) 50 MCG tablet Take 1 tablet (50 mcg) by mouth daily     lisinopril-hydrochlorothiazide (PRINZIDE/ZESTORETIC) 10-12.5 MG tablet Take 1 tablet by mouth daily     methotrexate sodium 2.5 MG TABS Take 8 tablets (20 mg) by mouth once a week . Take all 8 tablets on the same day of each week.     MULTI-VITAMIN OR 1 daily     simvastatin (ZOCOR) 20 MG tablet Take 1 tablet (20 mg) by mouth At Bedtime     hydrochlorothiazide (HYDRODIURIL) 25 MG tablet Take 1 tablet (25 mg) by mouth every morning (Patient not taking: Reported on 8/5/2019)     Potassium Chloride ER 20 MEQ TBCR Take 1 tablet (20 mEq) by mouth 2 times daily (Patient not taking: Reported on 8/5/2019)     No current facility-administered medications for this visit.          Social History   See HPI    Family History     Family History   Problem Relation Age of Onset     Diabetes Father      Dementia Father      Diabetes Brother      Diabetes Sister      Hypertension Sister      Hypertension Sister      Physical Exam     Temp Readings from Last 3 Encounters:   08/19/19 98.6  F (37  C) (Oral)   08/05/19 98.7  F (37.1  C)   04/17/19 98.4  F (36.9  C) (Tympanic)     BP Readings from Last 5 Encounters:   08/19/19 138/82   08/05/19 123/70   05/20/19 124/73   04/17/19 144/73   02/19/19 145/75     Pulse Readings from Last 1 Encounters:   08/19/19 76     Resp Readings from Last 1 Encounters:   08/05/19 18     Estimated body mass index is 31.57 kg/m  as calculated from the following:    Height as of 8/5/19: 1.657 m (5' 5.25\").    Weight as of this encounter: 86.7 kg (191 lb 3.2 oz).    GEN: NAD  HEENT: MMM. No oral lesions. Anicteric, noninjected sclera  CV: S1, S2. RRR. No m/r/g.  PULM: CTA bilaterally. No w/c.  MSK: MCPs, PIPs, wrists, elbows, shoulders, knees, ankles, and MTPs without swelling or tenderness to palpation.  Hips nontender to palpation.      NEURO: UE " and LE strengths 5/5 and equal bilaterally.   SKIN: No rash  EXT: No LE edema  PSYCH: Alert. Appropriate.    Labs / Imaging (select studies)   RF/CCP  Recent Labs   Lab Test 05/01/18  1538   CCPIGG 182*     CBC  Recent Labs   Lab Test 08/05/19  0933 05/22/19  1404 02/15/19  1555   WBC 7.3 10.4 7.6   RBC 4.32 4.13 4.07   HGB 14.8 14.4 13.9   HCT 44.1 41.5 41.6   * 101* 102*   RDW 13.6 13.7 13.4    244 224   MCH 34.3* 34.9* 34.2*   MCHC 33.6 34.7 33.4   NEUTROPHIL 68.3 76.3 60.5   LYMPH 20.6 13.0 29.9   MONOCYTE 7.7 7.5 6.6   EOSINOPHIL 3.0 2.9 2.5   BASOPHIL 0.4 0.3 0.5   ANEU 5.0 7.9 4.6   ALYM 1.5 1.4 2.3   SHYLA 0.6 0.8 0.5   AEOS 0.2 0.3 0.2   ABAS 0.0 0.0 0.0     CMP  Recent Labs   Lab Test 08/05/19  0933 05/22/19  1404 04/17/19  1526 02/15/19  1555  08/10/18  0837  04/30/18  1601   NA  --  134 136  --   --   --   --  135   POTASSIUM  --  3.4 3.3*  --   --  3.9  --  3.1*   CHLORIDE  --  99 102  --   --   --   --  100   CO2  --  32 31  --   --   --   --  30   ANIONGAP  --  3 3  --   --   --   --  5   GLC  --  123* 120*  --   --   --   --  111*   BUN  --  13 16  --   --   --   --  9   CR 0.58 0.58 0.61 0.60   < > 0.65   < > 0.51*   GFRESTIMATED >90 >90 >90 >90   < > >90   < > >90   GFRESTBLACK >90 >90 >90 >90   < > >90   < > >90   ROSELYN  --  9.3 9.0  --   --   --   --  8.9   BILITOTAL 0.6 1.3  --  0.5   < > 0.7   < >  --    ALBUMIN 3.5 3.9  --  3.6   < > 3.5   < >  --    PROTTOTAL 7.7 7.6  --  7.2   < > 7.8   < >  --    ALKPHOS 125 132  --  122   < > 133   < >  --    AST 27 21  --  28   < > 35   < >  --    ALT 27 25  --  28   < > 34   < >  --     < > = values in this interval not displayed.     Calcium/VitaminD  Recent Labs   Lab Test 05/22/19  1404 04/17/19  1526 04/30/18  1601   ROSELYN 9.3 9.0 8.9     ESR/CRP  Recent Labs   Lab Test 08/05/19  0933 05/01/18  1538 04/20/17  1700   SED 10 13 12   CRP 10.4* 10.1*  --      Lipid Panel  Recent Labs   Lab Test 04/17/19  1526 04/30/18  1601 07/03/17  0803   09/09/13  0857 05/25/12  0800   CHOL 159 163 146   < > 169 177   TRIG 102 170* 126  --  132 102   HDL 63 55 51   < > 58 56   LDL 76 74 70   < > 85 101   VLDL  --   --   --   --  26 20   CHOLHDLRATIO  --   --   --   --  3.0 3.0   NHDL 96 108 95  --   --   --     < > = values in this interval not displayed.     Hepatitis B  Recent Labs   Lab Test 05/01/18  1538   HBCAB Nonreactive   HEPBANG Nonreactive     Hepatitis C  Recent Labs   Lab Test 09/11/14  1650   HCVAB Negative     Immunization History     Immunization History   Administered Date(s) Administered     Influenza (H1N1) 12/31/2009     Influenza (IIV3) PF 11/03/2004, 10/30/2012, 10/20/2013, 10/03/2016     Influenza Vaccine IM 3yrs+ 4 Valent IIV4 10/23/2017     Pneumo Conj 13-V (2010&after) 11/12/2018     Pneumococcal 23 valent 02/19/2019     TD (ADULT, 7+) 04/28/1995     TDAP Vaccine (Adacel) 12/05/2007, 02/01/2018     Zoster vaccine recombinant adjuvanted (SHINGRIX) 04/17/2019, 07/10/2019     Zoster vaccine, live 06/07/2016          Chart documentation done in part with Dragon Voice recognition Software. Although reviewed after completion, some word and grammatical error may remain.    Nasir Baltazar MD

## 2019-09-04 ENCOUNTER — OFFICE VISIT (OUTPATIENT)
Dept: FAMILY MEDICINE | Facility: CLINIC | Age: 64
End: 2019-09-04
Payer: COMMERCIAL

## 2019-09-04 VITALS
SYSTOLIC BLOOD PRESSURE: 136 MMHG | DIASTOLIC BLOOD PRESSURE: 72 MMHG | BODY MASS INDEX: 32.15 KG/M2 | OXYGEN SATURATION: 98 % | TEMPERATURE: 97.7 F | HEIGHT: 65 IN | WEIGHT: 193 LBS | RESPIRATION RATE: 18 BRPM | HEART RATE: 74 BPM

## 2019-09-04 DIAGNOSIS — Z12.11 SPECIAL SCREENING FOR MALIGNANT NEOPLASMS, COLON: ICD-10-CM

## 2019-09-04 DIAGNOSIS — N39.0 URINARY TRACT INFECTION, ACUTE: Primary | ICD-10-CM

## 2019-09-04 LAB
ALBUMIN UR-MCNC: 100 MG/DL
APPEARANCE UR: ABNORMAL
BACTERIA #/AREA URNS HPF: ABNORMAL /HPF
BILIRUB UR QL STRIP: NEGATIVE
COLOR UR AUTO: YELLOW
GLUCOSE UR STRIP-MCNC: NEGATIVE MG/DL
HGB UR QL STRIP: ABNORMAL
KETONES UR STRIP-MCNC: NEGATIVE MG/DL
LEUKOCYTE ESTERASE UR QL STRIP: ABNORMAL
NITRATE UR QL: NEGATIVE
NON-SQ EPI CELLS #/AREA URNS LPF: ABNORMAL /LPF
PH UR STRIP: 6.5 PH (ref 5–7)
RBC #/AREA URNS AUTO: ABNORMAL /HPF
SOURCE: ABNORMAL
SP GR UR STRIP: 1.01 (ref 1–1.03)
UROBILINOGEN UR STRIP-ACNC: 0.2 EU/DL (ref 0.2–1)
WBC #/AREA URNS AUTO: ABNORMAL /HPF

## 2019-09-04 PROCEDURE — 87088 URINE BACTERIA CULTURE: CPT | Performed by: FAMILY MEDICINE

## 2019-09-04 PROCEDURE — 87086 URINE CULTURE/COLONY COUNT: CPT | Performed by: FAMILY MEDICINE

## 2019-09-04 PROCEDURE — 99213 OFFICE O/P EST LOW 20 MIN: CPT | Performed by: FAMILY MEDICINE

## 2019-09-04 PROCEDURE — 87186 SC STD MICRODIL/AGAR DIL: CPT | Performed by: FAMILY MEDICINE

## 2019-09-04 PROCEDURE — 81001 URINALYSIS AUTO W/SCOPE: CPT | Performed by: FAMILY MEDICINE

## 2019-09-04 RX ORDER — CEPHALEXIN 500 MG/1
500 CAPSULE ORAL 2 TIMES DAILY
Qty: 14 CAPSULE | Refills: 0 | Status: SHIPPED | OUTPATIENT
Start: 2019-09-04 | End: 2019-11-05

## 2019-09-04 ASSESSMENT — PAIN SCALES - GENERAL: PAINLEVEL: EXTREME PAIN (8)

## 2019-09-04 ASSESSMENT — MIFFLIN-ST. JEOR: SCORE: 1420.28

## 2019-09-04 NOTE — PROGRESS NOTES
"Subjective     Mine Meléndez is a 64 year old female who presents to clinic today for the following health issues:    HPI   URINARY TRACT SYMPTOMS  Onset: 3 weeks    Description:   Painful urination (Dysuria): YES           Frequency: YES  Blood in urine (Hematuria): no   Delay in urine (Hesitency): no     Intensity: 8/10    Progression of Symptoms:  worsening    Accompanying Signs & Symptoms:  Fever/chills: no   Flank pain no   Nausea and vomiting: no   Any vaginal symptoms: vaginal odor  Abdominal/Pelvic Pain: YES    History:   History of frequent UTI's: YES  History of kidney stones: no   Sexually Active: YES  Possibility of pregnancy: No    Precipitating factors:       Therapies Tried and outcome: Cranberry juice prn (contraindicated in Coumadin patients) and Increase fluid intake            Reviewed and updated as needed this visit by Provider         Review of Systems   ROS COMP: Constitutional, HEENT, cardiovascular, pulmonary, gi and gu systems are negative, except as otherwise noted.      Objective    /72   Pulse 74   Temp 97.7  F (36.5  C) (Tympanic)   Resp 18   Ht 1.657 m (5' 5.25\")   Wt 87.5 kg (193 lb)   LMP 11/12/2004 (Approximate)   SpO2 98%   BMI 31.87 kg/m    Body mass index is 31.87 kg/m .  Physical Exam   GENERAL: healthy, alert and no distress  ABDOMEN: soft, nontender, without hepatosplenomegaly or masses, bowel sounds normal and no CVAT    Diagnostic Test Results:  Results for orders placed or performed in visit on 09/04/19 (from the past 24 hour(s))   *UA reflex to Microscopic and Culture ([place] and [facility] (except [place] and [place])   Result Value Ref Range    Color Urine Yellow     Appearance Urine Cloudy     Glucose Urine Negative NEG^Negative mg/dL    Bilirubin Urine Negative NEG^Negative    Ketones Urine Negative NEG^Negative mg/dL    Specific Gravity Urine 1.010 1.003 - 1.035    Blood Urine Moderate (A) NEG^Negative    pH Urine 6.5 5.0 - 7.0 pH    Protein " Albumin Urine 100 (A) NEG^Negative mg/dL    Urobilinogen Urine 0.2 0.2 - 1.0 EU/dL    Nitrite Urine Negative NEG^Negative    Leukocyte Esterase Urine Large (A) NEG^Negative    Source Midstream Urine    Urine Microscopic   Result Value Ref Range    WBC Urine  (A) OTO5^0 - 5 /HPF    RBC Urine 5-10 (A) OTO2^O - 2 /HPF    Squamous Epithelial /LPF Urine Few FEW^Few /LPF    Bacteria Urine Few (A) NEG^Negative /HPF           Assessment & Plan     1. Urinary tract infection, acute     - *UA reflex to Microscopic and Culture ([place] and [facility] (except [place] and [place])  - Urine Culture Aerobic Bacterial  - Urine Microscopic  - cephALEXin (KEFLEX) 500 MG capsule; Take 1 capsule (500 mg) by mouth 2 times daily for 7 days  Dispense: 14 capsule; Refill: 0    2. Special screening for malignant neoplasms, colon     - Fecal colorectal cancer screen (FIT); Future       No follow-ups on file.    Britany Norton MD  [facility] [place]

## 2019-09-04 NOTE — PATIENT INSTRUCTIONS
Health Maintenance reviewed and plan for update discussed.  complete fit kit           Our Clinic hours are:  Mondays    7:20 am - 7 pm  Tues -  Fri  7:20 am - 5 pm    Clinic Phone: [phone]    The clinic lab opens at 7:30 am Mon - Fri and appointments are required.    [facility]  Ph. [phone]  Monday  8 am - 7pm  Tues - Fri 8 am - 5:30 pm

## 2019-09-05 LAB
BACTERIA SPEC CULT: ABNORMAL
BACTERIA SPEC CULT: ABNORMAL
Lab: ABNORMAL
SPECIMEN SOURCE: ABNORMAL

## 2019-09-06 ENCOUNTER — TELEPHONE (OUTPATIENT)
Dept: FAMILY MEDICINE | Facility: CLINIC | Age: 64
End: 2019-09-06

## 2019-09-06 DIAGNOSIS — N39.0 URINARY TRACT INFECTION, ACUTE: Primary | ICD-10-CM

## 2019-09-06 RX ORDER — CIPROFLOXACIN 250 MG/1
250 TABLET, FILM COATED ORAL 2 TIMES DAILY
Qty: 6 TABLET | Refills: 0 | Status: SHIPPED | OUTPATIENT
Start: 2019-09-06 | End: 2019-11-05

## 2019-09-06 NOTE — TELEPHONE ENCOUNTER
Change antibiotic to ciprofloxacin 250 mg twice daily x 3 days.     Stop the cephalexin.     Prescription sent to pharmacy.    Britany Norton M.D.

## 2019-09-06 NOTE — TELEPHONE ENCOUNTER
Dr. Norton,    Patient was called back, she was seen this past Wednesday, she says she has painful urination that started last evening, says it feels like it burns when she urinates. Reports no blood, no back or flank pain, no fever, has been drinking plenty of water, tolerating the antibiotic well, but reports she was a little dizzy taking it for the first few doses. Has been taking Azo OTC and took ibuprofen last night. Needs to leave at 10:30 am today to care for mother. Was told to call back if feeling worse at  appointment on 9-4-19. Please advise, pharmacy is pended if needed.     LUDIVINA Key

## 2019-09-06 NOTE — TELEPHONE ENCOUNTER
Reason for Call:  Pain with urination    Detailed comments: patient is calling and was seen by BECKY Norton just 2 days ago for UTI. Patient was given abx, but no she has pain when urinating. Dr. Norton told her to call if that happens. She uses our Pharmacy at [facility]. Patient will be leaving at 10:30 to care for her mother, so if we can get back to her before then that would be great.    Phone Number Patient can be reached at: Home number on file [phone] (home)    Best Time: any    Can we leave a detailed message on this number? YES   Rosalina Zhang  [facility]      Call taken on 9/6/2019 at 8:21 AM by Rosalina Zhang

## 2019-09-06 NOTE — TELEPHONE ENCOUNTER
Patient is notified, told to call back if not better, patient verbalizes understanding.    LUDIVINA Key

## 2019-09-11 ENCOUNTER — ANCILLARY PROCEDURE (OUTPATIENT)
Dept: MAMMOGRAPHY | Facility: CLINIC | Age: 64
End: 2019-09-11
Attending: FAMILY MEDICINE
Payer: COMMERCIAL

## 2019-09-11 DIAGNOSIS — Z12.31 VISIT FOR SCREENING MAMMOGRAM: ICD-10-CM

## 2019-09-11 PROCEDURE — 77067 SCR MAMMO BI INCL CAD: CPT | Mod: TC

## 2019-09-23 ENCOUNTER — OFFICE VISIT (OUTPATIENT)
Dept: DERMATOLOGY | Facility: CLINIC | Age: 64
End: 2019-09-23
Payer: COMMERCIAL

## 2019-09-23 VITALS — SYSTOLIC BLOOD PRESSURE: 134 MMHG | DIASTOLIC BLOOD PRESSURE: 74 MMHG | HEART RATE: 68 BPM | OXYGEN SATURATION: 92 %

## 2019-09-23 DIAGNOSIS — D22.9 NEVUS: ICD-10-CM

## 2019-09-23 DIAGNOSIS — Z85.820 HISTORY OF MELANOMA: Primary | ICD-10-CM

## 2019-09-23 DIAGNOSIS — D18.01 ANGIOMA OF SKIN: ICD-10-CM

## 2019-09-23 DIAGNOSIS — L82.1 SEBORRHEIC KERATOSIS: ICD-10-CM

## 2019-09-23 DIAGNOSIS — L81.4 LENTIGO: ICD-10-CM

## 2019-09-23 DIAGNOSIS — D23.9 DERMAL NEVUS: ICD-10-CM

## 2019-09-23 PROCEDURE — 99213 OFFICE O/P EST LOW 20 MIN: CPT | Performed by: DERMATOLOGY

## 2019-09-23 NOTE — NURSING NOTE
Chief Complaint   Patient presents with     Skin Check       Vitals:    09/23/19 1032   BP: 134/74   Pulse: 68   SpO2: 92%     Wt Readings from Last 1 Encounters:   09/04/19 87.5 kg (193 lb)       Ara Mckeon LPN.................9/23/2019

## 2019-09-23 NOTE — LETTER
9/23/2019         RE: Mine Meléndez  [id number]  [facility] [id number]        Dear Colleague,    Thank you for referring your patient, Mine Meléndez, to the [facility]. Please see a copy of my visit note below.    Mine Meléndez is a 64 year old year old female patient here today for hx of melanoma r thigh 0.3mm, she notes suture on right thigh.  .  Patient states this has been present for  A while.  Patient reports the following symptoms:  itching.  Patient reports the following previous treatments none.  Patient reports the following modifying factors none.  Associated symptoms: none.  Patient has no other skin complaints today.  Remainder of the HPI, Meds, PMH, Allergies, FH, and SH was reviewed in chart.      Past Medical History:   Diagnosis Date     Herpes simplex without mention of complication      Malignant melanoma (H)      Obesity, unspecified      Osteomyelitis of jaw 5/22/2008     Rheumatoid arthritis(714.0)      Trochanteric bursitis of right hip 8/16/2013       Past Surgical History:   Procedure Laterality Date     COLONOSCOPY  1/20/06     HYSTERECTOMY, PAP NO LONGER INDICATED       HYSTERECTOMY, VAGINAL  11/04    TVH,TVT procedure     RELEASE CARPAL TUNNEL Right 10/11/2016    Procedure: RELEASE CARPAL TUNNEL;  Surgeon: Emely Blanca MD;  Location: [facility] OR     SURGICAL HISTORY OF -   1988    tubal ligation     SURGICAL HISTORY OF -   2001    excision gangliion cyst rt heel        Family History   Problem Relation Age of Onset     Diabetes Father      Dementia Father      Diabetes Brother      Diabetes Sister      Hypertension Sister      Hypertension Sister        Social History     Socioeconomic History     Marital status:      Spouse name: Not on file     Number of children: Not on file     Years of education: Not on file     Highest education level: Not on file   Occupational History     Not on file   Social Needs     Financial resource strain: Not on file      Food insecurity:     Worry: Not on file     Inability: Not on file     Transportation needs:     Medical: Not on file     Non-medical: Not on file   Tobacco Use     Smoking status: Former Smoker     Years: 25.00     Last attempt to quit: 2000     Years since quittin.3     Smokeless tobacco: Never Used   Substance and Sexual Activity     Alcohol use: Yes     Comment: 6 pack of beer on a weekend     Drug use: No     Sexual activity: Yes     Partners: Male     Birth control/protection: Surgical     Comment: hyster.partial   Lifestyle     Physical activity:     Days per week: Not on file     Minutes per session: Not on file     Stress: Not on file   Relationships     Social connections:     Talks on phone: Not on file     Gets together: Not on file     Attends Voodoo service: Not on file     Active member of club or organization: Not on file     Attends meetings of clubs or organizations: Not on file     Relationship status: Not on file     Intimate partner violence:     Fear of current or ex partner: Not on file     Emotionally abused: Not on file     Physically abused: Not on file     Forced sexual activity: Not on file   Other Topics Concern     Parent/sibling w/ CABG, MI or angioplasty before 65F 55M? No   Social History Narrative     Not on file       Outpatient Encounter Medications as of 2019   Medication Sig Dispense Refill     ASPIRIN 81 MG OR TABS ONE DAILY 100 3     BIOTIN PO        folic acid (FOLVITE) 1 MG tablet Take 1 tablet (1,000 mcg) by mouth daily 90 tablet 2     levothyroxine (SYNTHROID/LEVOTHROID) 50 MCG tablet Take 1 tablet (50 mcg) by mouth daily 90 tablet 3     lisinopril-hydrochlorothiazide (PRINZIDE/ZESTORETIC) 10-12.5 MG tablet Take 1 tablet by mouth daily 90 tablet 3     methotrexate sodium 2.5 MG TABS Take 8 tablets (20 mg) by mouth once a week . Take all 8 tablets on the same day of each week. 96 tablet 2     MULTI-VITAMIN OR 1 daily       simvastatin (ZOCOR) 20 MG  tablet Take 1 tablet (20 mg) by mouth At Bedtime 90 tablet 3     [] cephALEXin (KEFLEX) 500 MG capsule Take 1 capsule (500 mg) by mouth 2 times daily for 7 days 14 capsule 0     [] ciprofloxacin (CIPRO) 250 MG tablet Take 1 tablet (250 mg) by mouth 2 times daily for 3 days 6 tablet 0     No facility-administered encounter medications on file as of 2019.              Review Of Systems  Skin: As above  Eyes: negative  Ears/Nose/Throat: negative  Respiratory: No shortness of breath, dyspnea on exertion, cough, or hemoptysis  Cardiovascular: negative  Gastrointestinal: negative  Genitourinary: negative  Musculoskeletal: negative  Neurologic: negative  Psychiatric: negative  Hematologic/Lymphatic/Immunologic: negative  Endocrine: negative      O:   NAD, WDWN, Alert & Oriented, Mood & Affect wnl, Vitals stable   Here today alone   /74   Pulse 68   LMP 2004 (Approximate)   SpO2 92%    General appearance normal   Vitals stable   Alert, oriented and in no acute distress      Following lymph nodes palpated: Occipital, Cervical, Supraclavicular , inguinal femoral no lad   R thigh spitting suture   pigmetned macules on trunka nd ext with regulabrorders and pigmetn entworks      Stuck on papules and brown macules on trunk and ext   Red papules on trunk  Flesh colored papules on trunk     The remainder of the full exam was unremarkable; the following areas were examined:  conjunctiva/lids, oral mucosa, neck, peripheral vascular system, abdomen, lymph nodes, digits/nails, eccrine and apocrine glands, scalp/hair, face, neck, chest, abdomen, buttocks, back, RUE, LUE, RLE, LLE       Eyes: Conjunctivae/lids:Normal     ENT: Lips, buccal mucosa, tongue: normal    MSK:Normal    Cardiovascular: peripheral edema none    Pulm: Breathing Normal    Lymph Nodes: No Head and Neck Lymphadenopathy     Neuro/Psych: Orientation:Normal; Mood/Affect:Normal      A/P:  1. Seborrheic keratosis, lentigo, angioma, dermal  nevus, hx of melanoma, spiotting suture  MELANOMA DISCUSSED WITH PATIENT:  I discussed the specifics of tumor, prognosis, metachronous melanoma, self exam, and genetics with the patient. I explained the need for monthly skin exams including and taught the patient how to do this. Patient was asked about new or changing moles . I discussed with patient signs and symptoms that could arise in the setting of recurrent locoregional or metastatic disease. In addition, the need to undergo every 4 month dermatologic full skin survey and evaluation given that patients with a diagnosis of melanoma are at risk of recurrence (local and distant) and of subsequent de trixie melanoma.    Suture removed    BENIGN LESIONS DISCUSSED WITH PATIENT:  I discussed the specifics of tumor, prognosis, and genetics of benign lesions.  I explained that treatment of these lesions would be purely cosmetic and not medically neccessary.  I discussed with patient different removal options including excision, cautery and /or laser.      Nature and genetics of benign skin lesions dicussed with patient.  Signs and Symptoms of skin cancer discussed with patient.  ABCDEs of melanoma reviewed with patient.  Patient encouraged to perform monthly skin exams.  UV precautions reviewed with patient.  Skin care regimen reviewed with patient: Eliminate harsh soaps, i.e. Dial, zest, irsih spring; Mild soaps such as Cetaphil or Dove sensitive skin, avoid hot or cold showers, aggressive use of emollients including vanicream, cetaphil or cerave discussed with patient.    Risks of non-melanoma skin cancer discussed with patient   Return to clinic 4 months      Again, thank you for allowing me to participate in the care of your patient.        Sincerely,        Pankaj Castro MD

## 2019-09-23 NOTE — PROGRESS NOTES
Mine Meléndez is a 64 year old year old female patient here today for hx of melanoma r thigh 0.3mm, she notes suture on right thigh.  .  Patient states this has been present for  A while.  Patient reports the following symptoms:  itching.  Patient reports the following previous treatments none.  Patient reports the following modifying factors none.  Associated symptoms: none.  Patient has no other skin complaints today.  Remainder of the HPI, Meds, PMH, Allergies, FH, and SH was reviewed in chart.      Past Medical History:   Diagnosis Date     Herpes simplex without mention of complication      Malignant melanoma (H)      Obesity, unspecified      Osteomyelitis of jaw 5/22/2008     Rheumatoid arthritis(714.0)      Trochanteric bursitis of right hip 8/16/2013       Past Surgical History:   Procedure Laterality Date     COLONOSCOPY  1/20/06     HYSTERECTOMY, PAP NO LONGER INDICATED       HYSTERECTOMY, VAGINAL  11/04    TVH,TVT procedure     RELEASE CARPAL TUNNEL Right 10/11/2016    Procedure: RELEASE CARPAL TUNNEL;  Surgeon: Emely Blanca MD;  Location: [facility] OR     SURGICAL HISTORY OF -   1988    tubal ligation     SURGICAL HISTORY OF -   2001    excision gangliion cyst rt heel        Family History   Problem Relation Age of Onset     Diabetes Father      Dementia Father      Diabetes Brother      Diabetes Sister      Hypertension Sister      Hypertension Sister        Social History     Socioeconomic History     Marital status:      Spouse name: Not on file     Number of children: Not on file     Years of education: Not on file     Highest education level: Not on file   Occupational History     Not on file   Social Needs     Financial resource strain: Not on file     Food insecurity:     Worry: Not on file     Inability: Not on file     Transportation needs:     Medical: Not on file     Non-medical: Not on file   Tobacco Use     Smoking status: Former Smoker     Years: 25.00     Last attempt to quit:  2000     Years since quittin.3     Smokeless tobacco: Never Used   Substance and Sexual Activity     Alcohol use: Yes     Comment: 6 pack of beer on a weekend     Drug use: No     Sexual activity: Yes     Partners: Male     Birth control/protection: Surgical     Comment: hyster.partial   Lifestyle     Physical activity:     Days per week: Not on file     Minutes per session: Not on file     Stress: Not on file   Relationships     Social connections:     Talks on phone: Not on file     Gets together: Not on file     Attends Bahai service: Not on file     Active member of club or organization: Not on file     Attends meetings of clubs or organizations: Not on file     Relationship status: Not on file     Intimate partner violence:     Fear of current or ex partner: Not on file     Emotionally abused: Not on file     Physically abused: Not on file     Forced sexual activity: Not on file   Other Topics Concern     Parent/sibling w/ CABG, MI or angioplasty before 65F 55M? No   Social History Narrative     Not on file       Outpatient Encounter Medications as of 2019   Medication Sig Dispense Refill     ASPIRIN 81 MG OR TABS ONE DAILY 100 3     BIOTIN PO        folic acid (FOLVITE) 1 MG tablet Take 1 tablet (1,000 mcg) by mouth daily 90 tablet 2     levothyroxine (SYNTHROID/LEVOTHROID) 50 MCG tablet Take 1 tablet (50 mcg) by mouth daily 90 tablet 3     lisinopril-hydrochlorothiazide (PRINZIDE/ZESTORETIC) 10-12.5 MG tablet Take 1 tablet by mouth daily 90 tablet 3     methotrexate sodium 2.5 MG TABS Take 8 tablets (20 mg) by mouth once a week . Take all 8 tablets on the same day of each week. 96 tablet 2     MULTI-VITAMIN OR 1 daily       simvastatin (ZOCOR) 20 MG tablet Take 1 tablet (20 mg) by mouth At Bedtime 90 tablet 3     [] cephALEXin (KEFLEX) 500 MG capsule Take 1 capsule (500 mg) by mouth 2 times daily for 7 days 14 capsule 0     [] ciprofloxacin (CIPRO) 250 MG tablet Take 1 tablet  (250 mg) by mouth 2 times daily for 3 days 6 tablet 0     No facility-administered encounter medications on file as of 9/23/2019.              Review Of Systems  Skin: As above  Eyes: negative  Ears/Nose/Throat: negative  Respiratory: No shortness of breath, dyspnea on exertion, cough, or hemoptysis  Cardiovascular: negative  Gastrointestinal: negative  Genitourinary: negative  Musculoskeletal: negative  Neurologic: negative  Psychiatric: negative  Hematologic/Lymphatic/Immunologic: negative  Endocrine: negative      O:   NAD, WDWN, Alert & Oriented, Mood & Affect wnl, Vitals stable   Here today alone   /74   Pulse 68   LMP 11/12/2004 (Approximate)   SpO2 92%    General appearance normal   Vitals stable   Alert, oriented and in no acute distress      Following lymph nodes palpated: Occipital, Cervical, Supraclavicular , inguinal femoral no lad   R thigh spitting suture   pigmetned macules on trunka nd ext with regulabrorders and pigmetn entworks      Stuck on papules and brown macules on trunk and ext   Red papules on trunk  Flesh colored papules on trunk     The remainder of the full exam was unremarkable; the following areas were examined:  conjunctiva/lids, oral mucosa, neck, peripheral vascular system, abdomen, lymph nodes, digits/nails, eccrine and apocrine glands, scalp/hair, face, neck, chest, abdomen, buttocks, back, RUE, LUE, RLE, LLE       Eyes: Conjunctivae/lids:Normal     ENT: Lips, buccal mucosa, tongue: normal    MSK:Normal    Cardiovascular: peripheral edema none    Pulm: Breathing Normal    Lymph Nodes: No Head and Neck Lymphadenopathy     Neuro/Psych: Orientation:Normal; Mood/Affect:Normal      A/P:  1. Seborrheic keratosis, lentigo, angioma, dermal nevus, hx of melanoma, spiotting suture  MELANOMA DISCUSSED WITH PATIENT:  I discussed the specifics of tumor, prognosis, metachronous melanoma, self exam, and genetics with the patient. I explained the need for monthly skin exams including and  taught the patient how to do this. Patient was asked about new or changing moles . I discussed with patient signs and symptoms that could arise in the setting of recurrent locoregional or metastatic disease. In addition, the need to undergo every 4 month dermatologic full skin survey and evaluation given that patients with a diagnosis of melanoma are at risk of recurrence (local and distant) and of subsequent de trixie melanoma.    Suture removed    BENIGN LESIONS DISCUSSED WITH PATIENT:  I discussed the specifics of tumor, prognosis, and genetics of benign lesions.  I explained that treatment of these lesions would be purely cosmetic and not medically neccessary.  I discussed with patient different removal options including excision, cautery and /or laser.      Nature and genetics of benign skin lesions dicussed with patient.  Signs and Symptoms of skin cancer discussed with patient.  ABCDEs of melanoma reviewed with patient.  Patient encouraged to perform monthly skin exams.  UV precautions reviewed with patient.  Skin care regimen reviewed with patient: Eliminate harsh soaps, i.e. Dial, zest, irsih spring; Mild soaps such as Cetaphil or Dove sensitive skin, avoid hot or cold showers, aggressive use of emollients including vanicream, cetaphil or cerave discussed with patient.    Risks of non-melanoma skin cancer discussed with patient   Return to clinic 4 months

## 2019-11-05 ENCOUNTER — OFFICE VISIT (OUTPATIENT)
Dept: FAMILY MEDICINE | Facility: CLINIC | Age: 64
End: 2019-11-05
Payer: COMMERCIAL

## 2019-11-05 VITALS
WEIGHT: 194 LBS | SYSTOLIC BLOOD PRESSURE: 120 MMHG | BODY MASS INDEX: 32.32 KG/M2 | TEMPERATURE: 98.5 F | HEART RATE: 75 BPM | RESPIRATION RATE: 15 BRPM | DIASTOLIC BLOOD PRESSURE: 72 MMHG | OXYGEN SATURATION: 94 % | HEIGHT: 65 IN

## 2019-11-05 DIAGNOSIS — N30.01 ACUTE CYSTITIS WITH HEMATURIA: Primary | ICD-10-CM

## 2019-11-05 DIAGNOSIS — R30.0 DYSURIA: ICD-10-CM

## 2019-11-05 DIAGNOSIS — R82.90 NONSPECIFIC FINDING ON EXAMINATION OF URINE: ICD-10-CM

## 2019-11-05 DIAGNOSIS — Z23 NEED FOR PROPHYLACTIC VACCINATION AND INOCULATION AGAINST INFLUENZA: ICD-10-CM

## 2019-11-05 LAB
ALBUMIN UR-MCNC: 30 MG/DL
AMORPH CRY #/AREA URNS HPF: ABNORMAL /HPF
APPEARANCE UR: ABNORMAL
BACTERIA #/AREA URNS HPF: ABNORMAL /HPF
BILIRUB UR QL STRIP: ABNORMAL
COLOR UR AUTO: ABNORMAL
GLUCOSE UR STRIP-MCNC: 100 MG/DL
HGB UR QL STRIP: ABNORMAL
KETONES UR STRIP-MCNC: NEGATIVE MG/DL
LEUKOCYTE ESTERASE UR QL STRIP: ABNORMAL
NITRATE UR QL: POSITIVE
NON-SQ EPI CELLS #/AREA URNS LPF: ABNORMAL /LPF
PH UR STRIP: 7 PH (ref 5–7)
RBC #/AREA URNS AUTO: ABNORMAL /HPF
SOURCE: ABNORMAL
SP GR UR STRIP: 1.01 (ref 1–1.03)
UROBILINOGEN UR STRIP-ACNC: 1 EU/DL (ref 0.2–1)
WBC #/AREA URNS AUTO: ABNORMAL /HPF

## 2019-11-05 PROCEDURE — 99213 OFFICE O/P EST LOW 20 MIN: CPT | Mod: 25 | Performed by: NURSE PRACTITIONER

## 2019-11-05 PROCEDURE — 87086 URINE CULTURE/COLONY COUNT: CPT | Performed by: NURSE PRACTITIONER

## 2019-11-05 PROCEDURE — 87088 URINE BACTERIA CULTURE: CPT | Performed by: NURSE PRACTITIONER

## 2019-11-05 PROCEDURE — G0008 ADMIN INFLUENZA VIRUS VAC: HCPCS | Performed by: NURSE PRACTITIONER

## 2019-11-05 PROCEDURE — 90662 IIV NO PRSV INCREASED AG IM: CPT | Performed by: NURSE PRACTITIONER

## 2019-11-05 PROCEDURE — 87186 SC STD MICRODIL/AGAR DIL: CPT | Performed by: NURSE PRACTITIONER

## 2019-11-05 PROCEDURE — 81001 URINALYSIS AUTO W/SCOPE: CPT | Performed by: NURSE PRACTITIONER

## 2019-11-05 RX ORDER — CIPROFLOXACIN 250 MG/1
250 TABLET, FILM COATED ORAL 2 TIMES DAILY
Qty: 6 TABLET | Refills: 0 | Status: SHIPPED | OUTPATIENT
Start: 2019-11-05 | End: 2019-11-08

## 2019-11-05 ASSESSMENT — MIFFLIN-ST. JEOR: SCORE: 1424.82

## 2019-11-05 NOTE — PROGRESS NOTES
Mine Meléndez is a 64 year old female who presents to clinic today for the following health issues:    HPI   URINARY TRACT SYMPTOMS      Duration: 3 days     Description  dysuria, frequency, odor, nocturia x 4 and history of UTI    Intensity:  Moderate, 6/10    Accompanying signs and symptoms:  Fever/chills: no   Flank pain no   Nausea and vomiting: no   Vaginal symptoms: odor  Abdominal/Pelvic Pain: no     History  History of frequent UTI's: YES  History of kidney stones: no   Sexually Active: YES  Possibility of pregnancy: No    Precipitating or alleviating factors: None    Therapies tried and outcome: increase fluid intake and Azo   Outcome: helped a little at first and now it is worse     Patient Active Problem List   Diagnosis     Seropositive rheumatoid arthritis (H)     DYSPEPSIA     Obesity     Essential hypertension     Hypothyroidism     HYPERLIPIDEMIA LDL GOAL <130     Advanced directives, counseling/discussion     Dermatitis     High risk medications (not anticoagulants) long-term use     Female stress incontinence     Migraine     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)     Past Surgical History:   Procedure Laterality Date     COLONOSCOPY  06     HYSTERECTOMY, PAP NO LONGER INDICATED       HYSTERECTOMY, VAGINAL      TVH,TVT procedure     RELEASE CARPAL TUNNEL Right 10/11/2016    Procedure: RELEASE CARPAL TUNNEL;  Surgeon: Emely Blanca MD;  Location: [facility] OR     SURGICAL HISTORY OF -       tubal ligation     SURGICAL HISTORY OF -       excision gangliion cyst rt heel       Social History     Tobacco Use     Smoking status: Former Smoker     Years: 25.00     Last attempt to quit: 2000     Years since quittin.5     Smokeless tobacco: Never Used   Substance Use Topics     Alcohol use: Yes     Comment: 6 pack of beer on a weekend     Family History   Problem Relation Age of Onset     Diabetes Father      Dementia Father      Diabetes  "Brother      Diabetes Sister      Hypertension Sister      Hypertension Sister          Current Outpatient Medications   Medication Sig Dispense Refill     ASPIRIN 81 MG OR TABS ONE DAILY 100 3     BIOTIN PO        ciprofloxacin (CIPRO) 250 MG tablet Take 1 tablet (250 mg) by mouth 2 times daily for 3 days 6 tablet 0     folic acid (FOLVITE) 1 MG tablet Take 1 tablet (1,000 mcg) by mouth daily 90 tablet 2     levothyroxine (SYNTHROID/LEVOTHROID) 50 MCG tablet Take 1 tablet (50 mcg) by mouth daily 90 tablet 3     lisinopril-hydrochlorothiazide (PRINZIDE/ZESTORETIC) 10-12.5 MG tablet Take 1 tablet by mouth daily 90 tablet 3     methotrexate sodium 2.5 MG TABS Take 8 tablets (20 mg) by mouth once a week . Take all 8 tablets on the same day of each week. 96 tablet 2     MULTI-VITAMIN OR 1 daily       simvastatin (ZOCOR) 20 MG tablet Take 1 tablet (20 mg) by mouth At Bedtime 90 tablet 3     Allergies   Allergen Reactions     Codeine Rash     Flagyl [Metronidazole] Rash     Septra [Sulfamethoxazole W/Trimethoprim] Itching     Puffy eyes, flushed.      Reviewed and updated as needed this visit by Provider  Tobacco  Allergies  Meds  Problems  Med Hx  Surg Hx  Fam Hx         Review of Systems   ROS COMP: CONSTITUTIONAL: NEGATIVE for fever, chills, change in weight  RESP: NEGATIVE for significant cough or SOB  CV: NEGATIVE for chest pain, palpitations or peripheral edema  : dysuria, frequency, nocturia x 4 and odor to urine  PSYCHIATRIC: NEGATIVE for changes in mood or affect  ROS otherwise negative      Objective    /72   Pulse 75   Temp 98.5  F (36.9  C) (Tympanic)   Resp 15   Ht 1.657 m (5' 5.25\")   Wt 88 kg (194 lb)   LMP 11/12/2004 (Approximate)   SpO2 94%   BMI 32.04 kg/m    Body mass index is 32.04 kg/m .  Physical Exam   GENERAL: healthy, alert and no distress  RESP: lungs clear to auscultation - no rales, rhonchi or wheezes  CV: regular rate and rhythm, normal S1 S2, no S3 or S4, no murmur, " click or rub, no peripheral edema and peripheral pulses strong  ABDOMEN: soft, nontender, no hepatosplenomegaly, no masses and bowel sounds normal  PSYCH: mentation appears normal, affect normal/bright    Diagnostic Test Results:  Labs reviewed in Epic  Results for orders placed or performed in visit on 11/05/19 (from the past 24 hour(s))   UA reflex to Microscopic and Culture   Result Value Ref Range    Color Urine Orange     Appearance Urine Slightly Cloudy     Glucose Urine 100 (A) NEG^Negative mg/dL    Bilirubin Urine Small (A) NEG^Negative    Ketones Urine Negative NEG^Negative mg/dL    Specific Gravity Urine 1.015 1.003 - 1.035    Blood Urine Small (A) NEG^Negative    pH Urine 7.0 5.0 - 7.0 pH    Protein Albumin Urine 30 (A) NEG^Negative mg/dL    Urobilinogen Urine 1.0 0.2 - 1.0 EU/dL    Nitrite Urine Positive (A) NEG^Negative    Leukocyte Esterase Urine Small (A) NEG^Negative    Source Midstream Urine    Urine Microscopic   Result Value Ref Range    WBC Urine 10-25 (A) OTO5^0 - 5 /HPF    RBC Urine 2-5 (A) OTO2^O - 2 /HPF    Squamous Epithelial /LPF Urine Few FEW^Few /LPF    Bacteria Urine Many (A) NEG^Negative /HPF    Amorphous Crystals Moderate (A) NEG^Negative /HPF           Assessment & Plan     1. Acute cystitis with hematuria  UA positive for UTI.  Culture is pending.  I will call if medication change is needed.  Treating with Cipro since this worked for her last time.  Last culture shows that the bacteria was sensitive to all antibiotic choices.  Recommend pushing fluids and AZO 1-2 tabs 3 times daily as needed for pain with urination.  Follow-up in 1 week if any persistent or worsening symptoms.  - ciprofloxacin (CIPRO) 250 MG tablet; Take 1 tablet (250 mg) by mouth 2 times daily for 3 days  Dispense: 6 tablet; Refill: 0    2. Dysuria  - UA reflex to Microscopic and Culture  - Urine Microscopic  - Urine Culture Aerobic Bacterial    3. Nonspecific finding on examination of urine  - Urine Culture Aerobic  Bacterial    4. Need for prophylactic vaccination and inoculation against influenza  - INFLUENZA (HIGH DOSE) 3 VALENT VACCINE [11768]  - ADMIN INFLUENZA (For MEDICARE Patients ONLY) []     See Patient Instructions    Return in about 1 week (around 11/12/2019), or if symptoms worsen or fail to improve.    Sejal Zurita NP  [facility]

## 2019-11-05 NOTE — PATIENT INSTRUCTIONS
1.  Use AZO as needed (100-200mg) 3 times daily as needed.  2.  Take antibiotic as directed.  3.  Push fluids.  4.  If any persistent symptoms, follow-up in clinic or call nurse line.

## 2019-11-06 LAB
BACTERIA SPEC CULT: ABNORMAL
BACTERIA SPEC CULT: ABNORMAL
SPECIMEN SOURCE: ABNORMAL

## 2019-12-30 ENCOUNTER — OFFICE VISIT (OUTPATIENT)
Dept: FAMILY MEDICINE | Facility: CLINIC | Age: 64
End: 2019-12-30
Payer: COMMERCIAL

## 2019-12-30 ENCOUNTER — TELEPHONE (OUTPATIENT)
Dept: RHEUMATOLOGY | Facility: CLINIC | Age: 64
End: 2019-12-30

## 2019-12-30 VITALS
OXYGEN SATURATION: 96 % | DIASTOLIC BLOOD PRESSURE: 62 MMHG | RESPIRATION RATE: 16 BRPM | WEIGHT: 189.6 LBS | BODY MASS INDEX: 31.59 KG/M2 | SYSTOLIC BLOOD PRESSURE: 124 MMHG | TEMPERATURE: 98.1 F | HEART RATE: 102 BPM | HEIGHT: 65 IN

## 2019-12-30 DIAGNOSIS — R82.90 NONSPECIFIC FINDING ON EXAMINATION OF URINE: ICD-10-CM

## 2019-12-30 DIAGNOSIS — R30.0 DYSURIA: Primary | ICD-10-CM

## 2019-12-30 DIAGNOSIS — N30.00 ACUTE CYSTITIS WITHOUT HEMATURIA: ICD-10-CM

## 2019-12-30 LAB
ALBUMIN UR-MCNC: NEGATIVE MG/DL
APPEARANCE UR: CLEAR
BACTERIA #/AREA URNS HPF: ABNORMAL /HPF
BILIRUB UR QL STRIP: NEGATIVE
COLOR UR AUTO: YELLOW
GLUCOSE UR STRIP-MCNC: NEGATIVE MG/DL
HGB UR QL STRIP: ABNORMAL
KETONES UR STRIP-MCNC: NEGATIVE MG/DL
LEUKOCYTE ESTERASE UR QL STRIP: ABNORMAL
NITRATE UR QL: POSITIVE
NON-SQ EPI CELLS #/AREA URNS LPF: ABNORMAL /LPF
PH UR STRIP: 6.5 PH (ref 5–7)
RBC #/AREA URNS AUTO: ABNORMAL /HPF
SOURCE: ABNORMAL
SP GR UR STRIP: 1.01 (ref 1–1.03)
UROBILINOGEN UR STRIP-ACNC: 0.2 EU/DL (ref 0.2–1)
WBC #/AREA URNS AUTO: ABNORMAL /HPF
WBC CLUMPS #/AREA URNS HPF: PRESENT /HPF

## 2019-12-30 PROCEDURE — 81001 URINALYSIS AUTO W/SCOPE: CPT | Performed by: FAMILY MEDICINE

## 2019-12-30 PROCEDURE — 87086 URINE CULTURE/COLONY COUNT: CPT | Performed by: FAMILY MEDICINE

## 2019-12-30 PROCEDURE — 99213 OFFICE O/P EST LOW 20 MIN: CPT | Performed by: FAMILY MEDICINE

## 2019-12-30 RX ORDER — NITROFURANTOIN 25; 75 MG/1; MG/1
100 CAPSULE ORAL 2 TIMES DAILY
Qty: 10 CAPSULE | Refills: 0 | Status: SHIPPED | OUTPATIENT
Start: 2019-12-30 | End: 2020-01-02

## 2019-12-30 ASSESSMENT — MIFFLIN-ST. JEOR: SCORE: 1404.86

## 2019-12-30 NOTE — PROGRESS NOTES
SUBJECTIVE:  Mine Meléndez is a 64 year old female who  presents today for a possible UTI. Symptoms of dysuria, urgency and frequency have been going on for 2day(s).  Hematuria no.  gradual onset and still presentand moderate.  There is no history of fever, chills, nausea or vomiting.  No history of vaginal or penile discharge. This patient does  have a history of urinary tract infections. Patient denies long duration, rigors, flank pain and temperature > 101 degrees F. or vaginal discharge, vaginal odor and vaginal itching     Past Medical History:   Diagnosis Date     Herpes simplex without mention of complication      Malignant melanoma (H)      Obesity, unspecified      Osteomyelitis of jaw 2008     Rheumatoid arthritis(714.0)      Trochanteric bursitis of right hip 2013     Current Outpatient Medications   Medication Sig Dispense Refill     ASPIRIN 81 MG OR TABS ONE DAILY 100 3     BIOTIN PO        folic acid (FOLVITE) 1 MG tablet Take 1 tablet (1,000 mcg) by mouth daily 90 tablet 2     levothyroxine (SYNTHROID/LEVOTHROID) 50 MCG tablet Take 1 tablet (50 mcg) by mouth daily 90 tablet 3     lisinopril-hydrochlorothiazide (PRINZIDE/ZESTORETIC) 10-12.5 MG tablet Take 1 tablet by mouth daily 90 tablet 3     methotrexate sodium 2.5 MG TABS Take 8 tablets (20 mg) by mouth once a week . Take all 8 tablets on the same day of each week. 96 tablet 2     MULTI-VITAMIN OR 1 daily       nitroFURantoin macrocrystal-monohydrate (MACROBID) 100 MG capsule Take 1 capsule (100 mg) by mouth 2 times daily for 5 days 10 capsule 0     simvastatin (ZOCOR) 20 MG tablet Take 1 tablet (20 mg) by mouth At Bedtime 90 tablet 3     Social History     Tobacco Use     Smoking status: Former Smoker     Years: 25.00     Last attempt to quit: 2000     Years since quittin.6     Smokeless tobacco: Never Used   Substance Use Topics     Alcohol use: Yes     Comment: 6 pack of beer on a weekend       ROS:   RESP:cough-non  "productive    OBJECTIVE:  /62 (BP Location: Right arm, Patient Position: Sitting, Cuff Size: Adult Large)   Pulse 102   Temp 98.1  F (36.7  C) (Tympanic)   Resp 16   Ht 1.657 m (5' 5.25\")   Wt 86 kg (189 lb 9.6 oz)   LMP 11/12/2004 (Approximate)   SpO2 96%   BMI 31.31 kg/m    GENERAL APPEARANCE: alert, no distress and cooperative  RESP: lungs clear to auscultation - no rales, rhonchi or wheezes  CV: regular rates and rhythm, normal S1 S2, no murmur noted  ABDOMEN:  soft, nontender, no HSM or masses and bowel sounds normal  BACK: No CVA tenderness  SKIN: no suspicious lesions or rashes    ASSESSMENT:   Lower, uncomplicated urinary tract infection.    PLAN: Nitrofurantoin, Drink plenty of fluids.  Prevention and treatment of UTI's discussed.Signs and symptoms of pyelonephritis mentioned.  Follow up with primary care provider if not improving.  "

## 2019-12-30 NOTE — TELEPHONE ENCOUNTER
Osmel Combs / RN: Please call to notify Ms. Meléndez that methotrexate toxicity monitoring labs are overdue; please have done ASAP at any [facility] lab.     Nasir Baltazar MD  12/30/2019 5:10 PM

## 2019-12-30 NOTE — NURSING NOTE
"Chief Complaint   Patient presents with     UTI     2 days uti symptoms frequency and painful started to take Azo to help, which helps with the pain. 7 days cough started get's really bad at night time, wheezing.      Cough       Initial /62 (BP Location: Right arm, Patient Position: Sitting, Cuff Size: Adult Large)   Pulse 102   Temp 98.1  F (36.7  C) (Tympanic)   Resp 16   Ht 1.657 m (5' 5.25\")   Wt 86 kg (189 lb 9.6 oz)   LMP 11/12/2004 (Approximate)   SpO2 96%   BMI 31.31 kg/m   Estimated body mass index is 31.31 kg/m  as calculated from the following:    Height as of this encounter: 1.657 m (5' 5.25\").    Weight as of this encounter: 86 kg (189 lb 9.6 oz).    Patient presents to the clinic using No DME    Health Maintenance that is potentially due pending provider review:  NONE    n/a    Is there anyone who you would like to be able to receive your results? No  If yes have patient fill out [facility]    Nellie Clay CMA    "

## 2019-12-31 LAB
BACTERIA SPEC CULT: NO GROWTH
Lab: NORMAL
SPECIMEN SOURCE: NORMAL

## 2019-12-31 NOTE — TELEPHONE ENCOUNTER
Called and spoke to pt as a reminder to get her labs done for Dr. Baltazar. She stated she has an appt later this month and will keep it. She had no further questions.    Cynthia Becker RN Specialty Triage 12/31/2019 8:38 AM

## 2020-01-02 ENCOUNTER — OFFICE VISIT (OUTPATIENT)
Dept: FAMILY MEDICINE | Facility: CLINIC | Age: 65
End: 2020-01-02
Payer: COMMERCIAL

## 2020-01-02 ENCOUNTER — ANCILLARY PROCEDURE (OUTPATIENT)
Dept: GENERAL RADIOLOGY | Facility: CLINIC | Age: 65
End: 2020-01-02
Attending: FAMILY MEDICINE
Payer: COMMERCIAL

## 2020-01-02 VITALS
SYSTOLIC BLOOD PRESSURE: 112 MMHG | TEMPERATURE: 97.8 F | DIASTOLIC BLOOD PRESSURE: 62 MMHG | OXYGEN SATURATION: 93 % | HEART RATE: 99 BPM | BODY MASS INDEX: 30.85 KG/M2 | RESPIRATION RATE: 26 BRPM | WEIGHT: 186.8 LBS

## 2020-01-02 DIAGNOSIS — J20.9 ACUTE BRONCHITIS WITH SYMPTOMS > 10 DAYS: Primary | ICD-10-CM

## 2020-01-02 PROCEDURE — 71046 X-RAY EXAM CHEST 2 VIEWS: CPT | Mod: FY

## 2020-01-02 PROCEDURE — 99214 OFFICE O/P EST MOD 30 MIN: CPT | Performed by: FAMILY MEDICINE

## 2020-01-02 RX ORDER — AZITHROMYCIN 250 MG/1
TABLET, FILM COATED ORAL
Qty: 6 TABLET | Refills: 0 | Status: SHIPPED | OUTPATIENT
Start: 2020-01-02 | End: 2020-01-07

## 2020-01-02 RX ORDER — PREDNISONE 20 MG/1
40 TABLET ORAL DAILY
Qty: 10 TABLET | Refills: 0 | Status: SHIPPED | OUTPATIENT
Start: 2020-01-02 | End: 2020-01-07

## 2020-01-02 RX ORDER — ALBUTEROL SULFATE 90 UG/1
2 AEROSOL, METERED RESPIRATORY (INHALATION) 4 TIMES DAILY
Qty: 1 INHALER | Refills: 0 | Status: SHIPPED | OUTPATIENT
Start: 2020-01-02 | End: 2020-04-20

## 2020-01-02 NOTE — PATIENT INSTRUCTIONS
Our Clinic hours are:  Mondays    7:20 am - 7 pm  Tues -  Fri  7:20 am - 5 pm    Clinic Phone: [phone]    The clinic lab opens at 7:30 am Mon - Fri and appointments are required.    [facility]. [phone]  Monday  8 am - 7pm  Tues - Fri 8 am - 5:30 pm

## 2020-01-19 DIAGNOSIS — J20.9 ACUTE BRONCHITIS WITH SYMPTOMS > 10 DAYS: ICD-10-CM

## 2020-01-20 NOTE — TELEPHONE ENCOUNTER
"Requested Prescriptions   Pending Prescriptions Disp Refills     albuterol (PROAIR HFA/PROVENTIL HFA/VENTOLIN HFA) 108 (90 Base)  Last Written Prescription Date:  01/02/20  Last Fill Quantity: 1,  # refills: 0   Last office visit: 1/2/2020 with prescribing provider:  Britany Norton   Future Office Visit:   Next 5 appointments (look out 90 days)    Jan 27, 2020 10:45 AM CST  [facility] [address] [address]  [phone]          MCG/ACT inhaler [Pharmacy Med Name: ALBUTEROL SULFATE  AERS] 18 g 0     Sig: INHALE TWO PUFFS BY MOUTH FOUR TIMES A DAY       Asthma Maintenance Inhalers - Anticholinergics Passed - 1/19/2020  5:02 AM        Passed - Patient is age 12 years or older        Passed - Recent (12 mo) or future (30 days) visit within the authorizing provider's specialty     Patient has had an office visit with the authorizing provider or a provider within the authorizing providers department within the previous 12 mos or has a future within next 30 days. See \"Patient Info\" tab in inbasket, or \"Choose Columns\" in Meds & Orders section of the refill encounter.          Passed - Medication is active on med list          "

## 2020-01-21 RX ORDER — ALBUTEROL SULFATE 90 UG/1
AEROSOL, METERED RESPIRATORY (INHALATION)
Qty: 18 G | Refills: 0 | OUTPATIENT
Start: 2020-01-21

## 2020-01-21 NOTE — TELEPHONE ENCOUNTER
"Inhaler was prescribed 1/2/2020 for acute bronchitis. Called pt, she did not request this, says she does not need it. Inquired how she is feeling: \"I'm a lot better than I was.\"     Vicky WINSTON RN      "

## 2020-01-27 ENCOUNTER — OFFICE VISIT (OUTPATIENT)
Dept: DERMATOLOGY | Facility: CLINIC | Age: 65
End: 2020-01-27
Payer: COMMERCIAL

## 2020-01-27 VITALS — DIASTOLIC BLOOD PRESSURE: 62 MMHG | OXYGEN SATURATION: 96 % | SYSTOLIC BLOOD PRESSURE: 136 MMHG | HEART RATE: 73 BPM

## 2020-01-27 DIAGNOSIS — D23.9 DERMAL NEVUS: ICD-10-CM

## 2020-01-27 DIAGNOSIS — Z85.820 HISTORY OF MELANOMA: ICD-10-CM

## 2020-01-27 DIAGNOSIS — D22.9 NEVUS: ICD-10-CM

## 2020-01-27 DIAGNOSIS — D18.01 ANGIOMA OF SKIN: ICD-10-CM

## 2020-01-27 DIAGNOSIS — L82.1 SEBORRHEIC KERATOSIS: ICD-10-CM

## 2020-01-27 DIAGNOSIS — L81.4 LENTIGO: Primary | ICD-10-CM

## 2020-01-27 PROCEDURE — 99214 OFFICE O/P EST MOD 30 MIN: CPT | Performed by: DERMATOLOGY

## 2020-01-27 NOTE — PROGRESS NOTES
Mine Meléndez is a 64 year old year old female patient here today for hx of melanoma 0.3mm r thigh.  She denies any new or changing skin lesions.  SHe just retired.  She notes one spot on left neck.  Patient states this has been present for a while.  Patient reports the following symptoms:  none.  Patient reports the following previous treatments none.  These treatments did not work.  Patient reports the following modifying factors none.  Associated symptoms: she picks at it .  Patient has no other skin complaints today.  Remainder of the HPI, Meds, PMH, Allergies, FH, and SH was reviewed in chart.      Past Medical History:   Diagnosis Date     Herpes simplex without mention of complication      Malignant melanoma (H)      Obesity, unspecified      Osteomyelitis of jaw 5/22/2008     Rheumatoid arthritis(714.0)      Trochanteric bursitis of right hip 8/16/2013       Past Surgical History:   Procedure Laterality Date     COLONOSCOPY  1/20/06     HYSTERECTOMY, PAP NO LONGER INDICATED       HYSTERECTOMY, VAGINAL  11/04    TVH,TVT procedure     RELEASE CARPAL TUNNEL Right 10/11/2016    Procedure: RELEASE CARPAL TUNNEL;  Surgeon: Emely Blanca MD;  Location: [facility] OR     SURGICAL HISTORY OF -   1988    tubal ligation     SURGICAL HISTORY OF -   2001    excision gangliion cyst rt heel        Family History   Problem Relation Age of Onset     Diabetes Father      Dementia Father      Diabetes Brother      Diabetes Sister      Hypertension Sister      Hypertension Sister        Social History     Socioeconomic History     Marital status:      Spouse name: Not on file     Number of children: Not on file     Years of education: Not on file     Highest education level: Not on file   Occupational History     Not on file   Social Needs     Financial resource strain: Not on file     Food insecurity:     Worry: Not on file     Inability: Not on file     Transportation needs:     Medical: Not on file     Non-medical:  Not on file   Tobacco Use     Smoking status: Former Smoker     Years: 25.00     Last attempt to quit: 2000     Years since quittin.7     Smokeless tobacco: Never Used   Substance and Sexual Activity     Alcohol use: Yes     Comment: 6 pack of beer on a weekend     Drug use: No     Sexual activity: Yes     Partners: Male     Birth control/protection: Surgical     Comment: hyster.partial   Lifestyle     Physical activity:     Days per week: Not on file     Minutes per session: Not on file     Stress: Not on file   Relationships     Social connections:     Talks on phone: Not on file     Gets together: Not on file     Attends Zoroastrian service: Not on file     Active member of club or organization: Not on file     Attends meetings of clubs or organizations: Not on file     Relationship status: Not on file     Intimate partner violence:     Fear of current or ex partner: Not on file     Emotionally abused: Not on file     Physically abused: Not on file     Forced sexual activity: Not on file   Other Topics Concern     Parent/sibling w/ CABG, MI or angioplasty before 65F 55M? No   Social History Narrative     Not on file       Outpatient Encounter Medications as of 2020   Medication Sig Dispense Refill     albuterol (PROAIR HFA/PROVENTIL HFA/VENTOLIN HFA) 108 (90 Base) MCG/ACT inhaler Inhale 2 puffs into the lungs 4 times daily 1 Inhaler 0     ASPIRIN 81 MG OR TABS ONE DAILY 100 3     [] azithromycin (ZITHROMAX) 250 MG tablet Take 2 tablets (500 mg) by mouth daily for 1 day, THEN 1 tablet (250 mg) daily for 4 days. 6 tablet 0     BIOTIN PO        [] ciprofloxacin (CIPRO) 250 MG tablet Take 1 tablet (250 mg) by mouth 2 times daily for 3 days 6 tablet 0     folic acid (FOLVITE) 1 MG tablet Take 1 tablet (1,000 mcg) by mouth daily 90 tablet 2     levothyroxine (SYNTHROID/LEVOTHROID) 50 MCG tablet Take 1 tablet (50 mcg) by mouth daily 90 tablet 3     lisinopril-hydrochlorothiazide  (PRINZIDE/ZESTORETIC) 10-12.5 MG tablet Take 1 tablet by mouth daily 90 tablet 3     methotrexate sodium 2.5 MG TABS Take 8 tablets (20 mg) by mouth once a week . Take all 8 tablets on the same day of each week. 96 tablet 2     MULTI-VITAMIN OR 1 daily       [] predniSONE (DELTASONE) 20 MG tablet Take 2 tablets (40 mg) by mouth daily for 5 days 10 tablet 0     simvastatin (ZOCOR) 20 MG tablet Take 1 tablet (20 mg) by mouth At Bedtime 90 tablet 3     No facility-administered encounter medications on file as of 2020.              Review Of Systems  Skin: As above  Eyes: negative  Ears/Nose/Throat: negative  Respiratory: No shortness of breath, dyspnea on exertion, cough, or hemoptysis  Cardiovascular: negative  Gastrointestinal: negative  Genitourinary: negative  Musculoskeletal: negative  Neurologic: negative  Psychiatric: negative  Hematologic/Lymphatic/Immunologic: negative  Endocrine: negative      O:   NAD, WDWN, Alert & Oriented, Mood & Affect wnl, Vitals stable   Here today alone   /62   Pulse 73   LMP 2004 (Approximate)   SpO2 96%    General appearance normal   Vitals stable   Alert, oriented and in no acute distress      Following lymph nodes palpated: Occipital, Cervical, Supraclavicular , inguinal, axilla no lad    L post neck scaly stuck on papule   Pigmented macules on trunk and ext with regular borders and pigment networks   Stuck on papules and brown macules on trunk and ext   Red papules on trunk  Flesh colored papules on trunk     The remainder of the full exam was unremarkable; the following areas were examined:  conjunctiva/lids, oral mucosa, neck, peripheral vascular system, abdomen, lymph nodes, digits/nails, eccrine and apocrine glands, scalp/hair, face, neck, chest, abdomen, buttocks, back, RUE, LUE, RLE, LLE       Eyes: Conjunctivae/lids:Normal     ENT: Lips, buccal mucosa, tongue: normal    MSK:Normal    Cardiovascular: peripheral edema none    Pulm: Breathing  Normal    Lymph Nodes: No Head and Neck Lymphadenopathy     Neuro/Psych: Orientation:Alert and Orientedx3 ; Mood/Affect:normal       A/P:  1. Seborrheic keratosis, lentigo, angioma, dermal nevus, nevi, hx of melanoma  MELANOMA DISCUSSED WITH PATIENT:  I discussed the specifics of tumor, prognosis, metachronous melanoma, self exam, and genetics with the patient. I explained the need for monthly skin exams including and taught the patient how to do this. Patient was asked about new or changing moles . I discussed with patient signs and symptoms that could arise in the setting of recurrent locoregional or metastatic disease. In addition, the need to undergo every 4 month dermatologic full skin survey and evaluation given that patients with a diagnosis of melanoma are at risk of recurrence (local and distant) and of subsequent de trxiie melanoma.      BENIGN LESIONS DISCUSSED WITH PATIENT:  I discussed the specifics of tumor, prognosis, and genetics of benign lesions.  I explained that treatment of these lesions would be purely cosmetic and not medically neccessary.  I discussed with patient different removal options including excision, cautery and /or laser.      Nature and genetics of benign skin lesions dicussed with patient.  Signs and Symptoms of skin cancer discussed with patient.  ABCDEs of melanoma reviewed with patient.  Patient encouraged to perform monthly skin exams.  UV precautions reviewed with patient.  Patient to follow up with Primary Care provider regarding elevated blood pressure.  Skin care regimen reviewed with patient: Eliminate harsh soaps, i.e. Dial, zest, irsih spring; Mild soaps such as Cetaphil or Dove sensitive skin, avoid hot or cold showers, aggressive use of emollients including vanicream, cetaphil or cerave discussed with patient.    Risks of non-melanoma skin cancer discussed with patient   Return to clinic 4 months

## 2020-01-27 NOTE — LETTER
1/27/2020         RE: Mine Meléndez  [address]  [facility] [address]        Dear Colleague,    Thank you for referring your patient, Mine Meléndez, to the [facility]. Please see a copy of my visit note below.    Mine Meléndez is a 64 year old year old female patient here today for hx of melanoma 0.3mm r thigh.  She denies any new or changing skin lesions.  SHe just retired.  She notes one spot on left neck.  Patient states this has been present for a while.  Patient reports the following symptoms:  none.  Patient reports the following previous treatments none.  These treatments did not work.  Patient reports the following modifying factors none.  Associated symptoms: she picks at it .  Patient has no other skin complaints today.  Remainder of the HPI, Meds, PMH, Allergies, FH, and SH was reviewed in chart.      Past Medical History:   Diagnosis Date     Herpes simplex without mention of complication      Malignant melanoma (H)      Obesity, unspecified      Osteomyelitis of jaw 5/22/2008     Rheumatoid arthritis(714.0)      Trochanteric bursitis of right hip 8/16/2013       Past Surgical History:   Procedure Laterality Date     COLONOSCOPY  1/20/06     HYSTERECTOMY, PAP NO LONGER INDICATED       HYSTERECTOMY, VAGINAL  11/04    TVH,TVT procedure     RELEASE CARPAL TUNNEL Right 10/11/2016    Procedure: RELEASE CARPAL TUNNEL;  Surgeon: Emely Blanca MD;  Location: [facility] OR     SURGICAL HISTORY OF -   1988    tubal ligation     SURGICAL HISTORY OF -   2001    excision gangliion cyst rt heel        Family History   Problem Relation Age of Onset     Diabetes Father      Dementia Father      Diabetes Brother      Diabetes Sister      Hypertension Sister      Hypertension Sister        Social History     Socioeconomic History     Marital status:      Spouse name: Not on file     Number of children: Not on file     Years of education: Not on file     Highest education level: Not on  file   Occupational History     Not on file   Social Needs     Financial resource strain: Not on file     Food insecurity:     Worry: Not on file     Inability: Not on file     Transportation needs:     Medical: Not on file     Non-medical: Not on file   Tobacco Use     Smoking status: Former Smoker     Years: 25.00     Last attempt to quit: 2000     Years since quittin.7     Smokeless tobacco: Never Used   Substance and Sexual Activity     Alcohol use: Yes     Comment: 6 pack of beer on a weekend     Drug use: No     Sexual activity: Yes     Partners: Male     Birth control/protection: Surgical     Comment: hyster.partial   Lifestyle     Physical activity:     Days per week: Not on file     Minutes per session: Not on file     Stress: Not on file   Relationships     Social connections:     Talks on phone: Not on file     Gets together: Not on file     Attends Latter-day service: Not on file     Active member of club or organization: Not on file     Attends meetings of clubs or organizations: Not on file     Relationship status: Not on file     Intimate partner violence:     Fear of current or ex partner: Not on file     Emotionally abused: Not on file     Physically abused: Not on file     Forced sexual activity: Not on file   Other Topics Concern     Parent/sibling w/ CABG, MI or angioplasty before 65F 55M? No   Social History Narrative     Not on file       Outpatient Encounter Medications as of 2020   Medication Sig Dispense Refill     albuterol (PROAIR HFA/PROVENTIL HFA/VENTOLIN HFA) 108 (90 Base) MCG/ACT inhaler Inhale 2 puffs into the lungs 4 times daily 1 Inhaler 0     ASPIRIN 81 MG OR TABS ONE DAILY 100 3     [] azithromycin (ZITHROMAX) 250 MG tablet Take 2 tablets (500 mg) by mouth daily for 1 day, THEN 1 tablet (250 mg) daily for 4 days. 6 tablet 0     BIOTIN PO        [] ciprofloxacin (CIPRO) 250 MG tablet Take 1 tablet (250 mg) by mouth 2 times daily for 3 days 6 tablet 0      folic acid (FOLVITE) 1 MG tablet Take 1 tablet (1,000 mcg) by mouth daily 90 tablet 2     levothyroxine (SYNTHROID/LEVOTHROID) 50 MCG tablet Take 1 tablet (50 mcg) by mouth daily 90 tablet 3     lisinopril-hydrochlorothiazide (PRINZIDE/ZESTORETIC) 10-12.5 MG tablet Take 1 tablet by mouth daily 90 tablet 3     methotrexate sodium 2.5 MG TABS Take 8 tablets (20 mg) by mouth once a week . Take all 8 tablets on the same day of each week. 96 tablet 2     MULTI-VITAMIN OR 1 daily       [] predniSONE (DELTASONE) 20 MG tablet Take 2 tablets (40 mg) by mouth daily for 5 days 10 tablet 0     simvastatin (ZOCOR) 20 MG tablet Take 1 tablet (20 mg) by mouth At Bedtime 90 tablet 3     No facility-administered encounter medications on file as of 2020.              Review Of Systems  Skin: As above  Eyes: negative  Ears/Nose/Throat: negative  Respiratory: No shortness of breath, dyspnea on exertion, cough, or hemoptysis  Cardiovascular: negative  Gastrointestinal: negative  Genitourinary: negative  Musculoskeletal: negative  Neurologic: negative  Psychiatric: negative  Hematologic/Lymphatic/Immunologic: negative  Endocrine: negative      O:   NAD, WDWN, Alert & Oriented, Mood & Affect wnl, Vitals stable   Here today alone   /62   Pulse 73   LMP 2004 (Approximate)   SpO2 96%    General appearance normal   Vitals stable   Alert, oriented and in no acute distress      Following lymph nodes palpated: Occipital, Cervical, Supraclavicular , inguinal, axilla no lad    L post neck scaly stuck on papule   Pigmented macules on trunk and ext with regular borders and pigment networks   Stuck on papules and brown macules on trunk and ext   Red papules on trunk  Flesh colored papules on trunk     The remainder of the full exam was unremarkable; the following areas were examined:  conjunctiva/lids, oral mucosa, neck, peripheral vascular system, abdomen, lymph nodes, digits/nails, eccrine and apocrine glands, scalp/hair,  face, neck, chest, abdomen, buttocks, back, RUE, LUE, RLE, LLE       Eyes: Conjunctivae/lids:Normal     ENT: Lips, buccal mucosa, tongue: normal    MSK:Normal    Cardiovascular: peripheral edema none    Pulm: Breathing Normal    Lymph Nodes: No Head and Neck Lymphadenopathy     Neuro/Psych: Orientation:Alert and Orientedx3 ; Mood/Affect:normal       A/P:  1. Seborrheic keratosis, lentigo, angioma, dermal nevus, nevi, hx of melanoma  MELANOMA DISCUSSED WITH PATIENT:  I discussed the specifics of tumor, prognosis, metachronous melanoma, self exam, and genetics with the patient. I explained the need for monthly skin exams including and taught the patient how to do this. Patient was asked about new or changing moles . I discussed with patient signs and symptoms that could arise in the setting of recurrent locoregional or metastatic disease. In addition, the need to undergo every 4 month dermatologic full skin survey and evaluation given that patients with a diagnosis of melanoma are at risk of recurrence (local and distant) and of subsequent de trixie melanoma.      BENIGN LESIONS DISCUSSED WITH PATIENT:  I discussed the specifics of tumor, prognosis, and genetics of benign lesions.  I explained that treatment of these lesions would be purely cosmetic and not medically neccessary.  I discussed with patient different removal options including excision, cautery and /or laser.      Nature and genetics of benign skin lesions dicussed with patient.  Signs and Symptoms of skin cancer discussed with patient.  ABCDEs of melanoma reviewed with patient.  Patient encouraged to perform monthly skin exams.  UV precautions reviewed with patient.  Patient to follow up with Primary Care provider regarding elevated blood pressure.  Skin care regimen reviewed with patient: Eliminate harsh soaps, i.e. Dial, zest, irsih spring; Mild soaps such as Cetaphil or Dove sensitive skin, avoid hot or cold showers, aggressive use of emollients  including vanicream, cetaphil or cerave discussed with patient.    Risks of non-melanoma skin cancer discussed with patient   Return to clinic 4 months      Again, thank you for allowing me to participate in the care of your patient.        Sincerely,        Pankaj Castro MD

## 2020-02-10 ENCOUNTER — HEALTH MAINTENANCE LETTER (OUTPATIENT)
Age: 65
End: 2020-02-10

## 2020-02-17 ENCOUNTER — MYC MEDICAL ADVICE (OUTPATIENT)
Dept: FAMILY MEDICINE | Facility: CLINIC | Age: 65
End: 2020-02-17

## 2020-02-17 NOTE — TELEPHONE ENCOUNTER
Panel Management Review      Patient has the following on her problem list:     Hypertension   Last three blood pressure readings:  BP Readings from Last 3 Encounters:   01/27/20 136/62   01/02/20 112/62   12/30/19 124/62     Blood pressure: Passed    HTN Guidelines:  Less than 140/90      Composite cancer screening  Chart review shows that this patient is due/due soon for the following Fecal Colorectal (FIT)  Summary:    Patient is due/failing the following:   FIT    Action needed:   Patient needs referral/order: fit    Type of outreach:    Sent [facility] message.    Questions for provider review:    None                                                                                                                                    Irene Rankin MA

## 2020-02-19 DIAGNOSIS — M05.9 SEROPOSITIVE RHEUMATOID ARTHRITIS (H): ICD-10-CM

## 2020-02-19 DIAGNOSIS — Z79.899 HIGH RISK MEDICATION USE: ICD-10-CM

## 2020-02-19 LAB
ALBUMIN SERPL-MCNC: 3.3 G/DL (ref 3.4–5)
ALP SERPL-CCNC: 101 U/L (ref 40–150)
ALT SERPL W P-5'-P-CCNC: 23 U/L (ref 0–50)
AST SERPL W P-5'-P-CCNC: 23 U/L (ref 0–45)
BASOPHILS # BLD AUTO: 0 10E9/L (ref 0–0.2)
BASOPHILS NFR BLD AUTO: 0.3 %
BILIRUB DIRECT SERPL-MCNC: 0.2 MG/DL (ref 0–0.2)
BILIRUB SERPL-MCNC: 0.8 MG/DL (ref 0.2–1.3)
CREAT SERPL-MCNC: 0.64 MG/DL (ref 0.52–1.04)
CRP SERPL-MCNC: 8.5 MG/L (ref 0–8)
DIFFERENTIAL METHOD BLD: ABNORMAL
EOSINOPHIL # BLD AUTO: 0.2 10E9/L (ref 0–0.7)
EOSINOPHIL NFR BLD AUTO: 2.8 %
ERYTHROCYTE [DISTWIDTH] IN BLOOD BY AUTOMATED COUNT: 14.9 % (ref 10–15)
ERYTHROCYTE [SEDIMENTATION RATE] IN BLOOD BY WESTERGREN METHOD: 10 MM/H (ref 0–30)
GFR SERPL CREATININE-BSD FRML MDRD: >90 ML/MIN/{1.73_M2}
HCT VFR BLD AUTO: 41.1 % (ref 35–47)
HGB BLD-MCNC: 14.1 G/DL (ref 11.7–15.7)
LYMPHOCYTES # BLD AUTO: 1.5 10E9/L (ref 0.8–5.3)
LYMPHOCYTES NFR BLD AUTO: 20.5 %
MCH RBC QN AUTO: 35.4 PG (ref 26.5–33)
MCHC RBC AUTO-ENTMCNC: 34.3 G/DL (ref 31.5–36.5)
MCV RBC AUTO: 103 FL (ref 78–100)
MONOCYTES # BLD AUTO: 0.7 10E9/L (ref 0–1.3)
MONOCYTES NFR BLD AUTO: 9.2 %
NEUTROPHILS # BLD AUTO: 5 10E9/L (ref 1.6–8.3)
NEUTROPHILS NFR BLD AUTO: 67.2 %
PLATELET # BLD AUTO: 202 10E9/L (ref 150–450)
PROT SERPL-MCNC: 7.1 G/DL (ref 6.8–8.8)
RBC # BLD AUTO: 3.98 10E12/L (ref 3.8–5.2)
WBC # BLD AUTO: 7.4 10E9/L (ref 4–11)

## 2020-02-19 PROCEDURE — 82565 ASSAY OF CREATININE: CPT | Performed by: INTERNAL MEDICINE

## 2020-02-19 PROCEDURE — 80076 HEPATIC FUNCTION PANEL: CPT | Performed by: INTERNAL MEDICINE

## 2020-02-19 PROCEDURE — 86140 C-REACTIVE PROTEIN: CPT | Performed by: INTERNAL MEDICINE

## 2020-02-19 PROCEDURE — 85025 COMPLETE CBC W/AUTO DIFF WBC: CPT | Performed by: INTERNAL MEDICINE

## 2020-02-19 PROCEDURE — 36415 COLL VENOUS BLD VENIPUNCTURE: CPT | Performed by: INTERNAL MEDICINE

## 2020-02-19 PROCEDURE — 85652 RBC SED RATE AUTOMATED: CPT | Performed by: INTERNAL MEDICINE

## 2020-04-13 DIAGNOSIS — I10 ESSENTIAL HYPERTENSION WITH GOAL BLOOD PRESSURE LESS THAN 140/90: ICD-10-CM

## 2020-04-13 NOTE — TELEPHONE ENCOUNTER
"Requested Prescriptions   Pending Prescriptions Disp Refills     lisinopril-hydrochlorothiazide (ZESTORETIC) 10-12.5 MG tablet [Pharmacy Med Name: LISINOPRIL-HYDROCHLORO 10-12.5 TABS] 90 tablet 3     Sig: TAKE ONE TABLET BY MOUTH ONCE DAILY       Diuretics (Including Combos) Protocol Passed - 4/13/2020  5:04 AM        Passed - Blood pressure under 140/90 in past 12 months     BP Readings from Last 3 Encounters:   01/27/20 136/62   01/02/20 112/62   12/30/19 124/62                 Passed - Recent (12 mo) or future (30 days) visit within the authorizing provider's specialty     Patient has had an office visit with the authorizing provider or a provider within the authorizing providers department within the previous 12 mos or has a future within next 30 days. See \"Patient Info\" tab in inbasket, or \"Choose Columns\" in Meds & Orders section of the refill encounter.              Passed - Medication is active on med list        Passed - Patient is age 18 or older        Passed - No active pregancy on record        Passed - Normal serum creatinine on file in past 12 months     Recent Labs   Lab Test 02/19/20  1030   CR 0.64              Passed - Normal serum potassium on file in past 12 months     Recent Labs   Lab Test 05/22/19  1404   POTASSIUM 3.4                    Passed - Normal serum sodium on file in past 12 months     Recent Labs   Lab Test 05/22/19  1404                 Passed - No positive pregnancy test in past 12 months       ACE Inhibitors (Including Combos) Protocol Passed - 4/13/2020  5:04 AM        Passed - Blood pressure under 140/90 in past 12 months     BP Readings from Last 3 Encounters:   01/27/20 136/62   01/02/20 112/62   12/30/19 124/62                 Passed - Recent (12 mo) or future (30 days) visit within the authorizing provider's specialty     Patient has had an office visit with the authorizing provider or a provider within the authorizing providers department within the previous 12 mos " "or has a future within next 30 days. See \"Patient Info\" tab in inbasket, or \"Choose Columns\" in Meds & Orders section of the refill encounter.              Passed - Medication is active on med list        Passed - Patient is age 18 or older        Passed - No active pregnancy on record        Passed - Normal serum creatinine on file in past 12 months     Recent Labs   Lab Test 02/19/20  1030   CR 0.64       Ok to refill medication if creatinine is low          Passed - Normal serum potassium on file in past 12 months     Recent Labs   Lab Test 05/22/19  1404   POTASSIUM 3.4             Passed - No positive pregnancy test within past 12 months           Last Written Prescription Date:  4/17/2019  Last Fill Quantity: 90,  # refills: 3   Last office visit: 1/2/2020 with prescribing provider:  Errol    Future Office Visit:   Next 5 appointments (look out 90 days)    Jun 01, 2020 11:15 AM CDT  Return Visit with Pankaj Castro MD  [facility] ([facility] [address] [address] [facility] [address]  [phone]           "

## 2020-04-14 RX ORDER — LISINOPRIL/HYDROCHLOROTHIAZIDE 10-12.5 MG
TABLET ORAL
Qty: 90 TABLET | Refills: 3 | Status: SHIPPED | OUTPATIENT
Start: 2020-04-14 | End: 2021-04-05

## 2020-04-14 NOTE — TELEPHONE ENCOUNTER
Prescription approved per [facility] Refill Protocol.  Rossy Cardenas RN

## 2020-04-20 ENCOUNTER — VIRTUAL VISIT (OUTPATIENT)
Dept: RHEUMATOLOGY | Facility: CLINIC | Age: 65
End: 2020-04-20
Payer: COMMERCIAL

## 2020-04-20 ENCOUNTER — TELEPHONE (OUTPATIENT)
Dept: RHEUMATOLOGY | Facility: CLINIC | Age: 65
End: 2020-04-20

## 2020-04-20 DIAGNOSIS — M05.9 SEROPOSITIVE RHEUMATOID ARTHRITIS (H): ICD-10-CM

## 2020-04-20 DIAGNOSIS — Z79.899 HIGH RISK MEDICATION USE: Primary | ICD-10-CM

## 2020-04-20 PROCEDURE — 99214 OFFICE O/P EST MOD 30 MIN: CPT | Mod: 95 | Performed by: INTERNAL MEDICINE

## 2020-04-20 RX ORDER — FOLIC ACID 1 MG/1
1000 TABLET ORAL DAILY
Qty: 90 TABLET | Refills: 2 | Status: SHIPPED | OUTPATIENT
Start: 2020-04-20 | End: 2020-10-26

## 2020-04-20 RX ORDER — IBUPROFEN 800 MG/1
800 TABLET, FILM COATED ORAL EVERY 8 HOURS PRN
Qty: 90 TABLET | Refills: 0 | Status: SHIPPED | OUTPATIENT
Start: 2020-04-20 | End: 2020-05-15

## 2020-04-20 RX ORDER — METHOTREXATE 25 MG/ML
25 INJECTION, SOLUTION INTRA-ARTERIAL; INTRAMUSCULAR; INTRAVENOUS
Qty: 4 VIAL | Refills: 3 | Status: SHIPPED | OUTPATIENT
Start: 2020-04-20 | End: 2020-07-27

## 2020-04-20 RX ORDER — SYRING-NEEDL,DISP,INSUL,0.3 ML 28GX1/2"
SYRINGE, EMPTY DISPOSABLE MISCELLANEOUS
Qty: 10 EACH | Refills: 1 | Status: SHIPPED | OUTPATIENT
Start: 2020-04-20 | End: 2020-07-27

## 2020-04-20 NOTE — PROGRESS NOTES
"Mine Meléndez is a 64 year old female who is being evaluated via a billable video visit.      The patient has been notified of following:     \"This video visit will be conducted via a call between you and your physician/provider. We have found that certain health care needs can be provided without the need for an in-person physical exam.  This service lets us provide the care you need with a video conversation.  If a prescription is necessary we can send it directly to your pharmacy.  If lab work is needed we can place an order for that and you can then stop by our lab to have the test done at a later time.    Video visits are billed at different rates depending on your insurance coverage.  Please reach out to your insurance provider with any questions.    If during the course of the call the physician/provider feels a video visit is not appropriate, you will not be charged for this service.\"    Patient has given verbal consent for Video visit? Yes    How would you like to obtain your AVS? [facility] [place]    Patient would like the video invitation sent by: Text to cell phone: [phone]        Additional provider notes:     Rheumatology Video Visit      Mine Meléndez MRN# [id number]   YOB: 1955 Age: 64 year old      Date of visit: 4/20/20     Chief Complaint   Patient presents with:  RECHECK: RA    Assessment and Plan     1. Seropositive Erosive Rheumatoid Arthritis (RF+, ): Dx'd with RA prior to 2002. Previously on SSZ (stomatitis). Currently on MTX 20mg once weekly and folic acid 1mg daily.  Inflammatory joint symptoms and wrist swelling; dx'd tx options.  HCQ shortage.  Increase MTX and change route of administration to increase bioavailability.   - Change Methotrexate from 20mg PO once weekly; to 25mg SQ once weekl  - Continue folic acid 1mg daily  - Labs monthly [id number]: CBC, Cr, Hepatic Panel  - Labs in 3 months: CBC, Creatinine, Hepatic Panel, ESR, CRP    # Methotrexate Risks and Benefits: " "The risks and benefits of methotrexate were discussed in detail and the patient verbalized understanding.  The risks discussed include, but are not limited to, the risk for hypersensitivity, anaphylaxis, anaphylactoid reactions, infections, bone marrow suppression, renal toxicity, hepatotoxicity, pulmonary toxicity, malignancy, impaired fertility, GI upset, alopecia, and oral and nasal sores.  Folic acid supplementation is recommended during methotrexate therapy to help prevent some of the side effects. Pregnancy prevention and planning was discussed; it is recommended that women of childbearing potential use reliable contraception during therapy.  The risks of taking both methotrexate and alcohol were reviewed; complete alcohol avoidance was discussed.  Routine laboratory monitoring is required during methotrexate therapy. Taking MTX once weekly, all within a 24 hour period was stressed and the patient verbalized this instruction back to me.  I encouraged reviewing the package insert and asking any questions about the medication.    2.  History of flares of right forearm and/or right mid-upper arm pain: Had occurred once on the left.  From record review, this was evaluated with imaging back in 2010 without etiology identified.  This occurs approximately once per month.  Symptoms resolved within hours or after she goes to sleep and wakes up the next morning.  When she flares, whether be in the forearm or the upper arm, she finds it difficult to raise her arm.  Question if spine involvement given the bilateral nature since it has happened once on the left.  X-rays and physical therapy were done.  Since physical therapy exercises were started she has not had a \"flare\"     # Relevant labs and imaging were reviewed with the patient    # High risk medication toxicity monitoring: discussion and labs reviewed; appropriate labs ordered. See above    # Note that this is a virtual visit to reduce the risk of COVID-19 exposure " during this current pandemic.         Ms. Meléndez verbalized agreement with and understanding of the rational for the diagnosis and treatment plan.  All questions were answered to best of my ability and the patient's satisfaction. Ms. Meléndez was advised to contact the clinic with any questions that may arise after the clinic visit.      Thank you for involving me in the care of the patient    Return to clinic: 3 months      HPI   Mine Meléndez is a 64 year old female with a past medical history significant for hypertension, hyperlipidemia, hypothyroidism, migraines, and rheumatoid arthritis who presents for follow-up of rheumatoid arthritis.     Today, Ms. Meléndez reports that she has more pain at the MCPs and PIPs and wrists; swelling at the MCPs and wrists.  Pain is worse in the AM and better with time and activity.  +gelling.  Morning stiffness for 1-2 hrs.  Symptoms annoying; not debilitating.     Denies fevers, chills, nausea, vomiting, constipation, diarrhea. No abdominal pain. No chest pain/pressure, palpitations, or shortness of breath. No LE swelling. No neck pain. No oral or nasal sores.  No rash. No sicca symptoms.     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  CV: No chest pain, pressure, palpitations, or dyspnea on exertion.  PULM: No SOB, wheeze, cough.  GI: No nausea, vomiting, constipation, diarrhea. No blood in stool. No abdominal pain.  : No blood in urine.  MSK: See HPI.  NEURO: No numbness, tingling, or weakness.  EXT: No LE swelling  PSYCH: Negative    Active Problem List     Patient Active Problem List   Diagnosis     Seropositive rheumatoid arthritis (H)     DYSPEPSIA     Obesity     Essential hypertension     Hypothyroidism     HYPERLIPIDEMIA LDL GOAL <130     Advanced directives,  counseling/discussion     Dermatitis     High risk medications (not anticoagulants) long-term use     Female stress incontinence     Migraine     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)     Past Medical History     Past Medical History:   Diagnosis Date     Herpes simplex without mention of complication      Malignant melanoma (H)      Obesity, unspecified      Osteomyelitis of jaw 5/22/2008     Rheumatoid arthritis(714.0)      Trochanteric bursitis of right hip 8/16/2013     Past Surgical History     Past Surgical History:   Procedure Laterality Date     COLONOSCOPY  1/20/06     HYSTERECTOMY, PAP NO LONGER INDICATED       HYSTERECTOMY, VAGINAL  11/04    TVH,TVT procedure     RELEASE CARPAL TUNNEL Right 10/11/2016    Procedure: RELEASE CARPAL TUNNEL;  Surgeon: Emely Blanca MD;  Location: [facility] OR     SURGICAL HISTORY OF -   1988    tubal ligation     SURGICAL HISTORY OF -   2001    excision gangliion cyst rt heel     Allergy     Allergies   Allergen Reactions     Codeine Rash     Flagyl [Metronidazole] Rash     Septra [Sulfamethoxazole W/Trimethoprim] Itching     Puffy eyes, flushed.      Current Medication List     Current Outpatient Medications   Medication Sig     ASPIRIN 81 MG OR TABS ONE DAILY     BIOTIN PO      folic acid (FOLVITE) 1 MG tablet Take 1 tablet (1,000 mcg) by mouth daily     levothyroxine (SYNTHROID/LEVOTHROID) 50 MCG tablet Take 1 tablet (50 mcg) by mouth daily     lisinopril-hydrochlorothiazide (ZESTORETIC) 10-12.5 MG tablet TAKE ONE TABLET BY MOUTH ONCE DAILY     methotrexate sodium 2.5 MG TABS Take 8 tablets (20 mg) by mouth once a week . Take all 8 tablets on the same day of each week.     MULTI-VITAMIN OR 1 daily     simvastatin (ZOCOR) 20 MG tablet Take 1 tablet (20 mg) by mouth At Bedtime     No current facility-administered medications for this visit.          Social History   See HPI    Family History     Family History   Problem Relation Age  "of Onset     Diabetes Father      Dementia Father      Diabetes Brother      Diabetes Sister      Hypertension Sister      Hypertension Sister      Physical Exam     Temp Readings from Last 3 Encounters:   01/02/20 97.8  F (36.6  C) (Tympanic)   12/30/19 98.1  F (36.7  C) (Tympanic)   11/05/19 98.5  F (36.9  C) (Tympanic)     BP Readings from Last 5 Encounters:   01/27/20 136/62   01/02/20 112/62   12/30/19 124/62   11/05/19 120/72   09/23/19 134/74     Pulse Readings from Last 1 Encounters:   01/27/20 73     Resp Readings from Last 1 Encounters:   01/02/20 26     Estimated body mass index is 30.85 kg/m  as calculated from the following:    Height as of 12/30/19: 1.657 m (5' 5.25\").    Weight as of 1/2/20: 84.7 kg (186 lb 12.8 oz).      GEN: NAD  HEENT: MMM.  Anicteric, noninjected sclera  PULM: No increased work of breathing  MSK:  Mild swelling at the bilateral 2nd-3rd MCPs and left wrist.   PSYCH: Alert. Appropriate.     Labs / Imaging (select studies)   RF/CCP  Recent Labs   Lab Test 05/01/18  1538   CCPIGG 182*     CBC  Recent Labs   Lab Test 02/19/20  1030 08/05/19  0933 05/22/19  1404   WBC 7.4 7.3 10.4   RBC 3.98 4.32 4.13   HGB 14.1 14.8 14.4   HCT 41.1 44.1 41.5   * 102* 101*   RDW 14.9 13.6 13.7    230 244   MCH 35.4* 34.3* 34.9*   MCHC 34.3 33.6 34.7   NEUTROPHIL 67.2 68.3 76.3   LYMPH 20.5 20.6 13.0   MONOCYTE 9.2 7.7 7.5   EOSINOPHIL 2.8 3.0 2.9   BASOPHIL 0.3 0.4 0.3   ANEU 5.0 5.0 7.9   ALYM 1.5 1.5 1.4   SHYLA 0.7 0.6 0.8   AEOS 0.2 0.2 0.3   ABAS 0.0 0.0 0.0     CMP  Recent Labs   Lab Test 02/19/20  1030 08/05/19  0933 05/22/19  1404 04/17/19  1526  08/10/18  0837  04/30/18  1601   NA  --   --  134 136  --   --   --  135   POTASSIUM  --   --  3.4 3.3*  --  3.9  --  3.1*   CHLORIDE  --   --  99 102  --   --   --  100   CO2  --   --  32 31  --   --   --  30   ANIONGAP  --   --  3 3  --   --   --  5   GLC  --   --  123* 120*  --   --   --  111*   BUN  --   --  13 16  --   --   --  9   CR " 0.64 0.58 0.58 0.61   < > 0.65   < > 0.51*   GFRESTIMATED >90 >90 >90 >90   < > >90   < > >90   GFRESTBLACK >90 >90 >90 >90   < > >90   < > >90   ROSELYN  --   --  9.3 9.0  --   --   --  8.9   BILITOTAL 0.8 0.6 1.3  --    < > 0.7   < >  --    ALBUMIN 3.3* 3.5 3.9  --    < > 3.5   < >  --    PROTTOTAL 7.1 7.7 7.6  --    < > 7.8   < >  --    ALKPHOS 101 125 132  --    < > 133   < >  --    AST 23 27 21  --    < > 35   < >  --    ALT 23 27 25  --    < > 34   < >  --     < > = values in this interval not displayed.     Calcium/VitaminD  Recent Labs   Lab Test 05/22/19  1404 04/17/19  1526 04/30/18  1601   ROSELYN 9.3 9.0 8.9     ESR/CRP  Recent Labs   Lab Test 02/19/20  1030 08/05/19  0933 05/01/18  1538   SED 10 10 13   CRP 8.5* 10.4* 10.1*     Lipid Panel  Recent Labs   Lab Test 04/17/19  1526 04/30/18  1601 07/03/17  0803  09/09/13  0857 05/25/12  0800   CHOL 159 163 146   < > 169 177   TRIG 102 170* 126  --  132 102   HDL 63 55 51   < > 58 56   LDL 76 74 70   < > 85 101   VLDL  --   --   --   --  26 20   CHOLHDLRATIO  --   --   --   --  3.0 3.0   NHDL 96 108 95  --   --   --     < > = values in this interval not displayed.     Hepatitis B  Recent Labs   Lab Test 05/01/18  1538   HBCAB Nonreactive   HEPBANG Nonreactive     Hepatitis C  Recent Labs   Lab Test 09/11/14  1650   HCVAB Negative     Lyme confirmation testing by Western Blot  Recent Labs   Lab Test 05/22/13  1540   LYWG Negative Reference range: Negative (Note) Band(s) present: NONE (Insufficient number of bands for positive result) INTERPRETIVE INFORMATION: Borrelia Burgdorferi Ab, IgG Western Blot  For this assay, a positive result is reported when any 5 or more of the following 10 bands are present: 18, 23, 28, 30, 39, 41, 45, 58, 66, or 93 kDa.  All other banding patterns are reported as negative.   LYWM Negative Reference range: Negative (Note) Band(s) present: NONE (Insufficient number of bands for positive result) INTERPRETIVE INFORMATION: Borrelia  Burgdorferi Antibody, IgM Western Blot  For this assay, a positive result is reported when any 2 or more of the following bands are present: 23, 39, or 41 kDa. All other banding patterns are reported as negative. Performed by [facility], [address] [place] [id number] [phone] [email or web address], Comfort Vega MD, Lab. Director     Immunization History     Immunization History   Administered Date(s) Administered     Influenza (H1N1) 12/31/2009     Influenza (High Dose) 3 valent vaccine 11/05/2019     Influenza (IIV3) PF 11/03/2004, 10/30/2012, 10/20/2013, 10/03/2016     Influenza Vaccine IM > 6 months Valent IIV4 10/23/2017     Pneumo Conj 13-V (2010&after) 11/12/2018     Pneumococcal 23 valent 02/19/2019     TD (ADULT, 7+) 04/28/1995     TDAP Vaccine (Adacel) 12/05/2007, 02/01/2018     Zoster vaccine recombinant adjuvanted (SHINGRIX) 04/17/2019, 07/10/2019     Zoster vaccine, live 06/07/2016          Chart documentation done in part with Dragon Voice recognition Software. Although reviewed after completion, some word and grammatical error may remain.        Video-Visit Details    Type of service:  Video Visit    Video Start Time: 1:20 PM    Video End Time (time video stopped): 1:45 PM    Originating Location (pt. Location): Home    Distant Location (provider location): Home    Mode of Communication:  Video Conference via D[employer] Video (american well was not responding)      Nasir Baltazar MD

## 2020-04-20 NOTE — TELEPHONE ENCOUNTER
Kane states he got a prescription to fill for patient for methotrexate injectable and ibuprofen and there are drug interactions.    Thank you.

## 2020-04-20 NOTE — TELEPHONE ENCOUNTER
Called and spoke with Kane at [facility] and provided verbal okay per Dr. Baltazar for patient to be on both meds.    Osmel Combs RN....4/20/2020 3:33 PM

## 2020-05-13 ENCOUNTER — OFFICE VISIT (OUTPATIENT)
Dept: FAMILY MEDICINE | Facility: CLINIC | Age: 65
End: 2020-05-13
Payer: COMMERCIAL

## 2020-05-13 VITALS
HEIGHT: 65 IN | DIASTOLIC BLOOD PRESSURE: 68 MMHG | WEIGHT: 194 LBS | BODY MASS INDEX: 32.32 KG/M2 | RESPIRATION RATE: 16 BRPM | OXYGEN SATURATION: 98 % | SYSTOLIC BLOOD PRESSURE: 112 MMHG | TEMPERATURE: 97.5 F | HEART RATE: 82 BPM

## 2020-05-13 DIAGNOSIS — H81.10 BENIGN PAROXYSMAL POSITIONAL VERTIGO, UNSPECIFIED LATERALITY: Primary | ICD-10-CM

## 2020-05-13 PROCEDURE — 99213 OFFICE O/P EST LOW 20 MIN: CPT | Performed by: FAMILY MEDICINE

## 2020-05-13 ASSESSMENT — MIFFLIN-ST. JEOR: SCORE: 1424.82

## 2020-05-13 NOTE — PATIENT INSTRUCTIONS
Thank you for choosing [facility].  You may be receiving an email and/or telephone survey request from [facility] Customer Experience regarding your visit today.  Please take a few minutes to respond to the survey to let us know how we are doing.      If you have questions or concerns, please contact us via [employer] or you can contact your care team at [phone].    Our Clinic hours are:  Monday 6:40 am  to 7:00 pm  Tuesday -Friday 6:40 am to 5:00 pm    The [place] outpatient lab hours are:  Monday - Friday 6:10 am to 4:45 pm  Saturdays 7:00 am to 11:00 am  Appointments are required, call [phone]    If you have clinical questions after hours or would like to schedule an appointment,  call the clinic at [phone].    (H81.10) Benign paroxysmal positional vertigo, unspecified laterality  (primary encounter diagnosis)  Comment:   Plan: PHYSICAL THERAPY REFERRAL        We discussed the most likely diagnosis as positional vertigo. This could be from a stone in the inner ear, called an otolith.   Modify activities and avoid ladder and heights. Avoid swimming. Be careful driving. Physical therapy for the balance treatments is ordered.   Recheck as needed.

## 2020-05-13 NOTE — PROGRESS NOTES
Subjective     Mine Meléndez is a 64 year old female who presents to clinic today for the following health issues:    HPI   Dizziness      Duration: 4:00 pm yesterday.  She was sitting on the deck yesterday and looked up at the robert.  That is when her symptoms started for her.  She has noticed the past 3-4 months when looking up into the cupboard she would notice symptoms.  That would get better right away.    Description   Feeling faint:  no   Feeling like the surroundings are moving: no   Loss of consciousness or falls: no, she does feel like she could fall, has not though.     Intensity:  At first very severe, now more moderate.    Accompanying signs and symptoms:   Nausea/vomitting: YES- Nausea, no vomiting all though at times she feels like she could.   Palpitations: no   Weakness in arms or legs: no   Vision or speech changes: no   No paresthesias or weakness.   Ringing in ears (Tinnitus): History of tinnitus for the left ear.  No changes with that.  Hearing loss related to dizziness: no   Other (fevers/chills/sweating/dyspnea): no     History (similar episodes/head trauma/previous evaluation/recent bleeding): Previous vertigo but this feels different.  No past history of head trauma, or fever. She does have a hx of vertigo in the past but not this severe. She had Otolith therapy. This was effective.     Precipitating or alleviating factors (new meds/chemicals): Her methotrexate changed from pill form to injection form about three weeks ago.  She is not sure if that would be related since it was three weeks ago now.  Worse with activity/head movement: YES- with walking around, took a shower and that didn't go well.  Laying down she is fine.    Therapies tried and outcome: None    Current Outpatient Medications   Medication Instructions     ASPIRIN 81 MG OR TABS ONE DAILY     BIOTIN PO Oral     folic acid (FOLVITE) 1,000 mcg, Oral, DAILY     ibuprofen (ADVIL/MOTRIN) 800 mg, Oral, EVERY 8 HOURS PRN     Insulin  "Syringe-Needle U-100 (BD INSULIN SYRINGE) 27G X 1/2\" 1 ML MISC For weekly methotrexate administration     levothyroxine (SYNTHROID/LEVOTHROID) 50 mcg, Oral, DAILY     lisinopril-hydrochlorothiazide (ZESTORETIC) 10-12.5 MG tablet TAKE ONE TABLET BY MOUTH ONCE DAILY     methotrexate 25 mg, Subcutaneous, EVERY 7 DAYS     MULTI-VITAMIN OR 1 daily     simvastatin (ZOCOR) 20 MG tablet TAKE ONE TABLET BY MOUTH AT BEDTIME       Patient Active Problem List   Diagnosis     Seropositive rheumatoid arthritis (H)     DYSPEPSIA     Obesity     Essential hypertension     Hypothyroidism     HYPERLIPIDEMIA LDL GOAL <130     Advanced directives, counseling/discussion     Dermatitis     High risk medications (not anticoagulants) long-term use     Female stress incontinence     Migraine     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)       Blood pressure 112/68, pulse 82, temperature 97.5  F (36.4  C), temperature source Tympanic, resp. rate 16, height 1.657 m (5' 5.25\"), weight 88 kg (194 lb), last menstrual period 11/12/2004, SpO2 98 %, not currently breastfeeding.    Exam:  GENERAL APPEARANCE: healthy, alert and no distress  EYES: EOMI,  PERRL  HENT: ear canals and TM's normal and nose and mouth without ulcers or lesions  NECK: no adenopathy, no asymmetry, masses, or scars and thyroid normal to palpation  SKIN: no suspicious lesions or rashes  NEURO: Normal strength and tone, sensory exam grossly normal, mentation intact, speech normal and gait abnormal.   PSYCH: mentation appears normal and affect normal/bright  LYMPHATICS: No axillary, cervical, inguinal, or supraclavicular nodes      (H81.10) Benign paroxysmal positional vertigo, unspecified laterality  (primary encounter diagnosis)  Comment:   Plan: PHYSICAL THERAPY REFERRAL        We discussed the most likely diagnosis as positional vertigo. This could be from a stone in the inner ear, called an otolith.   Modify activities and avoid ladder " and heights. Avoid swimming. Be careful driving. Physical therapy for the balance treatments is ordered.   Recheck as needed.     Isai Olivia MD

## 2020-05-14 DIAGNOSIS — M05.9 SEROPOSITIVE RHEUMATOID ARTHRITIS (H): ICD-10-CM

## 2020-05-14 NOTE — TELEPHONE ENCOUNTER
Medication:   Ibuprofen 800mg  Last written on:   4/20/2020  Quantity:   90    Refills:   0    Last office visit:   4/20/2020  Next office visit:   7/27/2020  Last labs:   2/19/2020    Rachel Uriarte CMA Rheumatology  5/14/2020 9:06 AM

## 2020-05-15 RX ORDER — IBUPROFEN 800 MG/1
800 TABLET, FILM COATED ORAL EVERY 8 HOURS PRN
Qty: 90 TABLET | Refills: 0 | Status: SHIPPED | OUTPATIENT
Start: 2020-05-15

## 2020-05-15 NOTE — TELEPHONE ENCOUNTER
Rheumatology team: Please call to notify Ms. Meléndez that ibuprofen has been refilled.  Nasir Baltazar MD  5/15/2020 3:45 PM

## 2020-05-20 ENCOUNTER — HOSPITAL ENCOUNTER (OUTPATIENT)
Dept: PHYSICAL THERAPY | Facility: CLINIC | Age: 65
Setting detail: THERAPIES SERIES
End: 2020-05-20
Attending: FAMILY MEDICINE
Payer: COMMERCIAL

## 2020-05-20 DIAGNOSIS — H81.10 BENIGN PAROXYSMAL POSITIONAL VERTIGO, UNSPECIFIED LATERALITY: ICD-10-CM

## 2020-05-20 PROCEDURE — 97162 PT EVAL MOD COMPLEX 30 MIN: CPT | Mod: GP | Performed by: PHYSICAL THERAPIST

## 2020-05-20 NOTE — PROGRESS NOTES
Mine Meléndez   PHYSICAL THERAPY EVALUATION    05/20/20 1400   Quick Adds   Quick Adds Vestibular Eval   Type of Visit Initial OP PT Evaluation   General Information   Start of Care Date 05/20/20   Referring Physician DR Olivia   Orders Evaluate and Treat as Indicated   Order Date 05/13/20   Medical Diagnosis BPPV   Onset of illness/injury or Date of Surgery 05/13/20   Pertinent history of current problem (include personal factors and/or comorbidities that impact the POC) Sx began 5/13/20, off balance, felt more like she was moving, lasted  evan, felt ok lying down, stayed in bed most of day. Then got better. Happened again this Monday, felt abd right away getting up, staye din bed most of day, lessened by evening. Off balance, difficulty walking.    Prior level of function comment garden, housework   Patient role/Employment history Retired   Patient/Family Goals Statement get rid of dizziness   Fall Risk Screen   Fall screen completed by PT   Have you fallen 2 or more times in the past year? No   Have you fallen and had an injury in the past year? No   Is patient a fall risk? No   Gait   Gait Comments gait si normal, gait with head turns is normal, pivot normal   Balance Special Tests   Balance Special Tests Modified CTSIB Conditions   Balance Special Tests Modified CTSIB Conditions   Condition 1, seconds 30 Seconds   Condition 2, seconds 30 Seconds   Condition 4, seconds 30 Seconds   Condition 5, seconds 9 Seconds   Cervicogenic Screen   Neck ROM WFL   Vertebral Artery Test Normal   Transverse Ligament Test Normal   Oculomotor Exam   Smooth Pursuit Normal   Saccades Normal   VOR Normal   Rapid Head Thrust Normal   Infrared Goggle Exam or Frenzel Lense Exam   Vestibular Suppressant in Last 24 Hours? No   Exam completed with Room Light   Spontaneous Nystagmus Negative   [place]-Hallpike (right) Upbeating R torsional   [place]-Hallpike (right) comments very small amplitude lasting 30sec approx   [place]-Hallpike (Left)  Negative   [place]-Hallpike (left) comments possibly very slight up/L ROT 5-7 beats first getting into position   HSCC Supine Roll Test (Right) Negative   HSCC Supine Roll Test (Left) Negative   BPPV Canal(s) R Posterior   BPPV Type Canalithasis   Planned Therapy Interventions   Planned Therapy Interventions neuromuscular re-education;balance training   Clinical Impression   Criteria for Skilled Therapeutic Interventions Met yes, treatment indicated   PT Diagnosis R post canal BPPV   Functional limitations due to impairments turning head too quickly, doing too much   Clinical Presentation Evolving/Changing   Clinical Presentation Rationale vestib, 2 episodes in past week, possible neuritis vs BPPV, pt not great historian   Clinical Decision Making (Complexity) Moderate complexity   Therapy Frequency 1 time/week   Predicted Duration of Therapy Intervention (days/wks) up to 4wk, [employer] needs auth    Risk & Benefits of therapy have been explained Yes   Patient, Family & other staff in agreement with plan of care Yes   Education Assessment   Preferred Learning Style Listening   Barriers to Learning No barriers   GOALS   PT Eval Goals 1   Goal 1   Goal Description pt will be noemy to do all acitivites including gardening wihtout dizziness in4wk   Target Date 06/20/20   Total Evaluation Time   PT Eval, Moderate Complexity Minutes (48265) 25   Kris Hoenk, PT #[id number]  [facility]  [phone]

## 2020-06-01 ENCOUNTER — OFFICE VISIT (OUTPATIENT)
Dept: DERMATOLOGY | Facility: CLINIC | Age: 65
End: 2020-06-01
Payer: COMMERCIAL

## 2020-06-01 VITALS — HEART RATE: 105 BPM | SYSTOLIC BLOOD PRESSURE: 129 MMHG | OXYGEN SATURATION: 96 % | DIASTOLIC BLOOD PRESSURE: 66 MMHG

## 2020-06-01 DIAGNOSIS — D23.9 DERMAL NEVUS: ICD-10-CM

## 2020-06-01 DIAGNOSIS — D18.01 ANGIOMA OF SKIN: ICD-10-CM

## 2020-06-01 DIAGNOSIS — L81.4 LENTIGO: ICD-10-CM

## 2020-06-01 DIAGNOSIS — L82.1 SEBORRHEIC KERATOSIS: ICD-10-CM

## 2020-06-01 DIAGNOSIS — D22.9 NEVUS: ICD-10-CM

## 2020-06-01 DIAGNOSIS — Z85.820 HISTORY OF MELANOMA: Primary | ICD-10-CM

## 2020-06-01 PROCEDURE — 99214 OFFICE O/P EST MOD 30 MIN: CPT | Performed by: DERMATOLOGY

## 2020-06-01 NOTE — LETTER
6/1/2020         RE: Mine Meléndez  [id number]  [facility] [id number]        Dear Colleague,    Thank you for referring your patient, Mine Meléndez, to the [facility]. Please see a copy of my visit note below.    Mine Meléndez is a 65 year old year old female patient here today for hx of melanoma 0.3mm r thigh. She notes brown spot on right leg.    .  Patient states this has been present for a while.  Patient reports the following symptoms:  none.  Patient reports the following previous treatments none.  These treatments did not work.  Patient reports the following modifying factors none.  Associated symptoms: none.  Patient has no other skin complaints today.  Remainder of the HPI, Meds, PMH, Allergies, FH, and SH was reviewed in chart.      Past Medical History:   Diagnosis Date     Herpes simplex without mention of complication      Malignant melanoma (H)      Obesity, unspecified      Osteomyelitis of jaw 5/22/2008     Rheumatoid arthritis(714.0)      Trochanteric bursitis of right hip 8/16/2013       Past Surgical History:   Procedure Laterality Date     COLONOSCOPY  1/20/06     HYSTERECTOMY, PAP NO LONGER INDICATED       HYSTERECTOMY, VAGINAL  11/04    TVH,TVT procedure     RELEASE CARPAL TUNNEL Right 10/11/2016    Procedure: RELEASE CARPAL TUNNEL;  Surgeon: Emely Blanca MD;  Location: [facility] OR     SURGICAL HISTORY OF -   1988    tubal ligation     SURGICAL HISTORY OF -   2001    excision gangliion cyst rt heel        Family History   Problem Relation Age of Onset     Diabetes Father      Dementia Father      Diabetes Brother      Diabetes Sister      Hypertension Sister      Hypertension Sister        Social History     Socioeconomic History     Marital status:      Spouse name: Not on file     Number of children: Not on file     Years of education: Not on file     Highest education level: Not on file   Occupational History     Not on file   Social Needs      "Financial resource strain: Not on file     Food insecurity     Worry: Not on file     Inability: Not on file     Transportation needs     Medical: Not on file     Non-medical: Not on file   Tobacco Use     Smoking status: Former Smoker     Years: 25.00     Last attempt to quit: 2000     Years since quittin.0     Smokeless tobacco: Never Used   Substance and Sexual Activity     Alcohol use: Yes     Comment: 6 pack of beer on a weekend     Drug use: No     Sexual activity: Yes     Partners: Male     Birth control/protection: Surgical     Comment: [email or web address].partial   Lifestyle     Physical activity     Days per week: Not on file     Minutes per session: Not on file     Stress: Not on file   Relationships     Social connections     Talks on phone: Not on file     Gets together: Not on file     Attends Taoist service: Not on file     Active member of club or organization: Not on file     Attends meetings of clubs or organizations: Not on file     Relationship status: Not on file     Intimate partner violence     Fear of current or ex partner: Not on file     Emotionally abused: Not on file     Physically abused: Not on file     Forced sexual activity: Not on file   Other Topics Concern     Parent/sibling w/ CABG, MI or angioplasty before 65F 55M? No   Social History Narrative     Not on file       Outpatient Encounter Medications as of 2020   Medication Sig Dispense Refill     ASPIRIN 81 MG OR TABS ONE DAILY 100 3     BIOTIN PO        folic acid (FOLVITE) 1 MG tablet Take 1 tablet (1,000 mcg) by mouth daily 90 tablet 2     ibuprofen (ADVIL/MOTRIN) 800 MG tablet Take 1 tablet (800 mg) by mouth every 8 hours as needed for moderate pain 90 tablet 0     Insulin Syringe-Needle U-100 (BD INSULIN SYRINGE) 27G X 1/2\" 1 ML MISC For weekly methotrexate administration 10 each 1     levothyroxine (SYNTHROID/LEVOTHROID) 50 MCG tablet Take 1 tablet (50 mcg) by mouth daily 90 tablet 3     lisinopril-hydrochlorothiazide " (ZESTORETIC) 10-12.5 MG tablet TAKE ONE TABLET BY MOUTH ONCE DAILY 90 tablet 3     methotrexate 50 MG/2ML injection Inject 1 mL (25 mg) Subcutaneous every 7 days 4 vial 3     MULTI-VITAMIN OR 1 daily       simvastatin (ZOCOR) 20 MG tablet TAKE ONE TABLET BY MOUTH AT BEDTIME 90 tablet 1     [] azithromycin (ZITHROMAX) 250 MG tablet Take 2 tablets (500 mg) by mouth daily for 1 day, THEN 1 tablet (250 mg) daily for 4 days. 6 tablet 0     [] predniSONE (DELTASONE) 20 MG tablet Take 2 tablets (40 mg) by mouth daily for 5 days 10 tablet 0     No facility-administered encounter medications on file as of 2020.              Review Of Systems  Skin: As above  Eyes: negative  Ears/Nose/Throat: negative  Respiratory: No shortness of breath, dyspnea on exertion, cough, or hemoptysis  Cardiovascular: negative  Gastrointestinal: negative  Genitourinary: negative  Musculoskeletal: negative  Neurologic: negative  Psychiatric: negative  Hematologic/Lymphatic/Immunologic: negative  Endocrine: negative      O:   NAD, WDWN, Alert & Oriented, Mood & Affect wnl, Vitals stable   Here today alone   /66   Pulse 105   LMP 2004 (Approximate)   SpO2 96%    General appearance normal   Vitals stable   Alert, oriented and in no acute distress      Following lymph nodes palpated: Occipital, Cervical, Supraclavicular , inguinal no lad   R leg brown stuck on papule      Stuck on papules and brown macules on trunk and ext   Red papules on trunk  Flesh colored papules on trunk   Pigmented macules on trunk and ext with regular borders and pigment networks   The remainder of the full exam was normal; the following areas were examined:  conjunctiva/lids, oral mucosa, neck, peripheral vascular system, abdomen, lymph nodes, digits/nails, eccrine and apocrine glands, scalp/hair, face, neck, chest, abdomen, buttocks, back, RUE, LUE, RLE, LLE       Eyes: Conjunctivae/lids:Normal     ENT: Lips, buccal mucosa, tongue:  normal    MSK:Normal    Cardiovascular: peripheral edema none    Pulm: Breathing Normal    Lymph Nodes: No Head and Neck Lymphadenopathy     Neuro/Psych: Orientation:Alert and Orientedx3 ; Mood/Affect:normal       A/P:  1. Seborrheic keratosis, lentigo, angioma, dermal nevus, hx of melanoma  MELANOMA DISCUSSED WITH PATIENT:  I discussed the specifics of tumor, prognosis, metachronous melanoma, self exam, and genetics with the patient. I explained the need for monthly skin exams including and taught the patient how to do this. Patient was asked about new or changing moles . I discussed with patient signs and symptoms that could arise in the setting of recurrent locoregional or metastatic disease. In addition, the need to undergo every 4 month dermatologic full skin survey and evaluation given that patients with a diagnosis of melanoma are at risk of recurrence (local and distant) and of subsequent de trixie melanoma.      BENIGN LESIONS DISCUSSED WITH PATIENT:  I discussed the specifics of tumor, prognosis, and genetics of benign lesions.  I explained that treatment of these lesions would be purely cosmetic and not medically neccessary.  I discussed with patient different removal options including excision, cautery and /or laser.      Nature and genetics of benign skin lesions dicussed with patient.  Signs and Symptoms of skin cancer discussed with patient.  ABCDEs of melanoma reviewed with patient.  Patient encouraged to perform monthly skin exams.  UV precautions reviewed with patient.  Skin care regimen reviewed with patient: Eliminate harsh soaps, i.e. Dial, zest, irsih spring; Mild soaps such as Cetaphil or Dove sensitive skin, avoid hot or cold showers, aggressive use of emollients including vanicream, cetaphil or cerave discussed with patient.    Risks of non-melanoma skin cancer discussed with patient   Return to clinic 4 months    Again, thank you for allowing me to participate in the care of your patient.         Sincerely,        Pankaj Castro MD

## 2020-06-01 NOTE — PROGRESS NOTES
Mine Meléndez is a 65 year old year old female patient here today for hx of melanoma 0.3mm r thigh. She notes brown spot on right leg.    .  Patient states this has been present for a while.  Patient reports the following symptoms:  none.  Patient reports the following previous treatments none.  These treatments did not work.  Patient reports the following modifying factors none.  Associated symptoms: none.  Patient has no other skin complaints today.  Remainder of the HPI, Meds, PMH, Allergies, FH, and SH was reviewed in chart.      Past Medical History:   Diagnosis Date     Herpes simplex without mention of complication      Malignant melanoma (H)      Obesity, unspecified      Osteomyelitis of jaw 5/22/2008     Rheumatoid arthritis(714.0)      Trochanteric bursitis of right hip 8/16/2013       Past Surgical History:   Procedure Laterality Date     COLONOSCOPY  1/20/06     HYSTERECTOMY, PAP NO LONGER INDICATED       HYSTERECTOMY, VAGINAL  11/04    TVH,TVT procedure     RELEASE CARPAL TUNNEL Right 10/11/2016    Procedure: RELEASE CARPAL TUNNEL;  Surgeon: Emely Blanca MD;  Location: [facility]     SURGICAL HISTORY OF -   1988    tubal ligation     SURGICAL HISTORY OF -   2001    excision gangliion cyst rt heel        Family History   Problem Relation Age of Onset     Diabetes Father      Dementia Father      Diabetes Brother      Diabetes Sister      Hypertension Sister      Hypertension Sister        Social History     Socioeconomic History     Marital status:      Spouse name: Not on file     Number of children: Not on file     Years of education: Not on file     Highest education level: Not on file   Occupational History     Not on file   Social Needs     Financial resource strain: Not on file     Food insecurity     Worry: Not on file     Inability: Not on file     Transportation needs     Medical: Not on file     Non-medical: Not on file   Tobacco Use     Smoking status: Former Smoker     Years:  "25.00     Last attempt to quit: 2000     Years since quittin.0     Smokeless tobacco: Never Used   Substance and Sexual Activity     Alcohol use: Yes     Comment: 6 pack of beer on a weekend     Drug use: No     Sexual activity: Yes     Partners: Male     Birth control/protection: Surgical     Comment: hyster.partial   Lifestyle     Physical activity     Days per week: Not on file     Minutes per session: Not on file     Stress: Not on file   Relationships     Social connections     Talks on phone: Not on file     Gets together: Not on file     Attends Rastafarian service: Not on file     Active member of club or organization: Not on file     Attends meetings of clubs or organizations: Not on file     Relationship status: Not on file     Intimate partner violence     Fear of current or ex partner: Not on file     Emotionally abused: Not on file     Physically abused: Not on file     Forced sexual activity: Not on file   Other Topics Concern     Parent/sibling w/ CABG, MI or angioplasty before 65F 55M? No   Social History Narrative     Not on file       Outpatient Encounter Medications as of 2020   Medication Sig Dispense Refill     ASPIRIN 81 MG OR TABS ONE DAILY 100 3     BIOTIN PO        folic acid (FOLVITE) 1 MG tablet Take 1 tablet (1,000 mcg) by mouth daily 90 tablet 2     ibuprofen (ADVIL/MOTRIN) 800 MG tablet Take 1 tablet (800 mg) by mouth every 8 hours as needed for moderate pain 90 tablet 0     Insulin Syringe-Needle U-100 (BD INSULIN SYRINGE) 27G X 1/2\" 1 ML MISC For weekly methotrexate administration 10 each 1     levothyroxine (SYNTHROID/LEVOTHROID) 50 MCG tablet Take 1 tablet (50 mcg) by mouth daily 90 tablet 3     lisinopril-hydrochlorothiazide (ZESTORETIC) 10-12.5 MG tablet TAKE ONE TABLET BY MOUTH ONCE DAILY 90 tablet 3     methotrexate 50 MG/2ML injection Inject 1 mL (25 mg) Subcutaneous every 7 days 4 vial 3     MULTI-VITAMIN OR 1 daily       simvastatin (ZOCOR) 20 MG tablet TAKE ONE " TABLET BY MOUTH AT BEDTIME 90 tablet 1     [] azithromycin (ZITHROMAX) 250 MG tablet Take 2 tablets (500 mg) by mouth daily for 1 day, THEN 1 tablet (250 mg) daily for 4 days. 6 tablet 0     [] predniSONE (DELTASONE) 20 MG tablet Take 2 tablets (40 mg) by mouth daily for 5 days 10 tablet 0     No facility-administered encounter medications on file as of 2020.              Review Of Systems  Skin: As above  Eyes: negative  Ears/Nose/Throat: negative  Respiratory: No shortness of breath, dyspnea on exertion, cough, or hemoptysis  Cardiovascular: negative  Gastrointestinal: negative  Genitourinary: negative  Musculoskeletal: negative  Neurologic: negative  Psychiatric: negative  Hematologic/Lymphatic/Immunologic: negative  Endocrine: negative      O:   NAD, WDWN, Alert & Oriented, Mood & Affect wnl, Vitals stable   Here today alone   /66   Pulse 105   LMP 2004 (Approximate)   SpO2 96%    General appearance normal   Vitals stable   Alert, oriented and in no acute distress      Following lymph nodes palpated: Occipital, Cervical, Supraclavicular , inguinal no lad   R leg brown stuck on papule      Stuck on papules and brown macules on trunk and ext   Red papules on trunk  Flesh colored papules on trunk   Pigmented macules on trunk and ext with regular borders and pigment networks   The remainder of the full exam was normal; the following areas were examined:  conjunctiva/lids, oral mucosa, neck, peripheral vascular system, abdomen, lymph nodes, digits/nails, eccrine and apocrine glands, scalp/hair, face, neck, chest, abdomen, buttocks, back, RUE, LUE, RLE, LLE       Eyes: Conjunctivae/lids:Normal     ENT: Lips, buccal mucosa, tongue: normal    MSK:Normal    Cardiovascular: peripheral edema none    Pulm: Breathing Normal    Lymph Nodes: No Head and Neck Lymphadenopathy     Neuro/Psych: Orientation:Alert and Orientedx3 ; Mood/Affect:normal       A/P:  1. Seborrheic keratosis, lentigo,  angioma, dermal nevus, hx of melanoma  MELANOMA DISCUSSED WITH PATIENT:  I discussed the specifics of tumor, prognosis, metachronous melanoma, self exam, and genetics with the patient. I explained the need for monthly skin exams including and taught the patient how to do this. Patient was asked about new or changing moles . I discussed with patient signs and symptoms that could arise in the setting of recurrent locoregional or metastatic disease. In addition, the need to undergo every 4 month dermatologic full skin survey and evaluation given that patients with a diagnosis of melanoma are at risk of recurrence (local and distant) and of subsequent de trixie melanoma.      BENIGN LESIONS DISCUSSED WITH PATIENT:  I discussed the specifics of tumor, prognosis, and genetics of benign lesions.  I explained that treatment of these lesions would be purely cosmetic and not medically neccessary.  I discussed with patient different removal options including excision, cautery and /or laser.      Nature and genetics of benign skin lesions dicussed with patient.  Signs and Symptoms of skin cancer discussed with patient.  ABCDEs of melanoma reviewed with patient.  Patient encouraged to perform monthly skin exams.  UV precautions reviewed with patient.  Skin care regimen reviewed with patient: Eliminate harsh soaps, i.e. Dial, zest, irsih spring; Mild soaps such as Cetaphil or Dove sensitive skin, avoid hot or cold showers, aggressive use of emollients including vanicream, cetaphil or cerave discussed with patient.    Risks of non-melanoma skin cancer discussed with patient   Return to clinic 4 months

## 2020-06-24 NOTE — PROGRESS NOTES
PHYSICAL THERAPY DISCHARGE        Patient was seen one time as planned.  No further contacts have been made.  Will discharge this episode of care.    Kris Hoenk, PT #[id number]  [facility]

## 2020-06-26 ENCOUNTER — MYC MEDICAL ADVICE (OUTPATIENT)
Dept: FAMILY MEDICINE | Facility: CLINIC | Age: 65
End: 2020-06-26

## 2020-06-26 NOTE — TELEPHONE ENCOUNTER
Spoke with patient by phone.  She thinks it was a bug bite, like a fly or mosquito.  The area was itchy, she has been scratching it.  Advised it could be a bruise from scratching (she is on aspirin).  No fever, chills, swelling or other signs of infection.  Advised patient to watch for new or worsening symptoms.  If any develop, go to ED/UC over the weekend.  Follow up in clinic if no improvement.    Patient verbalizes understanding.  Rossy Cardenas RN

## 2020-07-09 DIAGNOSIS — Z79.899 HIGH RISK MEDICATION USE: ICD-10-CM

## 2020-07-09 DIAGNOSIS — E03.8 OTHER SPECIFIED HYPOTHYROIDISM: ICD-10-CM

## 2020-07-09 DIAGNOSIS — M05.9 SEROPOSITIVE RHEUMATOID ARTHRITIS (H): ICD-10-CM

## 2020-07-09 LAB
ALBUMIN SERPL-MCNC: 3.3 G/DL (ref 3.4–5)
ALP SERPL-CCNC: 130 U/L (ref 40–150)
ALT SERPL W P-5'-P-CCNC: 26 U/L (ref 0–50)
AST SERPL W P-5'-P-CCNC: 25 U/L (ref 0–45)
BASOPHILS # BLD AUTO: 0 10E9/L (ref 0–0.2)
BASOPHILS NFR BLD AUTO: 0.3 %
BILIRUB DIRECT SERPL-MCNC: 0.2 MG/DL (ref 0–0.2)
BILIRUB SERPL-MCNC: 0.5 MG/DL (ref 0.2–1.3)
CREAT SERPL-MCNC: 0.58 MG/DL (ref 0.52–1.04)
CRP SERPL-MCNC: 24 MG/L (ref 0–8)
DIFFERENTIAL METHOD BLD: ABNORMAL
EOSINOPHIL # BLD AUTO: 0.1 10E9/L (ref 0–0.7)
EOSINOPHIL NFR BLD AUTO: 1.3 %
ERYTHROCYTE [DISTWIDTH] IN BLOOD BY AUTOMATED COUNT: 13.2 % (ref 10–15)
ERYTHROCYTE [SEDIMENTATION RATE] IN BLOOD BY WESTERGREN METHOD: 17 MM/H (ref 0–30)
GFR SERPL CREATININE-BSD FRML MDRD: >90 ML/MIN/{1.73_M2}
HCT VFR BLD AUTO: 42.2 % (ref 35–47)
HGB BLD-MCNC: 14.4 G/DL (ref 11.7–15.7)
LYMPHOCYTES # BLD AUTO: 1.4 10E9/L (ref 0.8–5.3)
LYMPHOCYTES NFR BLD AUTO: 16.4 %
MCH RBC QN AUTO: 34.6 PG (ref 26.5–33)
MCHC RBC AUTO-ENTMCNC: 34.1 G/DL (ref 31.5–36.5)
MCV RBC AUTO: 101 FL (ref 78–100)
MONOCYTES # BLD AUTO: 0.6 10E9/L (ref 0–1.3)
MONOCYTES NFR BLD AUTO: 6.6 %
NEUTROPHILS # BLD AUTO: 6.6 10E9/L (ref 1.6–8.3)
NEUTROPHILS NFR BLD AUTO: 75.4 %
PLATELET # BLD AUTO: 225 10E9/L (ref 150–450)
PROT SERPL-MCNC: 7.4 G/DL (ref 6.8–8.8)
RBC # BLD AUTO: 4.16 10E12/L (ref 3.8–5.2)
TSH SERPL DL<=0.005 MIU/L-ACNC: 1.89 MU/L (ref 0.4–4)
WBC # BLD AUTO: 8.7 10E9/L (ref 4–11)

## 2020-07-09 PROCEDURE — 80076 HEPATIC FUNCTION PANEL: CPT | Performed by: INTERNAL MEDICINE

## 2020-07-09 PROCEDURE — 84443 ASSAY THYROID STIM HORMONE: CPT | Performed by: FAMILY MEDICINE

## 2020-07-09 PROCEDURE — 82565 ASSAY OF CREATININE: CPT | Performed by: INTERNAL MEDICINE

## 2020-07-09 PROCEDURE — 85652 RBC SED RATE AUTOMATED: CPT | Performed by: INTERNAL MEDICINE

## 2020-07-09 PROCEDURE — 85025 COMPLETE CBC W/AUTO DIFF WBC: CPT | Performed by: INTERNAL MEDICINE

## 2020-07-09 PROCEDURE — 36415 COLL VENOUS BLD VENIPUNCTURE: CPT | Performed by: INTERNAL MEDICINE

## 2020-07-09 PROCEDURE — 86140 C-REACTIVE PROTEIN: CPT | Performed by: INTERNAL MEDICINE

## 2020-07-09 RX ORDER — LEVOTHYROXINE SODIUM 50 UG/1
50 TABLET ORAL DAILY
Qty: 90 TABLET | Refills: 3 | Status: SHIPPED | OUTPATIENT
Start: 2020-07-09 | End: 2021-04-05

## 2020-07-27 ENCOUNTER — VIRTUAL VISIT (OUTPATIENT)
Dept: RHEUMATOLOGY | Facility: CLINIC | Age: 65
End: 2020-07-27
Payer: COMMERCIAL

## 2020-07-27 DIAGNOSIS — M05.9 SEROPOSITIVE RHEUMATOID ARTHRITIS (H): Primary | ICD-10-CM

## 2020-07-27 DIAGNOSIS — Z11.59 ENCOUNTER FOR SCREENING FOR OTHER VIRAL DISEASES: ICD-10-CM

## 2020-07-27 DIAGNOSIS — Z79.899 HIGH RISK MEDICATION USE: ICD-10-CM

## 2020-07-27 DIAGNOSIS — M19.041 PRIMARY OSTEOARTHRITIS OF BOTH HANDS: ICD-10-CM

## 2020-07-27 DIAGNOSIS — M19.042 PRIMARY OSTEOARTHRITIS OF BOTH HANDS: ICD-10-CM

## 2020-07-27 PROCEDURE — 99214 OFFICE O/P EST MOD 30 MIN: CPT | Mod: 95 | Performed by: INTERNAL MEDICINE

## 2020-07-27 RX ORDER — METHOTREXATE 2.5 MG/1
20 TABLET ORAL WEEKLY
Qty: 96 TABLET | Refills: 1 | Status: SHIPPED | OUTPATIENT
Start: 2020-07-27 | End: 2021-01-22

## 2020-07-27 RX ORDER — HYDROXYCHLOROQUINE SULFATE 200 MG/1
200 TABLET, FILM COATED ORAL 2 TIMES DAILY
Qty: 60 TABLET | Refills: 3 | Status: SHIPPED | OUTPATIENT
Start: 2020-07-27 | End: 2020-10-26

## 2020-07-27 NOTE — PROGRESS NOTES
"Mine Meléndez is a 65 year old female who is being evaluated via a billable video visit.      The patient has been notified of following:     \"This video visit will be conducted via a call between you and your physician/provider. We have found that certain health care needs can be provided without the need for an in-person physical exam.  This service lets us provide the care you need with a video conversation.  If a prescription is necessary we can send it directly to your pharmacy.  If lab work is needed we can place an order for that and you can then stop by our lab to have the test done at a later time.    Video visits are billed at different rates depending on your insurance coverage.  Please reach out to your insurance provider with any questions.    If during the course of the call the physician/provider feels a video visit is not appropriate, you will not be charged for this service.\"    Patient has given verbal consent for Video visit? Yes  How would you like to obtain your AVS? MyChart  If you are dropped from the video visit, the video invite should be resent to: Text to cell phone: [phone]  Will anyone else be joining your video visit? No    Rheumatology Video Visit      Mine Meléndez MRN# [id number]   YOB: 1955 Age: 65 year old      Date of visit: 7/27/20     Chief Complaint   Patient presents with:  Arthritis: RA    Assessment and Plan     1. Seropositive Erosive Rheumatoid Arthritis (RF+, ): Dx'd with RA prior to 2002. Previously on SSZ (stomatitis). Currently on MTX 25mg SQ once weekly and folic acid 1mg daily.  No improvement with MTX change so will revert to the previous dose and route of administration for MTX.  Persistent swelling, and inflammatory symptoms so discussed other tx options; HCQ, Xeljanz, and Humira.  After thorough discussion will start HCQ and if no improvement then plan to use Humira.  Labs in 3 months for toxicity monitoring.  If RA is still active " then advised she also check infection screening labs and x-rays.  - Change Methotrexate from 25mg SQ weekly, to 20mg PO once weekly  - Continue folic acid 1mg daily  - Start hydroxychloroquine 200mg BID  - Toxicity monitoring labs in 3 months, and disease activity labs:  CBC, Creatinine, Hepatic Panel, ESR, CRP  - Infection screening needed in 3 months if still active disease: hepatitis B/C, Quantieron TB gold plus, Chest x-ray    # Adalimumab (Humira) Risks and Benefits: The risks and benefits of adalimumab were discussed in detail and the patient verbalized understanding.  The risks discussed include, but are not limited to, the risk for hypersensitivity, anaphylaxis, anaphylactoid reactions, an increased risk for serious infections leading to hospitalization or death, a possible increased risk for lymphoma and other malignancies, a possible worsening of demyelinating diseases, a possible worsening of heart failure, risk for cytopenias, risk for drug induced lupus, possible reactivation of hepatitis B, and possible reactivation of latent tuberculosis.  Subcutaneous injections may result in injection site reactions and/or pain at the site of injection.  The most common adverse reactions are infections, injection site reactions, headache, and rash.  It was discussed that the medication would need to be discontinued if a serious infection develops.  It was discussed that live vaccinations should not be received while using adalimumab or within 30 days prior to starting adalimumab.  I encouraged reviewing the package insert and asking any questions about the medication.      # Hydroxychloroquine (Plaquenil) Risks and Benefits:  The risks and benefits of hydroxychloroquine were discussed in detail and the patient verbalized understanding; the patient also verbalized agreement to get the required ophthalmologic toxicity monitoring.  The risks discussed include, but are not limited to, the risk for hypersensitivity,  anaphylaxis, anaphylactoid reactions, irreversible retinal damage, rare hematologic reactions, and rare cardiomyopathy.  Patients with G6PD deficiency or hepatic impairment may be at an increased risk for adverse effects.  I encouraged reviewing the package insert and asking any questions about the medication.      # Tofacitinib (Xeljanz) Risks and Benefits: The risks and benefits of Xeljanz were discussed in detail and the patient verbalized understanding.  The risks discussed include, but are not limited to, the risk for hypersensitivity, anaphylaxis, anaphylactoid reactions, an increased risk for serious infections leading to hospitalization or death, increased risk for lymphoma and other malignancies, an increased risk for gastrointestinal perforation, worsening triglycerides.  The most common adverse reactions were upper respiratory tract infections, headache, diarrhea, and nasopharyngitis.  In addition to routine laboratory monitoring during therapy, labs including CBC, CMP, and lipid panel will be required at the start of therapy and 8 weeks after starting therapy.  I encouraged reviewing the package insert and asking any questions about the medication.     2. Primary osteoarthritis of both hands: start voltaren gel PRN  - Start diclofenac (VOLTAREN) 1 % topical gel; Apply up to 2 grams of 1% gel to hands up to 4 times daily as needed for joint pain (maximum: 8 g per upper extremity per day)  Dispense: 200 g; Refill: 3    # NSAIDs:  The risks and benefits of NSAIDs were discussed in detail and the patient verbalized understanding.  The risks discussed include, but are not limited to, the risk for hypersensitivity, anaphylaxis, GI upset, gastric ulcer, nephropathy, and an increased risk for cardiovascular events.     # Relevant labs and imaging were reviewed with the patient    # High risk medication toxicity monitoring: discussion and labs reviewed; appropriate labs ordered. See above.  Instructed that if  confirmed to have COVID-19 or exposure to someone with confirmed COVID-19 to call this clinic for directions on DMARD management.    # Note that this is a virtual visit to reduce the risk of COVID-19 exposure during this current pandemic.      # Considered to be at high risk of complications from the COVID-19 virus.  It is recommended to limit contact with other people and if possible to work remotely or provide a leave of absence to reduce the risk for COVID-19.        Ms. Meléndez verbalized agreement with and understanding of the rational for the diagnosis and treatment plan.  All questions were answered to best of my ability and the patient's satisfaction. Ms. Meléndez was advised to contact the clinic with any questions that may arise after the clinic visit.      Thank you for involving me in the care of the patient    Return to clinic: 3 months      HPI   Mine Meléndez is a 65 year old female with a past medical history significant for hypertension, hyperlipidemia, hypothyroidism, migraines, and rheumatoid arthritis who presents for follow-up of rheumatoid arthritis.     Today, 7/27/2020: doing poorly with no improvement of her arthritis symptoms with the MTX dose increase.  Swelling and pain at the MCPs and PIPs that is worse in the AM and improved with time and activity.  DIPs pain and bony enlargement; pain worse with activity and improved with rest.  Morning stiffness all day; reaching a baseline after 1-2 hours; +gelling.     Denies fevers, chills, nausea, vomiting, constipation, diarrhea. No abdominal pain. No chest pain/pressure, palpitations, or shortness of breath. No LE swelling. No neck pain. No oral or nasal sores.  No rash. No sicca symptoms.     Tobacco: None  EtOH: 4 drinks per day on the weekends  Drugs: None  Occupation: retired June 2020, was working in [employer]   GEN: No fevers, chills, night sweats, or weight change  SKIN: No itching, rashes, sores  HEENT: No oral or nasal ulcers.  CV:  No chest pain, pressure, palpitations, or dyspnea on exertion.  PULM: No SOB, wheeze, cough.  GI: No nausea, vomiting, constipation, diarrhea. No blood in stool. No abdominal pain.  : No blood in urine.  MSK: See HPI.  NEURO: No numbness, tingling, or weakness.  EXT: No LE swelling  PSYCH: Negative    Active Problem List     Patient Active Problem List   Diagnosis     Seropositive rheumatoid arthritis (H)     DYSPEPSIA     Obesity     Essential hypertension     Hypothyroidism     HYPERLIPIDEMIA LDL GOAL <130     Advanced directives, counseling/discussion     Dermatitis     High risk medications (not anticoagulants) long-term use     Female stress incontinence     Migraine     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)     Past Medical History     Past Medical History:   Diagnosis Date     Herpes simplex without mention of complication      Malignant melanoma (H)      Obesity, unspecified      Osteomyelitis of jaw 5/22/2008     Rheumatoid arthritis(714.0)      Trochanteric bursitis of right hip 8/16/2013     Past Surgical History     Past Surgical History:   Procedure Laterality Date     COLONOSCOPY  1/20/06     HYSTERECTOMY, PAP NO LONGER INDICATED       HYSTERECTOMY, VAGINAL  11/04    TVH,TVT procedure     RELEASE CARPAL TUNNEL Right 10/11/2016    Procedure: RELEASE CARPAL TUNNEL;  Surgeon: Emely Blanca MD;  Location: [facility]     SURGICAL HISTORY OF -   1988    tubal ligation     SURGICAL HISTORY OF -   2001    excision gangliion cyst rt heel     Allergy     Allergies   Allergen Reactions     Codeine Rash     Flagyl [Metronidazole] Rash     Septra [Sulfamethoxazole W/Trimethoprim] Itching     Puffy eyes, flushed.      Current Medication List     Current Outpatient Medications   Medication Sig     ASPIRIN 81 MG OR TABS ONE DAILY     BIOTIN PO      folic acid (FOLVITE) 1 MG tablet Take 1 tablet (1,000 mcg) by mouth daily     ibuprofen (ADVIL/MOTRIN) 800 MG tablet Take 1  "tablet (800 mg) by mouth every 8 hours as needed for moderate pain     Insulin Syringe-Needle U-100 (BD INSULIN SYRINGE) 27G X 1/2\" 1 ML MISC For weekly methotrexate administration     levothyroxine (SYNTHROID/LEVOTHROID) 50 MCG tablet Take 1 tablet (50 mcg) by mouth daily Needs lab updated     lisinopril-hydrochlorothiazide (ZESTORETIC) 10-12.5 MG tablet TAKE ONE TABLET BY MOUTH ONCE DAILY     methotrexate 50 MG/2ML injection Inject 1 mL (25 mg) Subcutaneous every 7 days     MULTI-VITAMIN OR 1 daily     simvastatin (ZOCOR) 20 MG tablet TAKE ONE TABLET BY MOUTH AT BEDTIME     No current facility-administered medications for this visit.          Social History   See HPI    Family History     Family History   Problem Relation Age of Onset     Diabetes Father      Dementia Father      Diabetes Brother      Diabetes Sister      Hypertension Sister      Hypertension Sister      Physical Exam     Temp Readings from Last 3 Encounters:   05/13/20 97.5  F (36.4  C) (Tympanic)   01/02/20 97.8  F (36.6  C) (Tympanic)   12/30/19 98.1  F (36.7  C) (Tympanic)     BP Readings from Last 5 Encounters:   06/01/20 129/66   05/13/20 112/68   01/27/20 136/62   01/02/20 112/62   12/30/19 124/62     Pulse Readings from Last 1 Encounters:   06/01/20 105     Resp Readings from Last 1 Encounters:   05/13/20 16     Estimated body mass index is 32.04 kg/m  as calculated from the following:    Height as of 5/13/20: 1.657 m (5' 5.25\").    Weight as of 5/13/20: 88 kg (194 lb).        GEN: NAD. Healthy appearing adult.   HEENT: MMM.  Anicteric, noninjected sclera. No obvious external lesions of the ear and nose. Hearing intact.  PULM: No increased work of breathing; no use of accessory muscles.  MSK:   Mild swelling at the bilateral 2nd-3rd MCPs. Heberden's and hanane's nodes.  Wrists without swelling.   SKIN: No rash or jaundice seen  PSYCH: Alert. Appropriate.        Labs / Imaging (select studies)   RF/CCP  Recent Labs   Lab Test " 05/01/18  1538   CCPIGG 182*     CBC  Recent Labs   Lab Test 07/09/20  1346 02/19/20  1030 08/05/19  0933   WBC 8.7 7.4 7.3   RBC 4.16 3.98 4.32   HGB 14.4 14.1 14.8   HCT 42.2 41.1 44.1   * 103* 102*   RDW 13.2 14.9 13.6    202 230   MCH 34.6* 35.4* 34.3*   MCHC 34.1 34.3 33.6   NEUTROPHIL 75.4 67.2 68.3   LYMPH 16.4 20.5 20.6   MONOCYTE 6.6 9.2 7.7   EOSINOPHIL 1.3 2.8 3.0   BASOPHIL 0.3 0.3 0.4   ANEU 6.6 5.0 5.0   ALYM 1.4 1.5 1.5   SHYLA 0.6 0.7 0.6   AEOS 0.1 0.2 0.2   ABAS 0.0 0.0 0.0     CMP  Recent Labs   Lab Test 07/09/20  1346 02/19/20  1030 08/05/19  0933 05/22/19  1404 04/17/19  1526  08/10/18  0837  04/30/18  1601   NA  --   --   --  134 136  --   --   --  135   POTASSIUM  --   --   --  3.4 3.3*  --  3.9  --  3.1*   CHLORIDE  --   --   --  99 102  --   --   --  100   CO2  --   --   --  32 31  --   --   --  30   ANIONGAP  --   --   --  3 3  --   --   --  5   GLC  --   --   --  123* 120*  --   --   --  111*   BUN  --   --   --  13 16  --   --   --  9   CR 0.58 0.64 0.58 0.58 0.61   < > 0.65   < > 0.51*   GFRESTIMATED >90 >90 >90 >90 >90   < > >90   < > >90   GFRESTBLACK >90 >90 >90 >90 >90   < > >90   < > >90   ROSELYN  --   --   --  9.3 9.0  --   --   --  8.9   BILITOTAL 0.5 0.8 0.6 1.3  --    < > 0.7   < >  --    ALBUMIN 3.3* 3.3* 3.5 3.9  --    < > 3.5   < >  --    PROTTOTAL 7.4 7.1 7.7 7.6  --    < > 7.8   < >  --    ALKPHOS 130 101 125 132  --    < > 133   < >  --    AST 25 23 27 21  --    < > 35   < >  --    ALT 26 23 27 25  --    < > 34   < >  --     < > = values in this interval not displayed.     Calcium/VitaminD  Recent Labs   Lab Test 05/22/19  1404 04/17/19  1526 04/30/18  1601   ROSELYN 9.3 9.0 8.9     ESR/CRP  Recent Labs   Lab Test 07/09/20  1346 02/19/20  1030 08/05/19  0933   SED 17 10 10   CRP 24.0* 8.5* 10.4*     Lipid Panel  Recent Labs   Lab Test 04/17/19  1526 04/30/18  1601 07/03/17  0803  09/09/13  0857 05/25/12  0800   CHOL 159 163 146   < > 169 177   TRIG 102 170* 126  --   132 102   HDL 63 55 51   < > 58 56   LDL 76 74 70   < > 85 101   VLDL  --   --   --   --  26 20   CHOLHDLRATIO  --   --   --   --  3.0 3.0   NHDL 96 108 95  --   --   --     < > = values in this interval not displayed.     Hepatitis B  Recent Labs   Lab Test 05/01/18  1538   HBCAB Nonreactive   HEPBANG Nonreactive     Hepatitis C  Recent Labs   Lab Test 09/11/14  1650   HCVAB Negative     Lyme confirmation testing by Western Blot  Recent Labs   Lab Test 05/22/13  1540   LYWG Negative Reference range: Negative (Note) Band(s) present: NONE (Insufficient number of bands for positive result) INTERPRETIVE INFORMATION: Borrelia Burgdorferi Ab, IgG Western Blot  For this assay, a positive result is reported when any 5 or more of the following 10 bands are present: 18, 23, 28, 30, 39, 41, 45, 58, 66, or 93 kDa.  All other banding patterns are reported as negative.   LYWM Negative Reference range: Negative (Note) Band(s) present: NONE (Insufficient number of bands for positive result) INTERPRETIVE INFORMATION: Borrelia Burgdorferi Antibody, IgM Western Blot  For this assay, a positive result is reported when any 2 or more of the following bands are present: 23, 39, or 41 kDa. All other banding patterns are reported as negative. Performed by [email or web address], [address] [place] [id number] [phone] [email or web address], Comfort Vega MD, Lab. Director       Immunization History     Immunization History   Administered Date(s) Administered     Influenza (H1N1) 12/31/2009     Influenza (High Dose) 3 valent vaccine 11/05/2019     Influenza (IIV3) PF 11/03/2004, 10/30/2012, 10/20/2013, 10/03/2016     Influenza Vaccine IM > 6 months Valent IIV4 10/23/2017     Pneumo Conj 13-V (2010&after) 11/12/2018     Pneumococcal 23 valent 02/19/2019     TD (ADULT, 7+) 04/28/1995     TDAP Vaccine (Adacel) 12/05/2007, 02/01/2018     Zoster vaccine recombinant adjuvanted (SHINGRIX) 04/17/2019, 07/10/2019     Zoster vaccine, live 06/07/2016           Chart documentation done in part with Dragon Voice recognition Software. Although reviewed after completion, some word and grammatical error may remain.      Video-Visit Details    Type of service:  Video Visit    Video Start Time: 7:45 AM  Video End Time: 8:00 AM    Originating Location (pt. Location): Home, in [place]    Distant Location (provider location):  Home    Platform used for Video Visit: Siva Baltazar MD

## 2020-10-06 ENCOUNTER — OFFICE VISIT (OUTPATIENT)
Dept: DERMATOLOGY | Facility: CLINIC | Age: 65
End: 2020-10-06
Payer: COMMERCIAL

## 2020-10-06 VITALS — HEART RATE: 77 BPM | DIASTOLIC BLOOD PRESSURE: 69 MMHG | OXYGEN SATURATION: 99 % | SYSTOLIC BLOOD PRESSURE: 113 MMHG

## 2020-10-06 DIAGNOSIS — Z85.820 HISTORY OF MELANOMA: ICD-10-CM

## 2020-10-06 DIAGNOSIS — L82.1 SEBORRHEIC KERATOSIS: Primary | ICD-10-CM

## 2020-10-06 DIAGNOSIS — L81.4 LENTIGO: ICD-10-CM

## 2020-10-06 DIAGNOSIS — D18.01 ANGIOMA OF SKIN: ICD-10-CM

## 2020-10-06 DIAGNOSIS — D23.9 DERMAL NEVUS: ICD-10-CM

## 2020-10-06 PROCEDURE — 99213 OFFICE O/P EST LOW 20 MIN: CPT | Performed by: DERMATOLOGY

## 2020-10-06 NOTE — LETTER
10/6/2020         RE: Mine Meléndez  [id number]  [facility] [address]        Dear Colleague,    Thank you for referring your patient, Mine Meléndez, to the [facility]. Please see a copy of my visit note below.    Mine Meléndez is a 65 year old year old female patient here today for hx of 0.3mm right thigh.  SHe notes spot on breast,.   .  Patient states this has been present for a while.  Patient reports the following symptoms:  none.  Patient reports the following previous treatments none.  These treatments did not work.  Patient reports the following modifying factors none.  Associated symptoms: none.  Patient has no other skin complaints today.  Remainder of the HPI, Meds, PMH, Allergies, FH, and SH was reviewed in chart.      Past Medical History:   Diagnosis Date     Herpes simplex without mention of complication      Malignant melanoma (H)      Obesity, unspecified      Osteomyelitis of jaw 5/22/2008     Rheumatoid arthritis(714.0)      Trochanteric bursitis of right hip 8/16/2013       Past Surgical History:   Procedure Laterality Date     COLONOSCOPY  1/20/06     HYSTERECTOMY, PAP NO LONGER INDICATED       HYSTERECTOMY, VAGINAL  11/04    TVH,TVT procedure     RELEASE CARPAL TUNNEL Right 10/11/2016    Procedure: RELEASE CARPAL TUNNEL;  Surgeon: Emely Blanca MD;  Location: [facility]     SURGICAL HISTORY OF -   1988    tubal ligation     SURGICAL HISTORY OF -   2001    excision gangliion cyst rt heel        Family History   Problem Relation Age of Onset     Diabetes Father      Dementia Father      Diabetes Brother      Diabetes Sister      Hypertension Sister      Hypertension Sister        Social History     Socioeconomic History     Marital status:      Spouse name: Not on file     Number of children: Not on file     Years of education: Not on file     Highest education level: Not on file   Occupational History     Not on file   Social Needs     Financial  resource strain: Not on file     Food insecurity     Worry: Not on file     Inability: Not on file     Transportation needs     Medical: Not on file     Non-medical: Not on file   Tobacco Use     Smoking status: Former Smoker     Years: 25.00     Quit date: 2000     Years since quittin.4     Smokeless tobacco: Never Used   Substance and Sexual Activity     Alcohol use: Yes     Comment: 6 pack of beer on a weekend     Drug use: No     Sexual activity: Yes     Partners: Male     Birth control/protection: Surgical     Comment: hyster.partial   Lifestyle     Physical activity     Days per week: Not on file     Minutes per session: Not on file     Stress: Not on file   Relationships     Social connections     Talks on phone: Not on file     Gets together: Not on file     Attends [employer] service: Not on file     Active member of club or organization: Not on file     Attends meetings of clubs or organizations: Not on file     Relationship status: Not on file     Intimate partner violence     Fear of current or ex partner: Not on file     Emotionally abused: Not on file     Physically abused: Not on file     Forced sexual activity: Not on file   Other Topics Concern     Parent/sibling w/ CABG, MI or angioplasty before 65F 55M? No   Social History Narrative     Not on file       Outpatient Encounter Medications as of 10/6/2020   Medication Sig Dispense Refill     ASPIRIN 81 MG OR TABS ONE DAILY 100 3     BIOTIN PO        folic acid (FOLVITE) 1 MG tablet Take 1 tablet (1,000 mcg) by mouth daily 90 tablet 2     hydroxychloroquine (PLAQUENIL) 200 MG tablet Take 1 tablet (200 mg) by mouth 2 times daily 60 tablet 3     ibuprofen (ADVIL/MOTRIN) 800 MG tablet Take 1 tablet (800 mg) by mouth every 8 hours as needed for moderate pain 90 tablet 0     levothyroxine (SYNTHROID/LEVOTHROID) 50 MCG tablet Take 1 tablet (50 mcg) by mouth daily Needs lab updated 90 tablet 3     lisinopril-hydrochlorothiazide (ZESTORETIC) 10-12.5  MG tablet TAKE ONE TABLET BY MOUTH ONCE DAILY 90 tablet 3     methotrexate sodium 2.5 MG TABS Take 8 tablets (20 mg) by mouth once a week . Take all 8 tablets on the same day of each week. 96 tablet 1     MULTI-VITAMIN OR 1 daily       simvastatin (ZOCOR) 20 MG tablet TAKE ONE TABLET BY MOUTH AT BEDTIME 90 tablet 1     [DISCONTINUED] diclofenac (VOLTAREN) 1 % topical gel Apply up to 2 grams of 1% gel to hands up to 4 times daily as needed for joint pain (maximum: 8 g per upper extremity per day) (Patient not taking: Reported on 10/6/2020) 200 g 3     No facility-administered encounter medications on file as of 10/6/2020.              Review Of Systems  Skin: As above  Eyes: negative  Ears/Nose/Throat: negative  Respiratory: No shortness of breath, dyspnea on exertion, cough, or hemoptysis  Cardiovascular: negative  Gastrointestinal: negative  Genitourinary: negative  Musculoskeletal: negative  Neurologic: negative  Psychiatric: negative  Hematologic/Lymphatic/Immunologic: negative  Endocrine: negative      O:   NAD, WDWN, Alert & Oriented, Mood & Affect wnl, Vitals stable   Here today alone   /69   Pulse 77   LMP 11/12/2004 (Approximate)   SpO2 99%    General appearance normal   Vitals stable   Alert, oriented and in no acute distress      Following lymph nodes palpated: Occipital, Cervical, Supraclavicular , inguinal no lad   R breast stuck on papule      Stuck on papules and brown macules on trunk and ext   Red papules on trunk  Flesh colored papules on trunk     The remainder of the full exam was normal; the following areas were examined:  conjunctiva/lids, oral mucosa, neck, peripheral vascular system, abdomen, lymph nodes, digits/nails, eccrine and apocrine glands, scalp/hair, face, neck, chest, abdomen, buttocks, back, RUE, LUE, RLE, LLE       Eyes: Conjunctivae/lids:Normal     ENT: Lips, buccal mucosa, tongue: normal    MSK:Normal    Cardiovascular: peripheral edema none    Pulm: Breathing  Normal    Lymph Nodes: No Head and Neck Lymphadenopathy     Neuro/Psych: Orientation:Alert and Orientedx3 ; Mood/Affect:normal       A/P:  1. Seborrheic keratosis, lentigo, angioma, dermal nevus, hx of melanoma  MELANOMA DISCUSSED WITH PATIENT:  I discussed the specifics of tumor, prognosis, metachronous melanoma, self exam, and genetics with the patient. I explained the need for monthly skin exams including and taught the patient how to do this. Patient was asked about new or changing moles . I discussed with patient signs and symptoms that could arise in the setting of recurrent locoregional or metastatic disease. In addition, the need to undergo every 4 month dermatologic full skin survey and evaluation given that patients with a diagnosis of melanoma are at risk of recurrence (local and distant) and of subsequent de trixie melanoma.      BENIGN LESIONS DISCUSSED WITH PATIENT:  I discussed the specifics of tumor, prognosis, and genetics of benign lesions.  I explained that treatment of these lesions would be purely cosmetic and not medically neccessary.  I discussed with patient different removal options including excision, cautery and /or laser.      Nature and genetics of benign skin lesions dicussed with patient.  Signs and Symptoms of skin cancer discussed with patient.  ABCDEs of melanoma reviewed with patient.  Patient encouraged to perform monthly skin exams.  UV precautions reviewed with patient.  Skin care regimen reviewed with patient: Eliminate harsh soaps, i.e. Dial, zest, irsih spring; Mild soaps such as Cetaphil or Dove sensitive skin, avoid hot or cold showers, aggressive use of emollients including vanicream, cetaphil or cerave discussed with patient.    Risks of non-melanoma skin cancer discussed with patient   Return to clinic 4 months        Again, thank you for allowing me to participate in the care of your patient.        Sincerely,        Pankaj Castro MD

## 2020-10-06 NOTE — PROGRESS NOTES
Mine Meléndez is a 65 year old year old female patient here today for hx of 0.3mm right thigh.  SHe notes spot on breast,.   .  Patient states this has been present for a while.  Patient reports the following symptoms:  none.  Patient reports the following previous treatments none.  These treatments did not work.  Patient reports the following modifying factors none.  Associated symptoms: none.  Patient has no other skin complaints today.  Remainder of the HPI, Meds, PMH, Allergies, FH, and SH was reviewed in chart.      Past Medical History:   Diagnosis Date     Herpes simplex without mention of complication      Malignant melanoma (H)      Obesity, unspecified      Osteomyelitis of jaw 5/22/2008     Rheumatoid arthritis(714.0)      Trochanteric bursitis of right hip 8/16/2013       Past Surgical History:   Procedure Laterality Date     COLONOSCOPY  1/20/06     HYSTERECTOMY, PAP NO LONGER INDICATED       HYSTERECTOMY, VAGINAL  11/04    TVH,TVT procedure     RELEASE CARPAL TUNNEL Right 10/11/2016    Procedure: RELEASE CARPAL TUNNEL;  Surgeon: Emely Blanca MD;  Location: [facility]     SURGICAL HISTORY OF -   1988    tubal ligation     SURGICAL HISTORY OF -   2001    excision gangliion cyst rt heel        Family History   Problem Relation Age of Onset     Diabetes Father      Dementia Father      Diabetes Brother      Diabetes Sister      Hypertension Sister      Hypertension Sister        Social History     Socioeconomic History     Marital status:      Spouse name: Not on file     Number of children: Not on file     Years of education: Not on file     Highest education level: Not on file   Occupational History     Not on file   Social Needs     Financial resource strain: Not on file     Food insecurity     Worry: Not on file     Inability: Not on file     Transportation needs     Medical: Not on file     Non-medical: Not on file   Tobacco Use     Smoking status: Former Smoker     Years: 25.00     Quit  date: 2000     Years since quittin.4     Smokeless tobacco: Never Used   Substance and Sexual Activity     Alcohol use: Yes     Comment: 6 pack of beer on a weekend     Drug use: No     Sexual activity: Yes     Partners: Male     Birth control/protection: Surgical     Comment: [email or web address]   Lifestyle     Physical activity     Days per week: Not on file     Minutes per session: Not on file     Stress: Not on file   Relationships     Social connections     Talks on phone: Not on file     Gets together: Not on file     Attends Cheondoism service: Not on file     Active member of club or organization: Not on file     Attends meetings of clubs or organizations: Not on file     Relationship status: Not on file     Intimate partner violence     Fear of current or ex partner: Not on file     Emotionally abused: Not on file     Physically abused: Not on file     Forced sexual activity: Not on file   Other Topics Concern     Parent/sibling w/ CABG, MI or angioplasty before 65F 55M? No   Social History Narrative     Not on file       Outpatient Encounter Medications as of 10/6/2020   Medication Sig Dispense Refill     ASPIRIN 81 MG OR TABS ONE DAILY 100 3     BIOTIN PO        folic acid (FOLVITE) 1 MG tablet Take 1 tablet (1,000 mcg) by mouth daily 90 tablet 2     hydroxychloroquine (PLAQUENIL) 200 MG tablet Take 1 tablet (200 mg) by mouth 2 times daily 60 tablet 3     ibuprofen (ADVIL/MOTRIN) 800 MG tablet Take 1 tablet (800 mg) by mouth every 8 hours as needed for moderate pain 90 tablet 0     levothyroxine (SYNTHROID/LEVOTHROID) 50 MCG tablet Take 1 tablet (50 mcg) by mouth daily Needs lab updated 90 tablet 3     lisinopril-hydrochlorothiazide (ZESTORETIC) 10-12.5 MG tablet TAKE ONE TABLET BY MOUTH ONCE DAILY 90 tablet 3     methotrexate sodium 2.5 MG TABS Take 8 tablets (20 mg) by mouth once a week . Take all 8 tablets on the same day of each week. 96 tablet 1     MULTI-VITAMIN OR 1 daily       simvastatin  (ZOCOR) 20 MG tablet TAKE ONE TABLET BY MOUTH AT BEDTIME 90 tablet 1     [DISCONTINUED] diclofenac (VOLTAREN) 1 % topical gel Apply up to 2 grams of 1% gel to hands up to 4 times daily as needed for joint pain (maximum: 8 g per upper extremity per day) (Patient not taking: Reported on 10/6/2020) 200 g 3     No facility-administered encounter medications on file as of 10/6/2020.              Review Of Systems  Skin: As above  Eyes: negative  Ears/Nose/Throat: negative  Respiratory: No shortness of breath, dyspnea on exertion, cough, or hemoptysis  Cardiovascular: negative  Gastrointestinal: negative  Genitourinary: negative  Musculoskeletal: negative  Neurologic: negative  Psychiatric: negative  Hematologic/Lymphatic/Immunologic: negative  Endocrine: negative      O:   NAD, WDWN, Alert & Oriented, Mood & Affect wnl, Vitals stable   Here today alone   /69   Pulse 77   LMP 11/12/2004 (Approximate)   SpO2 99%    General appearance normal   Vitals stable   Alert, oriented and in no acute distress      Following lymph nodes palpated: Occipital, Cervical, Supraclavicular , inguinal no lad   R breast stuck on papule      Stuck on papules and brown macules on trunk and ext   Red papules on trunk  Flesh colored papules on trunk     The remainder of the full exam was normal; the following areas were examined:  conjunctiva/lids, oral mucosa, neck, peripheral vascular system, abdomen, lymph nodes, digits/nails, eccrine and apocrine glands, scalp/hair, face, neck, chest, abdomen, buttocks, back, RUE, LUE, RLE, LLE       Eyes: Conjunctivae/lids:Normal     ENT: Lips, buccal mucosa, tongue: normal    MSK:Normal    Cardiovascular: peripheral edema none    Pulm: Breathing Normal    Lymph Nodes: No Head and Neck Lymphadenopathy     Neuro/Psych: Orientation:Alert and Orientedx3 ; Mood/Affect:normal       A/P:  1. Seborrheic keratosis, lentigo, angioma, dermal nevus, hx of melanoma  MELANOMA DISCUSSED WITH PATIENT:  I discussed  the specifics of tumor, prognosis, metachronous melanoma, self exam, and genetics with the patient. I explained the need for monthly skin exams including and taught the patient how to do this. Patient was asked about new or changing moles . I discussed with patient signs and symptoms that could arise in the setting of recurrent locoregional or metastatic disease. In addition, the need to undergo every 4 month dermatologic full skin survey and evaluation given that patients with a diagnosis of melanoma are at risk of recurrence (local and distant) and of subsequent de trixie melanoma.      BENIGN LESIONS DISCUSSED WITH PATIENT:  I discussed the specifics of tumor, prognosis, and genetics of benign lesions.  I explained that treatment of these lesions would be purely cosmetic and not medically neccessary.  I discussed with patient different removal options including excision, cautery and /or laser.      Nature and genetics of benign skin lesions dicussed with patient.  Signs and Symptoms of skin cancer discussed with patient.  ABCDEs of melanoma reviewed with patient.  Patient encouraged to perform monthly skin exams.  UV precautions reviewed with patient.  Skin care regimen reviewed with patient: Eliminate harsh soaps, i.e. Dial, zest, irsih spring; Mild soaps such as Cetaphil or Dove sensitive skin, avoid hot or cold showers, aggressive use of emollients including vanicream, cetaphil or cerave discussed with patient.    Risks of non-melanoma skin cancer discussed with patient   Return to clinic 4 months

## 2020-10-06 NOTE — NURSING NOTE
Chief Complaint   Patient presents with     Skin Check       Vitals:    10/06/20 1114   BP: 113/69   Pulse: 77   SpO2: 99%     Wt Readings from Last 1 Encounters:   05/13/20 88 kg (194 lb)       Ara Mckeon LPN.................10/6/2020

## 2020-10-08 ENCOUNTER — MYC MEDICAL ADVICE (OUTPATIENT)
Dept: RHEUMATOLOGY | Facility: CLINIC | Age: 65
End: 2020-10-08

## 2020-10-23 DIAGNOSIS — M05.9 SEROPOSITIVE RHEUMATOID ARTHRITIS (H): ICD-10-CM

## 2020-10-23 DIAGNOSIS — Z79.899 HIGH RISK MEDICATION USE: ICD-10-CM

## 2020-10-23 DIAGNOSIS — Z11.59 ENCOUNTER FOR SCREENING FOR OTHER VIRAL DISEASES: ICD-10-CM

## 2020-10-23 LAB
ALBUMIN SERPL-MCNC: 3.5 G/DL (ref 3.4–5)
ALP SERPL-CCNC: 128 U/L (ref 40–150)
ALT SERPL W P-5'-P-CCNC: 32 U/L (ref 0–50)
AST SERPL W P-5'-P-CCNC: 30 U/L (ref 0–45)
BASOPHILS # BLD AUTO: 0 10E9/L (ref 0–0.2)
BASOPHILS NFR BLD AUTO: 0.3 %
BILIRUB DIRECT SERPL-MCNC: 0.2 MG/DL (ref 0–0.2)
BILIRUB SERPL-MCNC: 0.7 MG/DL (ref 0.2–1.3)
CREAT SERPL-MCNC: 0.62 MG/DL (ref 0.52–1.04)
CRP SERPL-MCNC: 22.7 MG/L (ref 0–8)
DIFFERENTIAL METHOD BLD: ABNORMAL
EOSINOPHIL # BLD AUTO: 0.1 10E9/L (ref 0–0.7)
EOSINOPHIL NFR BLD AUTO: 1.7 %
ERYTHROCYTE [DISTWIDTH] IN BLOOD BY AUTOMATED COUNT: 13.6 % (ref 10–15)
ERYTHROCYTE [SEDIMENTATION RATE] IN BLOOD BY WESTERGREN METHOD: 15 MM/H (ref 0–30)
GFR SERPL CREATININE-BSD FRML MDRD: >90 ML/MIN/{1.73_M2}
HCT VFR BLD AUTO: 43.1 % (ref 35–47)
HGB BLD-MCNC: 14.4 G/DL (ref 11.7–15.7)
LYMPHOCYTES # BLD AUTO: 1.6 10E9/L (ref 0.8–5.3)
LYMPHOCYTES NFR BLD AUTO: 22.7 %
MCH RBC QN AUTO: 34.2 PG (ref 26.5–33)
MCHC RBC AUTO-ENTMCNC: 33.4 G/DL (ref 31.5–36.5)
MCV RBC AUTO: 102 FL (ref 78–100)
MONOCYTES # BLD AUTO: 0.4 10E9/L (ref 0–1.3)
MONOCYTES NFR BLD AUTO: 6.1 %
NEUTROPHILS # BLD AUTO: 4.9 10E9/L (ref 1.6–8.3)
NEUTROPHILS NFR BLD AUTO: 69.2 %
PLATELET # BLD AUTO: 220 10E9/L (ref 150–450)
PROT SERPL-MCNC: 7.3 G/DL (ref 6.8–8.8)
RBC # BLD AUTO: 4.21 10E12/L (ref 3.8–5.2)
WBC # BLD AUTO: 7 10E9/L (ref 4–11)

## 2020-10-23 PROCEDURE — 85652 RBC SED RATE AUTOMATED: CPT | Performed by: INTERNAL MEDICINE

## 2020-10-23 PROCEDURE — 82565 ASSAY OF CREATININE: CPT | Performed by: INTERNAL MEDICINE

## 2020-10-23 PROCEDURE — 86803 HEPATITIS C AB TEST: CPT | Performed by: INTERNAL MEDICINE

## 2020-10-23 PROCEDURE — 87340 HEPATITIS B SURFACE AG IA: CPT | Performed by: INTERNAL MEDICINE

## 2020-10-23 PROCEDURE — 86481 TB AG RESPONSE T-CELL SUSP: CPT | Performed by: INTERNAL MEDICINE

## 2020-10-23 PROCEDURE — 80076 HEPATIC FUNCTION PANEL: CPT | Performed by: INTERNAL MEDICINE

## 2020-10-23 PROCEDURE — 86140 C-REACTIVE PROTEIN: CPT | Performed by: INTERNAL MEDICINE

## 2020-10-23 PROCEDURE — 85025 COMPLETE CBC W/AUTO DIFF WBC: CPT | Performed by: INTERNAL MEDICINE

## 2020-10-23 PROCEDURE — 36415 COLL VENOUS BLD VENIPUNCTURE: CPT | Performed by: INTERNAL MEDICINE

## 2020-10-23 PROCEDURE — 86704 HEP B CORE ANTIBODY TOTAL: CPT | Performed by: INTERNAL MEDICINE

## 2020-10-24 LAB
HBV CORE AB SERPL QL IA: NONREACTIVE
HBV SURFACE AG SERPL QL IA: NONREACTIVE
HCV AB SERPL QL IA: NONREACTIVE

## 2020-10-26 ENCOUNTER — MYC MEDICAL ADVICE (OUTPATIENT)
Dept: INFECTIOUS DISEASES | Facility: CLINIC | Age: 65
End: 2020-10-26

## 2020-10-26 ENCOUNTER — VIRTUAL VISIT (OUTPATIENT)
Dept: RHEUMATOLOGY | Facility: CLINIC | Age: 65
End: 2020-10-26
Payer: COMMERCIAL

## 2020-10-26 ENCOUNTER — TELEPHONE (OUTPATIENT)
Dept: INFECTIOUS DISEASES | Facility: CLINIC | Age: 65
End: 2020-10-26

## 2020-10-26 DIAGNOSIS — R76.12 POSITIVE QUANTIFERON-TB GOLD TEST: ICD-10-CM

## 2020-10-26 DIAGNOSIS — Z79.899 HIGH RISK MEDICATION USE: ICD-10-CM

## 2020-10-26 DIAGNOSIS — M19.042 PRIMARY OSTEOARTHRITIS OF BOTH HANDS: ICD-10-CM

## 2020-10-26 DIAGNOSIS — M19.041 PRIMARY OSTEOARTHRITIS OF BOTH HANDS: ICD-10-CM

## 2020-10-26 DIAGNOSIS — M05.9 SEROPOSITIVE RHEUMATOID ARTHRITIS (H): Primary | ICD-10-CM

## 2020-10-26 PROCEDURE — 99214 OFFICE O/P EST MOD 30 MIN: CPT | Mod: 95 | Performed by: INTERNAL MEDICINE

## 2020-10-26 RX ORDER — FOLIC ACID 1 MG/1
1000 TABLET ORAL DAILY
Qty: 90 TABLET | Refills: 2 | Status: SHIPPED | OUTPATIENT
Start: 2020-10-26 | End: 2021-05-10

## 2020-10-26 RX ORDER — HYDROXYCHLOROQUINE SULFATE 200 MG/1
200 TABLET, FILM COATED ORAL DAILY
Qty: 90 TABLET | Refills: 1 | Status: SHIPPED | OUTPATIENT
Start: 2020-10-26 | End: 2020-12-02

## 2020-10-26 NOTE — TELEPHONE ENCOUNTER
[facility] Call Center    Phone Message    May a detailed message be left on voicemail: no     Reason for Call: Other: R76.12 (ICD-10-CM) - Positive QuantiFERON-TB Gold test, RAINER HEATH, per pt      All new patients must have telephone encounter sent to clinic for nurse review regardless of diagnosis per scheduling protocol.    Action Taken: Message routed to:  [facility] ([facility]): ID    Travel Screening: Not Applicable

## 2020-10-26 NOTE — PROGRESS NOTES
"Mine Meléndez is a 65 year old female who is being evaluated via a billable video visit.      The patient has been notified of following:     \"This video visit will be conducted via a call between you and your physician/provider. We have found that certain health care needs can be provided without the need for an in-person physical exam.  This service lets us provide the care you need with a video conversation.  If a prescription is necessary we can send it directly to your pharmacy.  If lab work is needed we can place an order for that and you can then stop by our lab to have the test done at a later time.    Video visits are billed at different rates depending on your insurance coverage.  Please reach out to your insurance provider with any questions.    If during the course of the call the physician/provider feels a video visit is not appropriate, you will not be charged for this service.\"    Patient has given verbal consent for Video visit? Yes  How would you like to obtain your AVS? MyChart  If you are dropped from the video visit, the video invite should be resent to: Text to cell phone: [employer] (fka [employer])---[phone]  Will anyone else be joining your video visit? No    Rheumatology Video Visit      Mine Meléndez MRN# [id number]   YOB: 1955 Age: 65 year old      Date of visit: 10/26/20     Chief Complaint   Patient presents with:  Arthritis    Assessment and Plan     1. Seropositive Erosive Rheumatoid Arthritis (RF+, ): Dx'd with RA prior to 2002. Previously on SSZ (stomatitis). Currently on MTX 20mg PO once weekly (no improvement when MTX was increased to 25mg SQ wkly when used as monotherapy), folic acid 1mg daily, and hydroxychloroquine 200mg BID.  Mild loose stools since starting hydroxychloroquine so will reduce the dose.   - Continue Methotrexate 20mg PO once weekly  - Continue folic acid 1mg daily  - Reduce hydroxychloroquine to 200mg daily    - Ophthalmology referral for " hydroxychloroquine toxicity monitoring; already established at [facility]  - Letter for her ophthalmologist made available on [employer] for her to print and bring in  - Labs in 3 months: CBC, Creatinine, Hepatic Panel, ESR, CRP    2. Primary osteoarthritis of both hands: start voltaren gel PRN  - Continue diclofenac (VOLTAREN) 1 % topical gel; Apply up to 2 grams of 1% gel to hands up to 4 times daily as needed for joint pain (maximum: 8 g per upper extremity per day)  Dispense: 200 g; Refill: 3    3. 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 No symptoms of active TB at this time.  - Infectious disease referral; phone number provided to her to call to schedule    10/27/2020 addendum: the labs just paged me, I called back.  They notified me that the quantiferon TB gold test was actually negative.  Reportedly two vials got mixed up.  The correct result for Mine Meléndez is NEGATIVE.  The lab result has been updated as noted below.  I will notify ID as she has already scheduled an ID appointment and this is no longer needed. Patient notified by phone.        # Relevant labs and imaging were reviewed with the patient    # High risk medication toxicity monitoring: discussion and labs reviewed; appropriate labs ordered. See above.  Instructed that if confirmed to have COVID-19 or exposure to someone with confirmed COVID-19 to call this clinic for directions on DMARD management.    # Note that this is a virtual visit to reduce the risk of COVID-19 exposure during this current pandemic.      # Considered to be at high risk of complications from the COVID-19 virus.  It is recommended to limit contact with other people and if possible to work remotely or provide a leave of absence to reduce the risk for COVID-19.        Ms. Meléndez verbalized agreement with and understanding of the rational for the diagnosis and treatment plan.  All questions were answered to best of  my ability and the patient's satisfaction. Ms. Meléndez was advised to contact the clinic with any questions that may arise after the clinic visit.      Thank you for involving me in the care of the patient    Return to clinic: 3 months      HPI   Mine Meléndez is a 65 year old female with a past medical history significant for hypertension, hyperlipidemia, hypothyroidism, migraines, and rheumatoid arthritis who presents for follow-up of rheumatoid arthritis.     Today, 10/26/2020: doing great.  No joint pain or swelling. No morning stiffness; mild +gelling phenomenon. Mild loose stool since starting hydroxychloroquine 200mg BID.  No known TB exposure. No previous TB tx.    Denies fevers, chills, nausea, vomiting, constipation, diarrhea. No abdominal pain. No chest pain/pressure, palpitations, or shortness of breath. No LE swelling. No neck pain. No oral or nasal sores.  No rash. No sicca symptoms.     Tobacco: None  EtOH: 4 drinks per day on the weekends  Drugs: None  Occupation: retired June 2020, was working in [employer]   GEN: No fevers, chills, night sweats, or weight change  SKIN: No itching, rashes, sores  HEENT: No oral or nasal ulcers.  CV: No chest pain, pressure, palpitations, or dyspnea on exertion.  PULM: No SOB, wheeze, cough. No hemoptysis.   GI: No nausea, vomiting, constipation, diarrhea. No blood in stool. No abdominal pain.  : No blood in urine.  MSK: See HPI.  NEURO: No numbness, tingling, or weakness.  EXT: No LE swelling  PSYCH: Negative    Active Problem List     Patient Active Problem List   Diagnosis     Seropositive rheumatoid arthritis (H)     DYSPEPSIA     Obesity     Essential hypertension     Hypothyroidism     HYPERLIPIDEMIA LDL GOAL <130     Advanced directives, counseling/discussion     Dermatitis     High risk medications (not anticoagulants) long-term use     Female stress incontinence     Migraine     Rheumatoid arthritis involving right wrist with positive rheumatoid  factor (H)     Past Medical History     Past Medical History:   Diagnosis Date     Herpes simplex without mention of complication      Malignant melanoma (H)      Obesity, unspecified      Osteomyelitis of jaw 5/22/2008     Rheumatoid arthritis(714.0)      Trochanteric bursitis of right hip 8/16/2013     Past Surgical History     Past Surgical History:   Procedure Laterality Date     COLONOSCOPY  1/20/06     HYSTERECTOMY, PAP NO LONGER INDICATED       HYSTERECTOMY, VAGINAL  11/04    TVH,TVT procedure     RELEASE CARPAL TUNNEL Right 10/11/2016    Procedure: RELEASE CARPAL TUNNEL;  Surgeon: Emely Blanca MD;  Location: [facility]     SURGICAL HISTORY OF -   1988    tubal ligation     SURGICAL HISTORY OF -   2001    excision gangliion cyst rt heel     Allergy     Allergies   Allergen Reactions     Codeine Rash     Flagyl [Metronidazole] Rash     Septra [Sulfamethoxazole W/Trimethoprim] Itching     Puffy eyes, flushed.      Current Medication List     Current Outpatient Medications   Medication Sig     ASPIRIN 81 MG OR TABS ONE DAILY     folic acid (FOLVITE) 1 MG tablet Take 1 tablet (1,000 mcg) by mouth daily     hydroxychloroquine (PLAQUENIL) 200 MG tablet Take 1 tablet (200 mg) by mouth 2 times daily     ibuprofen (ADVIL/MOTRIN) 800 MG tablet Take 1 tablet (800 mg) by mouth every 8 hours as needed for moderate pain     levothyroxine (SYNTHROID/LEVOTHROID) 50 MCG tablet Take 1 tablet (50 mcg) by mouth daily Needs lab updated     lisinopril-hydrochlorothiazide (ZESTORETIC) 10-12.5 MG tablet TAKE ONE TABLET BY MOUTH ONCE DAILY     methotrexate sodium 2.5 MG TABS Take 8 tablets (20 mg) by mouth once a week . Take all 8 tablets on the same day of each week.     MULTI-VITAMIN OR 1 daily     simvastatin (ZOCOR) 20 MG tablet TAKE ONE TABLET BY MOUTH AT BEDTIME     BIOTIN PO      No current facility-administered medications for this visit.          Social History   See HPI    Family History     Family History   Problem  "Relation Age of Onset     Diabetes Father      Dementia Father      Diabetes Brother      Diabetes Sister      Hypertension Sister      Hypertension Sister      Physical Exam     Temp Readings from Last 3 Encounters:   05/13/20 97.5  F (36.4  C) (Tympanic)   01/02/20 97.8  F (36.6  C) (Tympanic)   12/30/19 98.1  F (36.7  C) (Tympanic)     BP Readings from Last 5 Encounters:   10/06/20 113/69   06/01/20 129/66   05/13/20 112/68   01/27/20 136/62   01/02/20 112/62     Pulse Readings from Last 1 Encounters:   10/06/20 77     Resp Readings from Last 1 Encounters:   05/13/20 16     Estimated body mass index is 32.04 kg/m  as calculated from the following:    Height as of 5/13/20: 1.657 m (5' 5.25\").    Weight as of 5/13/20: 88 kg (194 lb).        GEN: NAD. Healthy appearing adult.   HEENT: MMM.  Anicteric, noninjected sclera. No obvious external lesions of the ear and nose. Hearing intact.  PULM: No increased work of breathing; no use of accessory muscles.  MSK:   MCPs, PIPs, and wrists without swelling or tenderness to palpation. Heberden's and Fabby's nodes present.   SKIN: No rash or jaundice seen  PSYCH: Alert. Appropriate.        Labs / Imaging (select studies)   RF/CCP  Recent Labs   Lab Test 05/01/18  1538   CCPIGG 182*     CBC  Recent Labs   Lab Test 10/23/20  1519 07/09/20  1346 02/19/20  1030   WBC 7.0 8.7 7.4   RBC 4.21 4.16 3.98   HGB 14.4 14.4 14.1   HCT 43.1 42.2 41.1   * 101* 103*   RDW 13.6 13.2 14.9    225 202   MCH 34.2* 34.6* 35.4*   MCHC 33.4 34.1 34.3   NEUTROPHIL 69.2 75.4 67.2   LYMPH 22.7 16.4 20.5   MONOCYTE 6.1 6.6 9.2   EOSINOPHIL 1.7 1.3 2.8   BASOPHIL 0.3 0.3 0.3   ANEU 4.9 6.6 5.0   ALYM 1.6 1.4 1.5   SHYLA 0.4 0.6 0.7   AEOS 0.1 0.1 0.2   ABAS 0.0 0.0 0.0     CMP  Recent Labs   Lab Test 10/23/20  1519 07/09/20  1346 02/19/20  1030 05/22/19  1404 05/22/19  1404 04/17/19  1526 08/10/18  0837 08/10/18  0837 04/30/18  1601 04/30/18  1601   NA  --   --   --   --  134 136  --   -- "   --  135   POTASSIUM  --   --   --   --  3.4 3.3*  --  3.9  --  3.1*   CHLORIDE  --   --   --   --  99 102  --   --   --  100   CO2  --   --   --   --  32 31  --   --   --  30   ANIONGAP  --   --   --   --  3 3  --   --   --  5   GLC  --   --   --   --  123* 120*  --   --   --  111*   BUN  --   --   --   --  13 16  --   --   --  9   CR 0.62 0.58 0.64   < > 0.58 0.61   < > 0.65   < > 0.51*   GFRESTIMATED >90 >90 >90   < > >90 >90   < > >90   < > >90   GFRESTBLACK >90 >90 >90   < > >90 >90   < > >90   < > >90   ROSELYN  --   --   --   --  9.3 9.0  --   --   --  8.9   BILITOTAL 0.7 0.5 0.8   < > 1.3  --    < > 0.7   < >  --    ALBUMIN 3.5 3.3* 3.3*   < > 3.9  --    < > 3.5   < >  --    PROTTOTAL 7.3 7.4 7.1   < > 7.6  --    < > 7.8   < >  --    ALKPHOS 128 130 101   < > 132  --    < > 133   < >  --    AST 30 25 23   < > 21  --    < > 35   < >  --    ALT 32 26 23   < > 25  --    < > 34   < >  --     < > = values in this interval not displayed.     Calcium/VitaminD  Recent Labs   Lab Test 05/22/19  1404 04/17/19  1526 04/30/18  1601   ROSELYN 9.3 9.0 8.9     ESR/CRP  Recent Labs   Lab Test 10/23/20  1519 07/09/20  1346 02/19/20  1030   SED 15 17 10   CRP 22.7* 24.0* 8.5*     Lipid Panel  Recent Labs   Lab Test 04/17/19  1526 04/30/18  1601 07/03/17  0803 09/09/13  0857 09/09/13  0857   CHOL 159 163 146   < > 169   TRIG 102 170* 126  --  132   HDL 63 55 51   < > 58   LDL 76 74 70   < > 85   VLDL  --   --   --   --  26   CHOLHDLRATIO  --   --   --   --  3.0   NHDL 96 108 95  --   --     < > = values in this interval not displayed.     Hepatitis B  Recent Labs   Lab Test 10/23/20  1519 05/01/18  1538   HBCAB Nonreactive Nonreactive   HEPBANG Nonreactive Nonreactive     Hepatitis C  Recent Labs   Lab Test 10/23/20  1519 09/11/14  1650   HCVAB Nonreactive Negative       Lyme confirmation testing by Western Blot  Recent Labs   Lab Test 05/22/13  1540   LYWG Negative Reference range: Negative (Note) Band(s) present: NONE (Insufficient  number of bands for positive result) INTERPRETIVE INFORMATION: Borrelia Burgdorferi Ab, IgG Western Blot  For this assay, a positive result is reported when any 5 or more of the following 10 bands are present: 18, 23, 28, 30, 39, 41, 45, 58, 66, or 93 kDa.  All other banding patterns are reported as negative.   LYWM Negative Reference range: Negative (Note) Band(s) present: NONE (Insufficient number of bands for positive result) INTERPRETIVE INFORMATION: Borrelia Burgdorferi Antibody, IgM Western Blot  For this assay, a positive result is reported when any 2 or more of the following bands are present: 23, 39, or 41 kDa. All other banding patterns are reported as negative. Performed by [facility], [address] [place] [id number] [phone] [email or web address], Comfort Vega MD, Lab. Director       Immunization History     Immunization History   Administered Date(s) Administered     Influenza (H1N1) 12/31/2009     Influenza (High Dose) 3 valent vaccine 11/05/2019     Influenza (IIV3) PF 11/03/2004, 10/30/2012, 10/20/2013, 10/03/2016     Influenza Vaccine IM > 6 months Valent IIV4 10/23/2017     Pneumo Conj 13-V (2010&after) 11/12/2018     Pneumococcal 23 valent 02/19/2019     TD (ADULT, 7+) 04/28/1995     TDAP Vaccine (Adacel) 12/05/2007, 02/01/2018     Zoster vaccine recombinant adjuvanted (SHINGRIX) 04/17/2019, 07/10/2019     Zoster vaccine, live 06/07/2016          Chart documentation done in part with Dragon Voice recognition Software. Although reviewed after completion, some word and grammatical error may remain.        Video-Visit Details    Type of service:  Video Visit    Video Start Time: 1:47 PM  Video End Time: 1:59 PM    Originating Location (pt. Location): Home, in [place]    Distant Location (provider location):  Home    Platform used for Video Visit: Siva Baltazar MD

## 2020-10-26 NOTE — LETTER
Eye Specialist Letter  [facility] [place]  [address]  [place] [address]  Phone: [phone]  Fax: [phone]    Dear Eye Specialist,  To ensure safe use of Plaquenil (hydroxychloroquine), we are requesting your assistance in providing the following information. Please return this form to our clinic via mail or fax, or incorporate this information into your visit summary. Your note must indicate whether or not there is evidence of toxicity from Plaquenil use. For questions regarding this form, please call [phone].  Patient Name: Mine Meléndez  : 1955       Date of Exam:    The following exams were completed during this visit in accordance with the American Academy of Ophthalmology recommendations (2016):  ? 10-2 automated visual field  ? Spectral-domain optical coherence tomography (SD OCT)  ? Multifocal electroretinogram (mfERG)  ? Fundus autofluorescence (FAF)  ? Other (please specify)  Please select from the following:  ? The findings from the above exams are not suggestive of toxicity from Plaquenil (hydroxychloroquine).  ? The findings from the above exams may suggest toxicity from Plaquenil (hydroxychloroquine).  Directly contact the clinic and fax this form.  Please provide additional guidance on whether or not the medication may be continued from your perspective:  Date of next recommended Plaquenil (hydroxychloroquine) screening eye exam:   ? 5 years  ? 1 year  ? 6 months  Other (please specify):   Eye Specialist Name (print):                                                                      Date:  Eye Specialist Signature:

## 2020-10-27 DIAGNOSIS — E78.5 HYPERLIPIDEMIA LDL GOAL <130: ICD-10-CM

## 2020-10-27 LAB
GAMMA INTERFERON BACKGROUND BLD IA-ACNC: 0.07 IU/ML
M TB IFN-G CD4+ BCKGRND COR BLD-ACNC: 9.93 IU/ML
M TB TUBERC IFN-G BLD QL: NEGATIVE
MITOGEN IGNF BCKGRD COR BLD-ACNC: 0 IU/ML
MITOGEN IGNF BCKGRD COR BLD-ACNC: 0 IU/ML

## 2020-10-27 NOTE — TELEPHONE ENCOUNTER
"Requested Prescriptions   Pending Prescriptions Disp Refills     simvastatin (ZOCOR) 20 MG tablet [Pharmacy Med Name: SIMVASTATIN 20MG TABS] 90 tablet 1     Sig: TAKE ONE TABLET BY MOUTH AT BEDTIME       Statins Protocol Failed - 10/27/2020  5:02 AM        Failed - LDL on file in past 12 months     Recent Labs   Lab Test 04/17/19  1526   LDL 76             Passed - No abnormal creatine kinase in past 12 months     No lab results found.             Passed - Recent (12 mo) or future (30 days) visit within the authorizing provider's specialty     Patient has had an office visit with the authorizing provider or a provider within the authorizing providers department within the previous 12 mos or has a future within next 30 days. See \"Patient Info\" tab in inbasket, or \"Choose Columns\" in Meds & Orders section of the refill encounter.              Passed - Medication is active on med list        Passed - Patient is age 18 or older        Passed - No active pregnancy on record        Passed - No positive pregnancy test in past 12 months             "

## 2020-10-27 NOTE — TELEPHONE ENCOUNTER
Justa,    I was just notified by the lab that vials in the lab were mixed up and Mine Meléndez's quantiferon TB gold test was actually negative.    The lab result has been updated as noted below.  The patient has been updated by phone and [employer] message.  Mine Meléndez no longer needs the infectious disease appointment to address the previously reported lab that was incorrect.        Thank you,  Nasir Baltazar MD  10/27/2020 10:31 AM

## 2020-10-27 NOTE — TELEPHONE ENCOUNTER
RECORDS RECEIVED FROM: Internal   DATE RECEIVED: 11.06.2020   NOTES (Gather within 2 years) STATUS DETAILS   OFFICE NOTE from referring provider   Internal 10.26.2020 Nasir Baltazar MD   OFFICE NOTE from other specialist N/A    DISCHARGE SUMMARY from hospital N/A    DISCHARGE REPORT from the ER N/A    LABS (any labs) Internal    MEDICATION LIST Internal    IMAGING  (NEED IMAGES AND REPORTS)     Osteomyelitis: Foot imaging  N/A    Liver Abscess: Abdominal imaging N/A    Other (anything related to diagnoses N/A

## 2020-10-28 RX ORDER — SIMVASTATIN 20 MG
TABLET ORAL
Qty: 30 TABLET | Refills: 0 | Status: SHIPPED | OUTPATIENT
Start: 2020-10-28 | End: 2020-11-09

## 2020-10-28 NOTE — TELEPHONE ENCOUNTER
Routing refill request to provider for review/approval because:  Labs not current:  AMY BALL RN BSN

## 2020-11-06 ENCOUNTER — PRE VISIT (OUTPATIENT)
Dept: INFECTIOUS DISEASES | Facility: CLINIC | Age: 65
End: 2020-11-06

## 2020-11-09 ENCOUNTER — OFFICE VISIT (OUTPATIENT)
Dept: FAMILY MEDICINE | Facility: CLINIC | Age: 65
End: 2020-11-09
Payer: COMMERCIAL

## 2020-11-09 VITALS
DIASTOLIC BLOOD PRESSURE: 62 MMHG | SYSTOLIC BLOOD PRESSURE: 114 MMHG | BODY MASS INDEX: 31.54 KG/M2 | HEART RATE: 71 BPM | TEMPERATURE: 97.6 F | WEIGHT: 191 LBS

## 2020-11-09 DIAGNOSIS — R73.01 ELEVATED FASTING GLUCOSE: ICD-10-CM

## 2020-11-09 DIAGNOSIS — E78.5 HYPERLIPIDEMIA LDL GOAL <130: Primary | ICD-10-CM

## 2020-11-09 DIAGNOSIS — Z23 NEED FOR PROPHYLACTIC VACCINATION AND INOCULATION AGAINST INFLUENZA: ICD-10-CM

## 2020-11-09 DIAGNOSIS — I10 ESSENTIAL HYPERTENSION: ICD-10-CM

## 2020-11-09 DIAGNOSIS — Z12.11 SCREENING FOR COLON CANCER: ICD-10-CM

## 2020-11-09 DIAGNOSIS — M67.432 GANGLION CYST OF WRIST, LEFT: ICD-10-CM

## 2020-11-09 LAB
ANION GAP SERPL CALCULATED.3IONS-SCNC: 3 MMOL/L (ref 3–14)
BUN SERPL-MCNC: 10 MG/DL (ref 7–30)
CALCIUM SERPL-MCNC: 9.3 MG/DL (ref 8.5–10.1)
CHLORIDE SERPL-SCNC: 104 MMOL/L (ref 94–109)
CHOLEST SERPL-MCNC: 153 MG/DL
CO2 SERPL-SCNC: 30 MMOL/L (ref 20–32)
CREAT SERPL-MCNC: 0.57 MG/DL (ref 0.52–1.04)
GFR SERPL CREATININE-BSD FRML MDRD: >90 ML/MIN/{1.73_M2}
GLUCOSE SERPL-MCNC: 115 MG/DL (ref 70–99)
HDLC SERPL-MCNC: 74 MG/DL
LDLC SERPL CALC-MCNC: 66 MG/DL
NONHDLC SERPL-MCNC: 79 MG/DL
POTASSIUM SERPL-SCNC: 3.7 MMOL/L (ref 3.4–5.3)
SODIUM SERPL-SCNC: 137 MMOL/L (ref 133–144)
TRIGL SERPL-MCNC: 65 MG/DL

## 2020-11-09 PROCEDURE — 99214 OFFICE O/P EST MOD 30 MIN: CPT | Mod: 25 | Performed by: FAMILY MEDICINE

## 2020-11-09 PROCEDURE — 36415 COLL VENOUS BLD VENIPUNCTURE: CPT | Performed by: FAMILY MEDICINE

## 2020-11-09 PROCEDURE — G0008 ADMIN INFLUENZA VIRUS VAC: HCPCS | Performed by: FAMILY MEDICINE

## 2020-11-09 PROCEDURE — 80061 LIPID PANEL: CPT | Performed by: FAMILY MEDICINE

## 2020-11-09 PROCEDURE — 80048 BASIC METABOLIC PNL TOTAL CA: CPT | Performed by: FAMILY MEDICINE

## 2020-11-09 PROCEDURE — 90662 IIV NO PRSV INCREASED AG IM: CPT | Performed by: FAMILY MEDICINE

## 2020-11-09 RX ORDER — SIMVASTATIN 20 MG
TABLET ORAL
Qty: 90 TABLET | Refills: 3 | Status: SHIPPED | OUTPATIENT
Start: 2020-11-09 | End: 2021-12-15

## 2020-11-09 NOTE — PATIENT INSTRUCTIONS
Patient Education     Ganglion Cyst: Hand    A ganglion cyst is a firm, fluid-filled lump that can suddenly appear on the front or back of the wrist or at the base of a finger. They are the most common type of mass or lump on the hand. These cysts grow from normal tissue in the wrist and fingers and range in size from a pea to a peach pit. Although ganglion cysts are common, they don t spread, and they don t become cancerous. They can occur after an injury, but many times it isn t known why they grow. Ganglion cysts can change in size, and may go away on their own.   What are the symptoms of a ganglion cyst?  A ganglion cyst is sometimes painful, especially when it first occurs. Constantly using your hand or wrist can make the cyst enlarge and hurt more. Some hand and wrist movements, such as grasping things, may also be difficult.   How does a ganglion cyst develop?  Your wrist and hand are made up of many small bones that meet at joints. Tendons attach muscles to the bones at the joints. The tendons allow the joints to bend and straighten. Both tendons and joints are lined with tissue called synovium. This tissue makes a thick fluid that keeps the joints and tendons moving easily. Sometimes the tissue balloons out from the joint or tendons and forms a cyst. As the cyst fills with fluid and grows, it appears as a lump you can feel.   Where do ganglion cysts occur?  A ganglion cyst can occur anywhere on the hand near a joint. Cysts most commonly appear on the back or palm side of the wrist, or on the palm at the base of a finger. Your doctor can usually diagnose a cyst by examining the lump. He or she may draw off a little fluid and order an X-ray to rule out other problems.   How is a ganglion cyst treated?  Your healthcare provider may just watch your ganglion cyst. Many shrink and become painless without treatment. Some disappear altogether. If the cyst is unsightly or painful, or makes it hard for you to use  your hand, your healthcare provider can treat it or, if needed, remove it surgically.   Nonsurgical treatment  To shrink the cyst, your provider may remove (aspirate) the fluid with a needle. If the cyst hurts, your provider may also give you an injection of an anti-inflammatory, such as cortisone, to relieve the irritation. Your hand may then be wrapped to help keep the cyst from recurring.   Surgery  If the cyst reappears after treatment, your healthcare provider may remove it surgically. A section of the tissue that lines the joint or tendon is removed along with the cyst. This helps prevent another cyst from forming, although recurrence of the cyst is still possible after surgery. Usually, only your hand or arm is numbed, and you can go home a few hours after surgery. Your hand may be in a splint for several days. You can usually go back to your normal activities 2 to6 weeks after surgery.   Theo last reviewed this educational content on 10/1/2019    0286-9257 [email or web address], [employer] [address] [place] [address], [place] [address]. All rights reserved. This information is not intended as a substitute for professional medical care. Always follow your healthcare professional's instructions.

## 2020-11-09 NOTE — PROGRESS NOTES
Subjective     Mine Meléndez is a 65 year old female who presents to clinic today for the following health issues:    HPI         Hyperlipidemia Follow-Up      Are you regularly taking any medication or supplement to lower your cholesterol?   Yes- simvastatin     Are you having muscle aches or other side effects that you think could be caused by your cholesterol lowering medication?  No      How many servings of fruits and vegetables do you eat daily?  2-3    On average, how many sweetened beverages do you drink each day (Examples: soda, juice, sweet tea, etc.  Do NOT count diet or artificially sweetened beverages)?   1 can pop    How many days per week do you exercise enough to make your heart beat faster? 3 or less    How many minutes a day do you exercise enough to make your heart beat faster? 9 or less    How many days per week do you miss taking your medication? 0    Medication Followup of simvastatin     Taking Medication as prescribed: yes    Side Effects:  None    Medication Helping Symptoms:  yes     Hypertension Follow-up      Do you check your blood pressure regularly outside of the clinic? No     Are you following a low salt diet? Yes    Are your blood pressures ever more than 140 on the top number (systolic) OR more   than 90 on the bottom number (diastolic), for example 140/90?  N/a    When bending over and standing up, gets dizzy.  Not dizzy/lightheaded on a regular basis. We discussed moving slowly after bending down or bending over. Asked to keep an eye on blood pressure from time to time.       Hypothyroidism Follow-up      Since last visit, patient describes the following symptoms: Weight stable, no hair loss, no skin changes, no constipation, no loose stools          Review of Systems   Constitutional, HEENT, cardiovascular, pulmonary, gi and gu systems are negative, except as otherwise noted.    Both wrists/hands with ganglions.   Not bothersome enough to warrant referral at this time.        "  Objective    /62 (BP Location: Right arm, Cuff Size: Adult Regular)   Pulse 71   Temp 97.6  F (36.4  C)   Wt 86.6 kg (191 lb)   LMP 11/12/2004 (Approximate)   BMI 31.54 kg/m    Body mass index is 31.54 kg/m .  Physical Exam   GENERAL: healthy, alert and no distress  NECK: no adenopathy, no asymmetry, masses, or scars and thyroid normal to palpation  RESP: lungs clear to auscultation - no rales, rhonchi or wheezes  CV: regular rate and rhythm, normal S1 S2, no S3 or S4, no murmur, click or rub, no peripheral edema and peripheral pulses strong  ABDOMEN: soft, nontender, no hepatosplenomegaly, no masses and bowel sounds normal  MS: left wrist ganglion over lateral wrist -  Near ulnar styloid.  Right hand wrist along 5th  Metacarpal - elongated.             Assessment & Plan     Hyperlipidemia LDL goal <130     - simvastatin (ZOCOR) 20 MG tablet; TAKE ONE TABLET BY MOUTH AT BEDTIME  - Lipid panel reflex to direct LDL Fasting  - Basic metabolic panel    Essential hypertension   well controlled  - Basic metabolic panel    Ganglion cyst of wrist, left   discussed ganglion pathophysiology and treatment including watchful waiting, aspiration/injection and surgical removal.  Pt opted for watchful waiting.  Handout given.      Screening for colon cancer     - Fecal colorectal cancer screen (FIT); Future    Flu shot given today     BMI:   Estimated body mass index is 31.54 kg/m  as calculated from the following:    Height as of 5/13/20: 1.657 m (5' 5.25\").    Weight as of this encounter: 86.6 kg (191 lb).                No follow-ups on file.    Britany Norton MD  [facility]    "

## 2020-11-11 DIAGNOSIS — R73.01 ELEVATED FASTING GLUCOSE: ICD-10-CM

## 2020-11-11 LAB — HBA1C MFR BLD: 5.2 % (ref 0–5.6)

## 2020-11-11 PROCEDURE — 83036 HEMOGLOBIN GLYCOSYLATED A1C: CPT | Performed by: FAMILY MEDICINE

## 2020-12-16 ENCOUNTER — MYC MEDICAL ADVICE (OUTPATIENT)
Dept: FAMILY MEDICINE | Facility: CLINIC | Age: 65
End: 2020-12-16

## 2021-01-04 DIAGNOSIS — M05.9 SEROPOSITIVE RHEUMATOID ARTHRITIS (H): ICD-10-CM

## 2021-01-04 RX ORDER — METHOTREXATE 2.5 MG/1
20 TABLET ORAL WEEKLY
Qty: 96 TABLET | Refills: 1 | Status: CANCELLED | OUTPATIENT
Start: 2021-01-04

## 2021-01-05 ENCOUNTER — MYC MEDICAL ADVICE (OUTPATIENT)
Dept: RHEUMATOLOGY | Facility: CLINIC | Age: 66
End: 2021-01-05

## 2021-01-05 NOTE — TELEPHONE ENCOUNTER
Last visit 10/26/20  Last labs 1/12/21  Next visit 2/1/21    Osmel Combs RN....1/5/2021 4:39 PM

## 2021-01-06 ENCOUNTER — MYC MEDICAL ADVICE (OUTPATIENT)
Dept: RHEUMATOLOGY | Facility: CLINIC | Age: 66
End: 2021-01-06

## 2021-01-12 DIAGNOSIS — M05.9 SEROPOSITIVE RHEUMATOID ARTHRITIS (H): ICD-10-CM

## 2021-01-12 DIAGNOSIS — Z79.899 HIGH RISK MEDICATION USE: ICD-10-CM

## 2021-01-12 LAB
ALBUMIN SERPL-MCNC: 3.6 G/DL (ref 3.4–5)
ALP SERPL-CCNC: 118 U/L (ref 40–150)
ALT SERPL W P-5'-P-CCNC: 27 U/L (ref 0–50)
AST SERPL W P-5'-P-CCNC: 28 U/L (ref 0–45)
BASOPHILS # BLD AUTO: 0 10E9/L (ref 0–0.2)
BASOPHILS NFR BLD AUTO: 0.1 %
BILIRUB DIRECT SERPL-MCNC: 0.2 MG/DL (ref 0–0.2)
BILIRUB SERPL-MCNC: 0.7 MG/DL (ref 0.2–1.3)
CREAT SERPL-MCNC: 0.65 MG/DL (ref 0.52–1.04)
CRP SERPL-MCNC: 8.9 MG/L (ref 0–8)
DIFFERENTIAL METHOD BLD: ABNORMAL
EOSINOPHIL # BLD AUTO: 0.2 10E9/L (ref 0–0.7)
EOSINOPHIL NFR BLD AUTO: 2.4 %
ERYTHROCYTE [DISTWIDTH] IN BLOOD BY AUTOMATED COUNT: 13.6 % (ref 10–15)
ERYTHROCYTE [SEDIMENTATION RATE] IN BLOOD BY WESTERGREN METHOD: 10 MM/H (ref 0–30)
GFR SERPL CREATININE-BSD FRML MDRD: >90 ML/MIN/{1.73_M2}
HCT VFR BLD AUTO: 43.1 % (ref 35–47)
HGB BLD-MCNC: 14.5 G/DL (ref 11.7–15.7)
LYMPHOCYTES # BLD AUTO: 1.4 10E9/L (ref 0.8–5.3)
LYMPHOCYTES NFR BLD AUTO: 19.6 %
MCH RBC QN AUTO: 35.2 PG (ref 26.5–33)
MCHC RBC AUTO-ENTMCNC: 33.6 G/DL (ref 31.5–36.5)
MCV RBC AUTO: 105 FL (ref 78–100)
MONOCYTES # BLD AUTO: 0.7 10E9/L (ref 0–1.3)
MONOCYTES NFR BLD AUTO: 9.2 %
NEUTROPHILS # BLD AUTO: 5.1 10E9/L (ref 1.6–8.3)
NEUTROPHILS NFR BLD AUTO: 68.7 %
PLATELET # BLD AUTO: 182 10E9/L (ref 150–450)
PROT SERPL-MCNC: 7.5 G/DL (ref 6.8–8.8)
RBC # BLD AUTO: 4.12 10E12/L (ref 3.8–5.2)
WBC # BLD AUTO: 7.4 10E9/L (ref 4–11)

## 2021-01-12 PROCEDURE — 82565 ASSAY OF CREATININE: CPT | Performed by: INTERNAL MEDICINE

## 2021-01-12 PROCEDURE — 36415 COLL VENOUS BLD VENIPUNCTURE: CPT | Performed by: INTERNAL MEDICINE

## 2021-01-12 PROCEDURE — 86140 C-REACTIVE PROTEIN: CPT | Performed by: INTERNAL MEDICINE

## 2021-01-12 PROCEDURE — 85025 COMPLETE CBC W/AUTO DIFF WBC: CPT | Performed by: INTERNAL MEDICINE

## 2021-01-12 PROCEDURE — 85652 RBC SED RATE AUTOMATED: CPT | Performed by: INTERNAL MEDICINE

## 2021-01-12 PROCEDURE — 80076 HEPATIC FUNCTION PANEL: CPT | Performed by: INTERNAL MEDICINE

## 2021-01-22 ENCOUNTER — TRANSFERRED RECORDS (OUTPATIENT)
Dept: HEALTH INFORMATION MANAGEMENT | Facility: CLINIC | Age: 66
End: 2021-01-22

## 2021-01-22 RX ORDER — METHOTREXATE 2.5 MG/1
20 TABLET ORAL WEEKLY
Qty: 96 TABLET | Refills: 1 | Status: SHIPPED | OUTPATIENT
Start: 2021-01-22 | End: 2021-05-10

## 2021-01-22 NOTE — TELEPHONE ENCOUNTER
Rheumatology team: Please call to notify Ms. Meléndez that methotrexate has been refilled.  Nasir Baltazar MD  1/22/2021 4:53 PM

## 2021-02-01 ENCOUNTER — VIRTUAL VISIT (OUTPATIENT)
Dept: RHEUMATOLOGY | Facility: CLINIC | Age: 66
End: 2021-02-01
Payer: COMMERCIAL

## 2021-02-01 ENCOUNTER — MYC MEDICAL ADVICE (OUTPATIENT)
Dept: RHEUMATOLOGY | Facility: CLINIC | Age: 66
End: 2021-02-01

## 2021-02-01 DIAGNOSIS — M19.041 PRIMARY OSTEOARTHRITIS OF BOTH HANDS: ICD-10-CM

## 2021-02-01 DIAGNOSIS — M05.9 SEROPOSITIVE RHEUMATOID ARTHRITIS (H): Primary | ICD-10-CM

## 2021-02-01 DIAGNOSIS — Z79.899 HIGH RISK MEDICATION USE: ICD-10-CM

## 2021-02-01 DIAGNOSIS — M19.042 PRIMARY OSTEOARTHRITIS OF BOTH HANDS: ICD-10-CM

## 2021-02-01 PROCEDURE — 99214 OFFICE O/P EST MOD 30 MIN: CPT | Mod: 95 | Performed by: INTERNAL MEDICINE

## 2021-02-01 RX ORDER — HYDROXYCHLOROQUINE SULFATE 200 MG/1
TABLET, FILM COATED ORAL
Qty: 135 TABLET | Refills: 3 | Status: SHIPPED | OUTPATIENT
Start: 2021-02-01 | End: 2021-12-10

## 2021-02-01 NOTE — TELEPHONE ENCOUNTER
Forms printed and faxed to Dr. Baltazar.  Rachel Uriarte [facility]  2/1/2021 2:33 PM

## 2021-02-01 NOTE — PROGRESS NOTES
Mine Meléndez  is a 65 year old year old female who is being evaluated via a billable video visit.      How would you like to obtain your AVS? MyChart  If the video visit is dropped, the invitation should be resent by: Text to cell phone: [phone]  Will anyone else be joining your video visit? No    Rheumatology Video Visit      Mine Mleéndez MRN# [id number]   YOB: 1955 Age: 65 year old      Date of visit: 2/01/21     Chief Complaint   Patient presents with:  Arthritis: RA, new medication is working good    Assessment and Plan     1. Seropositive Erosive Rheumatoid Arthritis (RF+, ): Dx'd with RA prior to 2002. Previously on SSZ (stomatitis). Currently on MTX 20mg PO once weekly (no improvement when MTX was increased to 25mg SQ wkly when used as monotherapy), folic acid 1mg daily, and hydroxychloroquine 300mg daily on average.  Mildly loose stool since starting hydroxychloroquine that has improved with dose reduction.  Doing well today with regard to her rheumatoid arthritis so may try reducing hydroxychloroquine to 200 mg daily; the prescription will remain the same in case her arthritis symptoms return in which case she can increase the dose back to 300 mg daily on average.  Chronic illness, stable  - Continue Methotrexate 20mg PO once weekly  - Continue folic acid 1mg daily  - Continue hydroxychloroquine 200mg daily with an additional 200mg every other day; may try to reduce to 200mg daily (1/22/2021 eye exam by Dr. Cobb at [facility] did not show evidence of hydroxychloroquine toxicity)  - Labs every 8-12 weeks: CBC, Creatinine, Hepatic Panel, ESR, CRP    High risk medication requiring intensive toxicity monitoring at least quarterly: labs ordered include CBC, Creatinine, Hepatic panel to monitor for cytopenia and hepatotoxicity; checking creatinine as it affects clearance of medication.      2. Primary osteoarthritis of both hands: start voltaren gel PRN.  Chronic illness,  stable  - Continue diclofenac (VOLTAREN) 1 % topical gel; Apply up to 2 grams of 1% gel to hands up to 4 times daily as needed for joint pain (maximum: 8 g per upper extremity per day)  Dispense: 200 g; Refill: 3    3.  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  This is documented here for reference only.      # Discussed the available mRNA COVID-19 vaccines available from [employer]. At this time the vaccine from Pfizer and Moderna for COVID19 is recommended based on our knowledge of this vaccine and vaccines in the past.  However, there is no data currently available to establish safety and efficacy for this vaccine in immunocompromised people.    #  Instructed that if confirmed to have COVID-19 or exposure to someone with confirmed COVID-19 to call this clinic for directions on DMARD management.    # This is a virtual visit to reduce the risk of COVID-19 exposure during this current pandemic.      # Considered to be at high risk of complications from the COVID-19 virus.  It is recommended to limit contact with other people and if possible to work remotely or provide a leave of absence to reduce the risk for COVID-19.      Total minutes spent in evaluation with patient, documentation, and review of pertinent lab tests, imaging studies, and chart notes: 17     Ms. Meléndez verbalized agreement with and understanding of the rational for the diagnosis and treatment plan.  All questions were answered to best of my ability and the patient's satisfaction. Ms. Meléndez was advised to contact the clinic with any questions that may arise after the clinic visit.      Thank you for involving me in the care of the patient    Return to clinic: 3 months      HPI   Mine Meléndez is a 65 year old female with a past medical history significant for hypertension, hyperlipidemia, hypothyroidism, migraines, and  rheumatoid arthritis who presents for follow-up of rheumatoid arthritis.     Today, 2/1/2021: Doing well.  No joint pain or morning stiffness.  No gelling phenomenon.  Mildly loose stools with hydroxychloroquine that is very tolerable.      Tobacco: None  EtOH: 4 drinks per day on the weekends  Drugs: None  Occupation: retired June 2020, was working in [employer]   12 point review of system was completed and negative except as noted in the HPI    Active Problem List     Patient Active Problem List   Diagnosis     Seropositive rheumatoid arthritis (H)     DYSPEPSIA     Obesity     Essential hypertension     Hypothyroidism     HYPERLIPIDEMIA LDL GOAL <130     Advanced directives, counseling/discussion     Dermatitis     High risk medications (not anticoagulants) long-term use     Female stress incontinence     Migraine     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)     Past Medical History     Past Medical History:   Diagnosis Date     Herpes simplex without mention of complication      Malignant melanoma (H)      Obesity, unspecified      Osteomyelitis of jaw 5/22/2008     Rheumatoid arthritis(714.0)      Trochanteric bursitis of right hip 8/16/2013     Past Surgical History     Past Surgical History:   Procedure Laterality Date     COLONOSCOPY  1/20/06     HYSTERECTOMY, PAP NO LONGER INDICATED       HYSTERECTOMY, VAGINAL  11/04    TVH,TVT procedure     RELEASE CARPAL TUNNEL Right 10/11/2016    Procedure: RELEASE CARPAL TUNNEL;  Surgeon: Emely Blanca MD;  Location: [facility] OR     SURGICAL HISTORY OF -   1988    tubal ligation     SURGICAL HISTORY OF -   2001    excision gangliion cyst rt heel     Allergy     Allergies   Allergen Reactions     Codeine Rash     Flagyl [Metronidazole] Rash     Septra [Sulfamethoxazole W/Trimethoprim] Itching     Puffy eyes, flushed.      Current Medication List     Current Outpatient Medications   Medication Sig     ASPIRIN 81 MG OR TABS ONE  "DAILY     folic acid (FOLVITE) 1 MG tablet Take 1 tablet (1,000 mcg) by mouth daily     hydroxychloroquine (PLAQUENIL) 200 MG tablet Hydroxychloroquine 200mg daily; and an additional 200mg every other day.     ibuprofen (ADVIL/MOTRIN) 800 MG tablet Take 1 tablet (800 mg) by mouth every 8 hours as needed for moderate pain     levothyroxine (SYNTHROID/LEVOTHROID) 50 MCG tablet Take 1 tablet (50 mcg) by mouth daily Needs lab updated     lisinopril-hydrochlorothiazide (ZESTORETIC) 10-12.5 MG tablet TAKE ONE TABLET BY MOUTH ONCE DAILY     methotrexate sodium 2.5 MG TABS Take 8 tablets (20 mg) by mouth once a week . Take all 8 tablets on the same day of each week.     MULTI-VITAMIN OR 1 daily     simvastatin (ZOCOR) 20 MG tablet TAKE ONE TABLET BY MOUTH AT BEDTIME     No current facility-administered medications for this visit.          Social History   See HPI    Family History     Family History   Problem Relation Age of Onset     Diabetes Father      Dementia Father      Diabetes Brother      Diabetes Sister      Hypertension Sister      Hypertension Sister      Physical Exam     Temp Readings from Last 3 Encounters:   11/09/20 97.6  F (36.4  C)   05/13/20 97.5  F (36.4  C) (Tympanic)   01/02/20 97.8  F (36.6  C) (Tympanic)     BP Readings from Last 5 Encounters:   11/09/20 114/62   10/06/20 113/69   06/01/20 129/66   05/13/20 112/68   01/27/20 136/62     Pulse Readings from Last 1 Encounters:   11/09/20 71     Resp Readings from Last 1 Encounters:   05/13/20 16     Estimated body mass index is 31.54 kg/m  as calculated from the following:    Height as of 5/13/20: 1.657 m (5' 5.25\").    Weight as of 11/9/20: 86.6 kg (191 lb).    No vitals taken today because this is a virtual visit    GEN: NAD. Healthy appearing adult.   HEENT: MMM.  Anicteric, noninjected sclera. No obvious external lesions of the ear and nose. Hearing intact.  PULM: No increased work of breathing  MSK:  Hands and wrists without swelling. Heberden's " and Fabby's nodes present.    SKIN: No rash or jaundice seen  PSYCH: Alert. Appropriate.     Labs / Imaging (select studies)     RF/CCP  Recent Labs   Lab Test 05/01/18  1538   CCPIGG 182*     CBC  Recent Labs   Lab Test 01/12/21  1547 10/23/20  1519 07/09/20  1346   WBC 7.4 7.0 8.7   RBC 4.12 4.21 4.16   HGB 14.5 14.4 14.4   HCT 43.1 43.1 42.2   * 102* 101*   RDW 13.6 13.6 13.2    220 225   MCH 35.2* 34.2* 34.6*   MCHC 33.6 33.4 34.1   NEUTROPHIL 68.7 69.2 75.4   LYMPH 19.6 22.7 16.4   MONOCYTE 9.2 6.1 6.6   EOSINOPHIL 2.4 1.7 1.3   BASOPHIL 0.1 0.3 0.3   ANEU 5.1 4.9 6.6   ALYM 1.4 1.6 1.4   SHYLA 0.7 0.4 0.6   AEOS 0.2 0.1 0.1   ABAS 0.0 0.0 0.0     CMP  Recent Labs   Lab Test 01/12/21  1547 11/09/20  0939 10/23/20  1519 07/09/20  1346 02/19/20  1030 05/22/19  1404 05/22/19  1404 04/17/19  1526 08/10/18  0837 08/10/18  0837 04/30/18  1601 04/30/18  1601 08/05/17  1150 08/05/17  1150 04/20/17  1700 04/20/17  1700   NA  --  137  --   --   --   --  134 136  --   --   --  135  --  139  --  140   POTASSIUM  --  3.7  --   --   --   --  3.4 3.3*  --  3.9  --  3.1*  --  3.4   < > 3.1*   CHLORIDE  --  104  --   --   --   --  99 102  --   --   --  100  --  103  --  100   CO2  --  30  --   --   --   --  32 31  --   --   --  30  --  28  --  31   ANIONGAP  --  3  --   --   --   --  3 3  --   --   --  5  --  8  --  9   GLC  --  115*  --   --   --   --  123* 120*  --   --   --  111*  --  88  --  100*   BUN  --  10  --   --   --   --  13 16  --   --   --  9  --  13  --  9   CR 0.65 0.57 0.62 0.58 0.64   < > 0.58 0.61   < > 0.65   < > 0.51*   < > 0.60  --  0.59   GFRESTIMATED >90 >90 >90 >90 >90   < > >90 >90   < > >90   < > >90   < > >90  Non  GFR Calc    --  >90  Non  GFR Calc     GFRESTBLACK >90 >90 >90 >90 >90   < > >90 >90   < > >90   < > >90   < > >90  African American GFR Calc    --  >90   GFR Calc     ROSELYN  --  9.3  --   --   --   --  9.3 9.0  --   --   --   8.9  --  9.2  --  9.1   BILITOTAL 0.7  --  0.7 0.5 0.8   < > 1.3  --    < > 0.7   < >  --    < > 0.4  --  0.4   ALBUMIN 3.6  --  3.5 3.3* 3.3*   < > 3.9  --    < > 3.5   < >  --    < > 3.7  --  3.7   PROTTOTAL 7.5  --  7.3 7.4 7.1   < > 7.6  --    < > 7.8   < >  --    < > 7.6  --  7.6   ALKPHOS 118  --  128 130 101   < > 132  --    < > 133   < >  --    < > 169*  --  142   AST 28  --  30 25 23   < > 21  --    < > 35   < >  --    < > 26  --  27   ALT 27  --  32 26 23   < > 25  --    < > 34   < >  --    < > 34  --  31    < > = values in this interval not displayed.       HgA1c  Recent Labs   Lab Test 11/11/20  1442 08/05/19  0933 05/22/13  1540   A1C 5.2 5.3 5.5     Calcium/VitaminD  Recent Labs   Lab Test 11/09/20  0939 05/22/19  1404 04/17/19  1526   ROSELYN 9.3 9.3 9.0     ESR/CRP  Recent Labs   Lab Test 01/12/21  1547 10/23/20  1519 07/09/20  1346   SED 10 15 17   CRP 8.9* 22.7* 24.0*     TSH/T4  Recent Labs   Lab Test 07/09/20  1346 04/17/19  1526 04/30/18  1601 09/11/14  1650 09/11/14  1650   TSH 1.89 2.04 2.87   < > 4.77*   T4  --   --   --   --  0.95    < > = values in this interval not displayed.     Lipid Panel  Recent Labs   Lab Test 11/09/20  0939 04/17/19  1526 04/30/18  1601 09/09/13  0857 09/09/13  0857   CHOL 153 159 163   < > 169   TRIG 65 102 170*   < > 132   HDL 74 63 55   < > 58   LDL 66 76 74   < > 85   VLDL  --   --   --   --  26   CHOLHDLRATIO  --   --   --   --  3.0   NHDL 79 96 108   < >  --     < > = values in this interval not displayed.     Hepatitis B  Recent Labs   Lab Test 10/23/20  1519 05/01/18  1538   HBCAB Nonreactive Nonreactive   HEPBANG Nonreactive Nonreactive     Hepatitis C  Recent Labs   Lab Test 10/23/20  1519 09/11/14  1650   HCVAB Nonreactive Negative     Lyme confirmation testing by Western Blot  Recent Labs   Lab Test 05/22/13  1540   LYWG Negative Reference range: Negative (Note) Band(s) present: NONE (Insufficient number of bands for positive result) INTERPRETIVE INFORMATION:  Borrelia Burgdorferi Ab, IgG Western Blot  For this assay, a positive result is reported when any 5 or more of the following 10 bands are present: 18, 23, 28, 30, 39, 41, 45, 58, 66, or 93 kDa.  All other banding patterns are reported as negative.   LYWM Negative Reference range: Negative (Note) Band(s) present: NONE (Insufficient number of bands for positive result) INTERPRETIVE INFORMATION: Borrelia Burgdorferi Antibody, IgM Western Blot  For this assay, a positive result is reported when any 2 or more of the following bands are present: 23, 39, or 41 kDa. All other banding patterns are reported as negative. Performed by [facility], [address] [place] [id number] [phone] [email or web address], Comfort Vega MD, Lab. Director     Tuberculosis Screening  Recent Labs   Lab Test 10/23/20  1519   TBRST Negative       Immunization History     Immunization History   Administered Date(s) Administered     Influenza (H1N1) 12/31/2009     Influenza (High Dose) 3 valent vaccine 11/05/2019     Influenza (IIV3) PF 11/03/2004, 10/30/2012, 10/20/2013, 10/03/2016     Influenza Vaccine IM > 6 months Valent IIV4 10/23/2017     Influenza, Quad, High Dose, Pf, 65yr + 11/09/2020     Pneumo Conj 13-V (2010&after) 11/12/2018     Pneumococcal 23 valent 02/19/2019     TD (ADULT, 7+) 04/28/1995     TDAP Vaccine (Adacel) 12/05/2007, 02/01/2018     Zoster vaccine recombinant adjuvanted (SHINGRIX) 04/17/2019, 07/10/2019     Zoster vaccine, live 06/07/2016          Chart documentation done in part with Dragon Voice recognition Software. Although reviewed after completion, some word and grammatical error may remain.        Video-Visit Details    Type of service:  Video Visit    Video Start Time: 1:38 PM    Video End Time:1:54 PM    Originating Location (pt. Location): [place], [place]    Distant Location (provider location):  Home    Platform used for Video Visit: Siva Baltazar MD

## 2021-02-01 NOTE — PATIENT INSTRUCTIONS
RHEUMATOLOGY    Dr. Nasir Baltazar    [facility]  [address] [place] [address]    Our new phone number for Rheumatology is [phone], this number will be able to help you schedule appointments for Dr. Baltazar or if you have any message you would like sent to us.    Thank you for choosing [facility]!  Rachel Uriarte [facility]

## 2021-02-09 ENCOUNTER — OFFICE VISIT (OUTPATIENT)
Dept: DERMATOLOGY | Facility: CLINIC | Age: 66
End: 2021-02-09
Payer: COMMERCIAL

## 2021-02-09 VITALS — DIASTOLIC BLOOD PRESSURE: 66 MMHG | HEART RATE: 67 BPM | SYSTOLIC BLOOD PRESSURE: 150 MMHG | OXYGEN SATURATION: 100 %

## 2021-02-09 DIAGNOSIS — Z85.820 HISTORY OF MELANOMA: Primary | ICD-10-CM

## 2021-02-09 DIAGNOSIS — D18.01 ANGIOMA OF SKIN: ICD-10-CM

## 2021-02-09 DIAGNOSIS — L81.4 LENTIGO: ICD-10-CM

## 2021-02-09 DIAGNOSIS — L82.1 SEBORRHEIC KERATOSIS: ICD-10-CM

## 2021-02-09 DIAGNOSIS — D23.9 DERMAL NEVUS: ICD-10-CM

## 2021-02-09 PROCEDURE — 99213 OFFICE O/P EST LOW 20 MIN: CPT | Performed by: DERMATOLOGY

## 2021-02-09 NOTE — LETTER
2/9/2021         RE: Mine Meléndez  [address]  [facility] [address]        Dear Colleague,    Thank you for referring your patient, Mine Meléndez, to the [facility]. Please see a copy of my visit note below.    Mine Meléndez is an extremely pleasant 65 year old year old female patient here today for hx of 0.3mm melanoma right thigh.   .She denies any new or changing skin lesions.  Patient reports the following symptoms:  none.  Patient reports the following previous treatments none.  These treatments did not work.  Patient reports the following modifying factors none.  Associated symptoms: none.  Patient has no other skin complaints today.  Remainder of the HPI, Meds, PMH, Allergies, FH, and SH was reviewed in chart.      Past Medical History:   Diagnosis Date     Herpes simplex without mention of complication      Malignant melanoma (H)      Obesity, unspecified      Osteomyelitis of jaw 5/22/2008     Rheumatoid arthritis(714.0)      Trochanteric bursitis of right hip 8/16/2013       Past Surgical History:   Procedure Laterality Date     COLONOSCOPY  1/20/06     HYSTERECTOMY, PAP NO LONGER INDICATED       HYSTERECTOMY, VAGINAL  11/04    TVH,TVT procedure     RELEASE CARPAL TUNNEL Right 10/11/2016    Procedure: RELEASE CARPAL TUNNEL;  Surgeon: Emely Blanca MD;  Location: [facility] OR     SURGICAL HISTORY OF -   1988    tubal ligation     SURGICAL HISTORY OF -   2001    excision gangliion cyst rt heel        Family History   Problem Relation Age of Onset     Diabetes Father      Dementia Father      Diabetes Brother      Diabetes Sister      Hypertension Sister      Hypertension Sister        Social History     Socioeconomic History     Marital status:      Spouse name: Not on file     Number of children: Not on file     Years of education: Not on file     Highest education level: Not on file   Occupational History     Not on file   Social Needs     Financial resource  strain: Not on file     Food insecurity     Worry: Not on file     Inability: Not on file     Transportation needs     Medical: Not on file     Non-medical: Not on file   Tobacco Use     Smoking status: Former Smoker     Years: 25.00     Quit date: 2000     Years since quittin.7     Smokeless tobacco: Never Used   Substance and Sexual Activity     Alcohol use: Yes     Comment: 6 pack of beer on a weekend     Drug use: No     Sexual activity: Yes     Partners: Male     Birth control/protection: Surgical     Comment: [email or web address].partial   Lifestyle     Physical activity     Days per week: Not on file     Minutes per session: Not on file     Stress: Not on file   Relationships     Social connections     Talks on phone: Not on file     Gets together: Not on file     Attends Sikh service: Not on file     Active member of club or organization: Not on file     Attends meetings of clubs or organizations: Not on file     Relationship status: Not on file     Intimate partner violence     Fear of current or ex partner: Not on file     Emotionally abused: Not on file     Physically abused: Not on file     Forced sexual activity: Not on file   Other Topics Concern     Parent/sibling w/ CABG, MI or angioplasty before 65F 55M? No   Social History Narrative     Not on file       Outpatient Encounter Medications as of 2021   Medication Sig Dispense Refill     ASPIRIN 81 MG OR TABS ONE DAILY 100 3     folic acid (FOLVITE) 1 MG tablet Take 1 tablet (1,000 mcg) by mouth daily 90 tablet 2     hydroxychloroquine (PLAQUENIL) 200 MG tablet Hydroxychloroquine 200mg daily; and an additional 200mg every other day. 135 tablet 3     ibuprofen (ADVIL/MOTRIN) 800 MG tablet Take 1 tablet (800 mg) by mouth every 8 hours as needed for moderate pain 90 tablet 0     levothyroxine (SYNTHROID/LEVOTHROID) 50 MCG tablet Take 1 tablet (50 mcg) by mouth daily Needs lab updated 90 tablet 3     lisinopril-hydrochlorothiazide (ZESTORETIC) 10-12.5  MG tablet TAKE ONE TABLET BY MOUTH ONCE DAILY 90 tablet 3     methotrexate sodium 2.5 MG TABS Take 8 tablets (20 mg) by mouth once a week . Take all 8 tablets on the same day of each week. 96 tablet 1     MULTI-VITAMIN OR 1 daily       simvastatin (ZOCOR) 20 MG tablet TAKE ONE TABLET BY MOUTH AT BEDTIME 90 tablet 3     No facility-administered encounter medications on file as of 2/9/2021.              Review Of Systems  Skin: As above  Eyes: negative  Ears/Nose/Throat: negative  Respiratory: No shortness of breath, dyspnea on exertion, cough, or hemoptysis  Cardiovascular: negative  Gastrointestinal: negative  Genitourinary: negative  Musculoskeletal: negative  Neurologic: negative  Psychiatric: negative  Hematologic/Lymphatic/Immunologic: negative  Endocrine: negative      O:   NAD, WDWN, Alert & Oriented, Mood & Affect wnl, Vitals stable   Here today alone  BP (!) 150/66 (BP Location: Right arm)   Pulse 67   LMP 11/12/2004 (Approximate)   SpO2 100%    LMP 11/12/2004 (Approximate)    General appearance normal   Vitals stable   Alert, oriented and in no acute distress      Following lymph nodes palpated: Occipital, Cervical, Supraclavicular , inguinal femoral no lad    R thigh well healed      Stuck on papules and brown macules on trunk and ext   Red papules on trunk  Flesh colored papules on trunk     The remainder of the full exam was normal; the following areas were examined:  conjunctiva/lids, oral mucosa, neck, peripheral vascular system, abdomen, lymph nodes, digits/nails, eccrine and apocrine glands, scalp/hair, face, neck, chest, abdomen, buttocks, back, RUE, LUE, RLE, LLE       Eyes: Conjunctivae/lids:Normal     ENT: Lips, buccal mucosa, tongue: normal    MSK:Normal    Cardiovascular: peripheral edema none    Pulm: Breathing Normal    Lymph Nodes: No Head and Neck Lymphadenopathy     Neuro/Psych: Orientation:Alert and Orientedx3 ; Mood/Affect:normal       A/P:  1. Seborrheic keratosis, lentigo, angioma,  dermal nevus, hx of melanoma  MELANOMA DISCUSSED WITH PATIENT:  I discussed the specifics of tumor, prognosis, metachronous melanoma, self exam, and genetics with the patient. I explained the need for monthly skin exams including and taught the patient how to do this. Patient was asked about new or changing moles . I discussed with patient signs and symptoms that could arise in the setting of recurrent locoregional or metastatic disease. In addition, the need to undergo every 4 month dermatologic full skin survey and evaluation given that patients with a diagnosis of melanoma are at risk of recurrence (local and distant) and of subsequent de trixie melanoma.      It was a pleasure speaking to Mine Meléndez today.  Previous clinic  notes and pertinent laboratory tests were reviewed prior to Mine Meléndez's visit.  BENIGN LESIONS DISCUSSED WITH PATIENT:  I discussed the specifics of tumor, prognosis, and genetics of benign lesions.  I explained that treatment of these lesions would be purely cosmetic and not medically neccessary.  I discussed with patient different removal options including excision, cautery and /or laser.      Signs and Symptoms of skin cancer discussed with patient.  ABCDEs of melanoma reviewed with patient.  Patient encouraged to perform monthly skin exams.  UV precautions reviewed with patient.  Patient to follow up with Primary Care provider regarding elevated blood pressure.  Risks of non-melanoma skin cancer discussed with patient   Return to clinic 4 months        Again, thank you for allowing me to participate in the care of your patient.        Sincerely,        Pankaj Castro MD

## 2021-02-09 NOTE — PROGRESS NOTES
Mine Meléndez is an extremely pleasant 65 year old year old female patient here today for hx of 0.3mm melanoma right thigh.   .She denies any new or changing skin lesions.  Patient reports the following symptoms:  none.  Patient reports the following previous treatments none.  These treatments did not work.  Patient reports the following modifying factors none.  Associated symptoms: none.  Patient has no other skin complaints today.  Remainder of the HPI, Meds, PMH, Allergies, FH, and SH was reviewed in chart.      Past Medical History:   Diagnosis Date     Herpes simplex without mention of complication      Malignant melanoma (H)      Obesity, unspecified      Osteomyelitis of jaw 5/22/2008     Rheumatoid arthritis(714.0)      Trochanteric bursitis of right hip 8/16/2013       Past Surgical History:   Procedure Laterality Date     COLONOSCOPY  1/20/06     HYSTERECTOMY, PAP NO LONGER INDICATED       HYSTERECTOMY, VAGINAL  11/04    TVH,TVT procedure     RELEASE CARPAL TUNNEL Right 10/11/2016    Procedure: RELEASE CARPAL TUNNEL;  Surgeon: Emely Blanca MD;  Location: [facility]     SURGICAL HISTORY OF -   1988    tubal ligation     SURGICAL HISTORY OF -   2001    excision gangliion cyst rt heel        Family History   Problem Relation Age of Onset     Diabetes Father      Dementia Father      Diabetes Brother      Diabetes Sister      Hypertension Sister      Hypertension Sister        Social History     Socioeconomic History     Marital status:      Spouse name: Not on file     Number of children: Not on file     Years of education: Not on file     Highest education level: Not on file   Occupational History     Not on file   Social Needs     Financial resource strain: Not on file     Food insecurity     Worry: Not on file     Inability: Not on file     Transportation needs     Medical: Not on file     Non-medical: Not on file   Tobacco Use     Smoking status: Former Smoker     Years: 25.00     Quit date:  2000     Years since quittin.7     Smokeless tobacco: Never Used   Substance and Sexual Activity     Alcohol use: Yes     Comment: 6 pack of beer on a weekend     Drug use: No     Sexual activity: Yes     Partners: Male     Birth control/protection: Surgical     Comment: hyster.partial   Lifestyle     Physical activity     Days per week: Not on file     Minutes per session: Not on file     Stress: Not on file   Relationships     Social connections     Talks on phone: Not on file     Gets together: Not on file     Attends Quaker service: Not on file     Active member of club or organization: Not on file     Attends meetings of clubs or organizations: Not on file     Relationship status: Not on file     Intimate partner violence     Fear of current or ex partner: Not on file     Emotionally abused: Not on file     Physically abused: Not on file     Forced sexual activity: Not on file   Other Topics Concern     Parent/sibling w/ CABG, MI or angioplasty before 65F 55M? No   Social History Narrative     Not on file       Outpatient Encounter Medications as of 2021   Medication Sig Dispense Refill     ASPIRIN 81 MG OR TABS ONE DAILY 100 3     folic acid (FOLVITE) 1 MG tablet Take 1 tablet (1,000 mcg) by mouth daily 90 tablet 2     hydroxychloroquine (PLAQUENIL) 200 MG tablet Hydroxychloroquine 200mg daily; and an additional 200mg every other day. 135 tablet 3     ibuprofen (ADVIL/MOTRIN) 800 MG tablet Take 1 tablet (800 mg) by mouth every 8 hours as needed for moderate pain 90 tablet 0     levothyroxine (SYNTHROID/LEVOTHROID) 50 MCG tablet Take 1 tablet (50 mcg) by mouth daily Needs lab updated 90 tablet 3     lisinopril-hydrochlorothiazide (ZESTORETIC) 10-12.5 MG tablet TAKE ONE TABLET BY MOUTH ONCE DAILY 90 tablet 3     methotrexate sodium 2.5 MG TABS Take 8 tablets (20 mg) by mouth once a week . Take all 8 tablets on the same day of each week. 96 tablet 1     MULTI-VITAMIN OR 1 daily       simvastatin  (ZOCOR) 20 MG tablet TAKE ONE TABLET BY MOUTH AT BEDTIME 90 tablet 3     No facility-administered encounter medications on file as of 2/9/2021.              Review Of Systems  Skin: As above  Eyes: negative  Ears/Nose/Throat: negative  Respiratory: No shortness of breath, dyspnea on exertion, cough, or hemoptysis  Cardiovascular: negative  Gastrointestinal: negative  Genitourinary: negative  Musculoskeletal: negative  Neurologic: negative  Psychiatric: negative  Hematologic/Lymphatic/Immunologic: negative  Endocrine: negative      O:   NAD, WDWN, Alert & Oriented, Mood & Affect wnl, Vitals stable   Here today alone  BP (!) 150/66 (BP Location: Right arm)   Pulse 67   LMP 11/12/2004 (Approximate)   SpO2 100%    LMP 11/12/2004 (Approximate)    General appearance normal   Vitals stable   Alert, oriented and in no acute distress      Following lymph nodes palpated: Occipital, Cervical, Supraclavicular , inguinal femoral no lad    R thigh well healed      Stuck on papules and brown macules on trunk and ext   Red papules on trunk  Flesh colored papules on trunk     The remainder of the full exam was normal; the following areas were examined:  conjunctiva/lids, oral mucosa, neck, peripheral vascular system, abdomen, lymph nodes, digits/nails, eccrine and apocrine glands, scalp/hair, face, neck, chest, abdomen, buttocks, back, RUE, LUE, RLE, LLE       Eyes: Conjunctivae/lids:Normal     ENT: Lips, buccal mucosa, tongue: normal    MSK:Normal    Cardiovascular: peripheral edema none    Pulm: Breathing Normal    Lymph Nodes: No Head and Neck Lymphadenopathy     Neuro/Psych: Orientation:Alert and Orientedx3 ; Mood/Affect:normal       A/P:  1. Seborrheic keratosis, lentigo, angioma, dermal nevus, hx of melanoma  MELANOMA DISCUSSED WITH PATIENT:  I discussed the specifics of tumor, prognosis, metachronous melanoma, self exam, and genetics with the patient. I explained the need for monthly skin exams including and taught the  patient how to do this. Patient was asked about new or changing moles . I discussed with patient signs and symptoms that could arise in the setting of recurrent locoregional or metastatic disease. In addition, the need to undergo every 4 month dermatologic full skin survey and evaluation given that patients with a diagnosis of melanoma are at risk of recurrence (local and distant) and of subsequent de trixie melanoma.      It was a pleasure speaking to Mine Meléndez today.  Previous clinic  notes and pertinent laboratory tests were reviewed prior to Mine Meléndez's visit.  BENIGN LESIONS DISCUSSED WITH PATIENT:  I discussed the specifics of tumor, prognosis, and genetics of benign lesions.  I explained that treatment of these lesions would be purely cosmetic and not medically neccessary.  I discussed with patient different removal options including excision, cautery and /or laser.      Signs and Symptoms of skin cancer discussed with patient.  ABCDEs of melanoma reviewed with patient.  Patient encouraged to perform monthly skin exams.  UV precautions reviewed with patient.  Patient to follow up with Primary Care provider regarding elevated blood pressure.  Risks of non-melanoma skin cancer discussed with patient   Return to clinic 4 months

## 2021-03-08 ENCOUNTER — IMMUNIZATION (OUTPATIENT)
Dept: FAMILY MEDICINE | Facility: CLINIC | Age: 66
End: 2021-03-08
Payer: COMMERCIAL

## 2021-03-08 PROCEDURE — 0001A PR COVID VAC PFIZER DIL RECON 30 MCG/0.3 ML IM: CPT

## 2021-03-08 PROCEDURE — 91300 PR COVID VAC PFIZER DIL RECON 30 MCG/0.3 ML IM: CPT

## 2021-03-29 ENCOUNTER — IMMUNIZATION (OUTPATIENT)
Dept: FAMILY MEDICINE | Facility: CLINIC | Age: 66
End: 2021-03-29
Attending: FAMILY MEDICINE
Payer: COMMERCIAL

## 2021-03-29 PROCEDURE — 0002A PR COVID VAC PFIZER DIL RECON 30 MCG/0.3 ML IM: CPT

## 2021-03-29 PROCEDURE — 91300 PR COVID VAC PFIZER DIL RECON 30 MCG/0.3 ML IM: CPT

## 2021-04-03 ENCOUNTER — HEALTH MAINTENANCE LETTER (OUTPATIENT)
Age: 66
End: 2021-04-03

## 2021-04-05 ENCOUNTER — OFFICE VISIT (OUTPATIENT)
Dept: FAMILY MEDICINE | Facility: CLINIC | Age: 66
End: 2021-04-05
Payer: COMMERCIAL

## 2021-04-05 VITALS
BODY MASS INDEX: 31.65 KG/M2 | SYSTOLIC BLOOD PRESSURE: 132 MMHG | HEART RATE: 88 BPM | WEIGHT: 190 LBS | OXYGEN SATURATION: 98 % | DIASTOLIC BLOOD PRESSURE: 62 MMHG | TEMPERATURE: 97.8 F | HEIGHT: 65 IN

## 2021-04-05 DIAGNOSIS — E03.8 OTHER SPECIFIED HYPOTHYROIDISM: ICD-10-CM

## 2021-04-05 DIAGNOSIS — I10 ESSENTIAL HYPERTENSION WITH GOAL BLOOD PRESSURE LESS THAN 140/90: ICD-10-CM

## 2021-04-05 DIAGNOSIS — R73.09 ELEVATED GLUCOSE: ICD-10-CM

## 2021-04-05 DIAGNOSIS — Z00.00 ENCOUNTER FOR MEDICARE ANNUAL WELLNESS EXAM: Primary | ICD-10-CM

## 2021-04-05 DIAGNOSIS — Z12.11 COLON CANCER SCREENING: ICD-10-CM

## 2021-04-05 LAB
ANION GAP SERPL CALCULATED.3IONS-SCNC: 8 MMOL/L (ref 3–14)
BUN SERPL-MCNC: 7 MG/DL (ref 7–30)
CALCIUM SERPL-MCNC: 8.7 MG/DL (ref 8.5–10.1)
CHLORIDE SERPL-SCNC: 98 MMOL/L (ref 94–109)
CO2 SERPL-SCNC: 26 MMOL/L (ref 20–32)
CREAT SERPL-MCNC: 0.55 MG/DL (ref 0.52–1.04)
GFR SERPL CREATININE-BSD FRML MDRD: >90 ML/MIN/{1.73_M2}
GLUCOSE SERPL-MCNC: 151 MG/DL (ref 70–99)
POTASSIUM SERPL-SCNC: 3.5 MMOL/L (ref 3.4–5.3)
SODIUM SERPL-SCNC: 132 MMOL/L (ref 133–144)
TSH SERPL DL<=0.005 MIU/L-ACNC: 1.3 MU/L (ref 0.4–4)

## 2021-04-05 PROCEDURE — 84443 ASSAY THYROID STIM HORMONE: CPT | Performed by: FAMILY MEDICINE

## 2021-04-05 PROCEDURE — 99397 PER PM REEVAL EST PAT 65+ YR: CPT | Performed by: FAMILY MEDICINE

## 2021-04-05 PROCEDURE — 36415 COLL VENOUS BLD VENIPUNCTURE: CPT | Performed by: FAMILY MEDICINE

## 2021-04-05 PROCEDURE — 80048 BASIC METABOLIC PNL TOTAL CA: CPT | Performed by: FAMILY MEDICINE

## 2021-04-05 PROCEDURE — 99214 OFFICE O/P EST MOD 30 MIN: CPT | Mod: 25 | Performed by: FAMILY MEDICINE

## 2021-04-05 RX ORDER — LEVOTHYROXINE SODIUM 50 UG/1
50 TABLET ORAL DAILY
Qty: 90 TABLET | Refills: 3 | Status: SHIPPED | OUTPATIENT
Start: 2021-04-05 | End: 2022-05-04

## 2021-04-05 RX ORDER — LISINOPRIL/HYDROCHLOROTHIAZIDE 10-12.5 MG
1 TABLET ORAL DAILY
Qty: 90 TABLET | Refills: 3 | Status: SHIPPED | OUTPATIENT
Start: 2021-04-05 | End: 2022-06-01

## 2021-04-05 ASSESSMENT — PAIN SCALES - GENERAL: PAINLEVEL: NO PAIN (0)

## 2021-04-05 ASSESSMENT — MIFFLIN-ST. JEOR: SCORE: 1401.67

## 2021-04-05 ASSESSMENT — ACTIVITIES OF DAILY LIVING (ADL): CURRENT_FUNCTION: NO ASSISTANCE NEEDED

## 2021-04-05 NOTE — PATIENT INSTRUCTIONS
Health Maintenance reviewed and plan for update discussed.  Patient asked to schedule her mammogram [phone]     Our Clinic hours are:  Mondays    7:20 am - 7 pm  Tues - Fri  7:20 am - 5 pm    Clinic Phone: [phone]    The clinic lab opens at 7:30 am Mon - Fri and appointments are required.    [facility]. [phone]  Monday  8 am - 7pm  Tues - Fri 8 am - 5:30 pm         Patient Education   Personalized Prevention Plan  You are due for the preventive services outlined below.  Your care team is available to assist you in scheduling these services.  If you have already completed any of these items, please share that information with your care team to update in your medical record.  Health Maintenance Due   Topic Date Due     Colorectal Cancer Screening  09/10/2019       Exercise for a Healthier Heart     Exercise with a friend. When activity is fun, you're more likely to stick with it.   You may wonder how you can improve the health of your heart. If you re thinking about exercise, you re on the right track. You don t need to become an athlete. But you do need a certain amount of brisk exercise to help strengthen your heart. If you have been diagnosed with a heart condition, your healthcare provider may advise exercise to help stabilize your condition. To help make exercise a habit, choose safe, fun activities.   Before you start  Check with your healthcare provider before starting an exercise program. This is especially important if you have not been active for a while. It's also important if you have a long-term (chronic) health problem such as heart disease, diabetes, or obesity. Or if you are at high risk for having these problems.   Why exercise?  Exercising regularly offers many healthy rewards. It can help you do all of the following:    Improve your blood cholesterol level to help prevent further heart trouble    Lower your blood pressure to help prevent a stroke or heart  attack    Control diabetes, or reduce your risk of getting this disease    Improve your heart and lung function    Reach and stay at a healthy weight    Make your muscles stronger so you can stay active    Prevent falls and fractures by slowing the loss of bone mass (osteoporosis)    Manage stress better    Reduce your blood pressure    Improve your sense of self and your body image  Exercise tips      Ease into your routine. Set small goals. Then build on them. If you are not sure what your activity level should be, talk with your healthcare provider first before starting an exercise routine.    Exercise on most days. Aim for a total of 150 minutes (2 hours and 30 minutes) or more of moderate-intensity aerobic activity each week. Or 75 minutes (1 hour and 15 minutes) or more of vigorous-intensity aerobic activity each week. Or try for a combination of both. Moderate activity means that you breathe heavier and your heart rate increases but you can still talk. Think about doing 40 minutes of moderate exercise, 3 to 4 times a week. For best results, activity should last for about 40 minutes to lower blood pressure and cholesterol. It's OK to work up to the 40-minute period over time. Examples of moderate-intensity activity are walking 1 mile in 15 minutes. Or doing 30 to 45 minutes of yard work.    Step up your daily activity level.  Along with your exercise program, try being more active the whole day. Walk instead of drive. Or park further away so that you take more steps each day. Do more household tasks or yard work. You may not be able to meet the advised mount of physical activity. But doing some moderate- or vigorous-intensity aerobic activity can help reduce your risk for heart disease. Your healthcare provider can help you figure out what is best for you.    Choose 1 or more activities you enjoy.  Walking is one of the easiest things you can do. You can also try swimming, riding a bike, dancing, or taking an  exercise class.    When to call your healthcare provider  Call your healthcare provider if you have any of these:     Chest pain or feel dizzy or lightheaded    Burning, tightness, pressure, or heaviness in your chest, neck, shoulders, back, or arms    Abnormal shortness of breath    More joint or muscle pain    A very fast or irregular heartbeat (palpitations)  Theo last reviewed this educational content on 7/1/2019 2000-2020 [employer]. All rights reserved. This information is not intended as a substitute for professional medical care. Always follow your healthcare professional's instructions.          Urinary Incontinence, Female (Adult)   Urinary incontinence means loss of bladder control. This problem affects many women, especially as they get older. If you have incontinence, you may be embarrassed to ask for help. But know that this problem can be treated.   Types of Incontinence  There are different types of incontinence. Two of the main types are described here. You can have more than one type.     Stress incontinence. With this type, urine leaks when pressure (stress) is put on the bladder. This may happen when you cough, sneeze, or laugh. Stress incontinence most often occurs because the pelvic floor muscles that support the bladder and urethra are weak. This can happen after pregnancy and vaginal childbirth or a hysterectomy. It can also be due to excess body weight or hormone changes.    Urge incontinence (also called overactive bladder). With this type, a sudden urge to urinate is felt often. This may happen even though there may not be much urine in the bladder. The need to urinate often during the night is common. Urge incontinence most often occurs because of bladder spasms. This may be due to bladder irritation or infection. Damage to bladder nerves or pelvic muscles, constipation, and certain medicines can also lead to urge incontinence.  Treatment depends on the cause.  Further evaluation is needed to find the type you have. This will likely include an exam and certain tests. Based on the results, you and your healthcare provider can then plan treatment. Until a diagnosis is made, the home care tips below can help ease symptoms.   Home care    Do pelvic floor muscle exercises, if they are prescribed. The pelvic floor muscles help support the bladder and urethra. Many women find that their symptoms improve when doing special exercises that strengthen these muscles. To do the exercises, contract the muscles you would use to stop your stream of urine. But do this when you re not urinating. Hold for 10 seconds, then relax. Repeat 10 to 20 times in a row, at least 3 times a day. Your healthcare provider may give you other instructions for how to do the exercises and how often.    Keep a bladder diary. This helps track how often and how much you urinate over a set period of time. Bring this diary with you to your next visit with the provider. The information can help your provider learn more about your bladder problem.    Lose weight, if advised to by your provider. Extra weight puts pressure on the bladder. Your provider can help you create a weight-loss plan that s right for you. This may include exercising more and making certain diet changes.    Don't have foods and drinks that may irritate the bladder. These can include alcohol and caffeinated drinks.    Quit smoking. Smoking and other tobacco use can lead to a long-term (chronic) cough that strains the pelvic floor muscles. Smoking may also damage the bladder and urethra. Talk with your provider about treatments or methods you can use to quit smoking.    If drinking large amounts of fluid makes you have symptoms, you may be advised to limit your fluid intake. You may also be advised to drink most of your fluids during the day and to limit fluids at night.    If you re worried about urine leakage or accidents, you may wear absorbent  pads to catch urine. Change the pads often. This helps reduce discomfort. It may also reduce the risk of skin or bladder infections.    Follow-up care  Follow up with your healthcare provider, or as directed. It may take some to find the right treatment for your problem. But healthy lifestyle changes can be made right away. These include such things as exercising on a regular basis, eating a healthy diet, losing weight (if needed), and quitting smoking. Your treatment plan may include special therapies or medicines. Certain procedures or surgery may also be options. Talk about any questions you have with your provider.   When to seek medical advice  Call the healthcare provider right away if any of these occur:    Fever of 100.4 F (38 C) or higher, or as directed by your provider    Bladder pain or fullness    Belly swelling    Nausea or vomiting    Back pain    Weakness, dizziness, or fainting  Theo last reviewed this educational content on 1/1/2020 2000-2020 [employer]. All rights reserved. This information is not intended as a substitute for professional medical care. Always follow your healthcare professional's instructions.

## 2021-04-05 NOTE — PROGRESS NOTES
She is at risk for lack of exercise and has been provided with information to increase physical activity for the benefit of her well-being.  Information on urinary incontinence and treatment options given to patient.

## 2021-04-05 NOTE — PROGRESS NOTES
"SUBJECTIVE:   Mine Meléndez is a 65 year old female who presents for Preventive Visit.      Patient has been advised of split billing requirements and indicates understanding: Yes   Are you in the first 12 months of your Medicare coverage?  No    Healthy Habits:     In general, how would you rate your overall health?  Good    Frequency of exercise:  None    Duration of exercise:  Less than 15 minutes    Do you usually eat at least 4 servings of fruit and vegetables a day, include whole grains    & fiber and avoid regularly eating high fat or \"junk\" foods?  Yes    Taking medications regularly:  No    Barriers to taking medications:  None    Medication side effects:  None    Ability to successfully perform activities of daily living:  No assistance needed    Home Safety:  No safety concerns identified    Hearing Impairment:  No hearing concerns    In the past 6 months, have you been bothered by leaking of urine? Yes    In general, how would you rate your overall mental or emotional health?  Good      PHQ-2 Total Score: 0    Additional concerns today:  No    Do you feel safe in your environment? Yes    Have you ever done Advance Care Planning? (For example, a Health Directive, POLST, or a discussion with a medical provider or your loved ones about your wishes): No, advance care planning information given to patient to review.  Patient plans to discuss their wishes with loved ones or provider.         Fall risk  Fallen 2 or more times in the past year?: No  Any fall with injury in the past year?: No    Cognitive Screening   1) Repeat 3 items (Leader, Season, Table)    2) Clock draw: NORMAL  3) 3 item recall: Recalls 3 objects  Results: 3 items recalled: COGNITIVE IMPAIRMENT LESS LIKELY    Mini-CogTM Copyright VIOLETA Partida. Licensed by the author for use in [facility]; reprinted with permission (casey@.[facility]). All rights reserved.      Do you have sleep apnea, excessive snoring or daytime drowsiness?: " no    Reviewed and updated as needed this visit by clinical staff  Tobacco  Allergies  Meds  Problems  Med Hx  Surg Hx  Fam Hx          Reviewed and updated as needed this visit by Provider                Social History     Tobacco Use     Smoking status: Former Smoker     Years: 25.00     Quit date: 2000     Years since quittin.9     Smokeless tobacco: Never Used   Substance Use Topics     Alcohol use: Yes     Comment: 6 pack of beer on a weekend     If you drink alcohol do you typically have >3 drinks per day or >7 drinks per week? No    Alcohol Use 2016   Prescreen: >3 drinks/day or >7 drinks/week? The patient does not drink >3 drinks per day nor >7 drinks per week.   No flowsheet data found.        Hyperlipidemia Follow-Up      Are you regularly taking any medication or supplement to lower your cholesterol?   Yes- simvastatin    Are you having muscle aches or other side effects that you think could be caused by your cholesterol lowering medication?  No    Hypertension Follow-up      Do you check your blood pressure regularly outside of the clinic? No     Are you following a low salt diet? Yes    Are your blood pressures ever more than 140 on the top number (systolic) OR more   than 90 on the bottom number (diastolic), for example 140/90? No    Hypothyroidism Follow-up      Since last visit, patient describes the following symptoms: Weight stable, no hair loss, no skin changes, no constipation, no loose stools      Current providers sharing in care for this patient include:   Patient Care Team:  Britany Norton MD as PCP - General (Family Practice)  Britany Norton MD as Assigned PCP  Pankaj Castro MD as Assigned Surgical Provider    The following health maintenance items are reviewed in Epic and correct as of today:  Health Maintenance Due   Topic Date Due     ADVANCE CARE PLANNING  04/15/2016     MEDICARE ANNUAL WELLNESS VISIT  2018     COLORECTAL CANCER SCREENING   "09/10/2019     Lab work is in process    Any new diagnosis of family breast, ovarian, or bowel cancer? mp    FSH-7: No flowsheet data found.  click delete button to remove this line now  Mammogram Screening: Recommended mammography every 1-2 years with patient discussion and risk factor consideration  Pertinent mammograms are reviewed under the imaging tab.    Review of Systems  Constitutional, HEENT, cardiovascular, pulmonary, gi and gu systems are negative, except as otherwise noted.    OBJECTIVE:   /62   Pulse 88   Temp 97.8  F (36.6  C) (Tympanic)   Ht 1.657 m (5' 5.25\")   Wt 86.2 kg (190 lb)   LMP 11/12/2004 (Approximate)   SpO2 98%   Breastfeeding No   BMI 31.38 kg/m   Estimated body mass index is 31.38 kg/m  as calculated from the following:    Height as of this encounter: 1.657 m (5' 5.25\").    Weight as of this encounter: 86.2 kg (190 lb).  Physical Exam  GENERAL: healthy, alert and no distress  NECK: no adenopathy, no asymmetry, masses, or scars and thyroid normal to palpation  RESP: lungs clear to auscultation - no rales, rhonchi or wheezes  CV: regular rate and rhythm, normal S1 S2, no S3 or S4, no murmur, click or rub, no peripheral edema and peripheral pulses strong  ABDOMEN: soft, nontender, no hepatosplenomegaly, no masses and bowel sounds normal  MS: no gross musculoskeletal defects noted, no edema    Diagnostic Test Results:  Labs reviewed in Epic    ASSESSMENT / PLAN:   1. Encounter for Medicare annual wellness exam       2. Other specified hypothyroidism     - levothyroxine (SYNTHROID/LEVOTHROID) 50 MCG tablet; Take 1 tablet (50 mcg) by mouth daily Needs lab updated  Dispense: 90 tablet; Refill: 3  - TSH with free T4 reflex  - OFFICE/OUTPT VISIT,EST,LEVL III    3. Essential hypertension with goal blood pressure less than 140/90     - lisinopril-hydrochlorothiazide (ZESTORETIC) 10-12.5 MG tablet; Take 1 tablet by mouth daily  Dispense: 90 tablet; Refill: 3  - Basic metabolic panel  - " "OFFICE/OUTPT VISIT,EST,LEVL III    4. Colon cancer screening     - COLOGUARD(EXACT SCIENCES)    Patient has been advised of split billing requirements and indicates understanding: Yes  COUNSELING:  Reviewed preventive health counseling, as reflected in patient instructions       Regular exercise       Healthy diet/nutrition    Estimated body mass index is 31.38 kg/m  as calculated from the following:    Height as of this encounter: 1.657 m (5' 5.25\").    Weight as of this encounter: 86.2 kg (190 lb).        She reports that she quit smoking about 20 years ago. She quit after 25.00 years of use. She has never used smokeless tobacco.      Appropriate preventive services were discussed with this patient, including applicable screening as appropriate for cardiovascular disease, diabetes, osteopenia/osteoporosis, and glaucoma.  As appropriate for age/gender, discussed screening for colorectal cancer, prostate cancer, breast cancer, and cervical cancer. Checklist reviewing preventive services available has been given to the patient.    Reviewed patients plan of care and provided an AVS. The Basic Care Plan (routine screening as documented in Health Maintenance) for Mine meets the Care Plan requirement. This Care Plan has been established and reviewed with the Patient.    Counseling Resources:  ATP IV Guidelines  Pooled Cohorts Equation Calculator  Breast Cancer Risk Calculator  Breast Cancer: Medication to Reduce Risk  FRAX Risk Assessment  ICSI Preventive Guidelines  Dietary Guidelines for Americans, 2010  USDA's MyPlate  ASA Prophylaxis  Lung CA Screening    Britany Norton MD  [facility]    Identified Health Risks:  "

## 2021-04-06 DIAGNOSIS — R73.09 ELEVATED GLUCOSE: Primary | ICD-10-CM

## 2021-04-08 ENCOUNTER — MYC MEDICAL ADVICE (OUTPATIENT)
Dept: RHEUMATOLOGY | Facility: CLINIC | Age: 66
End: 2021-04-08

## 2021-04-22 ENCOUNTER — MYC MEDICAL ADVICE (OUTPATIENT)
Dept: FAMILY MEDICINE | Facility: CLINIC | Age: 66
End: 2021-04-22

## 2021-04-22 ENCOUNTER — MYC MEDICAL ADVICE (OUTPATIENT)
Dept: RHEUMATOLOGY | Facility: CLINIC | Age: 66
End: 2021-04-22

## 2021-04-22 NOTE — TELEPHONE ENCOUNTER
I usually just have people check with the pharmacist.  Our pharmacy has one that they keep refrigerated called Florajen which is a good brand.    Britany Norton M.D.

## 2021-04-29 LAB — COLOGUARD-ABSTRACT: NEGATIVE

## 2021-05-10 ENCOUNTER — VIRTUAL VISIT (OUTPATIENT)
Dept: RHEUMATOLOGY | Facility: CLINIC | Age: 66
End: 2021-05-10
Payer: COMMERCIAL

## 2021-05-10 DIAGNOSIS — M05.9 SEROPOSITIVE RHEUMATOID ARTHRITIS (H): Primary | ICD-10-CM

## 2021-05-10 DIAGNOSIS — Z79.899 HIGH RISK MEDICATION USE: ICD-10-CM

## 2021-05-10 PROCEDURE — 99214 OFFICE O/P EST MOD 30 MIN: CPT | Mod: 95 | Performed by: INTERNAL MEDICINE

## 2021-05-10 RX ORDER — METHOTREXATE 2.5 MG/1
20 TABLET ORAL WEEKLY
Qty: 104 TABLET | Refills: 0 | Status: SHIPPED | OUTPATIENT
Start: 2021-05-10 | End: 2021-08-24

## 2021-05-10 RX ORDER — FOLIC ACID 1 MG/1
1000 TABLET ORAL DAILY
Qty: 90 TABLET | Refills: 2 | Status: SHIPPED | OUTPATIENT
Start: 2021-05-10 | End: 2021-12-10

## 2021-05-10 NOTE — PROGRESS NOTES
Mine Meléndez is a 65 year old year old female who is being evaluated via a billable telephone visit.    What state will you be in during your phone visit?  [place]  What phone number would you like to be contacted at? [phone]  How would you like to obtain your AVS? [facility]    Rheumatology Video Visit      Mine Meléndez MRN# [id number]   YOB: 1955 Age: 65 year old      Date of visit: 5/10/21     Chief Complaint   Patient presents with:  Arthritis    Assessment and Plan     1. Seropositive Erosive Rheumatoid Arthritis (RF+, ): Dx'd with RA prior to 2002. Previously on SSZ (stomatitis). Currently on MTX 20mg PO once weekly (no improvement when MTX was increased to 25mg SQ wkly when used as monotherapy), folic acid 1mg daily, and hydroxychloroquine 300mg daily on average (loose stools with 400mg daily). Chronic illness, stable  - Continue Methotrexate 20mg PO once weekly  - Continue folic acid 1mg daily  - Continue hydroxychloroquine 200mg daily with an additional 200mg every other day; may try to reduce to 200mg daily (1/22/2021 eye exam by Dr. Cobb at [facility] did not show evidence of hydroxychloroquine toxicity)  - Labs now and every 8-12 weeks: CBC, Creatinine, Hepatic Panel, ESR, CRP    High risk medication requiring intensive toxicity monitoring at least quarterly: labs ordered include CBC, Creatinine, Hepatic panel to monitor for cytopenia and hepatotoxicity; checking creatinine as it affects clearance of medication.      2. Primary osteoarthritis of both hands: start voltaren gel PRN.  Chronic illness, stable  - Continue diclofenac (VOLTAREN) 1 % topical gel; Apply up to 2 grams of 1% gel to hands up to 4 times daily as needed for joint pain (maximum: 8 g per upper extremity per day)  Dispense: 200 g; Refill: 3    3.  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  This is documented here for reference only.    4.  Less than 24 hours of left shoulder pain that is worse with raising her arm up but has since resolved: Not an issue at this time.  Discussed possible etiologies, including rheumatoid nephritis, osteoarthritis, impingement syndrome, rotator cuff issue.  Given the short duration of symptoms this is less likely rheumatoid arthritis related but it cannot be completely ruled out as a contributing factor.  If recurrence then may be evaluated in clinic.    # s/p 2 doses of the Pfizer COVID-19 vaccine    Total minutes spent in evaluation with patient, documentation, , and review of pertinent studies and chart notes: 14      Ms. Meléndez verbalized agreement with and understanding of the rational for the diagnosis and treatment plan.  All questions were answered to best of my ability and the patient's satisfaction. Ms. Meléndez was advised to contact the clinic with any questions that may arise after the clinic visit.      Thank you for involving me in the care of the patient    Return to clinic: 3 months      HPI   Mine Meléndez is a 65 year old female with a past medical history significant for hypertension, hyperlipidemia, hypothyroidism, migraines, and rheumatoid arthritis who presents for follow-up of rheumatoid arthritis.     Today, 5/10/2021: Doing well at this time.  No joint pain or morning stiffness.  No gelling phenomenon.  She notes that there was one evening where she had left shoulder pain with raising her arm above her head and involving her for a little bit the following morning but that has resolved and not been an issue since; no swelling or increased warmth of the left shoulder.  No other arthritis issues/flares.    Tobacco: Former smoker  EtOH: 4 drinks per day on the weekends  Drugs: None  Occupation: retired June 2020, was working in [employer]   12 point review of system was completed and negative except as noted in the  HPI    Active Problem List     Patient Active Problem List   Diagnosis     Seropositive rheumatoid arthritis (H)     DYSPEPSIA     Obesity     Essential hypertension     Hypothyroidism     HYPERLIPIDEMIA LDL GOAL <130     Advanced directives, counseling/discussion     Dermatitis     High risk medications (not anticoagulants) long-term use     Female stress incontinence     Migraine     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)     Past Medical History     Past Medical History:   Diagnosis Date     Herpes simplex without mention of complication      Malignant melanoma (H)      Obesity, unspecified      Osteomyelitis of jaw 5/22/2008     Rheumatoid arthritis(714.0)      Trochanteric bursitis of right hip 8/16/2013     Past Surgical History     Past Surgical History:   Procedure Laterality Date     COLONOSCOPY  1/20/06     HYSTERECTOMY, PAP NO LONGER INDICATED       HYSTERECTOMY, VAGINAL  11/04    TVH,TVT procedure     RELEASE CARPAL TUNNEL Right 10/11/2016    Procedure: RELEASE CARPAL TUNNEL;  Surgeon: Emely Blanca MD;  Location: [facility] OR     SURGICAL HISTORY OF -   1988    tubal ligation     SURGICAL HISTORY OF -   2001    excision gangliion cyst rt heel     Allergy     Allergies   Allergen Reactions     Codeine Rash     Flagyl [Metronidazole] Rash     Septra [Sulfamethoxazole W/Trimethoprim] Itching     Puffy eyes, flushed.      Current Medication List     Current Outpatient Medications   Medication Sig     ASPIRIN 81 MG OR TABS ONE DAILY     folic acid (FOLVITE) 1 MG tablet Take 1 tablet (1,000 mcg) by mouth daily     hydroxychloroquine (PLAQUENIL) 200 MG tablet Hydroxychloroquine 200mg daily; and an additional 200mg every other day.     ibuprofen (ADVIL/MOTRIN) 800 MG tablet Take 1 tablet (800 mg) by mouth every 8 hours as needed for moderate pain     levothyroxine (SYNTHROID/LEVOTHROID) 50 MCG tablet Take 1 tablet (50 mcg) by mouth daily Needs lab updated      "lisinopril-hydrochlorothiazide (ZESTORETIC) 10-12.5 MG tablet Take 1 tablet by mouth daily     methotrexate sodium 2.5 MG TABS Take 8 tablets (20 mg) by mouth once a week . Take all 8 tablets on the same day of each week.     MULTI-VITAMIN OR 1 daily     Probiotic Product (PROBIOTIC PO)      simvastatin (ZOCOR) 20 MG tablet TAKE ONE TABLET BY MOUTH AT BEDTIME     No current facility-administered medications for this visit.          Social History   See HPI    Family History     Family History   Problem Relation Age of Onset     Diabetes Father      Dementia Father      Diabetes Brother      Diabetes Sister      Hypertension Sister      Hypertension Sister      Physical Exam     Temp Readings from Last 3 Encounters:   04/05/21 97.8  F (36.6  C) (Tympanic)   11/09/20 97.6  F (36.4  C)   05/13/20 97.5  F (36.4  C) (Tympanic)     BP Readings from Last 5 Encounters:   04/05/21 132/62   02/09/21 (!) 150/66   11/09/20 114/62   10/06/20 113/69   06/01/20 129/66     Pulse Readings from Last 1 Encounters:   04/05/21 88     Resp Readings from Last 1 Encounters:   05/13/20 16     Estimated body mass index is 31.38 kg/m  as calculated from the following:    Height as of 4/5/21: 1.657 m (5' 5.25\").    Weight as of 4/5/21: 86.2 kg (190 lb).    GEN: NAD. Healthy appearing adult.   HEENT: MMM.  Anicteric, noninjected sclera. No obvious external lesions of the ear and nose. Hearing intact.  PULM: No increased work of breathing  MSK:  Hands and wrists without swelling.  Able to make a complete fist with each hand.  Heberden's and Fabby's nodes present.    SKIN: No rash or jaundice seen  PSYCH: Alert. Appropriate.     Labs / Imaging (select studies)     RF/CCP  Recent Labs   Lab Test 05/01/18  1538   CCPIGG 182*     CBC  Recent Labs   Lab Test 01/12/21  1547 10/23/20  1519 07/09/20  1346   WBC 7.4 7.0 8.7   RBC 4.12 4.21 4.16   HGB 14.5 14.4 14.4   HCT 43.1 43.1 42.2   * 102* 101*   RDW 13.6 13.6 13.2    220 225   MCH " 35.2* 34.2* 34.6*   MCHC 33.6 33.4 34.1   NEUTROPHIL 68.7 69.2 75.4   LYMPH 19.6 22.7 16.4   MONOCYTE 9.2 6.1 6.6   EOSINOPHIL 2.4 1.7 1.3   BASOPHIL 0.1 0.3 0.3   ANEU 5.1 4.9 6.6   ALYM 1.4 1.6 1.4   SHYLA 0.7 0.4 0.6   AEOS 0.2 0.1 0.1   ABAS 0.0 0.0 0.0     CMP  Recent Labs   Lab Test 04/05/21  1255 01/12/21  1547 11/09/20  0939 10/23/20  1519 07/09/20  1346 05/22/19  1404 05/22/19  1404   *  --  137  --   --   --  134   POTASSIUM 3.5  --  3.7  --   --   --  3.4   CHLORIDE 98  --  104  --   --   --  99   CO2 26  --  30  --   --   --  32   ANIONGAP 8  --  3  --   --   --  3   *  --  115*  --   --   --  123*   BUN 7  --  10  --   --   --  13   CR 0.55 0.65 0.57 0.62 0.58   < > 0.58   GFRESTIMATED >90 >90 >90 >90 >90   < > >90   GFRESTBLACK >90 >90 >90 >90 >90   < > >90   ROSELYN 8.7  --  9.3  --   --   --  9.3   BILITOTAL  --  0.7  --  0.7 0.5   < > 1.3   ALBUMIN  --  3.6  --  3.5 3.3*   < > 3.9   PROTTOTAL  --  7.5  --  7.3 7.4   < > 7.6   ALKPHOS  --  118  --  128 130   < > 132   AST  --  28  --  30 25   < > 21   ALT  --  27  --  32 26   < > 25    < > = values in this interval not displayed.     Calcium/VitaminD  Recent Labs   Lab Test 04/05/21  1255 11/09/20  0939 05/22/19  1404   ROSELYN 8.7 9.3 9.3     ESR/CRP  Recent Labs   Lab Test 01/12/21  1547 10/23/20  1519 07/09/20  1346   SED 10 15 17   CRP 8.9* 22.7* 24.0*     Lipid Panel  Recent Labs   Lab Test 11/09/20  0939 04/17/19  1526 04/30/18  1601 09/09/13  0857 09/09/13  0857   CHOL 153 159 163   < > 169   TRIG 65 102 170*   < > 132   HDL 74 63 55   < > 58   LDL 66 76 74   < > 85   VLDL  --   --   --   --  26   CHOLHDLRATIO  --   --   --   --  3.0   NHDL 79 96 108   < >  --     < > = values in this interval not displayed.     Hepatitis B  Recent Labs   Lab Test 10/23/20  1519 05/01/18  1538   HBCAB Nonreactive Nonreactive   HEPBANG Nonreactive Nonreactive     Hepatitis C  Recent Labs   Lab Test 10/23/20  1519 09/11/14  1650   HCVAB Nonreactive Negative        Lyme confirmation testing by Western Blot  Recent Labs   Lab Test 05/22/13  1540   LYWG Negative Reference range: Negative (Note) Band(s) present: NONE (Insufficient number of bands for positive result) INTERPRETIVE INFORMATION: Borrelia Burgdorferi Ab, IgG Western Blot  For this assay, a positive result is reported when any 5 or more of the following 10 bands are present: 18, 23, 28, 30, 39, 41, 45, 58, 66, or 93 kDa.  All other banding patterns are reported as negative.   LYWM Negative Reference range: Negative (Note) Band(s) present: NONE (Insufficient number of bands for positive result) INTERPRETIVE INFORMATION: Borrelia Burgdorferi Antibody, IgM Western Blot  For this assay, a positive result is reported when any 2 or more of the following bands are present: 23, 39, or 41 kDa. All other banding patterns are reported as negative. Performed by [facility], [address] [place] [id number] [phone] [email or web address], Comfort Vega MD, Lab. Director     Tuberculosis Screening  Recent Labs   Lab Test 10/23/20  1519   TBRST Negative       Immunization History     Immunization History   Administered Date(s) Administered     COVID-19,PF,Pfizer 03/08/2021, 03/29/2021     Influenza (H1N1) 12/31/2009     Influenza (High Dose) 3 valent vaccine 11/05/2019     Influenza (IIV3) PF 11/03/2004, 10/30/2012, 10/20/2013, 10/03/2016     Influenza Vaccine IM > 6 months Valent IIV4 10/23/2017     Influenza, Quad, High Dose, Pf, 65yr + 11/09/2020     Pneumo Conj 13-V (2010&after) 11/12/2018     Pneumococcal 23 valent 02/19/2019     TD (ADULT, 7+) 04/28/1995     TDAP Vaccine (Adacel) 12/05/2007, 02/01/2018     Zoster vaccine recombinant adjuvanted (SHINGRIX) 04/17/2019, 07/10/2019     Zoster vaccine, live 06/07/2016          Chart documentation done in part with Dragon Voice recognition Software. Although reviewed after completion, some word and grammatical error may remain.        Video-Visit Details    Type of  service:  Video Visit    Video Start Time: 7:44 AM    Video End Time:7:53 AM    Originating Location (pt. Location): [place], [place]    Distant Location (provider location):  [facility] in [place], [place]    Platform used for Video Visit: Siva Baltazar MD

## 2021-05-10 NOTE — PATIENT INSTRUCTIONS
RHEUMATOLOGY    Dr. Nasir Baltazar    [facility]  [address] [place] [address]    Our new phone number for Rheumatology is [phone], this number will be able to help you schedule appointments for Dr. Baltazar or if you have any message you would like sent to us.    Thank you for choosing [facility]!    Rachel Uriarte [facility] Rheumat[facility]

## 2021-05-12 DIAGNOSIS — Z79.899 HIGH RISK MEDICATION USE: ICD-10-CM

## 2021-05-12 DIAGNOSIS — M05.9 SEROPOSITIVE RHEUMATOID ARTHRITIS (H): ICD-10-CM

## 2021-05-12 LAB
ALBUMIN SERPL-MCNC: 3.5 G/DL (ref 3.4–5)
ALP SERPL-CCNC: 121 U/L (ref 40–150)
ALT SERPL W P-5'-P-CCNC: 24 U/L (ref 0–50)
AST SERPL W P-5'-P-CCNC: 23 U/L (ref 0–45)
BASOPHILS # BLD AUTO: 0 10E9/L (ref 0–0.2)
BASOPHILS NFR BLD AUTO: 0.3 %
BILIRUB DIRECT SERPL-MCNC: 0.2 MG/DL (ref 0–0.2)
BILIRUB SERPL-MCNC: 0.5 MG/DL (ref 0.2–1.3)
CREAT SERPL-MCNC: 0.58 MG/DL (ref 0.52–1.04)
CRP SERPL-MCNC: 14.9 MG/L (ref 0–8)
DIFFERENTIAL METHOD BLD: ABNORMAL
EOSINOPHIL # BLD AUTO: 0.1 10E9/L (ref 0–0.7)
EOSINOPHIL NFR BLD AUTO: 1.6 %
ERYTHROCYTE [DISTWIDTH] IN BLOOD BY AUTOMATED COUNT: 14.1 % (ref 10–15)
ERYTHROCYTE [SEDIMENTATION RATE] IN BLOOD BY WESTERGREN METHOD: 13 MM/H (ref 0–30)
GFR SERPL CREATININE-BSD FRML MDRD: >90 ML/MIN/{1.73_M2}
HCT VFR BLD AUTO: 41.9 % (ref 35–47)
HGB BLD-MCNC: 14 G/DL (ref 11.7–15.7)
LYMPHOCYTES # BLD AUTO: 1.2 10E9/L (ref 0.8–5.3)
LYMPHOCYTES NFR BLD AUTO: 17.7 %
MCH RBC QN AUTO: 34 PG (ref 26.5–33)
MCHC RBC AUTO-ENTMCNC: 33.4 G/DL (ref 31.5–36.5)
MCV RBC AUTO: 102 FL (ref 78–100)
MONOCYTES # BLD AUTO: 0.4 10E9/L (ref 0–1.3)
MONOCYTES NFR BLD AUTO: 5.2 %
NEUTROPHILS # BLD AUTO: 5.1 10E9/L (ref 1.6–8.3)
NEUTROPHILS NFR BLD AUTO: 75.2 %
PLATELET # BLD AUTO: 217 10E9/L (ref 150–450)
PROT SERPL-MCNC: 7.5 G/DL (ref 6.8–8.8)
RBC # BLD AUTO: 4.12 10E12/L (ref 3.8–5.2)
WBC # BLD AUTO: 6.8 10E9/L (ref 4–11)

## 2021-05-12 PROCEDURE — 85652 RBC SED RATE AUTOMATED: CPT | Performed by: INTERNAL MEDICINE

## 2021-05-12 PROCEDURE — 36415 COLL VENOUS BLD VENIPUNCTURE: CPT | Performed by: INTERNAL MEDICINE

## 2021-05-12 PROCEDURE — 82565 ASSAY OF CREATININE: CPT | Performed by: INTERNAL MEDICINE

## 2021-05-12 PROCEDURE — 86140 C-REACTIVE PROTEIN: CPT | Performed by: INTERNAL MEDICINE

## 2021-05-12 PROCEDURE — 85025 COMPLETE CBC W/AUTO DIFF WBC: CPT | Performed by: INTERNAL MEDICINE

## 2021-05-12 PROCEDURE — 80076 HEPATIC FUNCTION PANEL: CPT | Performed by: INTERNAL MEDICINE

## 2021-05-31 ENCOUNTER — RECORDS - HEALTHEAST (OUTPATIENT)
Dept: ADMINISTRATIVE | Facility: CLINIC | Age: 66
End: 2021-05-31

## 2021-06-08 ENCOUNTER — OFFICE VISIT (OUTPATIENT)
Dept: DERMATOLOGY | Facility: CLINIC | Age: 66
End: 2021-06-08
Payer: COMMERCIAL

## 2021-06-08 VITALS — DIASTOLIC BLOOD PRESSURE: 75 MMHG | OXYGEN SATURATION: 97 % | SYSTOLIC BLOOD PRESSURE: 122 MMHG | HEART RATE: 82 BPM

## 2021-06-08 DIAGNOSIS — L81.4 LENTIGO: ICD-10-CM

## 2021-06-08 DIAGNOSIS — D22.9 NEVUS: ICD-10-CM

## 2021-06-08 DIAGNOSIS — Z85.820 HISTORY OF MELANOMA: Primary | ICD-10-CM

## 2021-06-08 DIAGNOSIS — L82.1 SEBORRHEIC KERATOSIS: ICD-10-CM

## 2021-06-08 DIAGNOSIS — D18.01 ANGIOMA OF SKIN: ICD-10-CM

## 2021-06-08 DIAGNOSIS — D23.9 DERMAL NEVUS: ICD-10-CM

## 2021-06-08 PROCEDURE — 99213 OFFICE O/P EST LOW 20 MIN: CPT | Performed by: DERMATOLOGY

## 2021-06-08 NOTE — PROGRESS NOTES
Mine Meléndez is an extremely pleasant 66 year old year old female patient here today for hx of 0.3mm melanoma right thigh.   She notes spot on left flank.  .   Patient states this has been present for a while.  Patient reports the following symptoms:  rough.  Patient reports the following previous treatments none.  These treatments did not work.  Patient reports the following modifying factors none.  Associated symptoms: none.  Patient has no other skin complaints today.  Remainder of the HPI, Meds, PMH, Allergies, FH, and SH was reviewed in chart.      Past Medical History:   Diagnosis Date     Herpes simplex without mention of complication      Malignant melanoma (H)      Obesity, unspecified      Osteomyelitis of jaw 5/22/2008     Rheumatoid arthritis(714.0)      Trochanteric bursitis of right hip 8/16/2013       Past Surgical History:   Procedure Laterality Date     COLONOSCOPY  1/20/06     HYSTERECTOMY, PAP NO LONGER INDICATED       HYSTERECTOMY, VAGINAL  11/04    TVH,TVT procedure     RELEASE CARPAL TUNNEL Right 10/11/2016    Procedure: RELEASE CARPAL TUNNEL;  Surgeon: Emely Blanca MD;  Location: [facility]     SURGICAL HISTORY OF -   1988    tubal ligation     SURGICAL HISTORY OF -   2001    excision gangliion cyst rt heel        Family History   Problem Relation Age of Onset     Diabetes Father      Dementia Father      Diabetes Brother      Diabetes Sister      Hypertension Sister      Hypertension Sister        Social History     Socioeconomic History     Marital status:      Spouse name: Not on file     Number of children: Not on file     Years of education: Not on file     Highest education level: Not on file   Occupational History     Not on file   Social Needs     Financial resource strain: Not on file     Food insecurity     Worry: Not on file     Inability: Not on file     Transportation needs     Medical: Not on file     Non-medical: Not on file   Tobacco Use     Smoking status: Former  Smoker     Years: 25.00     Quit date: 2000     Years since quittin.1     Smokeless tobacco: Never Used   Substance and Sexual Activity     Alcohol use: Yes     Comment: 6 pack of beer on a weekend     Drug use: No     Sexual activity: Yes     Partners: Male     Birth control/protection: Surgical     Comment: hyster.partial   Lifestyle     Physical activity     Days per week: Not on file     Minutes per session: Not on file     Stress: Not on file   Relationships     Social connections     Talks on phone: Not on file     Gets together: Not on file     Attends Buddhist service: Not on file     Active member of club or organization: Not on file     Attends meetings of clubs or organizations: Not on file     Relationship status: Not on file     Intimate partner violence     Fear of current or ex partner: Not on file     Emotionally abused: Not on file     Physically abused: Not on file     Forced sexual activity: Not on file   Other Topics Concern     Parent/sibling w/ CABG, MI or angioplasty before 65F 55M? No   Social History Narrative     Not on file       Outpatient Encounter Medications as of 2021   Medication Sig Dispense Refill     ASPIRIN 81 MG OR TABS ONE DAILY 100 3     folic acid (FOLVITE) 1 MG tablet Take 1 tablet (1,000 mcg) by mouth daily 90 tablet 2     hydroxychloroquine (PLAQUENIL) 200 MG tablet Hydroxychloroquine 200mg daily; and an additional 200mg every other day. 135 tablet 3     ibuprofen (ADVIL/MOTRIN) 800 MG tablet Take 1 tablet (800 mg) by mouth every 8 hours as needed for moderate pain 90 tablet 0     levothyroxine (SYNTHROID/LEVOTHROID) 50 MCG tablet Take 1 tablet (50 mcg) by mouth daily Needs lab updated 90 tablet 3     lisinopril-hydrochlorothiazide (ZESTORETIC) 10-12.5 MG tablet Take 1 tablet by mouth daily 90 tablet 3     methotrexate sodium 2.5 MG TABS Take 8 tablets (20 mg) by mouth once a week . Take all 8 tablets on the same day of each week. 104 tablet 0      MULTI-VITAMIN OR 1 daily       Probiotic Product (PROBIOTIC PO)        simvastatin (ZOCOR) 20 MG tablet TAKE ONE TABLET BY MOUTH AT BEDTIME 90 tablet 3     No facility-administered encounter medications on file as of 6/8/2021.              O:   NAD, WDWN, Alert & Oriented, Mood & Affect wnl, Vitals stable   Here today alone   /75   Pulse 82   LMP 11/12/2004 (Approximate)   SpO2 97%    General appearance normal   Vitals stable   Alert, oriented and in no acute distress      Following lymph nodes palpated: Occipital, Cervical, Supraclavicular , inguinal no lad  Pigmented macules on trunk and proximal ext wit regular borders and pigment networks   L flank stuck on papule      Stuck on papules and brown macules on trunk and ext   Red papules on trunk  Flesh colored papules on trunk     The remainder of the full exam was normal; the following areas were examined:  conjunctiva/lids, oral mucosa, neck, peripheral vascular system, abdomen, lymph nodes, digits/nails, eccrine and apocrine glands, scalp/hair, face, neck, chest, abdomen, buttocks, back, RUE, LUE, RLE, LLE       Eyes: Conjunctivae/lids:Normal     ENT: Lips, buccal mucosa, tongue: normal    MSK:Normal    Cardiovascular: peripheral edema none    Pulm: Breathing Normal    Lymph Nodes: No Head and Neck Lymphadenopathy     Neuro/Psych: Orientation:Alert and Orientedx3 ; Mood/Affect:normal       A/P:  1. Seborrheic keratosis, lentigo, angioma, dermal nevus, hx of melanoma, nevi   MELANOMA DISCUSSED WITH PATIENT:  I discussed the specifics of tumor, prognosis, metachronous melanoma, self exam, and genetics with the patient. I explained the need for monthly skin exams including and taught the patient how to do this. Patient was asked about new or changing moles . I discussed with patient signs and symptoms that could arise in the setting of recurrent locoregional or metastatic disease. In addition, the need to undergo every 6 month dermatologic full skin survey  and evaluation given that patients with a diagnosis of melanoma are at risk of recurrence (local and distant) and of subsequent de trixie melanoma.      It was a pleasure speaking to Mine Meléndez today.  Previous clinic notes and pertinent laboratory tests were reviewed prior to Mine Meléndez's visit.  Signs and Symptoms of skin cancer discussed with patient.  Patient encouraged to perform monthly skin exams.  UV precautions reviewed with patient.  Return to clinic 6 months

## 2021-06-08 NOTE — LETTER
6/8/2021         RE: Mine Meléndez  [id number]  [facility] [address]        Dear Colleague,    Thank you for referring your patient, Mine Meléndez, to the [facility]. Please see a copy of my visit note below.    Mine Meléndez is an extremely pleasant 66 year old year old female patient here today for hx of 0.3mm melanoma right thigh.   She notes spot on left flank.  .   Patient states this has been present for a while.  Patient reports the following symptoms:  rough.  Patient reports the following previous treatments none.  These treatments did not work.  Patient reports the following modifying factors none.  Associated symptoms: none.  Patient has no other skin complaints today.  Remainder of the HPI, Meds, PMH, Allergies, FH, and SH was reviewed in chart.      Past Medical History:   Diagnosis Date     Herpes simplex without mention of complication      Malignant melanoma (H)      Obesity, unspecified      Osteomyelitis of jaw 5/22/2008     Rheumatoid arthritis(714.0)      Trochanteric bursitis of right hip 8/16/2013       Past Surgical History:   Procedure Laterality Date     COLONOSCOPY  1/20/06     HYSTERECTOMY, PAP NO LONGER INDICATED       HYSTERECTOMY, VAGINAL  11/04    TVH,TVT procedure     RELEASE CARPAL TUNNEL Right 10/11/2016    Procedure: RELEASE CARPAL TUNNEL;  Surgeon: Emely Blanca MD;  Location: [facility]     SURGICAL HISTORY OF -   1988    tubal ligation     SURGICAL HISTORY OF -   2001    excision gangliion cyst rt heel        Family History   Problem Relation Age of Onset     Diabetes Father      Dementia Father      Diabetes Brother      Diabetes Sister      Hypertension Sister      Hypertension Sister        Social History     Socioeconomic History     Marital status:      Spouse name: Not on file     Number of children: Not on file     Years of education: Not on file     Highest education level: Not on file   Occupational History     Not on  file   Social Needs     Financial resource strain: Not on file     Food insecurity     Worry: Not on file     Inability: Not on file     Transportation needs     Medical: Not on file     Non-medical: Not on file   Tobacco Use     Smoking status: Former Smoker     Years: 25.00     Quit date: 2000     Years since quittin.1     Smokeless tobacco: Never Used   Substance and Sexual Activity     Alcohol use: Yes     Comment: 6 pack of beer on a weekend     Drug use: No     Sexual activity: Yes     Partners: Male     Birth control/protection: Surgical     Comment: hyster.partial   Lifestyle     Physical activity     Days per week: Not on file     Minutes per session: Not on file     Stress: Not on file   Relationships     Social connections     Talks on phone: Not on file     Gets together: Not on file     Attends Mosque service: Not on file     Active member of club or organization: Not on file     Attends meetings of clubs or organizations: Not on file     Relationship status: Not on file     Intimate partner violence     Fear of current or ex partner: Not on file     Emotionally abused: Not on file     Physically abused: Not on file     Forced sexual activity: Not on file   Other Topics Concern     Parent/sibling w/ CABG, MI or angioplasty before 65F 55M? No   Social History Narrative     Not on file       Outpatient Encounter Medications as of 2021   Medication Sig Dispense Refill     ASPIRIN 81 MG OR TABS ONE DAILY 100 3     folic acid (FOLVITE) 1 MG tablet Take 1 tablet (1,000 mcg) by mouth daily 90 tablet 2     hydroxychloroquine (PLAQUENIL) 200 MG tablet Hydroxychloroquine 200mg daily; and an additional 200mg every other day. 135 tablet 3     ibuprofen (ADVIL/MOTRIN) 800 MG tablet Take 1 tablet (800 mg) by mouth every 8 hours as needed for moderate pain 90 tablet 0     levothyroxine (SYNTHROID/LEVOTHROID) 50 MCG tablet Take 1 tablet (50 mcg) by mouth daily Needs lab updated 90 tablet 3      lisinopril-hydrochlorothiazide (ZESTORETIC) 10-12.5 MG tablet Take 1 tablet by mouth daily 90 tablet 3     methotrexate sodium 2.5 MG TABS Take 8 tablets (20 mg) by mouth once a week . Take all 8 tablets on the same day of each week. 104 tablet 0     MULTI-VITAMIN OR 1 daily       Probiotic Product (PROBIOTIC PO)        simvastatin (ZOCOR) 20 MG tablet TAKE ONE TABLET BY MOUTH AT BEDTIME 90 tablet 3     No facility-administered encounter medications on file as of 6/8/2021.              O:   NAD, WDWN, Alert & Oriented, Mood & Affect wnl, Vitals stable   Here today alone   /75   Pulse 82   LMP 11/12/2004 (Approximate)   SpO2 97%    General appearance normal   Vitals stable   Alert, oriented and in no acute distress      Following lymph nodes palpated: Occipital, Cervical, Supraclavicular , inguinal no lad  Pigmented macules on trunk and proximal ext wit regular borders and pigment networks   L flank stuck on papule      Stuck on papules and brown macules on trunk and ext   Red papules on trunk  Flesh colored papules on trunk     The remainder of the full exam was normal; the following areas were examined:  conjunctiva/lids, oral mucosa, neck, peripheral vascular system, abdomen, lymph nodes, digits/nails, eccrine and apocrine glands, scalp/hair, face, neck, chest, abdomen, buttocks, back, RUE, LUE, RLE, LLE       Eyes: Conjunctivae/lids:Normal     ENT: Lips, buccal mucosa, tongue: normal    MSK:Normal    Cardiovascular: peripheral edema none    Pulm: Breathing Normal    Lymph Nodes: No Head and Neck Lymphadenopathy     Neuro/Psych: Orientation:Alert and Orientedx3 ; Mood/Affect:normal       A/P:  1. Seborrheic keratosis, lentigo, angioma, dermal nevus, hx of melanoma, nevi   MELANOMA DISCUSSED WITH PATIENT:  I discussed the specifics of tumor, prognosis, metachronous melanoma, self exam, and genetics with the patient. I explained the need for monthly skin exams including and taught the patient how to do  this. Patient was asked about new or changing moles . I discussed with patient signs and symptoms that could arise in the setting of recurrent locoregional or metastatic disease. In addition, the need to undergo every 6 month dermatologic full skin survey and evaluation given that patients with a diagnosis of melanoma are at risk of recurrence (local and distant) and of subsequent de trixie melanoma.      It was a pleasure speaking to Mine Meléndez today.  Previous clinic notes and pertinent laboratory tests were reviewed prior to Mine Meléndez's visit.  Signs and Symptoms of skin cancer discussed with patient.  Patient encouraged to perform monthly skin exams.  UV precautions reviewed with patient.  Return to clinic 6 months        Again, thank you for allowing me to participate in the care of your patient.        Sincerely,        Pankaj Castro MD

## 2021-06-08 NOTE — NURSING NOTE
Chief Complaint   Patient presents with     Skin Check       Vitals:    06/08/21 1430   BP: 122/75   Pulse: 82   SpO2: 97%     Wt Readings from Last 1 Encounters:   04/05/21 86.2 kg (190 lb)       Ara Mckeon LPN.................6/8/2021

## 2021-06-21 DIAGNOSIS — Z12.31 VISIT FOR SCREENING MAMMOGRAM: ICD-10-CM

## 2021-06-21 PROCEDURE — 77063 BREAST TOMOSYNTHESIS BI: CPT | Mod: TC | Performed by: RADIOLOGY

## 2021-06-21 PROCEDURE — 77067 SCR MAMMO BI INCL CAD: CPT | Mod: TC | Performed by: RADIOLOGY

## 2021-06-27 ENCOUNTER — MYC MEDICAL ADVICE (OUTPATIENT)
Dept: FAMILY MEDICINE | Facility: CLINIC | Age: 66
End: 2021-06-27

## 2021-06-28 NOTE — TELEPHONE ENCOUNTER
Hard to tell from picture alone without being able to feel/examine it.  Could be a bug bite, etc.     If worsening/concerned should be seen.    Britany Norton M.D.

## 2021-06-28 NOTE — TELEPHONE ENCOUNTER
Dr. Norton,  Would you be able to review the my chart response and picture and advise your recommendations, thank you.    LUDIVINA Key

## 2021-08-23 ENCOUNTER — LAB (OUTPATIENT)
Dept: LAB | Facility: CLINIC | Age: 66
End: 2021-08-23
Payer: COMMERCIAL

## 2021-08-23 DIAGNOSIS — M05.9 SEROPOSITIVE RHEUMATOID ARTHRITIS (H): ICD-10-CM

## 2021-08-23 DIAGNOSIS — Z79.899 HIGH RISK MEDICATION USE: ICD-10-CM

## 2021-08-23 DIAGNOSIS — R73.09 ELEVATED GLUCOSE: ICD-10-CM

## 2021-08-23 LAB
ALBUMIN SERPL-MCNC: 3.3 G/DL (ref 3.4–5)
ALP SERPL-CCNC: 124 U/L (ref 40–150)
ALT SERPL W P-5'-P-CCNC: 23 U/L (ref 0–50)
AST SERPL W P-5'-P-CCNC: 33 U/L (ref 0–45)
BASOPHILS # BLD AUTO: 0 10E3/UL (ref 0–0.2)
BASOPHILS NFR BLD AUTO: 0 %
BILIRUB DIRECT SERPL-MCNC: 0.1 MG/DL (ref 0–0.2)
BILIRUB SERPL-MCNC: 0.5 MG/DL (ref 0.2–1.3)
CREAT SERPL-MCNC: 0.59 MG/DL (ref 0.52–1.04)
CRP SERPL-MCNC: 17.1 MG/L (ref 0–8)
EOSINOPHIL # BLD AUTO: 0.1 10E3/UL (ref 0–0.7)
EOSINOPHIL NFR BLD AUTO: 2 %
ERYTHROCYTE [DISTWIDTH] IN BLOOD BY AUTOMATED COUNT: 13.2 % (ref 10–15)
ERYTHROCYTE [SEDIMENTATION RATE] IN BLOOD BY WESTERGREN METHOD: 15 MM/HR (ref 0–30)
GFR SERPL CREATININE-BSD FRML MDRD: >90 ML/MIN/1.73M2
HBA1C MFR BLD: 5.3 % (ref 0–5.6)
HCT VFR BLD AUTO: 40.3 % (ref 35–47)
HGB BLD-MCNC: 14.1 G/DL (ref 11.7–15.7)
LYMPHOCYTES # BLD AUTO: 1.5 10E3/UL (ref 0.8–5.3)
LYMPHOCYTES NFR BLD AUTO: 23 %
MCH RBC QN AUTO: 36.2 PG (ref 26.5–33)
MCHC RBC AUTO-ENTMCNC: 35 G/DL (ref 31.5–36.5)
MCV RBC AUTO: 103 FL (ref 78–100)
MONOCYTES # BLD AUTO: 0.7 10E3/UL (ref 0–1.3)
MONOCYTES NFR BLD AUTO: 10 %
NEUTROPHILS # BLD AUTO: 4 10E3/UL (ref 1.6–8.3)
NEUTROPHILS NFR BLD AUTO: 64 %
PLATELET # BLD AUTO: 225 10E3/UL (ref 150–450)
PROT SERPL-MCNC: 7.2 G/DL (ref 6.8–8.8)
RBC # BLD AUTO: 3.9 10E6/UL (ref 3.8–5.2)
WBC # BLD AUTO: 6.3 10E3/UL (ref 4–11)

## 2021-08-23 PROCEDURE — 85025 COMPLETE CBC W/AUTO DIFF WBC: CPT

## 2021-08-23 PROCEDURE — 82565 ASSAY OF CREATININE: CPT

## 2021-08-23 PROCEDURE — 83036 HEMOGLOBIN GLYCOSYLATED A1C: CPT

## 2021-08-23 PROCEDURE — 86140 C-REACTIVE PROTEIN: CPT

## 2021-08-23 PROCEDURE — 85652 RBC SED RATE AUTOMATED: CPT

## 2021-08-23 PROCEDURE — 36415 COLL VENOUS BLD VENIPUNCTURE: CPT

## 2021-08-23 PROCEDURE — 80076 HEPATIC FUNCTION PANEL: CPT

## 2021-08-23 NOTE — RESULT ENCOUNTER NOTE
Mine,    All of the labs were normal or acceptable.    Please contact my office if you have questions.    Britany Norton M.D.

## 2021-08-24 ENCOUNTER — VIRTUAL VISIT (OUTPATIENT)
Dept: RHEUMATOLOGY | Facility: CLINIC | Age: 66
End: 2021-08-24
Payer: COMMERCIAL

## 2021-08-24 DIAGNOSIS — M05.9 SEROPOSITIVE RHEUMATOID ARTHRITIS (H): Primary | ICD-10-CM

## 2021-08-24 DIAGNOSIS — Z79.899 HIGH RISK MEDICATION USE: ICD-10-CM

## 2021-08-24 PROCEDURE — 99214 OFFICE O/P EST MOD 30 MIN: CPT | Mod: 95 | Performed by: INTERNAL MEDICINE

## 2021-08-24 RX ORDER — METHOTREXATE 2.5 MG/1
20 TABLET ORAL WEEKLY
Qty: 104 TABLET | Refills: 1 | Status: SHIPPED | OUTPATIENT
Start: 2021-08-24 | End: 2021-12-10

## 2021-08-24 NOTE — PATIENT INSTRUCTIONS
RHEUMATOLOGY    Dr. Nasir Baltazar    [address] [place] [address]  Phone number: [phone]  Fax number: [phone]      Thank you for choosing [facility]!    Rachel Uriarte CMA Rheumatology

## 2021-08-24 NOTE — PROGRESS NOTES
Mine Meléndez  is a 66 year old year old female who is being evaluated via a billable video visit.      How would you like to obtain your AVS? MyChart  If the video visit is dropped, the invitation should be resent by: Text to cell phone: [phone]  Will anyone else be joining your video visit? No     Rheumatology Video Visit      Mine Meléndez MRN# [id number]   YOB: 1955 Age: 66 year old      Date of visit: 8/24/21     Chief Complaint   Patient presents with:  Rheumatoid Arthritis    Assessment and Plan     1. Seropositive Erosive Rheumatoid Arthritis (RF+, ): Dx'd with RA prior to 2002. Previously on SSZ (stomatitis). Currently on MTX 20mg PO once weekly (no improvement when MTX was increased to 25mg SQ wkly when used as monotherapy), folic acid 1mg daily, and hydroxychloroquine 300mg daily on average (loose stools with 400mg daily). Chronic illness, stable  - Continue Methotrexate 20mg PO once weekly  - Continue folic acid 1mg daily  - Continue hydroxychloroquine 200mg daily with an additional 200mg every other day; may try to reduce to 200mg daily (1/22/2021 eye exam by Dr. Cobb [facility] did not show evidence of hydroxychloroquine toxicity)  - Labs every 8-12 weeks: CBC, Creatinine, Hepatic Panel, ESR, CRP    High risk medication requiring intensive toxicity monitoring at least quarterly: labs ordered include CBC, Creatinine, Hepatic panel to monitor for cytopenia and hepatotoxicity; checking creatinine as it affects clearance of medication.     2. Primary osteoarthritis of both hands: voltaren gel PRN.    - Continue diclofenac (VOLTAREN) 1 % topical gel; Apply up to 2 grams of 1% gel to hands up to 4 times daily as needed for joint pain (maximum: 8 g per upper extremity per day)  Dispense: 200 g; Refill: 3    3.  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   This is documented here for reference only.    # s/p 2 doses of the Pfizer COVID-19 vaccine    A single additional dose of Pfizer COVID-19 vaccine or Moderna COVID-19 vaccine is recommended at least 28 days after the completion of the 2-dose mRNA vaccine series for patients receiving any immunosuppressive or immunomodulatory therapy. Attempts should be made to match the additional mRNA dose type to the type given in the mRNA primary series; however, if that is not feasible, a booster dose with the alternative mRNA vaccine is permitted.    Based on our current understanding (and this may change over time as we learn more), medications should be adjusted as noted below, if disease activity allows:  - NSAIDs and Acetaminophen: hold for 24 hours prior to vaccination if able to do so  - Methotrexate should be held for 1-2 weeks after the mRNA COVID-19 booster vaccine ; Because of how well her disease is controlled I recommended that she hold methotrexate for 2 weeks after the booster vaccine    Total minutes spent in evaluation with patient, documentation, , and review of pertinent studies and chart notes: 12       Ms. Meléndez verbalized agreement with and understanding of the rational for the diagnosis and treatment plan.  All questions were answered to best of my ability and the patient's satisfaction. Ms. Meléndez was advised to contact the clinic with any questions that may arise after the clinic visit.      Thank you for involving me in the care of the patient    Return to clinic: 3-4 months      HPI   Mine Meléndez is a 66 year old female with a past medical history significant for hypertension, hyperlipidemia, hypothyroidism, migraines, and rheumatoid arthritis who presents for follow-up of rheumatoid arthritis.     Today, 8/24/2021: Doing well.  Tolerating hydroxychloroquine and methotrexate.  No joint pain or swelling.  No morning stiffness or gelling phenomenon.  Arthritis does not prevent her from  doing any of her daily activities.  She notes that on occasion she will have 1 joint hurt without swelling, increased warmth, or overlying erythema and it will resolve spontaneously; she says that it is a very short duration.    Tobacco: Former smoker  EtOH: 4 drinks per day on the weekends  Drugs: None  Occupation: retired June 2020, was working in [employer]   12 point review of system was completed and negative except as noted in the HPI    Active Problem List     Patient Active Problem List   Diagnosis     Seropositive rheumatoid arthritis (H)     DYSPEPSIA     Obesity     Essential hypertension     Hypothyroidism     HYPERLIPIDEMIA LDL GOAL <130     Advanced directives, counseling/discussion     Dermatitis     High risk medications (not anticoagulants) long-term use     Female stress incontinence     Migraine     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)     Past Medical History     Past Medical History:   Diagnosis Date     Herpes simplex without mention of complication      Malignant melanoma (H)      Obesity, unspecified      Osteomyelitis of jaw 5/22/2008     Rheumatoid arthritis(714.0)      Trochanteric bursitis of right hip 8/16/2013     Past Surgical History     Past Surgical History:   Procedure Laterality Date     COLONOSCOPY  1/20/06     HYSTERECTOMY, PAP NO LONGER INDICATED       HYSTERECTOMY, VAGINAL  11/04    TVH,TVT procedure     RELEASE CARPAL TUNNEL Right 10/11/2016    Procedure: RELEASE CARPAL TUNNEL;  Surgeon: Emely Blanca MD;  Location: [facility]     SURGICAL HISTORY OF -   1988    tubal ligation     SURGICAL HISTORY OF -   2001    excision gangliion cyst rt heel     Allergy     Allergies   Allergen Reactions     Codeine Rash     Flagyl [Metronidazole] Rash     Septra [Sulfamethoxazole W/Trimethoprim] Itching     Puffy eyes, flushed.      Current Medication List     Current Outpatient Medications   Medication Sig     ASPIRIN 81 MG OR TABS ONE  "DAILY     folic acid (FOLVITE) 1 MG tablet Take 1 tablet (1,000 mcg) by mouth daily     hydroxychloroquine (PLAQUENIL) 200 MG tablet Hydroxychloroquine 200mg daily; and an additional 200mg every other day.     ibuprofen (ADVIL/MOTRIN) 800 MG tablet Take 1 tablet (800 mg) by mouth every 8 hours as needed for moderate pain     levothyroxine (SYNTHROID/LEVOTHROID) 50 MCG tablet Take 1 tablet (50 mcg) by mouth daily Needs lab updated     lisinopril-hydrochlorothiazide (ZESTORETIC) 10-12.5 MG tablet Take 1 tablet by mouth daily     methotrexate sodium 2.5 MG TABS Take 8 tablets (20 mg) by mouth once a week . Take all 8 tablets on the same day of each week.     MULTI-VITAMIN OR 1 daily     Probiotic Product (PROBIOTIC PO)      simvastatin (ZOCOR) 20 MG tablet TAKE ONE TABLET BY MOUTH AT BEDTIME     No current facility-administered medications for this visit.         Social History   See HPI    Family History     Family History   Problem Relation Age of Onset     Diabetes Father      Dementia Father      Diabetes Brother      Diabetes Sister      Hypertension Sister      Hypertension Sister      Physical Exam     Temp Readings from Last 3 Encounters:   04/05/21 97.8  F (36.6  C) (Tympanic)   11/09/20 97.6  F (36.4  C)   05/13/20 97.5  F (36.4  C) (Tympanic)     BP Readings from Last 5 Encounters:   06/08/21 122/75   04/05/21 132/62   02/09/21 (!) 150/66   11/09/20 114/62   10/06/20 113/69     Pulse Readings from Last 1 Encounters:   06/08/21 82     Resp Readings from Last 1 Encounters:   05/13/20 16     Estimated body mass index is 31.38 kg/m  as calculated from the following:    Height as of 4/5/21: 1.657 m (5' 5.25\").    Weight as of 4/5/21: 86.2 kg (190 lb).    GEN: NAD.   HEENT:  No obvious external lesions of the ear and nose. Hearing intact.  PULM: No increased work of breathing  MSK:  Hands and wrists without swelling. Heberden's and Fabby's nodes present.    SKIN: No rash or jaundice seen  PSYCH: Alert. " Appropriate.     Labs / Imaging (select studies)   RF/CCP  Recent Labs   Lab Test 05/01/18  1538   CCPIGG 182*     CBC  Recent Labs   Lab Test 08/23/21  0848 05/12/21  1158 01/12/21  1547 10/23/20  1519   WBC 6.3 6.8 7.4 7.0   RBC 3.90 4.12 4.12 4.21   HGB 14.1 14.0 14.5 14.4   HCT 40.3 41.9 43.1 43.1   * 102* 105* 102*   RDW 13.2 14.1 13.6 13.6    217 182 220   MCH 36.2* 34.0* 35.2* 34.2*   MCHC 35.0 33.4 33.6 33.4   NEUTROPHIL 64 75.2 68.7 69.2   LYMPH 23 17.7 19.6 22.7   MONOCYTE 10 5.2 9.2 6.1   EOSINOPHIL 2 1.6 2.4 1.7   BASOPHIL 0 0.3 0.1 0.3   ANEU  --  5.1 5.1 4.9   ALYM  --  1.2 1.4 1.6   SHYLA  --  0.4 0.7 0.4   AEOS  --  0.1 0.2 0.1   ABAS  --  0.0 0.0 0.0   ANEUTAUTO 4.0  --   --   --    ALYMPAUTO 1.5  --   --   --    AMONOAUTO 0.7  --   --   --    AEOSAUTO 0.1  --   --   --    ABSBASO 0.0  --   --   --      CMP  Recent Labs   Lab Test 08/23/21  0847 05/12/21  1158 04/05/21  1255 01/12/21  1547 11/09/20  0939 10/23/20  1519 05/22/19  1404   NA  --   --  132*  --  137  --  134   POTASSIUM  --   --  3.5  --  3.7  --  3.4   CHLORIDE  --   --  98  --  104  --  99   CO2  --   --  26  --  30  --  32   ANIONGAP  --   --  8  --  3  --  3   GLC  --   --  151*  --  115*  --  123*   BUN  --   --  7  --  10  --  13   CR 0.59 0.58 0.55 0.65 0.57   < > 0.58   GFRESTIMATED >90 >90 >90 >90 >90   < > >90   GFRESTBLACK  --  >90 >90 >90 >90   < > >90   ROSELYN  --   --  8.7  --  9.3  --  9.3   BILITOTAL 0.5 0.5  --  0.7  --   --  1.3   ALBUMIN 3.3* 3.5  --  3.6  --   --  3.9   PROTTOTAL 7.2 7.5  --  7.5  --   --  7.6   ALKPHOS 124 121  --  118  --   --  132   AST 33 23  --  28  --   --  21   ALT 23 24  --  27  --   --  25    < > = values in this interval not displayed.     Calcium/VitaminD  Recent Labs   Lab Test 04/05/21  1255 11/09/20  0939 05/22/19  1404   ROSELYN 8.7 9.3 9.3     ESR/CRP  Recent Labs   Lab Test 08/23/21  0847 05/12/21  1158 01/12/21  1547   SED 15 13 10   CRP 17.1* 14.9* 8.9*     Lipid  Panel  Recent Labs   Lab Test 11/09/20  0939 04/17/19  1526 04/30/18  1601 09/11/14  1650 09/09/13  0857   CHOL 153 159 163  --  169   TRIG 65 102 170*   < > 132   HDL 74 63 55  --  58   LDL 66 76 74   < > 85   VLDL  --   --   --   --  26   CHOLHDLRATIO  --   --   --   --  3.0   NHDL 79 96 108   < >  --     < > = values in this interval not displayed.     Hepatitis B  Recent Labs   Lab Test 10/23/20  1519 05/01/18  1538   HBCAB Nonreactive Nonreactive   HEPBANG Nonreactive Nonreactive     Hepatitis C  Recent Labs   Lab Test 10/23/20  1519 09/11/14  1650   HCVAB Nonreactive Negative     Tuberculosis Screening  Recent Labs   Lab Test 10/23/20  1519   TBRST Negative     Immunization History     Immunization History   Administered Date(s) Administered     COVID-19,PF,Pfizer 03/08/2021, 03/29/2021     Influenza (H1N1) 12/31/2009     Influenza (High Dose) 3 valent vaccine 11/05/2019     Influenza (IIV3) PF 11/03/2004, 10/30/2012, 10/20/2013, 10/03/2016     Influenza Vaccine IM > 6 months Valent IIV4 10/23/2017     Influenza, Quad, High Dose, Pf, 65yr + 11/09/2020     Pneumo Conj 13-V (2010&after) 11/12/2018     Pneumococcal 23 valent 02/19/2019     TD (ADULT, 7+) 04/28/1995     TDAP Vaccine (Adacel) 12/05/2007, 02/01/2018     Zoster vaccine recombinant adjuvanted (SHINGRIX) 04/17/2019, 07/10/2019     Zoster vaccine, live 06/07/2016          Chart documentation done in part with Dragon Voice recognition Software. Although reviewed after completion, some word and grammatical error may remain.      Video-Visit Details    Type of service:  Video Visit    Video Start Time: 11:00 AM    Video End Time: 11:05 AM    Originating Location (pt. Location): [place], [place]    Distant Location (provider location):  Home    Platform used for Video Visit: Taisha Baltazar MD

## 2021-08-30 ENCOUNTER — OFFICE VISIT (OUTPATIENT)
Dept: FAMILY MEDICINE | Facility: CLINIC | Age: 66
End: 2021-08-30
Payer: COMMERCIAL

## 2021-08-30 VITALS
WEIGHT: 192 LBS | BODY MASS INDEX: 31.99 KG/M2 | OXYGEN SATURATION: 96 % | RESPIRATION RATE: 16 BRPM | HEART RATE: 72 BPM | TEMPERATURE: 97 F | SYSTOLIC BLOOD PRESSURE: 135 MMHG | HEIGHT: 65 IN | DIASTOLIC BLOOD PRESSURE: 69 MMHG

## 2021-08-30 DIAGNOSIS — R31.9 URINARY TRACT INFECTION WITH HEMATURIA, SITE UNSPECIFIED: ICD-10-CM

## 2021-08-30 DIAGNOSIS — R39.89 URINARY PROBLEM: Primary | ICD-10-CM

## 2021-08-30 DIAGNOSIS — N39.0 URINARY TRACT INFECTION WITH HEMATURIA, SITE UNSPECIFIED: ICD-10-CM

## 2021-08-30 LAB
ALBUMIN UR-MCNC: NEGATIVE MG/DL
APPEARANCE UR: CLEAR
BACTERIA #/AREA URNS HPF: ABNORMAL /HPF
BILIRUB UR QL STRIP: NEGATIVE
COLOR UR AUTO: YELLOW
GLUCOSE UR STRIP-MCNC: NEGATIVE MG/DL
HGB UR QL STRIP: ABNORMAL
KETONES UR STRIP-MCNC: NEGATIVE MG/DL
LEUKOCYTE ESTERASE UR QL STRIP: ABNORMAL
NITRATE UR QL: NEGATIVE
PH UR STRIP: 7 [PH] (ref 5–7)
RBC #/AREA URNS AUTO: ABNORMAL /HPF
SP GR UR STRIP: 1.01 (ref 1–1.03)
SQUAMOUS #/AREA URNS AUTO: ABNORMAL /LPF
UROBILINOGEN UR STRIP-ACNC: 0.2 E.U./DL
WBC #/AREA URNS AUTO: >100 /HPF

## 2021-08-30 PROCEDURE — 99213 OFFICE O/P EST LOW 20 MIN: CPT | Performed by: NURSE PRACTITIONER

## 2021-08-30 PROCEDURE — 87088 URINE BACTERIA CULTURE: CPT | Performed by: NURSE PRACTITIONER

## 2021-08-30 PROCEDURE — 81001 URINALYSIS AUTO W/SCOPE: CPT | Performed by: NURSE PRACTITIONER

## 2021-08-30 PROCEDURE — 87086 URINE CULTURE/COLONY COUNT: CPT | Performed by: NURSE PRACTITIONER

## 2021-08-30 PROCEDURE — 87186 SC STD MICRODIL/AGAR DIL: CPT | Performed by: NURSE PRACTITIONER

## 2021-08-30 RX ORDER — NITROFURANTOIN 25; 75 MG/1; MG/1
100 CAPSULE ORAL 2 TIMES DAILY
Qty: 14 CAPSULE | Refills: 0 | Status: SHIPPED | OUTPATIENT
Start: 2021-08-30 | End: 2021-09-06

## 2021-08-30 ASSESSMENT — MIFFLIN-ST. JEOR: SCORE: 1405.75

## 2021-08-30 NOTE — NURSING NOTE
"Initial /69   Pulse 72   Temp 97  F (36.1  C) (Tympanic)   Resp 16   Ht 1.657 m (5' 5.25\")   Wt 87.1 kg (192 lb)   LMP 11/12/2004 (Approximate)   SpO2 96%   BMI 31.71 kg/m   Estimated body mass index is 31.71 kg/m  as calculated from the following:    Height as of this encounter: 1.657 m (5' 5.25\").    Weight as of this encounter: 87.1 kg (192 lb). .      "

## 2021-08-30 NOTE — PROGRESS NOTES
"    Assessment & Plan       ICD-10-CM    1. Urinary problem  R39.89 UA macro with reflex to Microscopic and Culture - Clinc Collect     Urine Microscopic Exam     Urine Culture   2. Urinary tract infection with hematuria, site unspecified  N39.0 nitroFURantoin macrocrystal-monohydrate (MACROBID) 100 MG capsule    R31.9      I will treat patient for an acute urinary tract infection with nitrofurantoin.  This will go on to culture to assure appropriate antibiotic.  Continue with symptomatic management.             BMI:   Estimated body mass index is 31.71 kg/m  as calculated from the following:    Height as of this encounter: 1.657 m (5' 5.25\").    Weight as of this encounter: 87.1 kg (192 lb).           Return in about 5 days (around 9/4/2021) for ongoing symptoms if not improving.    ISRAEL Ely CNP  [facility]    Subjective   Helena is a 66 year old who presents for the following health issues     HPI     Genitourinary - Female  Onset/Duration: 3-4 days  Description:   Painful urination (Dysuria): YES           Frequency: YES  Blood in urine (Hematuria): no  Delay in urine (Hesitency): no  Intensity: moderate  Progression of Symptoms:  same  Accompanying Signs & Symptoms:  Fever/chills: no  Flank pain: no  Nausea and vomiting: no  Vaginal symptoms: none  Abdominal/Pelvic Pain: no  History:   History of frequent UTI s: YES  History of kidney stones: no  Sexually Active: YES  Possibility of pregnancy: No  Precipitating or alleviating factors: None  Therapies tried and outcome: AZO, last taken yesterday morning.         Review of Systems   Constitutional, HEENT, cardiovascular, pulmonary, gi and gu systems are negative, except as otherwise noted.      Objective    /69   Pulse 72   Temp 97  F (36.1  C) (Tympanic)   Resp 16   Ht 1.657 m (5' 5.25\")   Wt 87.1 kg (192 lb)   LMP 11/12/2004 (Approximate)   SpO2 96%   BMI 31.71 kg/m    Body mass index is 31.71 kg/m . "     Physical Exam   GENERAL: healthy, alert and no distress  RESP: lungs clear to auscultation - no rales, rhonchi or wheezes  CV: regular rate and rhythm, normal S1 S2, no S3 or S4, no murmur, click or rub, no peripheral edema and peripheral pulses strong  BACK: no CVA tenderness, no paralumbar tenderness  PSYCH: mentation appears normal, affect normal/bright    Results for orders placed or performed in visit on 08/30/21   UA macro with reflex to Microscopic and Culture - Clinc Collect     Status: Abnormal    Specimen: Urine, Midstream   Result Value Ref Range    Color Urine Yellow Colorless, Straw, Light Yellow, Yellow    Appearance Urine Clear Clear    Glucose Urine Negative Negative mg/dL    Bilirubin Urine Negative Negative    Ketones Urine Negative Negative mg/dL    Specific Gravity Urine 1.015 1.003 - 1.035    Blood Urine Moderate (A) Negative    pH Urine 7.0 5.0 - 7.0    Protein Albumin Urine Negative Negative mg/dL    Urobilinogen Urine 0.2 0.2, 1.0 E.U./dL    Nitrite Urine Negative Negative    Leukocyte Esterase Urine Large (A) Negative   Urine Microscopic Exam     Status: Abnormal   Result Value Ref Range    Bacteria Urine Moderate (A) None Seen /HPF    RBC Urine 2-5 (A) 0-2 /HPF /HPF    WBC Urine >100 (A) 0-5 /HPF /HPF    Squamous Epithelials Urine Few (A) None Seen /LPF

## 2021-09-01 LAB
BACTERIA UR CULT: ABNORMAL
BACTERIA UR CULT: ABNORMAL

## 2021-09-07 ENCOUNTER — TRANSFERRED RECORDS (OUTPATIENT)
Dept: HEALTH INFORMATION MANAGEMENT | Facility: CLINIC | Age: 66
End: 2021-09-07

## 2021-09-12 ENCOUNTER — HEALTH MAINTENANCE LETTER (OUTPATIENT)
Age: 66
End: 2021-09-12

## 2021-09-13 ENCOUNTER — MYC MEDICAL ADVICE (OUTPATIENT)
Dept: FAMILY MEDICINE | Facility: CLINIC | Age: 66
End: 2021-09-13

## 2021-09-13 DIAGNOSIS — R22.40 MASS OF FOOT, UNSPECIFIED LATERALITY: Primary | ICD-10-CM

## 2021-09-13 DIAGNOSIS — M67.40 GANGLION: ICD-10-CM

## 2021-09-23 ENCOUNTER — ANCILLARY PROCEDURE (OUTPATIENT)
Dept: GENERAL RADIOLOGY | Facility: CLINIC | Age: 66
End: 2021-09-23
Attending: PODIATRIST
Payer: COMMERCIAL

## 2021-09-23 ENCOUNTER — OFFICE VISIT (OUTPATIENT)
Dept: PODIATRY | Facility: CLINIC | Age: 66
End: 2021-09-23
Payer: COMMERCIAL

## 2021-09-23 VITALS
WEIGHT: 192 LBS | HEIGHT: 65 IN | BODY MASS INDEX: 31.99 KG/M2 | DIASTOLIC BLOOD PRESSURE: 81 MMHG | SYSTOLIC BLOOD PRESSURE: 121 MMHG | HEART RATE: 75 BPM

## 2021-09-23 DIAGNOSIS — M06.379: Primary | ICD-10-CM

## 2021-09-23 DIAGNOSIS — M67.40 GANGLION: ICD-10-CM

## 2021-09-23 DIAGNOSIS — M06.379: ICD-10-CM

## 2021-09-23 DIAGNOSIS — M79.89 MASS OF SOFT TISSUE OF FOOT: ICD-10-CM

## 2021-09-23 PROCEDURE — 73630 X-RAY EXAM OF FOOT: CPT | Mod: LT | Performed by: RADIOLOGY

## 2021-09-23 PROCEDURE — 99204 OFFICE O/P NEW MOD 45 MIN: CPT | Performed by: PODIATRIST

## 2021-09-23 ASSESSMENT — MIFFLIN-ST. JEOR: SCORE: 1405.75

## 2021-09-23 NOTE — PROGRESS NOTES
PATIENT HISTORY:  Mine Meléndez is a 66 year old female who presents to clinic in consultation at the request of  Britany Norotn M.D. with a chief complaint of mass on left foot.  The patient is seen by themselves.  The patient relates the pain is primarily located around the fifth digit.   that has been going on for 3 month(s).  The patient has previously tried ice with little relief.  Denies any prior history of similar issues..  The patient is retired.  Any previous notes and studies that pertain to the patient's condition were reviewed.    Pertinent medical, surgical and family history was reviewed in Epic chart and include rheumatoid arthritis    Medications:   Current Outpatient Medications:      ASPIRIN 81 MG OR TABS, ONE DAILY, Disp: 100, Rfl: 3     folic acid (FOLVITE) 1 MG tablet, Take 1 tablet (1,000 mcg) by mouth daily, Disp: 90 tablet, Rfl: 2     hydroxychloroquine (PLAQUENIL) 200 MG tablet, Hydroxychloroquine 200mg daily; and an additional 200mg every other day., Disp: 135 tablet, Rfl: 3     ibuprofen (ADVIL/MOTRIN) 800 MG tablet, Take 1 tablet (800 mg) by mouth every 8 hours as needed for moderate pain, Disp: 90 tablet, Rfl: 0     levothyroxine (SYNTHROID/LEVOTHROID) 50 MCG tablet, Take 1 tablet (50 mcg) by mouth daily Needs lab updated, Disp: 90 tablet, Rfl: 3     lisinopril-hydrochlorothiazide (ZESTORETIC) 10-12.5 MG tablet, Take 1 tablet by mouth daily, Disp: 90 tablet, Rfl: 3     methotrexate sodium 2.5 MG TABS, Take 8 tablets (20 mg) by mouth once a week . Take all 8 tablets on the same day of each week., Disp: 104 tablet, Rfl: 1     MULTI-VITAMIN OR, 1 daily, Disp: , Rfl:      Probiotic Product (PROBIOTIC PO), , Disp: , Rfl:      simvastatin (ZOCOR) 20 MG tablet, TAKE ONE TABLET BY MOUTH AT BEDTIME, Disp: 90 tablet, Rfl: 3     Allergies:    Allergies   Allergen Reactions     Codeine Rash     Flagyl [Metronidazole] Rash     Septra [Sulfamethoxazole W/Trimethoprim] Itching     Puffy eyes,  "flushed.        Vitals: /81   Pulse 75   Ht 1.657 m (5' 5.25\")   Wt 87.1 kg (192 lb)   LMP 11/12/2004 (Approximate)   BMI 31.71 kg/m    BMI= Body mass index is 31.71 kg/m .    LOWER EXTREMITY PHYSICAL EXAM    Dermatologic: Noted subcutaneous soft tissue mass over the dorsal lateral aspect of the fifth metatarsal head on the left.  No drainage noted.        Vascular: DP & PT pulses are intact & regular on the left.   CFT and skin temperature is normal to the left lower extremity.     Neurologic: Lower extremity sensation is intact to light touch.  No evidence of weakness in the left lower extremity.        Musculoskeletal: Patient is ambulatory without assistive device or brace.  No gross ankle deformity noted.  No foot or ankle joint effusion is noted.  Noted pain on palpation over the fifth metatarsophalangeal joint on the left.    Diagnostics:  Radiographs included three views of the left foot demonstrating  no cortical erosions or periosteal elevation.  All joint margins appear stable.  There is no apparent fracture or tumor formation noted.  There is no evidence of foreign body.  The images were independently reviewed by myself along with the patient explaining the findings.      ASSESSMENT / PLAN:     ICD-10-CM    1. Rheumatoid nodule, unspecified ankle and foot (H)  M06.379 XR Foot Left G/E 3 Views   2. Ganglion  M67.40 Orthopedic  Referral   3. Mass of soft tissue of foot  M79.89 Case Request: EXCISION, MASS, LEFT FOOT       I have explained to Mine about the conditions.  We discussed the underlying contributing factors to the condition as well as treatment options along with expected length of recovery.  At this time, I am recommending surgical treatment of the condition involving surgical excision soft tissue mass on the left foot.  I informed the patient on the surgical technique involved as well the advantages and disadvantages associated the procedure.   We discussed the anticipated " postoperative course and timeframe to return to normal activity in shoes.  The patient was instructed to not smoke or use nicotine patches before and after the surgery as this could result in poor outcomes due to slower healing potentially.  We discussd the possible development of a deep vein thrombosis (DVT) of the lower extremity and how this could lead to a pulmonary embolism (PE) that could be life threatening.  The patient was informed on DVT signs and symptoms as well as precautions to take to lessen the risk.  I informed the patient in risks and complications of the procedure including but not limited to infection, wound complications, swelling, pain, diminished range of motion with arthritis and diminished function, DVT, reoccurrence of condition, and possible loss of limb.  There is moderate risk involved.  The procedure will be performed under local with monitored anesthesia care for anesthesia.  The patient will obtain a preoperative history and physical by the primary care provider.  Consents will be reviewed and signed on the day of surgery.      Mine verbalized agreement with and understanding of the rational for the diagnosis and treatment plan.  All questions were answered to best of my ability and the patient's satisfaction. The patient was advised to contact the clinic with any questions that may arise after the clinic visit.      Disclaimer: This note consists of symbols derived from keyboarding, dictation and/or voice recognition software. As a result, there may be errors in the script that have gone undetected. Please consider this when interpreting information found in this chart.       SHANNON Pelaez D.P.M., [email or web address].

## 2021-09-23 NOTE — PATIENT INSTRUCTIONS
Surgery Scheduling   You have elected to proceed with surgery for surgical excision of a soft tissue mass, left foot.  Dr. Pelaez performs his surgeries on Tuesdays at [facility].   To schedule your surgery date please call [phone].  If no one answers, please leave a message with a good time for our staff to call you back.    - Please have a date in mind for your surgery, you can feel free to leave that date on the message, and we will schedule and call back to confirm.   - You can expect to receive a call back the same day or on the next business day from Dr. Pelaez s team to assist in the scheduling.   - We will assist to schedule the date of your surgery.    o The time will be determined a few days ahead of time.    o You can expect a call from Same Day Surgery 2-3 days ahead of time with specific instructions for what time to arrive at the hospital as well as any other preparations you should take prior to surgery.  - You may need to obtain a pre-operative physical from a primary medical provider.    o This must be done within 30 days of your surgery date.  - You will also need a preoperative COVID test.    o This is typically done 4 days before your scheduled surgery date.    o As most surgeries are scheduled on Tuesday, this means the test will need to be performed on the Friday before surgery.    o There are several centers where you can have the tests taken and you may need to go to the center that has availability.    - We will also schedule your first post-operative appointment for a bandage and wound check for the Monday following your surgery at the [place] location.  - You may be non-weight bearing for a period of up to 6 weeks.  Options for this include: (Please indicate which you would prefer so we can provide you with an order and instructions)  o Crutches  o Walker  o Roll-a-bout knee walker.  - If you will need paperwork filled out for your employer you may drop those off at  the clinic directly or you may have those faxed to us at [phone].  Please indicate on the form the date you would like the FMLA to begin if it will not be your surgery date.    The forms are typically filled out for up to 12 weeks, however you may be cleared to return prior to that time depending on your individual healing and job requirements.    GETTING READY FOR YOUR SURGERY    ONE-THREE WEEKS BEFORE  1. See your Family Doctor or Primary Care Provider for a Preoperative History and Physical.    2. Please see pre-surgical medications below to which medications need to be stopped before surgery and when.    SAME DAY SURGERY PATIENTS  1. You will need a family member of friend to drive you home. If you do not have one the surgery will be cancelled or rescheduled.  2. You will need a responsible adult to stay with you that night after the surgery.       We will ask this person to listen to some instructions before you leave the hospital.    DAY BEFORE SURGERY  1. DO NOT EAT OR DRINK ANYTHING AFTER MIDNIGHT THE NIGHT  BEFORE YOUR SURGERY!   2. DO NOT DRINK ALCOHOL.  3. Do not take over the counter drugs.  4. Some people need to have blood tests at the hospital. If you need blood tests, we will tell you in advance.  5. Take medications as directed by your doctor. You may take these with a small amount of water.  6. Do not chew gum, chew tobacco, or suck on hard candy the day of surgery.  7. Bring your insurance cards, a list of your medicines and co-pays you might need. Leave jewelry and other valuables at home.      PRESURGICAL MEDICATIONS:  Certain prescription, over-the-counter, and herbal medications interfere with healing after an operation. The main concern relates to medications that increase bleeding at the surgical site. Excess blood under the incision results in poor wound healing, excess pain, increased scarring, and a higher risk of infection.    Some medications slow the healing process of bone.  Medications can also interfere with the anesthesia drugs that keep you asleep during the operation. It is important to ensure that these medications are out of your system prior to the operation. The list below details a number of medications that are of concern. Pay special attention to how long you should avoid these medications before your operation. Please note that this list is not complete. You should ask your surgeon or pharmacist if you are uncertain about other medications. Any herbal supplement not listed should be discontinued at least one week prior to surgery.    Aspirin: Hold for one week prior to surgery and restart the day after surgery. This over the counter medication promotes bleeding.    Motrin / Ibuprofen / Aleve / Advil / NSAIDS:  Stop one week prior to surgery. These medications affect bleeding and may cause delay in bone healing. Avoid taking these medications for six weeks after bone surgeries. Other procedures may allow you to restart sooner than 6 weeks after surgery.    Coumadin / Plavix: Your primary care provider will manage Coumadin in relation to surgery. Coumadin may result in excessive bleeding and may be adjusted before and after surgery.    Enbriel: Stop two weeks prior to surgery and restart two weeks after surgery. This medication can effect soft tissue healing and increases the risk of infection.    Remicade: Stop 8-12 weeks before surgery and restart two weeks after surgery. This medication can affect soft tissue healing and increases the risk of infection.    Humira: Stop 4 weeks before surgery and restart two weeks after surgery. This medication can affect soft tissue healing and increases the risk of infection.    Methotrexate: Stop one dose prior to surgery. This medication will be restarted when the wound appears to be healing well. Please ask your surgeon about restarting this medication when you are being seen in the office for wound checks.    Kava: Stop at least one  day prior to surgery and may restart one day after surgery. Kava may increase the sedative effect of anesthetics that are given during the operation. Kava can also increase bleeding at the surgical site.    Ephedra (ma ortiz): Stop at least one day prior to surgery and may restart one day after surgery.  Ephedra may increase the risk of heart attack and stroke. This medication can also increase bleeding at the surgical site.    Joanna's Wort: Stop at least five days before surgery and may restart one day after surgery. Joanna's wort may diminish the effects of several drugs that are given during surgery.    Ginseng: Stop at least one week prior to surgery and may restart one day after surgery.  Ginseng lowers blood sugar and may increase bleeding at the surgical site.    Ginkgo: Stop 36 hours before surgery and may restart one day after surgery. Ginkgo may increase bleeding at the surgical site.    Garlic: Stop at least one week prior to surgery and may restart one day after. Garlic may increase bleeding at the surgery site.    Valerian: Do a slow steady decrease in your daily dose over a period of 2-3 weeks before surgery to decrease the chance of withdrawal symptoms. Valerian may increase the sedative effect of anesthetics given during the operation.    Echinacea: There is no data on stopping echinacea prior to surgery. This medication though can be associated with allergic reactions and suppression of your immune system.    Vitamin E, Omega-3, Flax, Fish Oil, Glucosamine and Chondroitin: Stop 2 weeks prior to surgery and may restart one day after. These herbal medications can increase risk of bleeding at surgical site.   RHEUMATOID ARTHRITIS OF THE FOOT & ANKLE  Rheumatoid arthritis (RA) is a disease in which certain cells of the immune system malfunction and attack healthy joints. RA causes inflammation in the lining (synovium) of joints, most often the joints of the hands and feet. The signs of inflammation  can include pain, swelling, redness, and a feeling of warmth around affected joints. In some patients, chronic inflammation results in damage to the cartilage and bones in the joint. Serious damage can lead to permanent joint destruction, deformity, and disability.  When joints become inflamed due to RA, the synovium thickens and produces an excess of joint fluid. This overabundance of fluid, along with inflammatory chemicals released by the immune system, causes swelling and damage to the joint s cartilage and bones.  SYMPTOMS  Foot problems caused by RA commonly occur in the forefoot (the ball of the foot, near the toes), although RA can also affect other areas of the foot and ankle. The most common signs and symptoms of RA-related foot problems, in addition to the abnormal appearance of deformities, are pain, swelling, joint stiffness, and difficulty walking.  Deformities and conditions associated with RA may include:  Rheumatoid nodules (lumps), which cause pain when they rub against shoes or, if they appear on the bottom of the foot, pain when walking   Dislocated toe joints   Hammertoes   Bunions   Heel pain   Achilles tendon pain   Flatfoot   Ankle pain   DIAGNOSIS  RA is diagnosed on the basis of a clinical examination as well as blood tests but to further evaluate the patient s foot and ankle problems, the surgeon may order x-rays and/or other imaging tests.  TREATMENT OPTIONS  While treatment of RA focuses on the medication prescribed by a patient's primary doctor or rheumatologist, the foot and ankle surgeon will develop a treatment plan aimed at relieving the pain of RA-related foot problems. The plan may include one or more of the following options:  Orthotic devices. The surgeon often fits the patient with custom orthotic devices to provide cushioning for rheumatoid nodules, minimize pain when walking, and give needed support to improve the foot s mechanics.   Accommodative shoes. These are used to  relieve pressure and pain and assist with walking.   Aspiration of fluid. When inflammation flares up in a joint, the surgeon may aspirate (draw out) fluid to reduce the swelling and pain.   Steroid injections. Injections of anti-inflammatory medication may be applied directly to an inflamed joint or to a rheumatoid nodule.   SURGERY  When RA produces pain and deformity in the foot that is not relieved through other treatments, surgery may be required. The foot and ankle surgeon will select the procedure best suited to the patient's condition and lifestyle.

## 2021-09-23 NOTE — LETTER
9/23/2021         RE: Mine Meléndez  [id number]  [facility] [address]        Dear Colleague,    Thank you for referring your patient, Mine Meléndez, to the [facility] [place]. Please see a copy of my visit note below.    PATIENT HISTORY:  Mine Meléndez is a 66 year old female who presents to clinic in consultation at the request of  Britany Norton M.D. with a chief complaint of mass on left foot.  The patient is seen by themselves.  The patient relates the pain is primarily located around the fifth digit.   that has been going on for 3 month(s).  The patient has previously tried ice with little relief.  Denies any prior history of similar issues..  The patient is retired.  Any previous notes and studies that pertain to the patient's condition were reviewed.    Pertinent medical, surgical and family history was reviewed in Epic chart and include rheumatoid arthritis    Medications:   Current Outpatient Medications:      ASPIRIN 81 MG OR TABS, ONE DAILY, Disp: 100, Rfl: 3     folic acid (FOLVITE) 1 MG tablet, Take 1 tablet (1,000 mcg) by mouth daily, Disp: 90 tablet, Rfl: 2     hydroxychloroquine (PLAQUENIL) 200 MG tablet, Hydroxychloroquine 200mg daily; and an additional 200mg every other day., Disp: 135 tablet, Rfl: 3     ibuprofen (ADVIL/MOTRIN) 800 MG tablet, Take 1 tablet (800 mg) by mouth every 8 hours as needed for moderate pain, Disp: 90 tablet, Rfl: 0     levothyroxine (SYNTHROID/LEVOTHROID) 50 MCG tablet, Take 1 tablet (50 mcg) by mouth daily Needs lab updated, Disp: 90 tablet, Rfl: 3     lisinopril-hydrochlorothiazide (ZESTORETIC) 10-12.5 MG tablet, Take 1 tablet by mouth daily, Disp: 90 tablet, Rfl: 3     methotrexate sodium 2.5 MG TABS, Take 8 tablets (20 mg) by mouth once a week . Take all 8 tablets on the same day of each week., Disp: 104 tablet, Rfl: 1     MULTI-VITAMIN OR, 1 daily, Disp: , Rfl:      Probiotic Product (PROBIOTIC PO), , Disp: , Rfl:       "simvastatin (ZOCOR) 20 MG tablet, TAKE ONE TABLET BY MOUTH AT BEDTIME, Disp: 90 tablet, Rfl: 3     Allergies:    Allergies   Allergen Reactions     Codeine Rash     Flagyl [Metronidazole] Rash     Septra [Sulfamethoxazole W/Trimethoprim] Itching     Puffy eyes, flushed.        Vitals: /81   Pulse 75   Ht 1.657 m (5' 5.25\")   Wt 87.1 kg (192 lb)   LMP 11/12/2004 (Approximate)   BMI 31.71 kg/m    BMI= Body mass index is 31.71 kg/m .    LOWER EXTREMITY PHYSICAL EXAM    Dermatologic: Noted subcutaneous soft tissue mass over the dorsal lateral aspect of the fifth metatarsal head on the left.  No drainage noted.        Vascular: DP & PT pulses are intact & regular on the left.   CFT and skin temperature is normal to the left lower extremity.     Neurologic: Lower extremity sensation is intact to light touch.  No evidence of weakness in the left lower extremity.        Musculoskeletal: Patient is ambulatory without assistive device or brace.  No gross ankle deformity noted.  No foot or ankle joint effusion is noted.  Noted pain on palpation over the fifth metatarsophalangeal joint on the left.    Diagnostics:  Radiographs included three views of the left foot demonstrating  no cortical erosions or periosteal elevation.  All joint margins appear stable.  There is no apparent fracture or tumor formation noted.  There is no evidence of foreign body.  The images were independently reviewed by myself along with the patient explaining the findings.      ASSESSMENT / PLAN:     ICD-10-CM    1. Rheumatoid nodule, unspecified ankle and foot (H)  M06.379 XR Foot Left G/E 3 Views   2. Ganglion  M67.40 Orthopedic  Referral   3. Mass of soft tissue of foot  M79.89 Case Request: EXCISION, MASS, LEFT FOOT       I have explained to Mine about the conditions.  We discussed the underlying contributing factors to the condition as well as treatment options along with expected length of recovery.  At this time, I am " recommending surgical treatment of the condition involving surgical excision soft tissue mass on the left foot.  I informed the patient on the surgical technique involved as well the advantages and disadvantages associated the procedure.   We discussed the anticipated postoperative course and timeframe to return to normal activity in shoes.  The patient was instructed to not smoke or use nicotine patches before and after the surgery as this could result in poor outcomes due to slower healing potentially.  We discussd the possible development of a deep vein thrombosis (DVT) of the lower extremity and how this could lead to a pulmonary embolism (PE) that could be life threatening.  The patient was informed on DVT signs and symptoms as well as precautions to take to lessen the risk.  I informed the patient in risks and complications of the procedure including but not limited to infection, wound complications, swelling, pain, diminished range of motion with arthritis and diminished function, DVT, reoccurrence of condition, and possible loss of limb.  There is moderate risk involved.  The procedure will be performed under local with monitored anesthesia care for anesthesia.  The patient will obtain a preoperative history and physical by the primary care provider.  Consents will be reviewed and signed on the day of surgery.      Mine verbalized agreement with and understanding of the rational for the diagnosis and treatment plan.  All questions were answered to best of my ability and the patient's satisfaction. The patient was advised to contact the clinic with any questions that may arise after the clinic visit.      Disclaimer: This note consists of symbols derived from keyboarding, dictation and/or voice recognition software. As a result, there may be errors in the script that have gone undetected. Please consider this when interpreting information found in this chart.       SHANNON Pelaez D.P.M.,  LUIS M.BOB.F.A.S.        Again, thank you for allowing me to participate in the care of your patient.        Sincerely,        Tommy Pelaez DPM

## 2021-09-23 NOTE — NURSING NOTE
"Chief Complaint   Patient presents with     Consult     left foot, Xr today       Initial /81   Pulse 75   Ht 1.657 m (5' 5.25\")   Wt 87.1 kg (192 lb)   LMP 11/12/2004 (Approximate)   BMI 31.71 kg/m   Estimated body mass index is 31.71 kg/m  as calculated from the following:    Height as of this encounter: 1.657 m (5' 5.25\").    Weight as of this encounter: 87.1 kg (192 lb).  Medications and allergies reviewed.      Teresa ROJAS MA    "

## 2021-09-24 ENCOUNTER — ANCILLARY PROCEDURE (OUTPATIENT)
Dept: GENERAL RADIOLOGY | Facility: CLINIC | Age: 66
End: 2021-09-24
Attending: PEDIATRICS
Payer: COMMERCIAL

## 2021-09-24 ENCOUNTER — OFFICE VISIT (OUTPATIENT)
Dept: ORTHOPEDICS | Facility: CLINIC | Age: 66
End: 2021-09-24
Payer: COMMERCIAL

## 2021-09-24 VITALS
DIASTOLIC BLOOD PRESSURE: 72 MMHG | SYSTOLIC BLOOD PRESSURE: 110 MMHG | HEIGHT: 62 IN | WEIGHT: 192 LBS | BODY MASS INDEX: 35.33 KG/M2

## 2021-09-24 DIAGNOSIS — M25.531 RIGHT WRIST PAIN: ICD-10-CM

## 2021-09-24 DIAGNOSIS — M67.431 GANGLION CYST OF WRIST, RIGHT: Primary | ICD-10-CM

## 2021-09-24 DIAGNOSIS — Z11.59 ENCOUNTER FOR SCREENING FOR OTHER VIRAL DISEASES: ICD-10-CM

## 2021-09-24 PROCEDURE — 99203 OFFICE O/P NEW LOW 30 MIN: CPT | Performed by: PEDIATRICS

## 2021-09-24 PROCEDURE — 73110 X-RAY EXAM OF WRIST: CPT | Mod: RT | Performed by: RADIOLOGY

## 2021-09-24 ASSESSMENT — MIFFLIN-ST. JEOR: SCORE: 1354.16

## 2021-09-24 NOTE — PROGRESS NOTES
ASSESSMENT & PLAN    Mine was seen today for pain.    Diagnoses and all orders for this visit:    Ganglion cyst of wrist, right  -     XR Wrist Right G/E 3 Views; Future  -     Orthopedic  Referral; Future      This issue is acute on chronic and Unchanged.    We discussed these other possible diagnosis: likely ganglion cyst    We discussed the following treatment options: symptom treatment, activity modification/rest, imaging, rehab and referral. Following discussion, plan: will trial US guide injection, continue supportive care. Consider MRI or referral pending clinical course.    Plan:  - Today's Plan of Care:  Referral for US guided injection  Wrist brace  Continue with relative rest and activity modification, Ice, Compression, and Elevation.  Can apply ice 10-15 minutes 3-4 times per day as needed. OTC medications as needed.    -We also discussed other future treatment options:  Referral to Hand Surgery  MRI right wrist    Follow Up: as needed    Concerning signs and symptoms were reviewed.  The patient expressed understanding of this management plan and all questions were answered at this time.      Latoya Hutson MD [facility]  Sports Medicine Physician  [facility]      -----  Chief Complaint   Patient presents with     Right Hand - Pain       SUBJECTIVE  Mine Meléndez is a/an 66 year old female who is seen as a self referral for evaluation of right hand cysts.       The patient is seen by themselves.  The patient is Right handed    Onset: Many years, but most recently in the last 3-6 month(s) ago. Reports insidious onset without acute precipitating event.  Location of Pain: right hand, dorsum, medial aspect  Worsened by: nothing really   Better with: icing  Treatments tried: ice, bracing, compression  Associated symptoms: deformity and pain    Orthopedic/Surgical history: YES - Date: had cyst removed from right achilles tendon   Social History/Occupation: retired    No family history  "pertinent to patient's problem today.    REVIEW OF SYSTEMS:  Review of Systems  Skin: no bruising, yes wrist swelling  Musculoskeletal: as above  Neurologic: no numbness, paresthesias  Remainder of review of systems is negative including constitutional, CV, pulmonary, GI, except as noted in HPI or medical history.    OBJECTIVE:  /72   Ht 1.575 m (5' 2\")   Wt 87.1 kg (192 lb)   LMP 11/12/2004 (Approximate)   BMI 35.12 kg/m     General: healthy, alert and in no distress  HEENT: no scleral icterus or conjunctival erythema  Skin: no suspicious lesions or rash. No jaundice.  CV: distal perfusion intact  Resp: normal respiratory effort without conversational dyspnea   Psych: normal mood and affect  Gait: normal steady gait with appropriate coordination and balance  Neuro: Normal light sensory exam of upper extremity    Bilateral Wrist and Hand exam    Inspection:       Swelling: Dorsal ganglion cysts, multiple on ulnar side, most likely fluid filled    Tender:       Dorsal wrist    Non Tender:       Remainder of the Wrist and Hand bilateral    ROM:       Full and symmetric active and passive range of motion of the forearm, wrist and digits bilateral    Strength:       5/5 strength in the muscles of the hand, wrist and forearm bilateral    Neurovascular:       2+ radial pulses bilaterally with brisk capillary refill and      normal sensation to light touch in the radial, median and ulnar nerve distributions    RADIOLOGY:  I independently ordered, visualized and reviewed these images with the patient  3 XR views of right wrist reviewed: no acute bony abnormality, no significant degenerative change  - will follow official read    Review of the result(s) of each unique test - XR       "

## 2021-09-24 NOTE — LETTER
9/24/2021         RE: Mine Meléndez  [address]  [facility] [address]        Dear Colleague,    Thank you for referring your patient, Mine Meléndez, to the [facility] [place]. Please see a copy of my visit note below.    ASSESSMENT & PLAN    Mine was seen today for pain.    Diagnoses and all orders for this visit:    Ganglion cyst of wrist, right  -     XR Wrist Right G/E 3 Views; Future  -     Orthopedic  Referral; Future      This issue is acute on chronic and Unchanged.    We discussed these other possible diagnosis: likely ganglion cyst    We discussed the following treatment options: symptom treatment, activity modification/rest, imaging, rehab and referral. Following discussion, plan: will trial US guide injection, continue supportive care. Consider MRI or referral pending clinical course.    Plan:  - Today's Plan of Care:  Referral for US guided injection  Wrist brace  Continue with relative rest and activity modification, Ice, Compression, and Elevation.  Can apply ice 10-15 minutes 3-4 times per day as needed. OTC medications as needed.    -We also discussed other future treatment options:  Referral to Hand Surgery  MRI right wrist    Follow Up: as needed    Concerning signs and symptoms were reviewed.  The patient expressed understanding of this management plan and all questions were answered at this time.      Latoya Hutson MD [facility]  Sports Medicine Physician  [facility]      -----  Chief Complaint   Patient presents with     Right Hand - Pain       SUBJECTIVE  Mine Meléndez is a/an 66 year old female who is seen as a self referral for evaluation of right hand cysts.       The patient is seen by themselves.  The patient is Right handed    Onset: Many years, but most recently in the last 3-6 month(s) ago. Reports insidious onset without acute precipitating event.  Location of Pain: right hand, dorsum, medial aspect  Worsened by:  "nothing really   Better with: icing  Treatments tried: ice, bracing, compression  Associated symptoms: deformity and pain    Orthopedic/Surgical history: YES - Date: had cyst removed from right achilles tendon   Social History/Occupation: retired    No family history pertinent to patient's problem today.    REVIEW OF SYSTEMS:  Review of Systems  Skin: no bruising, yes wrist swelling  Musculoskeletal: as above  Neurologic: no numbness, paresthesias  Remainder of review of systems is negative including constitutional, CV, pulmonary, GI, except as noted in HPI or medical history.    OBJECTIVE:  /72   Ht 1.575 m (5' 2\")   Wt 87.1 kg (192 lb)   LMP 11/12/2004 (Approximate)   BMI 35.12 kg/m     General: healthy, alert and in no distress  HEENT: no scleral icterus or conjunctival erythema  Skin: no suspicious lesions or rash. No jaundice.  CV: distal perfusion intact  Resp: normal respiratory effort without conversational dyspnea   Psych: normal mood and affect  Gait: normal steady gait with appropriate coordination and balance  Neuro: Normal light sensory exam of upper extremity    Bilateral Wrist and Hand exam    Inspection:       Swelling: Dorsal ganglion cysts, multiple on ulnar side, most likely fluid filled    Tender:       Dorsal wrist    Non Tender:       Remainder of the Wrist and Hand bilateral    ROM:       Full and symmetric active and passive range of motion of the forearm, wrist and digits bilateral    Strength:       5/5 strength in the muscles of the hand, wrist and forearm bilateral    Neurovascular:       2+ radial pulses bilaterally with brisk capillary refill and      normal sensation to light touch in the radial, median and ulnar nerve distributions    RADIOLOGY:  I independently ordered, visualized and reviewed these images with the patient  3 XR views of right wrist reviewed: no acute bony abnormality, no significant degenerative change  - will follow official read    Review of the " result(s) of each unique test - XR           Again, thank you for allowing me to participate in the care of your patient.        Sincerely,        Latoya Hutson MD

## 2021-09-24 NOTE — PATIENT INSTRUCTIONS
We discussed these other possible diagnosis: likely ganglion cyst    Plan:  - Today's Plan of Care:  Referral for US guided injection  Wrist brace  Continue with relative rest and activity modification, Ice, Compression, and Elevation.  Can apply ice 10-15 minutes 3-4 times per day as needed. OTC medications as needed.    -We also discussed other future treatment options:  Referral to Hand Surgery  MRI right wrist    Follow Up: as needed    If you have any further questions for your physician or physician s care team you can call [phone] and use option 3 to leave a voice message. Calls received during business hours will be returned same day.

## 2021-09-30 ENCOUNTER — OFFICE VISIT (OUTPATIENT)
Dept: FAMILY MEDICINE | Facility: CLINIC | Age: 66
End: 2021-09-30
Payer: COMMERCIAL

## 2021-09-30 VITALS
TEMPERATURE: 97.9 F | HEART RATE: 82 BPM | DIASTOLIC BLOOD PRESSURE: 71 MMHG | WEIGHT: 189 LBS | HEIGHT: 62 IN | SYSTOLIC BLOOD PRESSURE: 129 MMHG | RESPIRATION RATE: 14 BRPM | OXYGEN SATURATION: 96 % | BODY MASS INDEX: 34.78 KG/M2

## 2021-09-30 DIAGNOSIS — Z23 NEED FOR PROPHYLACTIC VACCINATION AND INOCULATION AGAINST INFLUENZA: ICD-10-CM

## 2021-09-30 DIAGNOSIS — R22.40 SUBCUTANEOUS NODULE OF FOOT: ICD-10-CM

## 2021-09-30 DIAGNOSIS — Z01.818 PREOP GENERAL PHYSICAL EXAM: Primary | ICD-10-CM

## 2021-09-30 PROCEDURE — G0008 ADMIN INFLUENZA VIRUS VAC: HCPCS | Performed by: NURSE PRACTITIONER

## 2021-09-30 PROCEDURE — 99214 OFFICE O/P EST MOD 30 MIN: CPT | Mod: 25 | Performed by: NURSE PRACTITIONER

## 2021-09-30 PROCEDURE — 90662 IIV NO PRSV INCREASED AG IM: CPT | Performed by: NURSE PRACTITIONER

## 2021-09-30 ASSESSMENT — PAIN SCALES - GENERAL: PAINLEVEL: NO PAIN (0)

## 2021-09-30 ASSESSMENT — MIFFLIN-ST. JEOR: SCORE: 1340.55

## 2021-09-30 NOTE — H&P (VIEW-ONLY)
[facility]  [address]  [facility] [address]  Phone: [phone]  Primary Provider: Britany Norton  Pre-op Performing Provider: MYRTLE POON      PREOPERATIVE EVALUATION:  Today's date: 9/30/2021    Mine Meléndez is a 66 year old female who presents for a preoperative evaluation.    Surgical Information:  Surgery/Procedure: Excision, Mass, Left foot  Surgery Location: [facility] podiatry  Surgeon: Tommy Pelaez DPM  Surgery Date: 10/5/21  Time of Surgery: 12:30 PM  Where patient plans to recover: At home with family  Fax number for surgical facility: Note does not need to be faxed, will be available electronically in Epic.    Type of Anesthesia Anticipated: Local with MAC    Assessment & Plan     The proposed surgical procedure is considered LOW risk.    Preop general physical exam      Subcutaneous nodule of foot      Need for prophylactic vaccination and inoculation against influenza    - INFLUENZA, QUAD, HIGH DOSE, PF, 65YR + (FLUZONE HD)  - ADMIN INFLUENZA (For MEDICARE Patients ONLY) []         Medication Instructions:   - DMARDs HOLD METHOTREXATE for 1 week   Hold baby ASA    RECOMMENDATION:  APPROVAL GIVEN to proceed with proposed procedure, without further diagnostic evaluation.                      Subjective     HPI related to upcoming procedure: Acquired superficial nodules of foot causing pain    Preop Questions 9/25/2021   1. Have you ever had a heart attack or stroke? No   2. Have you ever had surgery on your heart or blood vessels, such as a stent placement, a coronary artery bypass, or surgery on an artery in your head, neck, heart, or legs? No   3. Do you have chest pain with activity? No   4. Do you have a history of  heart failure? No   5. Do you currently have a cold, bronchitis or symptoms of other infection? No   6. Do you have a cough, shortness of breath, or wheezing? No   7. Do you or anyone in your family have  previous history of blood clots? No   8. Do you or does anyone in your family have a serious bleeding problem such as prolonged bleeding following surgeries or cuts? No   9. Have you ever had problems with anemia or been told to take iron pills? No   10. Have you had any abnormal blood loss such as black, tarry or bloody stools, or abnormal vaginal bleeding? No   11. Have you ever had a blood transfusion? No   12. Are you willing to have a blood transfusion if it is medically needed before, during, or after your surgery? Yes   13. Have you or any of your relatives ever had problems with anesthesia? No   14. Do you have sleep apnea, excessive snoring or daytime drowsiness? No   15. Do you have any artifical heart valves or other implanted medical devices like a pacemaker, defibrillator, or continuous glucose monitor? No   16. Do you have artificial joints? No   17. Are you allergic to latex? No       Health Care Directive:  Patient does not have a Health Care Directive or Living Will: Discussed advance care planning with patient; however, patient declined at this time.    Preoperative Review of :   reviewed - no record of controlled substances prescribed.  PDMP Review       Value Time User    State PDMP site checked  Yes 9/30/2021 12:17 PM Anum Wang APRN CNP              Status of Chronic Conditions:  HYPERTENSION - Patient has longstanding history of HTN , currently denies any symptoms referable to elevated blood pressure. Specifically denies chest pain, palpitations, dyspnea, orthopnea, PND or peripheral edema. Blood pressure readings have been in normal range. Current medication regimen is as listed below. Patient denies any side effects of medication.     HYPOTHYROIDISM - Patient has a longstanding history of chronic Hypothyroidism. Patient has been doing well, noting no tremor, insomnia, hair loss or changes in skin texture. Continues to take medications as directed, without adverse reactions  or side effects. Last TSH   Lab Results   Component Value Date    TSH 1.30 04/05/2021   .        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    Edith Lozano  April 15, 2011         HYPERLIPIDEMIA LDL GOAL <130 10/31/2010     Priority: Medium     Hypothyroidism 05/22/2009     Priority: Medium     Essential hypertension 09/27/2006     Priority: Medium     Problem list name updated by automated process. Provider to review       Obesity      Priority: Medium     Problem list name updated by automated process. Provider to review       Seropositive rheumatoid arthritis (H) 06/13/2005     Priority: Medium     Followed by Dr. Jackson  Problem list name updated by automated process. Provider to review       DYSPEPSIA 06/13/2005     Priority: Medium      Past Medical History:   Diagnosis Date     Herpes simplex without mention of complication      Malignant melanoma (H)      Obesity, unspecified      Osteomyelitis of jaw 5/22/2008     Rheumatoid arthritis(714.0)      Trochanteric bursitis of right hip 8/16/2013     Past Surgical History:   Procedure Laterality Date     COLONOSCOPY  1/20/06     HYSTERECTOMY, PAP NO LONGER  INDICATED       HYSTERECTOMY, VAGINAL      TVH,TVT procedure     RELEASE CARPAL TUNNEL Right 10/11/2016    Procedure: RELEASE CARPAL TUNNEL;  Surgeon: Emely Blanca MD;  Location: [facility]     SURGICAL HISTORY OF -       tubal ligation     SURGICAL HISTORY OF -       excision gangliion cyst rt heel     Current Outpatient Medications   Medication Sig Dispense Refill     ASPIRIN 81 MG OR TABS ONE DAILY 100 3     folic acid (FOLVITE) 1 MG tablet Take 1 tablet (1,000 mcg) by mouth daily 90 tablet 2     hydroxychloroquine (PLAQUENIL) 200 MG tablet Hydroxychloroquine 200mg daily; and an additional 200mg every other day. 135 tablet 3     ibuprofen (ADVIL/MOTRIN) 800 MG tablet Take 1 tablet (800 mg) by mouth every 8 hours as needed for moderate pain 90 tablet 0     levothyroxine (SYNTHROID/LEVOTHROID) 50 MCG tablet Take 1 tablet (50 mcg) by mouth daily Needs lab updated 90 tablet 3     lisinopril-hydrochlorothiazide (ZESTORETIC) 10-12.5 MG tablet Take 1 tablet by mouth daily 90 tablet 3     methotrexate sodium 2.5 MG TABS Take 8 tablets (20 mg) by mouth once a week . Take all 8 tablets on the same day of each week. 104 tablet 1     MULTI-VITAMIN OR 1 daily       Probiotic Product (PROBIOTIC PO)        simvastatin (ZOCOR) 20 MG tablet TAKE ONE TABLET BY MOUTH AT BEDTIME 90 tablet 3       Allergies   Allergen Reactions     Codeine Rash     Flagyl [Metronidazole] Rash     Septra [Sulfamethoxazole W/Trimethoprim] Itching     Puffy eyes, flushed.         Social History     Tobacco Use     Smoking status: Former Smoker     Years: 25.00     Quit date: 2000     Years since quittin.4     Smokeless tobacco: Never Used   Substance Use Topics     Alcohol use: Yes     Comment: 6 pack of beer on a weekend       History   Drug Use No         Objective     LMP 2004 (Approximate)     Physical Exam  GENERAL APPEARANCE: healthy, alert and no distress  HENT: ear canals and TM's normal and nose and mouth without  ulcers or lesions  RESP: lungs clear to auscultation - no rales, rhonchi or wheezes  CV: regular rate and rhythm, normal S1 S2, no S3 or S4 and no murmur, click or rub   ABDOMEN: soft, nontender, no HSM or masses and bowel sounds normal  SKIN: nodular lesions to left foot, no erythema, warmth  NEURO: Normal strength and tone, sensory exam grossly normal, mentation intact and speech normal    Recent Labs   Lab Test 08/23/21  0848 08/23/21  0847 05/12/21  1158 04/05/21  1255 01/12/21  1547 11/11/20  1442 11/09/20  0939   HGB 14.1  --  14.0  --    < >  --   --      --  217  --    < >  --   --    NA  --   --   --  132*  --   --  137   POTASSIUM  --   --   --  3.5  --   --  3.7   CR  --  0.59 0.58 0.55   < >  --  0.57   A1C 5.3  --   --   --   --  5.2  --     < > = values in this interval not displayed.        Diagnostics:  No labs were ordered during this visit.   No EKG required, no history of coronary heart disease, significant arrhythmia, peripheral arterial disease or other structural heart disease.    Revised Cardiac Risk Index (RCRI):  The patient has the following serious cardiovascular risks for perioperative complications:   - No serious cardiac risks = 0 points     RCRI Interpretation: 0 points: Class I (very low risk - 0.4% complication rate)           Signed Electronically by: ISRAEL Marie CNP  Copy of this evaluation report is provided to requesting physician.

## 2021-09-30 NOTE — PROGRESS NOTES
[facility]  [address]  [facility] [address]  Phone: [phone]  Primary Provider: Britany Norton  Pre-op Performing Provider: MYRTLE POON      PREOPERATIVE EVALUATION:  Today's date: 9/30/2021    Mine Meléndez is a 66 year old female who presents for a preoperative evaluation.    Surgical Information:  Surgery/Procedure: Excision, Mass, Left foot  Surgery Location: [facility] pod[facility]  Surgeon: Tommy Pelaez DPM  Surgery Date: 10/5/21  Time of Surgery: 12:30 PM  Where patient plans to recover: At home with family  Fax number for surgical facility: Note does not need to be faxed, will be available electronically in Epic.    Type of Anesthesia Anticipated: Local with MAC    Assessment & Plan     The proposed surgical procedure is considered LOW risk.    Preop general physical exam      Subcutaneous nodule of foot      Need for prophylactic vaccination and inoculation against influenza    - INFLUENZA, QUAD, HIGH DOSE, PF, 65YR + (FLUZONE HD)  - ADMIN INFLUENZA (For MEDICARE Patients ONLY) []         Medication Instructions:   - DMARDs HOLD METHOTREXATE for 1 week   Hold baby ASA    RECOMMENDATION:  APPROVAL GIVEN to proceed with proposed procedure, without further diagnostic evaluation.                      Subjective     HPI related to upcoming procedure: Acquired superficial nodules of foot causing pain    Preop Questions 9/25/2021   1. Have you ever had a heart attack or stroke? No   2. Have you ever had surgery on your heart or blood vessels, such as a stent placement, a coronary artery bypass, or surgery on an artery in your head, neck, heart, or legs? No   3. Do you have chest pain with activity? No   4. Do you have a history of  heart failure? No   5. Do you currently have a cold, bronchitis or symptoms of other infection? No   6. Do you have a cough, shortness of breath, or wheezing? No   7. Do you or anyone in your family have  previous history of blood clots? No   8. Do you or does anyone in your family have a serious bleeding problem such as prolonged bleeding following surgeries or cuts? No   9. Have you ever had problems with anemia or been told to take iron pills? No   10. Have you had any abnormal blood loss such as black, tarry or bloody stools, or abnormal vaginal bleeding? No   11. Have you ever had a blood transfusion? No   12. Are you willing to have a blood transfusion if it is medically needed before, during, or after your surgery? Yes   13. Have you or any of your relatives ever had problems with anesthesia? No   14. Do you have sleep apnea, excessive snoring or daytime drowsiness? No   15. Do you have any artifical heart valves or other implanted medical devices like a pacemaker, defibrillator, or continuous glucose monitor? No   16. Do you have artificial joints? No   17. Are you allergic to latex? No       Health Care Directive:  Patient does not have a Health Care Directive or Living Will: Discussed advance care planning with patient; however, patient declined at this time.    Preoperative Review of :   reviewed - no record of controlled substances prescribed.  PDMP Review       Value Time User    State PDMP site checked  Yes 9/30/2021 12:17 PM Anum Wang APRN CNP              Status of Chronic Conditions:  HYPERTENSION - Patient has longstanding history of HTN , currently denies any symptoms referable to elevated blood pressure. Specifically denies chest pain, palpitations, dyspnea, orthopnea, PND or peripheral edema. Blood pressure readings have been in normal range. Current medication regimen is as listed below. Patient denies any side effects of medication.     HYPOTHYROIDISM - Patient has a longstanding history of chronic Hypothyroidism. Patient has been doing well, noting no tremor, insomnia, hair loss or changes in skin texture. Continues to take medications as directed, without adverse reactions  or side effects. Last TSH   Lab Results   Component Value Date    TSH 1.30 04/05/2021   .        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    Edith Lozano  April 15, 2011         HYPERLIPIDEMIA LDL GOAL <130 10/31/2010     Priority: Medium     Hypothyroidism 05/22/2009     Priority: Medium     Essential hypertension 09/27/2006     Priority: Medium     Problem list name updated by automated process. Provider to review       Obesity      Priority: Medium     Problem list name updated by automated process. Provider to review       Seropositive rheumatoid arthritis (H) 06/13/2005     Priority: Medium     Followed by Dr. Jackson  Problem list name updated by automated process. Provider to review       DYSPEPSIA 06/13/2005     Priority: Medium      Past Medical History:   Diagnosis Date     Herpes simplex without mention of complication      Malignant melanoma (H)      Obesity, unspecified      Osteomyelitis of jaw 5/22/2008     Rheumatoid arthritis(714.0)      Trochanteric bursitis of right hip 8/16/2013     Past Surgical History:   Procedure Laterality Date     COLONOSCOPY  1/20/06     HYSTERECTOMY, PAP NO LONGER  INDICATED       HYSTERECTOMY, VAGINAL      TVH,TVT procedure     RELEASE CARPAL TUNNEL Right 10/11/2016    Procedure: RELEASE CARPAL TUNNEL;  Surgeon: Emely Blanca MD;  Location: [facility]     SURGICAL HISTORY OF -       tubal ligation     SURGICAL HISTORY OF -       excision gangliion cyst rt heel     Current Outpatient Medications   Medication Sig Dispense Refill     ASPIRIN 81 MG OR TABS ONE DAILY 100 3     folic acid (FOLVITE) 1 MG tablet Take 1 tablet (1,000 mcg) by mouth daily 90 tablet 2     hydroxychloroquine (PLAQUENIL) 200 MG tablet Hydroxychloroquine 200mg daily; and an additional 200mg every other day. 135 tablet 3     ibuprofen (ADVIL/MOTRIN) 800 MG tablet Take 1 tablet (800 mg) by mouth every 8 hours as needed for moderate pain 90 tablet 0     levothyroxine (SYNTHROID/LEVOTHROID) 50 MCG tablet Take 1 tablet (50 mcg) by mouth daily Needs lab updated 90 tablet 3     lisinopril-hydrochlorothiazide (ZESTORETIC) 10-12.5 MG tablet Take 1 tablet by mouth daily 90 tablet 3     methotrexate sodium 2.5 MG TABS Take 8 tablets (20 mg) by mouth once a week . Take all 8 tablets on the same day of each week. 104 tablet 1     MULTI-VITAMIN OR 1 daily       Probiotic Product (PROBIOTIC PO)        simvastatin (ZOCOR) 20 MG tablet TAKE ONE TABLET BY MOUTH AT BEDTIME 90 tablet 3       Allergies   Allergen Reactions     Codeine Rash     Flagyl [Metronidazole] Rash     Septra [Sulfamethoxazole W/Trimethoprim] Itching     Puffy eyes, flushed.         Social History     Tobacco Use     Smoking status: Former Smoker     Years: 25.00     Quit date: 2000     Years since quittin.4     Smokeless tobacco: Never Used   Substance Use Topics     Alcohol use: Yes     Comment: 6 pack of beer on a weekend       History   Drug Use No         Objective     LMP 2004 (Approximate)     Physical Exam  GENERAL APPEARANCE: healthy, alert and no distress  HENT: ear canals and TM's normal and nose and mouth without  ulcers or lesions  RESP: lungs clear to auscultation - no rales, rhonchi or wheezes  CV: regular rate and rhythm, normal S1 S2, no S3 or S4 and no murmur, click or rub   ABDOMEN: soft, nontender, no HSM or masses and bowel sounds normal  SKIN: nodular lesions to left foot, no erythema, warmth  NEURO: Normal strength and tone, sensory exam grossly normal, mentation intact and speech normal    Recent Labs   Lab Test 08/23/21  0848 08/23/21  0847 05/12/21  1158 04/05/21  1255 01/12/21  1547 11/11/20  1442 11/09/20  0939   HGB 14.1  --  14.0  --    < >  --   --      --  217  --    < >  --   --    NA  --   --   --  132*  --   --  137   POTASSIUM  --   --   --  3.5  --   --  3.7   CR  --  0.59 0.58 0.55   < >  --  0.57   A1C 5.3  --   --   --   --  5.2  --     < > = values in this interval not displayed.        Diagnostics:  No labs were ordered during this visit.   No EKG required, no history of coronary heart disease, significant arrhythmia, peripheral arterial disease or other structural heart disease.    Revised Cardiac Risk Index (RCRI):  The patient has the following serious cardiovascular risks for perioperative complications:   - No serious cardiac risks = 0 points     RCRI Interpretation: 0 points: Class I (very low risk - 0.4% complication rate)           Signed Electronically by: ISRAEL Marie CNP  Copy of this evaluation report is provided to requesting physician.

## 2021-09-30 NOTE — PATIENT INSTRUCTIONS

## 2021-10-01 ENCOUNTER — OFFICE VISIT (OUTPATIENT)
Dept: ORTHOPEDICS | Facility: CLINIC | Age: 66
End: 2021-10-01
Payer: COMMERCIAL

## 2021-10-01 ENCOUNTER — LAB (OUTPATIENT)
Dept: LAB | Facility: CLINIC | Age: 66
End: 2021-10-01

## 2021-10-01 DIAGNOSIS — M67.431 GANGLION CYST OF WRIST, RIGHT: ICD-10-CM

## 2021-10-01 DIAGNOSIS — Z11.59 ENCOUNTER FOR SCREENING FOR OTHER VIRAL DISEASES: ICD-10-CM

## 2021-10-01 PROCEDURE — U0003 INFECTIOUS AGENT DETECTION BY NUCLEIC ACID (DNA OR RNA); SEVERE ACUTE RESPIRATORY SYNDROME CORONAVIRUS 2 (SARS-COV-2) (CORONAVIRUS DISEASE [COVID-19]), AMPLIFIED PROBE TECHNIQUE, MAKING USE OF HIGH THROUGHPUT TECHNOLOGIES AS DESCRIBED BY CMS-2020-01-R: HCPCS

## 2021-10-01 PROCEDURE — 20612 ASPIRATE/INJ GANGLION CYST: CPT | Mod: RT | Performed by: FAMILY MEDICINE

## 2021-10-01 PROCEDURE — U0005 INFEC AGEN DETEC AMPLI PROBE: HCPCS

## 2021-10-01 RX ORDER — ROPIVACAINE HYDROCHLORIDE 5 MG/ML
0.5 INJECTION, SOLUTION EPIDURAL; INFILTRATION; PERINEURAL
Status: SHIPPED | OUTPATIENT
Start: 2021-10-01

## 2021-10-01 RX ORDER — BETAMETHASONE SODIUM PHOSPHATE AND BETAMETHASONE ACETATE 3; 3 MG/ML; MG/ML
6 INJECTION, SUSPENSION INTRA-ARTICULAR; INTRALESIONAL; INTRAMUSCULAR; SOFT TISSUE
Status: SHIPPED | OUTPATIENT
Start: 2021-10-01

## 2021-10-01 RX ADMIN — BETAMETHASONE SODIUM PHOSPHATE AND BETAMETHASONE ACETATE 6 MG: 3; 3 INJECTION, SUSPENSION INTRA-ARTICULAR; INTRALESIONAL; INTRAMUSCULAR; SOFT TISSUE at 15:10

## 2021-10-01 RX ADMIN — ROPIVACAINE HYDROCHLORIDE 0.5 ML: 5 INJECTION, SOLUTION EPIDURAL; INFILTRATION; PERINEURAL at 15:10

## 2021-10-01 NOTE — PROGRESS NOTES
Hand / Upper Extremity Injection/Arthrocentesis: R wrist    Date/Time: 10/1/2021 3:10 PM  Performed by: Michael Loredo MD  Authorized by: Michael Loredo MD     Indications:  Pain  Needle Size:  18 G  Guidance: ultrasound    Approach:  Dorsal  Condition: carpal ganglion, dorsal      Site:  R wrist  Medications:  6 mg betamethasone acet & sod phos 6 (3-3) MG/ML; 0.5 mL ropivacaine 5 MG/ML  Medications comment:  Actual amount of celestone used 0.5 mL  Aspirate amount (mL):  2  Aspirate:  Clear, gel like and blood-tinged  Outcome:  Tolerated well, no immediate complications  Procedure discussed: discussed risks, benefits, and alternatives    Consent Given by:  Patient  Timeout: timeout called immediately prior to procedure    Prep: patient was prepped and draped in usual sterile fashion     Referred by Dr. Hutson     Patient reported some improvement of pain after right ganglion cyst aspiration/steroid injection.  Aftercare instructions given to patient.  Plan to refer to hand surgery if symptoms persist over the next 3-4 weeks.  Pt and  understand and agree with plan.    Michael Loredo MD [facility]

## 2021-10-01 NOTE — PATIENT INSTRUCTIONS
[facility] Injection Discharge Instructions    Procedure: Right Wrist Ganglion Cyst Aspiration/Steroid Injection    Follow-up: Please call if symptomatic over the next 3-4 weeks can consider referral hand surgeon.        You may shower, however avoid swimming, tub baths or hot tubs for 24 hours following your procedure    You may have a mild to moderate increase in pain for several days following the injection.    It may take up to 14 days for the steroid medication to start working although you may feel the effect as early as a few days after the procedure.    You may use ice packs for 10-15 minutes, 3 to 4 times a day at the injection site for comfort    You may use anti-inflammatory medications (such as Ibuprofen or Aleve or Advil) or Tylenol for pain control if necessary    If you were fasting, you may resume your normal diet and medications after the procedure    If you have diabetes, check your blood sugar more frequently than usual as your blood sugar may be higher than normal for 10-14 days following a steroid injection. Contact your doctor who manages your diabetes if your blood sugar is higher than usual      If you experience any of the following, call [facility] @ [phone] or [phone]  -Fever over 100 degree F  -Swelling, bleeding, redness, drainage, warmth at the injection site  - New or worsening pain

## 2021-10-01 NOTE — LETTER
10/1/2021         RE: Mine Meléndez  [id number]  [place] [facility] [place] [facility] [address]        Dear Colleague,    Thank you for referring your patient, Mine Meléndez, to the [facility] [place]. Please see a copy of my visit note below.    Hand / Upper Extremity Injection/Arthrocentesis: R wrist    Date/Time: 10/1/2021 3:10 PM  Performed by: Michael Loredo MD  Authorized by: Michael Loredo MD     Indications:  Pain  Needle Size:  18 G  Guidance: ultrasound    Approach:  Dorsal  Condition: carpal ganglion, dorsal      Site:  R wrist  Medications:  6 mg betamethasone acet & sod phos 6 (3-3) MG/ML; 0.5 mL ropivacaine 5 MG/ML  Medications comment:  Actual amount of celestone used 0.5 mL  Aspirate amount (mL):  2  Aspirate:  Clear, gel like and blood-tinged  Outcome:  Tolerated well, no immediate complications  Procedure discussed: discussed risks, benefits, and alternatives    Consent Given by:  Patient  Timeout: timeout called immediately prior to procedure    Prep: patient was prepped and draped in usual sterile fashion     Referred by Dr. Hutson     Patient reported some improvement of pain after right ganglion cyst aspiration/steroid injection.  Aftercare instructions given to patient.  Plan to refer to hand surgery if symptoms persist over the next 3-4 weeks.  Pt and  understand and agree with plan.    Michael Loredo MD [facility]                Again, thank you for allowing me to participate in the care of your patient.        Sincerely,        Michael Loredo MD

## 2021-10-02 LAB — SARS-COV-2 RNA RESP QL NAA+PROBE: NEGATIVE

## 2021-10-04 ENCOUNTER — ANESTHESIA EVENT (OUTPATIENT)
Dept: SURGERY | Facility: CLINIC | Age: 66
End: 2021-10-04
Payer: COMMERCIAL

## 2021-10-04 ASSESSMENT — LIFESTYLE VARIABLES: TOBACCO_USE: 1

## 2021-10-04 NOTE — ANESTHESIA PREPROCEDURE EVALUATION
Anesthesia Pre-Procedure Evaluation    Patient: Mine Meléndez   MRN: [id number] : 1955        Preoperative Diagnosis: Mass of soft tissue of foot [M79.89]    Procedure : Procedure(s):  EXCISION, MASS, LEFT FOOT          Past Medical History:   Diagnosis Date     Herpes simplex without mention of complication      Malignant melanoma (H)      Obesity, unspecified      Osteomyelitis of jaw 2008     Rheumatoid arthritis(714.0)      Trochanteric bursitis of right hip 2013      Past Surgical History:   Procedure Laterality Date     COLONOSCOPY  06     HYSTERECTOMY, PAP NO LONGER INDICATED       HYSTERECTOMY, VAGINAL      TVH,TVT procedure     RELEASE CARPAL TUNNEL Right 10/11/2016    Procedure: RELEASE CARPAL TUNNEL;  Surgeon: Emely Blanca MD;  Location: [facility] OR     SURGICAL HISTORY OF -       tubal ligation     SURGICAL HISTORY OF -       excision gangliion cyst rt heel      Allergies   Allergen Reactions     Codeine Rash     Flagyl [Metronidazole] Rash     Septra [Sulfamethoxazole W/Trimethoprim] Itching     Puffy eyes, flushed.       Social History     Tobacco Use     Smoking status: Former Smoker     Years: 25.00     Quit date: 2000     Years since quittin.4     Smokeless tobacco: Never Used   Substance Use Topics     Alcohol use: Yes     Comment: 6 pack of beer on a weekend      Wt Readings from Last 1 Encounters:   21 85.7 kg (189 lb)        Anesthesia Evaluation   Pt has had prior anesthetic. Type: General and MAC.        ROS/MED HX  ENT/Pulmonary:     (+) tobacco use, Past use,     Neurologic:     (+) migraines,     Cardiovascular:     (+) Dyslipidemia hypertension-----    METS/Exercise Tolerance:     Hematologic:       Musculoskeletal: Comment: Osteomyelitis of jaw    (+) arthritis,     GI/Hepatic:       Renal/Genitourinary:       Endo:     (+) thyroid problem, hypothyroidism, Obesity,     Psychiatric/Substance Use:       Infectious Disease:        Malignancy:   (+) Malignancy,     Other:            Physical Exam    Airway  airway exam normal      Mallampati: II   TM distance: > 3 FB   Neck ROM: full   Mouth opening: > 3 cm    Respiratory Devices and Support         Dental  no notable dental history         Cardiovascular   cardiovascular exam normal          Pulmonary   pulmonary exam normal                OUTSIDE LABS:  CBC:   Lab Results   Component Value Date    WBC 6.3 08/23/2021    WBC 6.8 05/12/2021    HGB 14.1 08/23/2021    HGB 14.0 05/12/2021    HCT 40.3 08/23/2021    HCT 41.9 05/12/2021     08/23/2021     05/12/2021     BMP:   Lab Results   Component Value Date     (L) 04/05/2021     11/09/2020    POTASSIUM 3.5 04/05/2021    POTASSIUM 3.7 11/09/2020    CHLORIDE 98 04/05/2021    CHLORIDE 104 11/09/2020    CO2 26 04/05/2021    CO2 30 11/09/2020    BUN 7 04/05/2021    BUN 10 11/09/2020    CR 0.59 08/23/2021    CR 0.58 05/12/2021     (H) 04/05/2021     (H) 11/09/2020     COAGS: No results found for: PTT, INR, FIBR  POC: No results found for: BGM, HCG, HCGS  HEPATIC:   Lab Results   Component Value Date    ALBUMIN 3.3 (L) 08/23/2021    PROTTOTAL 7.2 08/23/2021    ALT 23 08/23/2021    AST 33 08/23/2021    ALKPHOS 124 08/23/2021    BILITOTAL 0.5 08/23/2021     OTHER:   Lab Results   Component Value Date    A1C 5.3 08/23/2021    ROSELYN 8.7 04/05/2021    PHOS 3.5 01/21/2015    TSH 1.30 04/05/2021    T4 0.95 09/11/2014    CRP 17.1 (H) 08/23/2021    SED 15 08/23/2021       Anesthesia Plan    ASA Status:  2   NPO Status:  NPO Appropriate    Anesthesia Type: General.   Induction: Intravenous, Propofol.   Maintenance: TIVA.        Consents    Anesthesia Plan(s) and associated risks, benefits, and realistic alternatives discussed. Questions answered and patient/representative(s) expressed understanding.     - Discussed with:  Patient         Postoperative Care    Pain management: IV analgesics, Multi-modal analgesia.   PONV  prophylaxis: Ondansetron (or other 5HT-3), Dexamethasone or Solumedrol     Comments:                Sandra Lopez, APRN CRNA

## 2021-10-05 ENCOUNTER — HOSPITAL ENCOUNTER (OUTPATIENT)
Facility: CLINIC | Age: 66
Discharge: HOME | End: 2021-10-05
Attending: PODIATRIST | Admitting: PODIATRIST
Payer: COMMERCIAL

## 2021-10-05 ENCOUNTER — ANESTHESIA (OUTPATIENT)
Dept: SURGERY | Facility: CLINIC | Age: 66
End: 2021-10-05
Payer: COMMERCIAL

## 2021-10-05 VITALS
SYSTOLIC BLOOD PRESSURE: 134 MMHG | HEIGHT: 65 IN | DIASTOLIC BLOOD PRESSURE: 65 MMHG | OXYGEN SATURATION: 95 % | TEMPERATURE: 98.9 F | BODY MASS INDEX: 31.49 KG/M2 | HEART RATE: 48 BPM | RESPIRATION RATE: 16 BRPM | WEIGHT: 189 LBS

## 2021-10-05 DIAGNOSIS — M79.89 MASS OF SOFT TISSUE OF FOOT: ICD-10-CM

## 2021-10-05 PROCEDURE — 250N000011 HC RX IP 250 OP 636: Performed by: NURSE ANESTHETIST, CERTIFIED REGISTERED

## 2021-10-05 PROCEDURE — 250N000009 HC RX 250: Performed by: NURSE ANESTHETIST, CERTIFIED REGISTERED

## 2021-10-05 PROCEDURE — 710N000012 HC RECOVERY PHASE 2, PER MINUTE: Performed by: PODIATRIST

## 2021-10-05 PROCEDURE — 370N000017 HC ANESTHESIA TECHNICAL FEE, PER MIN: Performed by: PODIATRIST

## 2021-10-05 PROCEDURE — 250N000011 HC RX IP 250 OP 636: Performed by: PODIATRIST

## 2021-10-05 PROCEDURE — 88304 TISSUE EXAM BY PATHOLOGIST: CPT | Mod: TC | Performed by: PODIATRIST

## 2021-10-05 PROCEDURE — 250N000009 HC RX 250: Performed by: PODIATRIST

## 2021-10-05 PROCEDURE — 999N000141 HC STATISTIC PRE-PROCEDURE NURSING ASSESSMENT: Performed by: PODIATRIST

## 2021-10-05 PROCEDURE — 250N000013 HC RX MED GY IP 250 OP 250 PS 637: Performed by: NURSE ANESTHETIST, CERTIFIED REGISTERED

## 2021-10-05 PROCEDURE — 28090 REMOVAL OF FOOT LESION: CPT | Mod: LT | Performed by: PODIATRIST

## 2021-10-05 PROCEDURE — 360N000075 HC SURGERY LEVEL 2, PER MIN: Performed by: PODIATRIST

## 2021-10-05 PROCEDURE — 272N000001 HC OR GENERAL SUPPLY STERILE: Performed by: PODIATRIST

## 2021-10-05 PROCEDURE — 258N000003 HC RX IP 258 OP 636: Performed by: NURSE ANESTHETIST, CERTIFIED REGISTERED

## 2021-10-05 RX ORDER — HYDROMORPHONE HYDROCHLORIDE 1 MG/ML
0.5 INJECTION, SOLUTION INTRAMUSCULAR; INTRAVENOUS; SUBCUTANEOUS EVERY 5 MIN PRN
Status: CANCELLED | OUTPATIENT
Start: 2021-10-05

## 2021-10-05 RX ORDER — ONDANSETRON 2 MG/ML
INJECTION INTRAMUSCULAR; INTRAVENOUS PRN
Status: DISCONTINUED | OUTPATIENT
Start: 2021-10-05 | End: 2021-10-05

## 2021-10-05 RX ORDER — CEFAZOLIN SODIUM 2 G/100ML
2 INJECTION, SOLUTION INTRAVENOUS SEE ADMIN INSTRUCTIONS
Status: DISCONTINUED | OUTPATIENT
Start: 2021-10-05 | End: 2021-10-05 | Stop reason: HOSPADM

## 2021-10-05 RX ORDER — LIDOCAINE 40 MG/G
CREAM TOPICAL
Status: DISCONTINUED | OUTPATIENT
Start: 2021-10-05 | End: 2021-10-05 | Stop reason: HOSPADM

## 2021-10-05 RX ORDER — OXYCODONE HYDROCHLORIDE 5 MG/1
5 TABLET ORAL
Status: CANCELLED | OUTPATIENT
Start: 2021-10-05

## 2021-10-05 RX ORDER — ACETAMINOPHEN 325 MG/1
975 TABLET ORAL ONCE
Status: COMPLETED | OUTPATIENT
Start: 2021-10-05 | End: 2021-10-05

## 2021-10-05 RX ORDER — GABAPENTIN 100 MG/1
100 CAPSULE ORAL
Status: COMPLETED | OUTPATIENT
Start: 2021-10-05 | End: 2021-10-05

## 2021-10-05 RX ORDER — BUPIVACAINE HYDROCHLORIDE 5 MG/ML
INJECTION, SOLUTION PERINEURAL PRN
Status: DISCONTINUED | OUTPATIENT
Start: 2021-10-05 | End: 2021-10-05 | Stop reason: HOSPADM

## 2021-10-05 RX ORDER — MAGNESIUM SULFATE HEPTAHYDRATE 40 MG/ML
2 INJECTION, SOLUTION INTRAVENOUS ONCE
Status: COMPLETED | OUTPATIENT
Start: 2021-10-05 | End: 2021-10-05

## 2021-10-05 RX ORDER — SODIUM CHLORIDE, SODIUM LACTATE, POTASSIUM CHLORIDE, CALCIUM CHLORIDE 600; 310; 30; 20 MG/100ML; MG/100ML; MG/100ML; MG/100ML
INJECTION, SOLUTION INTRAVENOUS CONTINUOUS
Status: CANCELLED | OUTPATIENT
Start: 2021-10-05

## 2021-10-05 RX ORDER — AMOXICILLIN 250 MG
1-2 CAPSULE ORAL 2 TIMES DAILY
Qty: 30 TABLET | Refills: 0 | Status: SHIPPED | OUTPATIENT
Start: 2021-10-05 | End: 2021-10-18

## 2021-10-05 RX ORDER — ACETAMINOPHEN 325 MG/1
975 TABLET ORAL ONCE
Status: CANCELLED | OUTPATIENT
Start: 2021-10-05 | End: 2021-10-05

## 2021-10-05 RX ORDER — HYDROXYZINE HYDROCHLORIDE 25 MG/1
25 TABLET, FILM COATED ORAL EVERY 6 HOURS PRN
Status: CANCELLED | OUTPATIENT
Start: 2021-10-05

## 2021-10-05 RX ORDER — ONDANSETRON 4 MG/1
4 TABLET, ORALLY DISINTEGRATING ORAL EVERY 30 MIN PRN
Status: CANCELLED | OUTPATIENT
Start: 2021-10-05

## 2021-10-05 RX ORDER — KETOROLAC TROMETHAMINE 30 MG/ML
INJECTION, SOLUTION INTRAMUSCULAR; INTRAVENOUS PRN
Status: DISCONTINUED | OUTPATIENT
Start: 2021-10-05 | End: 2021-10-05

## 2021-10-05 RX ORDER — HYDROXYZINE HYDROCHLORIDE 50 MG/1
50 TABLET, FILM COATED ORAL EVERY 6 HOURS PRN
Status: CANCELLED | OUTPATIENT
Start: 2021-10-05

## 2021-10-05 RX ORDER — OXYCODONE HYDROCHLORIDE 5 MG/1
5 TABLET ORAL EVERY 4 HOURS PRN
Qty: 16 TABLET | Refills: 0 | Status: SHIPPED | OUTPATIENT
Start: 2021-10-05 | End: 2021-10-18

## 2021-10-05 RX ORDER — ONDANSETRON 2 MG/ML
4 INJECTION INTRAMUSCULAR; INTRAVENOUS EVERY 30 MIN PRN
Status: CANCELLED | OUTPATIENT
Start: 2021-10-05

## 2021-10-05 RX ORDER — PROPOFOL 10 MG/ML
INJECTION, EMULSION INTRAVENOUS CONTINUOUS PRN
Status: DISCONTINUED | OUTPATIENT
Start: 2021-10-05 | End: 2021-10-05

## 2021-10-05 RX ORDER — FENTANYL CITRATE 50 UG/ML
50 INJECTION, SOLUTION INTRAMUSCULAR; INTRAVENOUS EVERY 5 MIN PRN
Status: CANCELLED | OUTPATIENT
Start: 2021-10-05

## 2021-10-05 RX ORDER — CEFAZOLIN SODIUM 2 G/100ML
2 INJECTION, SOLUTION INTRAVENOUS
Status: COMPLETED | OUTPATIENT
Start: 2021-10-05 | End: 2021-10-05

## 2021-10-05 RX ORDER — ACETAMINOPHEN 325 MG/1
650 TABLET ORAL EVERY 4 HOURS PRN
Qty: 50 TABLET | Refills: 0 | Status: SHIPPED | OUTPATIENT
Start: 2021-10-05

## 2021-10-05 RX ORDER — DEXAMETHASONE SODIUM PHOSPHATE 4 MG/ML
INJECTION, SOLUTION INTRA-ARTICULAR; INTRALESIONAL; INTRAMUSCULAR; INTRAVENOUS; SOFT TISSUE PRN
Status: DISCONTINUED | OUTPATIENT
Start: 2021-10-05 | End: 2021-10-05

## 2021-10-05 RX ORDER — SODIUM CHLORIDE, SODIUM LACTATE, POTASSIUM CHLORIDE, CALCIUM CHLORIDE 600; 310; 30; 20 MG/100ML; MG/100ML; MG/100ML; MG/100ML
INJECTION, SOLUTION INTRAVENOUS CONTINUOUS
Status: DISCONTINUED | OUTPATIENT
Start: 2021-10-05 | End: 2021-10-05 | Stop reason: HOSPADM

## 2021-10-05 RX ORDER — FENTANYL CITRATE 50 UG/ML
INJECTION, SOLUTION INTRAMUSCULAR; INTRAVENOUS PRN
Status: DISCONTINUED | OUTPATIENT
Start: 2021-10-05 | End: 2021-10-05

## 2021-10-05 RX ORDER — MEPERIDINE HYDROCHLORIDE 25 MG/ML
12.5 INJECTION INTRAMUSCULAR; INTRAVENOUS; SUBCUTANEOUS
Status: CANCELLED | OUTPATIENT
Start: 2021-10-05

## 2021-10-05 RX ORDER — ONDANSETRON 4 MG/1
4-8 TABLET, ORALLY DISINTEGRATING ORAL EVERY 8 HOURS PRN
Qty: 4 TABLET | Refills: 0 | Status: SHIPPED | OUTPATIENT
Start: 2021-10-05 | End: 2021-10-18

## 2021-10-05 RX ORDER — LIDOCAINE HYDROCHLORIDE 10 MG/ML
INJECTION, SOLUTION INFILTRATION; PERINEURAL PRN
Status: DISCONTINUED | OUTPATIENT
Start: 2021-10-05 | End: 2021-10-05 | Stop reason: HOSPADM

## 2021-10-05 RX ORDER — LIDOCAINE HYDROCHLORIDE 10 MG/ML
INJECTION, SOLUTION INFILTRATION; PERINEURAL PRN
Status: DISCONTINUED | OUTPATIENT
Start: 2021-10-05 | End: 2021-10-05

## 2021-10-05 RX ORDER — FENTANYL CITRATE 50 UG/ML
25 INJECTION, SOLUTION INTRAMUSCULAR; INTRAVENOUS
Status: CANCELLED | OUTPATIENT
Start: 2021-10-05

## 2021-10-05 RX ORDER — ONDANSETRON 4 MG/1
4 TABLET, ORALLY DISINTEGRATING ORAL
Status: CANCELLED | OUTPATIENT
Start: 2021-10-05

## 2021-10-05 RX ADMIN — PROPOFOL 150 MCG/KG/MIN: 10 INJECTION, EMULSION INTRAVENOUS at 12:34

## 2021-10-05 RX ADMIN — ONDANSETRON 4 MG: 2 INJECTION INTRAMUSCULAR; INTRAVENOUS at 12:37

## 2021-10-05 RX ADMIN — MIDAZOLAM 2 MG: 1 INJECTION INTRAMUSCULAR; INTRAVENOUS at 12:30

## 2021-10-05 RX ADMIN — KETOROLAC TROMETHAMINE 15 MG: 30 INJECTION, SOLUTION INTRAMUSCULAR at 12:39

## 2021-10-05 RX ADMIN — DEXAMETHASONE SODIUM PHOSPHATE 4 MG: 4 INJECTION, SOLUTION INTRA-ARTICULAR; INTRALESIONAL; INTRAMUSCULAR; INTRAVENOUS; SOFT TISSUE at 12:37

## 2021-10-05 RX ADMIN — FENTANYL CITRATE 100 MCG: 50 INJECTION, SOLUTION INTRAMUSCULAR; INTRAVENOUS at 12:31

## 2021-10-05 RX ADMIN — LIDOCAINE HYDROCHLORIDE 0.1 ML: 10 INJECTION, SOLUTION EPIDURAL; INFILTRATION; INTRACAUDAL; PERINEURAL at 11:50

## 2021-10-05 RX ADMIN — LIDOCAINE HYDROCHLORIDE 50 MG: 10 INJECTION, SOLUTION INFILTRATION; PERINEURAL at 12:34

## 2021-10-05 RX ADMIN — CEFAZOLIN SODIUM 2 G: 2 INJECTION, SOLUTION INTRAVENOUS at 12:30

## 2021-10-05 RX ADMIN — ACETAMINOPHEN 975 MG: 325 TABLET, FILM COATED ORAL at 11:25

## 2021-10-05 RX ADMIN — MAGNESIUM SULFATE HEPTAHYDRATE 2 G: 40 INJECTION, SOLUTION INTRAVENOUS at 12:39

## 2021-10-05 RX ADMIN — SODIUM CHLORIDE, POTASSIUM CHLORIDE, SODIUM LACTATE AND CALCIUM CHLORIDE 1000 ML: 600; 310; 30; 20 INJECTION, SOLUTION INTRAVENOUS at 11:50

## 2021-10-05 RX ADMIN — GABAPENTIN 100 MG: 100 CAPSULE ORAL at 11:25

## 2021-10-05 ASSESSMENT — MIFFLIN-ST. JEOR: SCORE: 1392.14

## 2021-10-05 NOTE — ANESTHESIA CARE TRANSFER NOTE
Patient: Mine Meléndez    Procedure: Procedure(s):  EXCISION, MASS, LEFT FOOT       Diagnosis: Mass of soft tissue of foot [M79.89]  Diagnosis Additional Information: No value filed.    Anesthesia Type:   General     Note:    Oropharynx: oropharynx clear of all foreign objects  Level of Consciousness: awake  Oxygen Supplementation: room air    Independent Airway: airway patency satisfactory and stable  Dentition: dentition unchanged  Vital Signs Stable: post-procedure vital signs reviewed and stable  Report to RN Given: handoff report given  Patient transferred to: Phase II    Handoff Report: Identifed the Patient, Identified the Reponsible Provider, Reviewed the pertinent medical history, Discussed the surgical course, Reviewed Intra-OP anesthesia mangement and issues during anesthesia, Set expectations for post-procedure period and Allowed opportunity for questions and acknowledgement of understanding      Vitals:  Vitals Value Taken Time   BP     Temp     Pulse     Resp     SpO2         Electronically Signed By: ISRAEL Elizalde CRNA  October 5, 2021  1:03 PM

## 2021-10-05 NOTE — OP NOTE
PREOPERATIVE DIAGNOSIS: 1.  Soft tissue mass, , left foot.   POSTOPERATIVE DIAGNOSIS: 1. Soft tissue mass, left foot.   PROCEDURE: 1. Excision of soft tissue mass, left foot.   PATHOLOGY: Soft tissue mass, cyst, left foot.   ANESTHESIA: local MAC   ESTIMATED BLOOD LOSS: Less than 10 mL   INDICATIONS FOR PROCEDURE: Mine Meléndez is a 66 year old-year-old female with a painful soft tissue mass over the dorsolateral aspect of the left foot. The proposed surgery will entail performing surgical excision of the soft tissue mass on the left foot. I informed the patient on the surgical technique involved as well the advantages and disadvantages associated the procedure.     We discussed the anticipated postoperative course and timeframe to return to normal activity in shoes.   I informed the patient in risks and complications of the procedure including but not limited to infection, wound complications, swelling, pain, diminished range of motion with arthritis and diminished function, DVT, reoccurrence of condition, and possible loss of limb and death.  There is moderate risk involved.   The patient was consented for surgical excision of the soft tissue mass, left foot.   PROCEDURE IN DETAIL: Under mild sedation, the patient in the operating room and placed on the operating table in the supine position. Pneumatic ankle tourniquet was placed around the patient's left ankle. After adequate sedation, approximately 20 cc of 1% buffered lidocaine was injected around the lateral forefoot of the left foot. The foot was then scrubbed, prepped and draped in the usual aseptic manner. Esmarch bandage was utilized to exsanguinate the patient's left lower extremity, and the pneumatic ankle tourniquet was inflated to 250 PSI.   Following this, an operative time out was performed according to our institution's policy which confirmed the laterality of the procedure. Preoperative antibiotics were administered to the patient prior to arrival  to the OR.     The procedure began with a linear longitudinal incision over the lateral aspect of the fifth metatarsophalangeal joint  on the left.  The incision was made full thickness down to subcutaneous tissue with care taken to avoid all vital neurovascular structures.  Any active bleeders were cauterized and ligated as needed.  Further dissection was carried down to the subcutaneous mass which was carefully dissected away from the surrounding tissue.  There was noted a yellowish opaque substance within the cyst.  The cyst was approximately 2 cm in diameter with no obvious direct connection to a joint or tendon.  The area where the cyst came from was cauterized.  The wound was irrigated with copious amounts of normal sterile saline. Tourniquet was deflated and prompt hyperemic response was noted to all five digits of the left foot. The subcutaneous tissue was reapproximated utilizing 3-0 Vicryl. The skin was reapproximated utilizing 4-0 nylon suture in a horizontal mattress technique. The area was blocked with approximately 10 cc of 0.5% Marcaine plain. The wound was dressed with sterile Jumpstart dressing, 4 x 4s, cast padding and an Ace wrap.   The patient tolerated these procedures well and was transferred from the operative table to the transport cart and taken from the OR to the PACU.  In PACU, patient and staff given orders and instructions for post-operative care in the following areas: post op pain management, weight-bearing status, dressing/splint care, weight-bearing assistive devices, elevation of the extremity, ice/cold therapy, DVT/blood clot prevention, nutrition and post-operative concerns to be aware.  Case and post-operative care measures were reviewed with the patient and family members present.  A post operative instruction sheet was provided.  If concerns or questions arise they will contact our clinic.  If acute concerns develop they will present emergently to a nearby medical  [place].      ANUSHKA CONCEPCION, NICCI, FACFAS

## 2021-10-05 NOTE — BRIEF OP NOTE
[facility]    Brief Operative Note    Pre-operative diagnosis: Mass of soft tissue of foot [M79.89]  Post-operative diagnosis Same as pre-operative diagnosis    Procedure: Procedure(s):  EXCISION, MASS, LEFT FOOT  Surgeon: Surgeon(s) and Role:     * Tommy Pelaez DPM - Primary  Anesthesia: Combined MAC with Local   Estimated Blood Loss: 10 mL from 10/5/2021 12:30 PM to 10/5/2021  1:04 PM      Drains: None  Specimens:   ID Type Source Tests Collected by Time Destination   1 : Left Foot Mass Tissue Foot, Left SURGICAL PATHOLOGY EXAM Tommy Pelaez DPM 10/5/2021 12:53 PM      Findings:   Fluid-filled cyst, approximately 2 cm in marissa.  Opaque yellow substance  Complications: None.  Implants: * No implants in log *

## 2021-10-05 NOTE — DISCHARGE INSTRUCTIONS
Same Day Surgery Discharge Instructions  Special Precautions After Surgery - Adult    1. It is not unusual to feel lightheaded or faint, up to 24 hours after surgery or while taking pain medication.  If you have these symptoms; sit for a few minutes before standing and have someone assist you when getting up.  2. You should rest and relax for the next 24 hours and must have someone stay with you for at least 24 hours after your discharge.  3. DO NOT DRIVE any vehicle or operate mechanical equipment for 24 hours following the end of your surgery.  DO NOT DRIVE while taking narcotic pain medications that have been prescribed by your physician.  If you had a limb operated on, you must be able to use it fully to drive.  4. DO NOT drink alcoholic beverages for 24 hours following surgery or while taking prescription pain medication.  5. Drink clear liquids (apple juice, ginger ale, broth, 7-Up, etc.).  Progress to your regular diet as you feel able.  6. Any questions call your physician and do not make important decisions for 24 hours.    ACTIVITY  ? As directed by Dr Pelaez     INCISIONAL CARE  ? As directed by Dr Pelaez     Call for an appointment to return to the clinic as directed    Medications:  ? Acetaminophen (Tylenol):  Next dose: 3:30.  ? Ibuprofen (Motrin, Advil):  Next dose: 6:30.       __________________________________________________________________________________________________________________________________  IMPORTANT NUMBERS:    [facility] [place]  [phone], [phone]  Pharmacy:  [phone]  Same Day Surgery:  [phone], Monday - Friday until 8:30 p.m.    Emergency Room:  [phone]                                                                                     [facility]:  [phone]

## 2021-10-05 NOTE — ANESTHESIA POSTPROCEDURE EVALUATION
Patient: Mine Meléndez    Procedure: Procedure(s):  EXCISION, MASS, LEFT FOOT       Diagnosis:Mass of soft tissue of foot [M79.89]  Diagnosis Additional Information: No value filed.    Anesthesia Type:  General    Note:  Disposition: Outpatient   Postop Pain Control: Uneventful            Sign Out: Well controlled pain   PONV: No   Neuro/Psych: Uneventful            Sign Out: Acceptable/Baseline neuro status   Airway/Respiratory: Uneventful            Sign Out: Acceptable/Baseline resp. status   CV/Hemodynamics: Uneventful            Sign Out: Acceptable CV status; No obvious hypovolemia; No obvious fluid overload   Other NRE: NONE   DID A NON-ROUTINE EVENT OCCUR? No           Last vitals:  Vitals Value Taken Time   /60 10/05/21 1305   Temp     Pulse 72 10/05/21 1305   Resp     SpO2 94 % 10/05/21 1309   Vitals shown include unvalidated device data.    Electronically Signed By: ISRAEL Elizalde CRNA  October 5, 2021  1:09 PM

## 2021-10-07 LAB
PATH REPORT.COMMENTS IMP SPEC: NORMAL
PATH REPORT.COMMENTS IMP SPEC: NORMAL
PATH REPORT.FINAL DX SPEC: NORMAL
PATH REPORT.GROSS SPEC: NORMAL
PATH REPORT.MICROSCOPIC SPEC OTHER STN: NORMAL
PATH REPORT.RELEVANT HX SPEC: NORMAL
PHOTO IMAGE: NORMAL

## 2021-10-11 ENCOUNTER — OFFICE VISIT (OUTPATIENT)
Dept: PODIATRY | Facility: CLINIC | Age: 66
End: 2021-10-11
Payer: COMMERCIAL

## 2021-10-11 VITALS
BODY MASS INDEX: 31.49 KG/M2 | DIASTOLIC BLOOD PRESSURE: 78 MMHG | HEART RATE: 85 BPM | SYSTOLIC BLOOD PRESSURE: 134 MMHG | WEIGHT: 189 LBS | HEIGHT: 65 IN

## 2021-10-11 DIAGNOSIS — Z98.890 STATUS POST LEFT FOOT SURGERY: Primary | ICD-10-CM

## 2021-10-11 DIAGNOSIS — M67.472 GANGLION CYST OF LEFT FOOT: ICD-10-CM

## 2021-10-11 PROCEDURE — 99024 POSTOP FOLLOW-UP VISIT: CPT | Performed by: PODIATRIST

## 2021-10-11 ASSESSMENT — MIFFLIN-ST. JEOR: SCORE: 1392.14

## 2021-10-11 NOTE — PROGRESS NOTES
"Mine presents to the office s/p one week surgical excision of soft tissue mass of the left foot .  The patient relates keeping the bandages clean, dry and intact.  The patient relates good compliance with postoperative instructions.  The patient denies any severe pain, fevers, chills, nausea or vomiting.  The patient denies having any calf swelling or tenderness.    /78   Pulse 85   Ht 1.657 m (5' 5.25\")   Wt 85.7 kg (189 lb)   LMP 11/12/2004 (Approximate)   BMI 31.21 kg/m         Physical Exam:    The patient appears to be in no distress and in good spirits.  The bandages appear clean, dry and intact with no strikethrough noted.   Neurovascular status unchanged with < 3 sec capillary refill to all digits.  No evidence of allodynia.  Noted mild to moderate edema to the operative site on the left foot.  Sutures are intact and the skin is well coapted with no erythema or drainage noted.  No pain on palpation, erythema or noted calor over the back of the calf.    Pathology report:      Soft tissue, left foot mass: Excision:  - Fragments of benign fibrous tissue with unlined cyst wall, mucin, and fibrin, most consistent with ganglion cyst   Electronically signed by Taran Mendoza MD on 10/7/2021 at  3:19 PM       Assessment:     ICD-10-CM    1. Status post left foot surgery  Z98.890    2. Ganglion cyst of left foot  M67.472        Plan:  Sutures remain intact.  A sterile dressing was reapplied.  The patient was instructed to continue  protected weightbearing to the left foot.  The patient is to keep the dressings clean, dry and intact and to continue with elevation of the left foot.  To decrease the risk of developing a blood clot, the patient was instructed to continue with performing leg lifts and knee bends throughout the day to help keep blood moving.  The patient was instructed that if they notice any calf pain, swelling, or shortness of breath, they should to the nearest ER or Urgent Care to be " evaluated for a blood clot.  The patient will return to the office in one week for suture removal.      Mine verbalized agreement with and understanding of the rational for the diagnosis and treatment plan.  All questions were answered to best of my ability and the patient's satisfaction. The patient was advised to contact the clinic with any questions that may arise after the clinic visit.      Disclaimer: This note consists of symbols derived from keyboarding, dictation and/or voice recognition software. As a result, there may be errors in the script that have gone undetected. Please consider this when interpreting information found in this chart.       SHANNON Pelaez D.P.M., [email or web address].

## 2021-10-11 NOTE — NURSING NOTE
"Chief Complaint   Patient presents with     Surgical Followup     left foot       Initial /78   Pulse 85   Ht 1.657 m (5' 5.25\")   Wt 85.7 kg (189 lb)   LMP 11/12/2004 (Approximate)   BMI 31.21 kg/m   Estimated body mass index is 31.21 kg/m  as calculated from the following:    Height as of this encounter: 1.657 m (5' 5.25\").    Weight as of this encounter: 85.7 kg (189 lb).  Medications and allergies reviewed.      Teresa ROJAS MA    "

## 2021-10-11 NOTE — LETTER
"    10/11/2021         RE: Mine Meléndez  [id number]  [place] [address]        Dear Colleague,    Thank you for referring your patient, Mine Melénedz, to the [facility] [place]. Please see a copy of my visit note below.    Mine presents to the office s/p one week surgical excision of soft tissue mass of the left foot .  The patient relates keeping the bandages clean, dry and intact.  The patient relates good compliance with postoperative instructions.  The patient denies any severe pain, fevers, chills, nausea or vomiting.  The patient denies having any calf swelling or tenderness.    /78   Pulse 85   Ht 1.657 m (5' 5.25\")   Wt 85.7 kg (189 lb)   LMP 11/12/2004 (Approximate)   BMI 31.21 kg/m         Physical Exam:    The patient appears to be in no distress and in good spirits.  The bandages appear clean, dry and intact with no strikethrough noted.   Neurovascular status unchanged with < 3 sec capillary refill to all digits.  No evidence of allodynia.  Noted mild to moderate edema to the operative site on the left foot.  Sutures are intact and the skin is well coapted with no erythema or drainage noted.  No pain on palpation, erythema or noted calor over the back of the calf.    Pathology report:      Soft tissue, left foot mass: Excision:  - Fragments of benign fibrous tissue with unlined cyst wall, mucin, and fibrin, most consistent with ganglion cyst   Electronically signed by Taran Mendoza MD on 10/7/2021 at  3:19 PM       Assessment:     ICD-10-CM    1. Status post left foot surgery  Z98.890    2. Ganglion cyst of left foot  M67.472        Plan:  Sutures remain intact.  A sterile dressing was reapplied.  The patient was instructed to continue  protected weightbearing to the left foot.  The patient is to keep the dressings clean, dry and intact and to continue with elevation of the left foot.  To decrease the risk of developing a blood clot, the patient was " instructed to continue with performing leg lifts and knee bends throughout the day to help keep blood moving.  The patient was instructed that if they notice any calf pain, swelling, or shortness of breath, they should to the nearest ER or Urgent Care to be evaluated for a blood clot.  The patient will return to the office in one week for suture removal.      Mine verbalized agreement with and understanding of the rational for the diagnosis and treatment plan.  All questions were answered to best of my ability and the patient's satisfaction. The patient was advised to contact the clinic with any questions that may arise after the clinic visit.      Disclaimer: This note consists of symbols derived from keyboarding, dictation and/or voice recognition software. As a result, there may be errors in the script that have gone undetected. Please consider this when interpreting information found in this chart.       CRYSTAL Roche.P.LUAN., F.A.C.F.A.S.        Again, thank you for allowing me to participate in the care of your patient.        Sincerely,        Tommy Pelaez DPM

## 2021-10-11 NOTE — PATIENT INSTRUCTIONS
Postoperative instructions:    1. Keep your dressings clean, dry and intact,  a. It is important to keep from exposing the incision to the outside environment.    b. The dressings that are applied are sterile. If the dressings do get wet, then you may change the dressings with 4x4 gauze and an ace wrap.  2. Keep the foot elevated above your heart level.  a. When sitting in a chair, rest your foot on a stool or another chair.  b. When lying in bed on on the couch, rest your foot on a couple of pillows.  3. Ice the foot or ankle 20 min per hour.  a. This will help reduce the acute inflammation that take place naturally after an injury or surgery.  b. Place the ice pack in the inside of the ankle joint to cool the blood going into the foot.  c. Place the ice pack on the back of the knee to cool the blood going to the ankle.  4. Blood clot prevention  a. It is important to keep the blood moving through the veins to help prevent stagnant blood and clotting.  b. Perform range of motion exercises of the ankle joint (if not splinted), knee and hip joints throughout the day (every 2 hours).    c. Do not remain in bed or the couch all day.  Get up and move around.  d. Report to the nearest ER or Urgent Care if you develop any swelling, redness, pain or shortness of breath to the lower extremity.  i. There you can be evaluated for possible deep vein thrombosis (DVT).  ii. This action can help prevent a life threatening condition known as a pulmonary embolism (PE).    Pain management:    1. Keep the foot elevated and cool to help reduce inflammation.  2. Take your pain medication as prescribed  a. After 3 to 5 days from surgery, try switching to a combination of Tylenol and Ibuprofen (Advil) taken as directed by package instructions.  i. This will help reducing the risk of developing addiction to narcotics.  b. Do not take Tylenol with any narcotic pain medication as they typically come with acetaminophen (Tylenol).  3. If  significant pain persists, take the ace wrap off as the swelling may cause the wrap to get tight, causing pain.  You may replace the wrap looser.

## 2021-10-18 ENCOUNTER — OFFICE VISIT (OUTPATIENT)
Dept: PODIATRY | Facility: CLINIC | Age: 66
End: 2021-10-18
Payer: COMMERCIAL

## 2021-10-18 VITALS
WEIGHT: 189 LBS | BODY MASS INDEX: 31.49 KG/M2 | DIASTOLIC BLOOD PRESSURE: 83 MMHG | HEIGHT: 65 IN | SYSTOLIC BLOOD PRESSURE: 136 MMHG | HEART RATE: 78 BPM

## 2021-10-18 DIAGNOSIS — Z98.890 STATUS POST LEFT FOOT SURGERY: Primary | ICD-10-CM

## 2021-10-18 DIAGNOSIS — M67.472 GANGLION CYST OF LEFT FOOT: ICD-10-CM

## 2021-10-18 PROCEDURE — 99024 POSTOP FOLLOW-UP VISIT: CPT | Performed by: PODIATRIST

## 2021-10-18 ASSESSMENT — MIFFLIN-ST. JEOR: SCORE: 1392.14

## 2021-10-18 NOTE — PATIENT INSTRUCTIONS
Next Steps    1. Now that your sutures have been removed, you may get the foot wet.    a. Start out with showering and lightly cleansing the skin.    b. After three days, you may begin soaking your foot in warm water with Epsom's salt for around 20 min.    c. While you are soaking, wiggle the toes and move the ankle around.    i. The combination of warmth and tissue movement will work together in breaking up scar tissue that is under the skin.    d. Remove the foot and ankle from the water and lightly dry off the skin.    e. Next, apply either a foot cream or muscle rub and perform a deep tissue massage around the surgical area.    i. This will also aid in breaking down scar tissue as well as hydrating the skin and reducing inflammation tissue.  1. Mederma Advanced Scar Gel  2. ScarAway Silicone Scar Sheets  f. If you had steristrips applied, they should lift off the skin over the next couple of weeks.  2. It is likely that you will still need to remain non weight bearing as the bones are still healing.  a. The doctor may say that it is ok to start protected weightbearing in a postoperative shoe.    b. Always increase your activity gradually over time  c. Listen to your foot or ankle.    i. If more pain and swelling is noted, you may need to get off the foot to allow more time to heal.  3. Return in 4 weeks for reevaluation and repeat x-rays, if indicated.

## 2021-10-18 NOTE — NURSING NOTE
"Chief Complaint   Patient presents with     Suture Removal     left foot       Initial /83   Pulse 78   Ht 1.657 m (5' 5.25\")   Wt 85.7 kg (189 lb)   LMP 11/12/2004 (Approximate)   BMI 31.21 kg/m   Estimated body mass index is 31.21 kg/m  as calculated from the following:    Height as of this encounter: 1.657 m (5' 5.25\").    Weight as of this encounter: 85.7 kg (189 lb).  Medications and allergies reviewed.      Teresa ROJAS MA    "

## 2021-10-18 NOTE — LETTER
"    10/18/2021         RE: Mine Meléndez  [address]  [facility] [address]        Dear Colleague,    Thank you for referring your patient, Mine Meléndez, to the [facility] [place]. Please see a copy of my visit note below.    Mine presents to the office s/p 2 weeks surgical excision of soft tissue mass of the left foot .  The patient relates keeping the bandages clean, dry and intact.  The patient relates good compliance with postoperative instructions.  The patient denies any severe pain, fevers, chills, nausea or vomiting.  The patient denies having any calf swelling or tenderness.    /83   Pulse 78   Ht 1.657 m (5' 5.25\")   Wt 85.7 kg (189 lb)   LMP 11/12/2004 (Approximate)   BMI 31.21 kg/m         Physical Exam:    The patient appears to be in no distress and in good spirits.  The bandages appear clean, dry and intact with no strikethrough noted.   Neurovascular status unchanged with < 3 sec capillary refill to all digits.  No evidence of allodynia.  Noted mild to moderate edema to the operative site on the left foot.  Sutures are intact and the skin is well coapted with no erythema or drainage noted.  No pain on palpation, erythema or noted calor over the back of the calf.      Assessment:     ICD-10-CM    1. Status post left foot surgery  Z98.890    2. Ganglion cyst of left foot  M67.472        Plan:  Sutures removed.  A bandage was reapplied.  The patient was instructed to continue full weightbearing to the left foot.  The patient may get the foot wet with showering and start soaking next week.  The patient may return in 1 month for reevaluation if any problems arise.    Mine verbalized agreement with and understanding of the rational for the diagnosis and treatment plan.  All questions were answered to best of my ability and the patient's satisfaction. The patient was advised to contact the clinic with any questions that may arise after the clinic visit.  "     Disclaimer: This note consists of symbols derived from keyboarding, dictation and/or voice recognition software. As a result, there may be errors in the script that have gone undetected. Please consider this when interpreting information found in this chart.       SHANNON Pelaez D.P.M., F.A.C.F.A.S.        Again, thank you for allowing me to participate in the care of your patient.        Sincerely,        Tommy Pelaez DPM

## 2021-11-30 ENCOUNTER — OFFICE VISIT (OUTPATIENT)
Dept: DERMATOLOGY | Facility: CLINIC | Age: 66
End: 2021-11-30
Payer: COMMERCIAL

## 2021-11-30 VITALS — SYSTOLIC BLOOD PRESSURE: 123 MMHG | DIASTOLIC BLOOD PRESSURE: 77 MMHG | OXYGEN SATURATION: 97 % | HEART RATE: 76 BPM

## 2021-11-30 DIAGNOSIS — D23.9 DERMAL NEVUS: ICD-10-CM

## 2021-11-30 DIAGNOSIS — L82.1 SEBORRHEIC KERATOSIS: ICD-10-CM

## 2021-11-30 DIAGNOSIS — Z85.820 HISTORY OF MELANOMA: Primary | ICD-10-CM

## 2021-11-30 DIAGNOSIS — L72.0 MILIA: ICD-10-CM

## 2021-11-30 DIAGNOSIS — L81.4 LENTIGO: ICD-10-CM

## 2021-11-30 DIAGNOSIS — D18.01 ANGIOMA OF SKIN: ICD-10-CM

## 2021-11-30 PROCEDURE — 99213 OFFICE O/P EST LOW 20 MIN: CPT | Performed by: DERMATOLOGY

## 2021-11-30 NOTE — LETTER
11/30/2021         RE: Mine Meléndez  [address]  [facility] [address]        Dear Colleague,    Thank you for referring your patient, Mine Meléndez, to the [facility]. Please see a copy of my visit note below.    Mine Meléndez is an extremely pleasant 66 year old year old female patient here today for hx of melanoma 0.3mm on thihg.  She notes spot on lip today.   .   Patient states this has been present for a while.  Patient reports the following symptoms:  none.  Patient reports the following previous treatments none.  These treatments did not work.  Patient reports the following modifying factors none.  Associated symptoms: none.  Patient has no other skin complaints today.  Remainder of the HPI, Meds, PMH, Allergies, FH, and SH was reviewed in chart.      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/2008     Rheumatoid arthritis(714.0)      Trochanteric bursitis of right hip 8/16/2013       Past Surgical History:   Procedure Laterality Date     COLONOSCOPY  1/20/06     EXCISE MASS FOOT Left 10/5/2021    Procedure: EXCISION, MASS, LEFT FOOT;  Surgeon: Tommy Pelaez DPM;  Location: [facility]     HYSTERECTOMY, PAP NO LONGER INDICATED       HYSTERECTOMY, VAGINAL  11/04    TVH,TVT procedure     RELEASE CARPAL TUNNEL Right 10/11/2016    Procedure: RELEASE CARPAL TUNNEL;  Surgeon: Emely Blanca MD;  Location: [facility]     SURGICAL HISTORY OF -   1988    tubal ligation     SURGICAL HISTORY OF -   2001    excision gangliion cyst rt heel        Family History   Problem Relation Age of Onset     Diabetes Father      Dementia Father      Diabetes Brother      Diabetes Sister      Hypertension Sister      Hypertension Sister        Social History     Socioeconomic History     Marital status:      Spouse name: Not on file      Number of children: Not on file     Years of education: Not on file     Highest education level: Not on file   Occupational History     Not on file   Tobacco Use     Smoking status: Former Smoker     Years: 25.00     Quit date: 2000     Years since quittin.5     Smokeless tobacco: Never Used   Vaping Use     Vaping Use: Never used   Substance and Sexual Activity     Alcohol use: Yes     Comment: 6 pack of beer on a weekend     Drug use: No     Sexual activity: Yes     Partners: Male     Birth control/protection: Surgical     Comment: [email or web address]   Other Topics Concern     Parent/sibling w/ CABG, MI or angioplasty before 65F 55M? No   Social History Narrative     Not on file     Social Determinants of Health     Financial Resource Strain: Not on file   Food Insecurity: Not on file   Transportation Needs: Not on file   Physical Activity: Not on file   Stress: Not on file   Social Connections: Not on file   Intimate Partner Violence: Not on file   Housing Stability: Not on file       Outpatient Encounter Medications as of 2021   Medication Sig Dispense Refill     acetaminophen (TYLENOL) 325 MG tablet Take 2 tablets (650 mg) by mouth every 4 hours as needed for mild pain 50 tablet 0     ASPIRIN 81 MG OR TABS ONE DAILY 100 3     folic acid (FOLVITE) 1 MG tablet Take 1 tablet (1,000 mcg) by mouth daily 90 tablet 2     hydroxychloroquine (PLAQUENIL) 200 MG tablet Hydroxychloroquine 200mg daily; and an additional 200mg every other day. 135 tablet 3     ibuprofen (ADVIL/MOTRIN) 800 MG tablet Take 1 tablet (800 mg) by mouth every 8 hours as needed for moderate pain 90 tablet 0     levothyroxine (SYNTHROID/LEVOTHROID) 50 MCG tablet Take 1 tablet (50 mcg) by mouth daily Needs lab updated 90 tablet 3     lisinopril-hydrochlorothiazide (ZESTORETIC) 10-12.5 MG tablet Take 1 tablet by mouth daily 90 tablet 3     methotrexate sodium 2.5 MG TABS Take 8 tablets (20 mg) by mouth once a week . Take all 8 tablets on the  same day of each week. 104 tablet 1     MULTI-VITAMIN OR 1 daily       Probiotic Product (PROBIOTIC PO)        simvastatin (ZOCOR) 20 MG tablet TAKE ONE TABLET BY MOUTH AT BEDTIME 90 tablet 3     Facility-Administered Encounter Medications as of 11/30/2021   Medication Dose Route Frequency Provider Last Rate Last Admin     betamethasone acet & sod phos (CELESTONE) injection 6 mg  6 mg   Michael Loredo MD   6 mg at 10/01/21 1510     ropivacaine (NAROPIN) injection 0.5 mL  0.5 mL   Michael Loredo MD   0.5 mL at 10/01/21 1510             O:   NAD, WDWN, Alert & Oriented, Mood & Affect wnl, Vitals stable   Here today alone   /77 (BP Location: Left arm, Cuff Size: Adult Large)   Pulse 76   LMP 11/12/2004 (Approximate)   SpO2 97%    General appearance normal   Vitals stable   Alert, oriented and in no acute distress      Following lymph nodes palpated: Occipital, Cervical, Supraclavicular , axilla, inguinal no lad  R lwoer lip white papule    Pigmented macules on trunk and proximal ext wit regular borders and pigment networks  Stuck on papules and brown macules on trunk and ext   Red papules on trunk  Flesh colored papules on trunk     The remainder of the full exam was normal; the following areas were examined:  conjunctiva/lids, oral mucosa, neck, peripheral vascular system, abdomen, lymph nodes, digits/nails, eccrine and apocrine glands, scalp/hair, face, neck, chest, abdomen, buttocks, back, RUE, LUE, RLE, LLE       Eyes: Conjunctivae/lids:Normal     ENT: Lips, buccal mucosa, tongue: normal    MSK:Normal    Cardiovascular: peripheral edema none    Pulm: Breathing Normal    Lymph Nodes: No Head and Neck Lymphadenopathy     Neuro/Psych: Orientation:Alert and Orientedx3 ; Mood/Affect:normal       A/P:  1. Seborrheic keratosis, lentigo, angioma, dermal nevus, milia, nevi,hx of melanoma  MELANOMA DISCUSSED WITH PATIENT:  I discussed the specifics of tumor, prognosis, metachronous melanoma, self exam, and  genetics with the patient. I explained the need for monthly skin exams including and taught the patient how to do this. Patient was asked about new or changing moles . I discussed with patient signs and symptoms that could arise in the setting of recurrent locoregional or metastatic disease. In addition, the need to undergo every 4 month dermatologic full skin survey and evaluation given that patients with a diagnosis of melanoma are at risk of recurrence (local and distant) and of subsequent de trixie melanoma.     It was a pleasure speaking to Mine Meléndez today.  Previous clinic notes and pertinent laboratory tests were reviewed prior to Mine Meléndez's visit.  Nature and genetics of benign skin lesions dicussed with patient.  Signs and Symptoms of skin cancer discussed with patient.  Patient encouraged to perform monthly skin exams.  UV precautions reviewed with patient.  Risks of non-melanoma skin cancer discussed with patient   Return to clinic 4 months        Again, thank you for allowing me to participate in the care of your patient.        Sincerely,        Pankaj Castro MD

## 2021-11-30 NOTE — PROGRESS NOTES
Mine Meléndez is an extremely pleasant 66 year old year old female patient here today for hx of melanoma 0.3mm on thihg.  She notes spot on lip today.   .   Patient states this has been present for a while.  Patient reports the following symptoms:  none.  Patient reports the following previous treatments none.  These treatments did not work.  Patient reports the following modifying factors none.  Associated symptoms: none.  Patient has no other skin complaints today.  Remainder of the HPI, Meds, PMH, Allergies, FH, and SH was reviewed in chart.      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/2008     Rheumatoid arthritis(714.0)      Trochanteric bursitis of right hip 8/16/2013       Past Surgical History:   Procedure Laterality Date     COLONOSCOPY  1/20/06     EXCISE MASS FOOT Left 10/5/2021    Procedure: EXCISION, MASS, LEFT FOOT;  Surgeon: Tommy Pelaez DPM;  Location: [facility]     HYSTERECTOMY, PAP NO LONGER INDICATED       HYSTERECTOMY, VAGINAL  11/04    TVH,TVT procedure     RELEASE CARPAL TUNNEL Right 10/11/2016    Procedure: RELEASE CARPAL TUNNEL;  Surgeon: Emely Blanca MD;  Location: [facility]     SURGICAL HISTORY OF -   1988    tubal ligation     SURGICAL HISTORY OF -   2001    excision gangliion cyst rt heel        Family History   Problem Relation Age of Onset     Diabetes Father      Dementia Father      Diabetes Brother      Diabetes Sister      Hypertension Sister      Hypertension Sister        Social History     Socioeconomic History     Marital status:      Spouse name: Not on file     Number of children: Not on file     Years of education: Not on file     Highest education level: Not on file   Occupational History     Not on file   Tobacco Use     Smoking status: Former Smoker     Years: 25.00     Quit date: 5/6/2000     Years since  quittin.5     Smokeless tobacco: Never Used   Vaping Use     Vaping Use: Never used   Substance and Sexual Activity     Alcohol use: Yes     Comment: 6 pack of beer on a weekend     Drug use: No     Sexual activity: Yes     Partners: Male     Birth control/protection: Surgical     Comment: [email or web address].partial   Other Topics Concern     Parent/sibling w/ CABG, MI or angioplasty before 65F 55M? No   Social History Narrative     Not on file     Social Determinants of Health     Financial Resource Strain: Not on file   Food Insecurity: Not on file   Transportation Needs: Not on file   Physical Activity: Not on file   Stress: Not on file   Social Connections: Not on file   Intimate Partner Violence: Not on file   Housing Stability: Not on file       Outpatient Encounter Medications as of 2021   Medication Sig Dispense Refill     acetaminophen (TYLENOL) 325 MG tablet Take 2 tablets (650 mg) by mouth every 4 hours as needed for mild pain 50 tablet 0     ASPIRIN 81 MG OR TABS ONE DAILY 100 3     folic acid (FOLVITE) 1 MG tablet Take 1 tablet (1,000 mcg) by mouth daily 90 tablet 2     hydroxychloroquine (PLAQUENIL) 200 MG tablet Hydroxychloroquine 200mg daily; and an additional 200mg every other day. 135 tablet 3     ibuprofen (ADVIL/MOTRIN) 800 MG tablet Take 1 tablet (800 mg) by mouth every 8 hours as needed for moderate pain 90 tablet 0     levothyroxine (SYNTHROID/LEVOTHROID) 50 MCG tablet Take 1 tablet (50 mcg) by mouth daily Needs lab updated 90 tablet 3     lisinopril-hydrochlorothiazide (ZESTORETIC) 10-12.5 MG tablet Take 1 tablet by mouth daily 90 tablet 3     methotrexate sodium 2.5 MG TABS Take 8 tablets (20 mg) by mouth once a week . Take all 8 tablets on the same day of each week. 104 tablet 1     MULTI-VITAMIN OR 1 daily       Probiotic Product (PROBIOTIC PO)        simvastatin (ZOCOR) 20 MG tablet TAKE ONE TABLET BY MOUTH AT BEDTIME 90 tablet 3     Facility-Administered Encounter Medications as of  11/30/2021   Medication Dose Route Frequency Provider Last Rate Last Admin     betamethasone acet & sod phos (CELESTONE) injection 6 mg  6 mg   Michael Loredo MD   6 mg at 10/01/21 1510     ropivacaine (NAROPIN) injection 0.5 mL  0.5 mL   Michael Loredo MD   0.5 mL at 10/01/21 1510             O:   NAD, WDWN, Alert & Oriented, Mood & Affect wnl, Vitals stable   Here today alone   /77 (BP Location: Left arm, Cuff Size: Adult Large)   Pulse 76   LMP 11/12/2004 (Approximate)   SpO2 97%    General appearance normal   Vitals stable   Alert, oriented and in no acute distress      Following lymph nodes palpated: Occipital, Cervical, Supraclavicular , axilla, inguinal no lad  R lwoer lip white papule    Pigmented macules on trunk and proximal ext wit regular borders and pigment networks  Stuck on papules and brown macules on trunk and ext   Red papules on trunk  Flesh colored papules on trunk     The remainder of the full exam was normal; the following areas were examined:  conjunctiva/lids, oral mucosa, neck, peripheral vascular system, abdomen, lymph nodes, digits/nails, eccrine and apocrine glands, scalp/hair, face, neck, chest, abdomen, buttocks, back, RUE, LUE, RLE, LLE       Eyes: Conjunctivae/lids:Normal     ENT: Lips, buccal mucosa, tongue: normal    MSK:Normal    Cardiovascular: peripheral edema none    Pulm: Breathing Normal    Lymph Nodes: No Head and Neck Lymphadenopathy     Neuro/Psych: Orientation:Alert and Orientedx3 ; Mood/Affect:normal       A/P:  1. Seborrheic keratosis, lentigo, angioma, dermal nevus, milia, nevi,hx of melanoma  MELANOMA DISCUSSED WITH PATIENT:  I discussed the specifics of tumor, prognosis, metachronous melanoma, self exam, and genetics with the patient. I explained the need for monthly skin exams including and taught the patient how to do this. Patient was asked about new or changing moles . I discussed with patient signs and symptoms that could arise in the setting  of recurrent locoregional or metastatic disease. In addition, the need to undergo every 4 month dermatologic full skin survey and evaluation given that patients with a diagnosis of melanoma are at risk of recurrence (local and distant) and of subsequent de trixie melanoma.     It was a pleasure speaking to Mine Meléndez today.  Previous clinic notes and pertinent laboratory tests were reviewed prior to Mine Meléndez's visit.  Nature and genetics of benign skin lesions dicussed with patient.  Signs and Symptoms of skin cancer discussed with patient.  Patient encouraged to perform monthly skin exams.  UV precautions reviewed with patient.  Risks of non-melanoma skin cancer discussed with patient   Return to clinic 4 months

## 2021-12-09 ENCOUNTER — LAB (OUTPATIENT)
Dept: LAB | Facility: CLINIC | Age: 66
End: 2021-12-09
Payer: COMMERCIAL

## 2021-12-09 DIAGNOSIS — M05.9 SEROPOSITIVE RHEUMATOID ARTHRITIS (H): ICD-10-CM

## 2021-12-09 DIAGNOSIS — Z79.899 HIGH RISK MEDICATION USE: ICD-10-CM

## 2021-12-09 LAB
ALBUMIN SERPL-MCNC: 3.5 G/DL (ref 3.4–5)
ALP SERPL-CCNC: 110 U/L (ref 40–150)
ALT SERPL W P-5'-P-CCNC: 34 U/L (ref 0–50)
AST SERPL W P-5'-P-CCNC: 39 U/L (ref 0–45)
BASOPHILS # BLD AUTO: 0 10E3/UL (ref 0–0.2)
BASOPHILS NFR BLD AUTO: 1 %
BILIRUB DIRECT SERPL-MCNC: 0.2 MG/DL (ref 0–0.2)
BILIRUB SERPL-MCNC: 0.8 MG/DL (ref 0.2–1.3)
CREAT SERPL-MCNC: 0.61 MG/DL (ref 0.52–1.04)
CRP SERPL-MCNC: 9.1 MG/L (ref 0–8)
EOSINOPHIL # BLD AUTO: 0.2 10E3/UL (ref 0–0.7)
EOSINOPHIL NFR BLD AUTO: 3 %
ERYTHROCYTE [DISTWIDTH] IN BLOOD BY AUTOMATED COUNT: 13.5 % (ref 10–15)
ERYTHROCYTE [SEDIMENTATION RATE] IN BLOOD BY WESTERGREN METHOD: 12 MM/HR (ref 0–30)
GFR SERPL CREATININE-BSD FRML MDRD: >90 ML/MIN/1.73M2
HCT VFR BLD AUTO: 43 % (ref 35–47)
HGB BLD-MCNC: 14.5 G/DL (ref 11.7–15.7)
LYMPHOCYTES # BLD AUTO: 1.7 10E3/UL (ref 0.8–5.3)
LYMPHOCYTES NFR BLD AUTO: 27 %
MCH RBC QN AUTO: 34 PG (ref 26.5–33)
MCHC RBC AUTO-ENTMCNC: 33.7 G/DL (ref 31.5–36.5)
MCV RBC AUTO: 101 FL (ref 78–100)
MONOCYTES # BLD AUTO: 0.6 10E3/UL (ref 0–1.3)
MONOCYTES NFR BLD AUTO: 9 %
NEUTROPHILS # BLD AUTO: 3.9 10E3/UL (ref 1.6–8.3)
NEUTROPHILS NFR BLD AUTO: 62 %
PLATELET # BLD AUTO: 214 10E3/UL (ref 150–450)
PROT SERPL-MCNC: 7.8 G/DL (ref 6.8–8.8)
RBC # BLD AUTO: 4.26 10E6/UL (ref 3.8–5.2)
WBC # BLD AUTO: 6.3 10E3/UL (ref 4–11)

## 2021-12-09 PROCEDURE — 85652 RBC SED RATE AUTOMATED: CPT

## 2021-12-09 PROCEDURE — 36415 COLL VENOUS BLD VENIPUNCTURE: CPT

## 2021-12-09 PROCEDURE — 85025 COMPLETE CBC W/AUTO DIFF WBC: CPT

## 2021-12-09 PROCEDURE — 80076 HEPATIC FUNCTION PANEL: CPT

## 2021-12-09 PROCEDURE — 86140 C-REACTIVE PROTEIN: CPT

## 2021-12-09 PROCEDURE — 82565 ASSAY OF CREATININE: CPT

## 2021-12-09 NOTE — PATIENT INSTRUCTIONS
RHEUMATOLOGY    Dr. Nasir Baltazar    [facility]  [address] [place] [address]  [place] [address]  Phone number: [phone]  Fax number: [phone]    Covid 19 vaccine scheduling phone number is [phone]    Thank you for choosing [facility]!    Rachel Uriarte CMA Rheumatology

## 2021-12-09 NOTE — PROGRESS NOTES
Mine Meléndez  is a 66 year old year old female who is being evaluated via a billable video visit.      How would you like to obtain your AVS? [employer]  If the video visit is dropped, the invitation should be resent by: Text to cell phone: [phone]  Will anyone else be joining your video visit? No    Rheumatology Video Visit      Mine Meléndez MRN# [id number]   YOB: 1955 Age: 66 year old      Date of visit: 12/10/21     Chief Complaint   Patient presents with:  Rheumatoid Arthritis    Assessment and Plan     1. Seropositive Erosive Rheumatoid Arthritis (RF+, ): Dx'd with RA prior to 2002. Previously on SSZ (stomatitis). Currently on MTX 20mg PO once weekly (no improvement when MTX was increased to 25mg SQ wkly when used as monotherapy), folic acid 1mg daily, and hydroxychloroquine 300mg daily on average (loose stools with 400mg daily, worsening arthritis symptoms when on 200 mg daily). Chronic illness, stable  - Continue Methotrexate 20mg PO once weekly  - Continue folic acid 1mg daily  - Continue hydroxychloroquine 200mg daily with an additional 200mg every other day  (1/22/2021 eye exam by Dr. Cobb at [facility] [place] did not show evidence of hydroxychloroquine toxicity, but no OCT was performed)  - I stressed the importance today of having visual field test and SD-OCT performed yearly to monitor for hydroxychloroquine toxicity. Hydroxychloroquine will not be continued if the screening tests are not completed yearly. I also provided a letter for her to print out via [employer] to bring to her ophthalmologist for the hydroxychloroquine toxicity monitoring eye exam  - Labs every 8-12 weeks: CBC, Creatinine, Hepatic Panel, ESR, CRP    High risk medication requiring intensive toxicity monitoring at least quarterly: labs ordered include CBC, Creatinine, Hepatic panel to monitor for cytopenia and hepatotoxicity; checking creatinine as it affects clearance of medication.     2. Primary  osteoarthritis of both hands: voltaren gel PRN.    - Continue diclofenac (VOLTAREN) 1 % topical gel; Apply up to 2 grams of 1% gel to hands up to 4 times daily as needed for joint pain (maximum: 8 g per upper extremity per day)  Dispense: 200 g; Refill: 3    3.  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  This is documented here for reference only.    4.  Vaccinations: Vaccinations reviewed with Ms. Meléndez.  Risks and benefits of vaccinations were discussed.    - Influenza: up to date  - [id number]: up to date  - [id number]: up to date  - Shingrix: Up to date  - COVID-19: has received the Pfizer vaccine on 3-8-21, 3-29-21, 11-24-21    Total minutes spent in evaluation with patient, documentation, , and review of pertinent studies and chart notes: 16       Ms. Meléndez verbalized agreement with and understanding of the rational for the diagnosis and treatment plan.  All questions were answered to best of my ability and the patient's satisfaction. Ms. Meléndez was advised to contact the clinic with any questions that may arise after the clinic visit.      Thank you for involving me in the care of the patient    Return to clinic: 3-4 months      HPI   Mine Meléndez is a 66 year old female with a past medical history significant for hypertension, hyperlipidemia, hypothyroidism, migraines, and rheumatoid arthritis who presents for follow-up of rheumatoid arthritis.     Today, 12/10/2021: Doing well with regard to arthritis. No joint pain or swelling, and morning stiffness for no more than 20 minutes. No gelling phenomenon. Worsening arthritis symptoms when she reduce hydroxychloroquine to 200 mg daily since she is now taking 300 mg daily on average and is doing well. Tolerating medications well. No fevers or chills. No oral or nasal sores. No chest pain/pressure, palpitations, or shortness of  breath.    Tobacco: Former smoker  EtOH: 4 drinks per day on the weekends  Drugs: None  Occupation: retired June 2020, was working in [employer]   12 point review of system was completed and negative except as noted in the HPI    Active Problem List     Patient Active Problem List   Diagnosis     Seropositive rheumatoid arthritis (H)     DYSPEPSIA     Obesity     Essential hypertension     Hypothyroidism     HYPERLIPIDEMIA LDL GOAL <130     Advanced directives, counseling/discussion     Dermatitis     High risk medications (not anticoagulants) long-term use     Female stress incontinence     Migraine     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)     Past Medical History     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/2008     Rheumatoid arthritis(714.0)      Trochanteric bursitis of right hip 8/16/2013     Past Surgical History     Past Surgical History:   Procedure Laterality Date     COLONOSCOPY  1/20/06     EXCISE MASS FOOT Left 10/5/2021    Procedure: EXCISION, MASS, LEFT FOOT;  Surgeon: Tommy Pelaez DPM;  Location: [facility] OR     HYSTERECTOMY, PAP NO LONGER INDICATED       HYSTERECTOMY, VAGINAL  11/04    TVH,TVT procedure     RELEASE CARPAL TUNNEL Right 10/11/2016    Procedure: RELEASE CARPAL TUNNEL;  Surgeon: Emely lBanca MD;  Location: [facility] OR     SURGICAL HISTORY OF -   1988    tubal ligation     SURGICAL HISTORY OF -   2001    excision gangliion cyst rt heel     Allergy     Allergies   Allergen Reactions     Codeine Rash     Flagyl [Metronidazole] Rash     Septra [Sulfamethoxazole W/Trimethoprim] Itching     Puffy eyes, flushed.      Current Medication List     Current Outpatient Medications   Medication Sig     acetaminophen (TYLENOL) 325 MG tablet Take 2 tablets (650 mg) by mouth every 4 hours as needed  "for mild pain     ASPIRIN 81 MG OR TABS ONE DAILY     folic acid (FOLVITE) 1 MG tablet Take 1 tablet (1,000 mcg) by mouth daily     hydroxychloroquine (PLAQUENIL) 200 MG tablet Hydroxychloroquine 200mg daily; and an additional 200mg every other day.     ibuprofen (ADVIL/MOTRIN) 800 MG tablet Take 1 tablet (800 mg) by mouth every 8 hours as needed for moderate pain     levothyroxine (SYNTHROID/LEVOTHROID) 50 MCG tablet Take 1 tablet (50 mcg) by mouth daily Needs lab updated     lisinopril-hydrochlorothiazide (ZESTORETIC) 10-12.5 MG tablet Take 1 tablet by mouth daily     methotrexate sodium 2.5 MG TABS Take 8 tablets (20 mg) by mouth once a week . Take all 8 tablets on the same day of each week.     MULTI-VITAMIN OR 1 daily     Probiotic Product (PROBIOTIC PO)      simvastatin (ZOCOR) 20 MG tablet TAKE ONE TABLET BY MOUTH AT BEDTIME     Current Facility-Administered Medications   Medication     betamethasone acet & sod phos (CELESTONE) injection 6 mg     ropivacaine (NAROPIN) injection 0.5 mL         Social History   See HPI    Family History     Family History   Problem Relation Age of Onset     Diabetes Father      Dementia Father      Diabetes Brother      Diabetes Sister      Hypertension Sister      Hypertension Sister      Physical Exam     Temp Readings from Last 3 Encounters:   10/05/21 98.9  F (37.2  C) (Oral)   09/30/21 97.9  F (36.6  C) (Tympanic)   08/30/21 97  F (36.1  C) (Tympanic)     BP Readings from Last 5 Encounters:   11/30/21 123/77   10/18/21 136/83   10/11/21 134/78   10/05/21 134/65   09/30/21 129/71     Pulse Readings from Last 1 Encounters:   11/30/21 76     Resp Readings from Last 1 Encounters:   10/05/21 16     Estimated body mass index is 31.21 kg/m  as calculated from the following:    Height as of 10/18/21: 1.657 m (5' 5.25\").    Weight as of 10/18/21: 85.7 kg (189 lb).    GEN: NAD.   HEENT:  No obvious external lesions of the ear and nose. Hearing intact.  PULM: No increased work of " breathing  MSK:  Hands and wrists without swelling. Heberden's and Fabby's nodes present.    SKIN: No rash or jaundice seen  PSYCH: Alert. Appropriate.     Labs / Imaging (select studies)     RF/CCP  Recent Labs   Lab Test 05/01/18  1538   CCPIGG 182*     CBC  Recent Labs   Lab Test 12/09/21  0830 08/23/21  0848 05/12/21  1158 01/12/21  1547 10/23/20  1519   WBC 6.3 6.3 6.8 7.4 7.0   RBC 4.26 3.90 4.12 4.12 4.21   HGB 14.5 14.1 14.0 14.5 14.4   HCT 43.0 40.3 41.9 43.1 43.1   * 103* 102* 105* 102*   RDW 13.5 13.2 14.1 13.6 13.6    225 217 182 220   MCH 34.0* 36.2* 34.0* 35.2* 34.2*   MCHC 33.7 35.0 33.4 33.6 33.4   NEUTROPHIL 62 64 75.2 68.7 69.2   LYMPH 27 23 17.7 19.6 22.7   MONOCYTE 9 10 5.2 9.2 6.1   EOSINOPHIL 3 2 1.6 2.4 1.7   BASOPHIL 1 0 0.3 0.1 0.3   ANEU  --   --  5.1 5.1 4.9   ALYM  --   --  1.2 1.4 1.6   SHYLA  --   --  0.4 0.7 0.4   AEOS  --   --  0.1 0.2 0.1   ABAS  --   --  0.0 0.0 0.0   ANEUTAUTO 3.9 4.0  --   --   --    ALYMPAUTO 1.7 1.5  --   --   --    AMONOAUTO 0.6 0.7  --   --   --    AEOSAUTO 0.2 0.1  --   --   --    ABSBASO 0.0 0.0  --   --   --      CMP  Recent Labs   Lab Test 12/09/21  0830 08/23/21  0847 05/12/21  1158 04/05/21  1255 01/12/21  1547 11/09/20  0939 08/05/19  0933 05/22/19  1404   NA  --   --   --  132*  --  137  --  134   POTASSIUM  --   --   --  3.5  --  3.7  --  3.4   CHLORIDE  --   --   --  98  --  104  --  99   CO2  --   --   --  26  --  30  --  32   ANIONGAP  --   --   --  8  --  3  --  3   GLC  --   --   --  151*  --  115*  --  123*   BUN  --   --   --  7  --  10  --  13   CR 0.61 0.59 0.58 0.55 0.65 0.57   < > 0.58   GFRESTIMATED >90 >90 >90 >90 >90 >90   < > >90   GFRESTBLACK  --   --  >90 >90 >90 >90   < > >90   ROSELYN  --   --   --  8.7  --  9.3  --  9.3   BILITOTAL 0.8 0.5 0.5  --  0.7  --    < > 1.3   ALBUMIN 3.5 3.3* 3.5  --  3.6  --    < > 3.9   PROTTOTAL 7.8 7.2 7.5  --  7.5  --    < > 7.6   ALKPHOS 110 124 121  --  118  --    < > 132   AST 39 33 23   --  28  --    < > 21   ALT 34 23 24  --  27  --    < > 25    < > = values in this interval not displayed.     Calcium/VitaminD  Recent Labs   Lab Test 04/05/21  1255 11/09/20  0939 05/22/19  1404   ROSELYN 8.7 9.3 9.3     ESR/CRP  Recent Labs   Lab Test 12/09/21  0830 08/23/21  0847 05/12/21  1158   SED 12 15 13   CRP 9.1* 17.1* 14.9*     Lipid Panel  Recent Labs   Lab Test 11/09/20  0939 04/17/19  1526 04/30/18  1601   CHOL 153 159 163   TRIG 65 102 170*   HDL 74 63 55   LDL 66 76 74   NHDL 79 96 108     Hepatitis B  Recent Labs   Lab Test 10/23/20  1519 05/01/18  1538   HBCAB Nonreactive Nonreactive   HEPBANG Nonreactive Nonreactive     Hepatitis C  Recent Labs   Lab Test 10/23/20  1519 09/11/14  1650   HCVAB Nonreactive Negative     Tuberculosis Screening  Recent Labs   Lab Test 10/23/20  1519   TBRST Negative     Immunization History     Immunization History   Administered Date(s) Administered     COVID-19,PF,Pfizer (12+ Yrs) 03/08/2021, 03/29/2021     Influenza (H1N1) 12/31/2009     Influenza (High Dose) 3 valent vaccine 11/05/2019     Influenza (IIV3) PF 11/03/2004, 10/30/2012, 10/20/2013, 10/03/2016     Influenza Vaccine IM > 6 months Valent IIV4 (Alfuria,Fluzone) 10/23/2017     Influenza, Quad, High Dose, Pf, 65yr+ (Fluzone HD) 11/09/2020, 09/30/2021     Pneumo Conj 13-V (2010&after) 11/12/2018     Pneumococcal 23 valent 02/19/2019     TD (ADULT, 7+) 04/28/1995     TDAP Vaccine (Adacel) 12/05/2007, 02/01/2018     Zoster vaccine recombinant adjuvanted (SHINGRIX) 04/17/2019, 07/10/2019     Zoster vaccine, live 06/07/2016          Chart documentation done in part with Dragon Voice recognition Software. Although reviewed after completion, some word and grammatical error may remain.        Video-Visit Details    Type of service:  Video Visit    Video Start Time: 9:17 AM    Video End Time:9:27 AM    Originating Location (pt. Location): [place], [place]    Distant Location (provider location):  Home    Platform used for Video  Visit: Taisha Baltazar MD

## 2021-12-10 ENCOUNTER — VIRTUAL VISIT (OUTPATIENT)
Dept: RHEUMATOLOGY | Facility: CLINIC | Age: 66
End: 2021-12-10
Payer: COMMERCIAL

## 2021-12-10 DIAGNOSIS — M05.9 SEROPOSITIVE RHEUMATOID ARTHRITIS (H): Primary | ICD-10-CM

## 2021-12-10 DIAGNOSIS — Z79.899 HIGH RISK MEDICATION USE: ICD-10-CM

## 2021-12-10 PROCEDURE — 99214 OFFICE O/P EST MOD 30 MIN: CPT | Mod: 95 | Performed by: INTERNAL MEDICINE

## 2021-12-10 RX ORDER — FOLIC ACID 1 MG/1
1000 TABLET ORAL DAILY
Qty: 90 TABLET | Refills: 2 | Status: SHIPPED | OUTPATIENT
Start: 2021-12-10 | End: 2022-11-03

## 2021-12-10 RX ORDER — METHOTREXATE 2.5 MG/1
20 TABLET ORAL WEEKLY
Qty: 104 TABLET | Refills: 2 | Status: SHIPPED | OUTPATIENT
Start: 2021-12-10 | End: 2022-07-20

## 2021-12-10 RX ORDER — HYDROXYCHLOROQUINE SULFATE 200 MG/1
TABLET, FILM COATED ORAL
Qty: 135 TABLET | Refills: 1 | Status: SHIPPED | OUTPATIENT
Start: 2021-12-10 | End: 2022-07-20

## 2021-12-10 NOTE — LETTER
Eye Specialist Letter  [facility] [place]  [address]  [place] [address]  Phone: [phone]  Fax: [phone]     Dear Eye Specialist,  To ensure safe use of Plaquenil (hydroxychloroquine), we are requesting your assistance in providing the following information. Please return this form to our clinic via mail or fax, or incorporate this information into your visit summary. Your note must indicate whether or not there is evidence of toxicity from Plaquenil use. For questions regarding this form, please call [phone].  Patient Name: Mine Meléndez    : 1955       Date of Exam:    The following exams were completed during this visit in accordance with the American Academy of Ophthalmology recommendations (2016):  ? 10-2 automated visual field  ? Spectral-domain optical coherence tomography (SD OCT)  ? Multifocal electroretinogram (mfERG)  ? Fundus autofluorescence (FAF)  ? Other (please specify)  Please select from the following:  ? The findings from the above exams are not suggestive of toxicity from Plaquenil (hydroxychloroquine).  ? The findings from the above exams may suggest toxicity from Plaquenil (hydroxychloroquine).  Directly contact the clinic and fax this form.  Please provide additional guidance on whether or not the medication may be continued from your perspective:  Date of next recommended Plaquenil (hydroxychloroquine) screening eye exam:   ? 5 years  ? 1 year  ? 6 months  Other (please specify):   Eye Specialist Name (print):                                                                      Date:  Eye Specialist Signature:

## 2021-12-30 ENCOUNTER — TRANSFERRED RECORDS (OUTPATIENT)
Dept: HEALTH INFORMATION MANAGEMENT | Facility: CLINIC | Age: 66
End: 2021-12-30

## 2021-12-30 LAB — RETINOPATHY: NORMAL

## 2022-01-28 ENCOUNTER — TRANSFERRED RECORDS (OUTPATIENT)
Dept: HEALTH INFORMATION MANAGEMENT | Facility: CLINIC | Age: 67
End: 2022-01-28
Payer: COMMERCIAL

## 2022-03-08 ENCOUNTER — VIRTUAL VISIT (OUTPATIENT)
Dept: RHEUMATOLOGY | Facility: CLINIC | Age: 67
End: 2022-03-08
Payer: COMMERCIAL

## 2022-03-08 DIAGNOSIS — Z79.899 HIGH RISK MEDICATION USE: ICD-10-CM

## 2022-03-08 DIAGNOSIS — M05.9 SEROPOSITIVE RHEUMATOID ARTHRITIS (H): Primary | ICD-10-CM

## 2022-03-08 PROCEDURE — 99214 OFFICE O/P EST MOD 30 MIN: CPT | Mod: 95 | Performed by: INTERNAL MEDICINE

## 2022-03-08 NOTE — PROGRESS NOTES
Mine Meléndez  is a 66 year old year old female who is being evaluated via a billable video visit.      How would you like to obtain your AVS? MyChart  If the video visit is dropped, the invitation should be resent by: Text to cell phone: [phone]  Will anyone else be joining your video visit? No    Rheumatology Video Visit      Mine Meléndez MRN# [id number]   YOB: 1955 Age: 66 year old      Date of visit: 3/08/22     Chief Complaint   Patient presents with:  Rheumatoid Arthritis    Assessment and Plan     1. Seropositive Erosive Rheumatoid Arthritis (RF+, ): Dx'd with RA prior to 2002. Previously on SSZ (stomatitis). Currently on MTX 20mg PO once weekly (no improvement when MTX was increased to 25mg SQ wkly when used as monotherapy), folic acid 1mg daily, and hydroxychloroquine 300mg daily on average (loose stools with 400mg daily, worsening arthritis symptoms when on 200 mg daily). Mild degenerative symptoms of the left second MCP and left first MCP, and no swelling of the joints will not change DMARD management at this time, but would benefit from in office physical exam to determine if DMARD change is needed. Next appointment is scheduled to be in office. Check x-rays to evaluate for progressive erosive disease.   Chronic illness, stable.     3/11/22 addendum: patient reports that the joint issues are on the right, not left hand.  - Continue Methotrexate 20mg PO once weekly  - Continue folic acid 1mg daily  - Continue hydroxychloroquine 200mg daily with an additional 200mg every other day  (1/22/2021 eye exam by Dr. Cobb at [facility] did not show evidence of hydroxychloroquine toxicity, but no OCT was performed; same on 1/28/2022.  OCT needed to safely continue and she says that this is scheduled to be completed in April 2022; refill by MD only)  - I stressed the importance previously and again today of having visual field test and SD-OCT performed yearly to monitor for  hydroxychloroquine toxicity. Hydroxychloroquine will not be continued if the screening tests are not completed yearly. I previously provided a letter for her to print out via [employer] to bring to her ophthalmologist for the hydroxychloroquine toxicity monitoring eye exam  - Labs now and every 8-12 weeks: CBC, Creatinine, Hepatic Panel, ESR, CRP  - X-rays: bilateral hands/feet    High risk medication requiring intensive toxicity monitoring at least quarterly: labs ordered include CBC, Creatinine, Hepatic panel to monitor for cytopenia and hepatotoxicity; checking creatinine as it affects clearance of medication.     2. Primary osteoarthritis of both hands: voltaren gel PRN.    - Continue diclofenac (VOLTAREN) 1 % topical gel; Apply up to 2 grams of 1% gel to hands up to 4 times daily as needed for joint pain (maximum: 8 g per upper extremity per day)  Dispense: 200 g; Refill: 3    3.  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  This is documented here for reference only.    4.  Vaccinations: Vaccinations reviewed with Ms. Meléndez.  Risks and benefits of vaccinations were discussed.    - Influenza: up to date  - [id number]: up to date  - [id number]: up to date  - Shingrix: Up to date  - COVID-19: has received the Pfizer vaccine on 3-8-21, 3-29-21, 11-24-21.  4th dose of the mRNA COVID-19 vaccination is recommended to be received 3 months after the third mRNA COVID-19 vaccination was received.  Based on our current understanding (and this may change over time as we learn more), medications should be adjusted as noted below, if disease activity allows:  - NSAIDs and Acetaminophen: hold for 24 hours prior to vaccination if able to do so  - Methotrexate should be held for 2 weeks after the mRNA COVID-19 vaccine      Total minutes spent in evaluation with patient, documentation, , and review of  pertinent studies and chart notes: 22      Ms. Meléndez verbalized agreement with and understanding of the rational for the diagnosis and treatment plan.  All questions were answered to best of my ability and the patient's satisfaction. Ms. Meléndez was advised to contact the clinic with any questions that may arise after the clinic visit.      Thank you for involving me in the care of the patient    Return to clinic: 3-4 months      HPI   Mine Meléndez is a 66 year old female with a past medical history significant for hypertension, hyperlipidemia, hypothyroidism, migraines, and rheumatoid arthritis who presents for follow-up of rheumatoid arthritis.     Today, 3/8/2022: Doing well except for occasional ache at the left first and second MCPs that are worse with activity and improved with rest; not associated with swelling or increased warmth or overlying erythema. Other joints are doing well. Tolerating medications well. States that she has an eye exam scheduled for April to have the SD-OCT completed for hydroxychloroquine toxicity monitoring and she would like to ensure that the testing is done correctly.      3/11/22 addendum: patient reports that the joint issues are on the right, not left hand.    Tobacco: Former smoker  EtOH: 4 drinks per day on the weekends  Drugs: None  Occupation: retired June 2020, was working in [employer]   12 point review of system was completed and negative except as noted in the HPI    Active Problem List     Patient Active Problem List   Diagnosis     Seropositive rheumatoid arthritis (H)     DYSPEPSIA     Obesity     Essential hypertension     Hypothyroidism     HYPERLIPIDEMIA LDL GOAL <130     Advanced directives, counseling/discussion     Dermatitis     High risk medications (not anticoagulants) long-term use     Female stress incontinence     Migraine     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)     Past Medical History     Past Medical  History:   Diagnosis Date     Herpes simplex without mention of complication      Hypertension      Malignant melanoma (H)      Obesity, unspecified      Osteomyelitis of jaw 5/22/2008     Rheumatoid arthritis(714.0)      Trochanteric bursitis of right hip 8/16/2013     Past Surgical History     Past Surgical History:   Procedure Laterality Date     COLONOSCOPY  1/20/06     EXCISE MASS FOOT Left 10/5/2021    Procedure: EXCISION, MASS, LEFT FOOT;  Surgeon: Tommy Pelaez DPM;  Location: [facility]     HYSTERECTOMY, PAP NO LONGER INDICATED       HYSTERECTOMY, VAGINAL  11/04    TVH,TVT procedure     RELEASE CARPAL TUNNEL Right 10/11/2016    Procedure: RELEASE CARPAL TUNNEL;  Surgeon: Emely Blanca MD;  Location: [facility]     SURGICAL HISTORY OF -   1988    tubal ligation     SURGICAL HISTORY OF -   2001    excision gangliion cyst rt heel     Allergy     Allergies   Allergen Reactions     Codeine Rash     Flagyl [Metronidazole] Rash     Septra [Sulfamethoxazole W/Trimethoprim] Itching     Puffy eyes, flushed.      Current Medication List     Current Outpatient Medications   Medication Sig     acetaminophen (TYLENOL) 325 MG tablet Take 2 tablets (650 mg) by mouth every 4 hours as needed for mild pain     ASPIRIN 81 MG OR TABS ONE DAILY     folic acid (FOLVITE) 1 MG tablet Take 1 tablet (1,000 mcg) by mouth daily     hydroxychloroquine (PLAQUENIL) 200 MG tablet Hydroxychloroquine 200mg daily; and an additional 200mg every other day. Yearly eye exam, including 10-2 VF and SD-OCT, are required.     ibuprofen (ADVIL/MOTRIN) 800 MG tablet Take 1 tablet (800 mg) by mouth every 8 hours as needed for moderate pain     levothyroxine (SYNTHROID/LEVOTHROID) 50 MCG tablet Take 1 tablet (50 mcg) by mouth daily Needs lab updated     lisinopril-hydrochlorothiazide (ZESTORETIC) 10-12.5 MG tablet Take 1 tablet by mouth daily     methotrexate sodium 2.5 MG TABS Take 8 tablets (20 mg) by mouth once a week . Take all 8 tablets on the  "same day of each week.     MULTI-VITAMIN OR 1 daily     Probiotic Product (PROBIOTIC PO)      simvastatin (ZOCOR) 20 MG tablet TAKE ONE TABLET BY MOUTH AT BEDTIME     Current Facility-Administered Medications   Medication     betamethasone acet & sod phos (CELESTONE) injection 6 mg     ropivacaine (NAROPIN) injection 0.5 mL         Social History   See HPI    Family History     Family History   Problem Relation Age of Onset     Diabetes Father      Dementia Father      Diabetes Brother      Diabetes Sister      Hypertension Sister      Hypertension Sister      Physical Exam     Temp Readings from Last 3 Encounters:   10/05/21 98.9  F (37.2  C) (Oral)   09/30/21 97.9  F (36.6  C) (Tympanic)   08/30/21 97  F (36.1  C) (Tympanic)     BP Readings from Last 5 Encounters:   11/30/21 123/77   10/18/21 136/83   10/11/21 134/78   10/05/21 134/65   09/30/21 129/71     Pulse Readings from Last 1 Encounters:   11/30/21 76     Resp Readings from Last 1 Encounters:   10/05/21 16     Estimated body mass index is 31.21 kg/m  as calculated from the following:    Height as of 10/18/21: 1.657 m (5' 5.25\").    Weight as of 10/18/21: 85.7 kg (189 lb).    GEN: NAD.   HEENT:  No obvious external lesions of the ear and nose. Hearing intact.  PULM: No increased work of breathing  MSK:  Hands and wrists without swelling. Heberden's and Fabby's nodes present.    SKIN: No rash or jaundice seen  PSYCH: Alert. Appropriate.     Labs / Imaging (select studies)       RF/CCP  Recent Labs   Lab Test 05/01/18  1538   CCPIGG 182*     CBC  Recent Labs   Lab Test 12/09/21  0830 08/23/21  0848 05/12/21  1158 01/12/21  1547 10/23/20  1519   WBC 6.3 6.3 6.8 7.4 7.0   RBC 4.26 3.90 4.12 4.12 4.21   HGB 14.5 14.1 14.0 14.5 14.4   HCT 43.0 40.3 41.9 43.1 43.1   * 103* 102* 105* 102*   RDW 13.5 13.2 14.1 13.6 13.6    225 217 182 220   MCH 34.0* 36.2* 34.0* 35.2* 34.2*   MCHC 33.7 35.0 33.4 33.6 33.4   NEUTROPHIL 62 64 75.2 68.7 69.2   LYMPH 27 " 23 17.7 19.6 22.7   MONOCYTE 9 10 5.2 9.2 6.1   EOSINOPHIL 3 2 1.6 2.4 1.7   BASOPHIL 1 0 0.3 0.1 0.3   ANEU  --   --  5.1 5.1 4.9   ALYM  --   --  1.2 1.4 1.6   SHYLA  --   --  0.4 0.7 0.4   AEOS  --   --  0.1 0.2 0.1   ABAS  --   --  0.0 0.0 0.0   ANEUTAUTO 3.9 4.0  --   --   --    ALYMPAUTO 1.7 1.5  --   --   --    AMONOAUTO 0.6 0.7  --   --   --    AEOSAUTO 0.2 0.1  --   --   --    ABSBASO 0.0 0.0  --   --   --      CMP  Recent Labs   Lab Test 12/09/21  0830 08/23/21  0847 05/12/21  1158 04/05/21  1255 01/12/21  1547 11/09/20  0939 08/05/19  0933 05/22/19  1404   NA  --   --   --  132*  --  137  --  134   POTASSIUM  --   --   --  3.5  --  3.7  --  3.4   CHLORIDE  --   --   --  98  --  104  --  99   CO2  --   --   --  26  --  30  --  32   ANIONGAP  --   --   --  8  --  3  --  3   GLC  --   --   --  151*  --  115*  --  123*   BUN  --   --   --  7  --  10  --  13   CR 0.61 0.59 0.58 0.55 0.65 0.57   < > 0.58   GFRESTIMATED >90 >90 >90 >90 >90 >90   < > >90   GFRESTBLACK  --   --  >90 >90 >90 >90   < > >90   ROSELYN  --   --   --  8.7  --  9.3  --  9.3   BILITOTAL 0.8 0.5 0.5  --  0.7  --    < > 1.3   ALBUMIN 3.5 3.3* 3.5  --  3.6  --    < > 3.9   PROTTOTAL 7.8 7.2 7.5  --  7.5  --    < > 7.6   ALKPHOS 110 124 121  --  118  --    < > 132   AST 39 33 23  --  28  --    < > 21   ALT 34 23 24  --  27  --    < > 25    < > = values in this interval not displayed.     Calcium/VitaminD  Recent Labs   Lab Test 04/05/21  1255 11/09/20  0939 05/22/19  1404   ROSELYN 8.7 9.3 9.3     ESR/CRP  Recent Labs   Lab Test 12/09/21  0830 08/23/21  0847 05/12/21  1158   SED 12 15 13   CRP 9.1* 17.1* 14.9*     Lipid Panel  Recent Labs   Lab Test 11/09/20  0939 04/17/19  1526 04/30/18  1601   CHOL 153 159 163   TRIG 65 102 170*   HDL 74 63 55   LDL 66 76 74   NHDL 79 96 108     Hepatitis B  Recent Labs   Lab Test 10/23/20  1519 05/01/18  1538   HBCAB Nonreactive Nonreactive   HEPBANG Nonreactive Nonreactive     Hepatitis C  Recent Labs   Lab Test  10/23/20  1519 09/11/14  1650   HCVAB Nonreactive Negative     Tuberculosis Screening  Recent Labs   Lab Test 10/23/20  1519   TBRST Negative       Immunization History     Immunization History   Administered Date(s) Administered     COVID-19,PF,Pfizer (12+ Yrs) 03/08/2021, 03/29/2021, 11/24/2021     Influenza (H1N1) 12/31/2009     Influenza (High Dose) 3 valent vaccine 11/05/2019     Influenza (IIV3) PF 11/03/2004, 10/30/2012, 10/20/2013, 10/03/2016     Influenza Vaccine IM > 6 months Valent IIV4 (Alfuria,Fluzone) 10/23/2017     Influenza, Quad, High Dose, Pf, 65yr+ (Fluzone HD) 11/09/2020, 09/30/2021     Pneumo Conj 13-V (2010&after) 11/12/2018     Pneumococcal 23 valent 02/19/2019     TD (ADULT, 7+) 04/28/1995     TDAP Vaccine (Adacel) 12/05/2007, 02/01/2018     Zoster vaccine recombinant adjuvanted (SHINGRIX) 04/17/2019, 07/10/2019     Zoster vaccine, live 06/07/2016          Chart documentation done in part with Dragon Voice recognition Software. Although reviewed after completion, some word and grammatical error may remain.    Video-Visit Details    Type of service:  Video Visit    Video Start Time: 2:19 PM    Video End Time: 2:35 PM    Originating Location (pt. Location): [place], [place]    Distant Location (provider location):  [place]    Platform used for Video Visit: Siva Baltazar MD

## 2022-03-08 NOTE — PATIENT INSTRUCTIONS
RHEUMATOLOGY    Dr. Nasir Baltazar    [address] [place] [address]  Phone number: [phone]  Fax number: [phone]      Thank you for choosing [facility]!    Rachel Uriarte CMA Rheumatology

## 2022-03-15 ENCOUNTER — ANCILLARY PROCEDURE (OUTPATIENT)
Dept: GENERAL RADIOLOGY | Facility: CLINIC | Age: 67
End: 2022-03-15
Attending: INTERNAL MEDICINE
Payer: COMMERCIAL

## 2022-03-15 ENCOUNTER — LAB (OUTPATIENT)
Dept: LAB | Facility: CLINIC | Age: 67
End: 2022-03-15
Payer: COMMERCIAL

## 2022-03-15 DIAGNOSIS — M05.9 SEROPOSITIVE RHEUMATOID ARTHRITIS (H): ICD-10-CM

## 2022-03-15 DIAGNOSIS — Z79.899 HIGH RISK MEDICATION USE: ICD-10-CM

## 2022-03-15 LAB
ALBUMIN SERPL-MCNC: 3.5 G/DL (ref 3.4–5)
ALP SERPL-CCNC: 114 U/L (ref 40–150)
ALT SERPL W P-5'-P-CCNC: 28 U/L (ref 0–50)
AST SERPL W P-5'-P-CCNC: 25 U/L (ref 0–45)
BASOPHILS # BLD AUTO: 0 10E3/UL (ref 0–0.2)
BASOPHILS NFR BLD AUTO: 0 %
BILIRUB DIRECT SERPL-MCNC: 0.1 MG/DL (ref 0–0.2)
BILIRUB SERPL-MCNC: 0.4 MG/DL (ref 0.2–1.3)
CREAT SERPL-MCNC: 0.57 MG/DL (ref 0.52–1.04)
CRP SERPL-MCNC: 8.3 MG/L (ref 0–8)
EOSINOPHIL # BLD AUTO: 0.2 10E3/UL (ref 0–0.7)
EOSINOPHIL NFR BLD AUTO: 2 %
ERYTHROCYTE [DISTWIDTH] IN BLOOD BY AUTOMATED COUNT: 13.3 % (ref 10–15)
ERYTHROCYTE [SEDIMENTATION RATE] IN BLOOD BY WESTERGREN METHOD: 11 MM/HR (ref 0–30)
GFR SERPL CREATININE-BSD FRML MDRD: >90 ML/MIN/1.73M2
HCT VFR BLD AUTO: 40.9 % (ref 35–47)
HGB BLD-MCNC: 13.7 G/DL (ref 11.7–15.7)
LYMPHOCYTES # BLD AUTO: 1.6 10E3/UL (ref 0.8–5.3)
LYMPHOCYTES NFR BLD AUTO: 22 %
MCH RBC QN AUTO: 34.6 PG (ref 26.5–33)
MCHC RBC AUTO-ENTMCNC: 33.5 G/DL (ref 31.5–36.5)
MCV RBC AUTO: 103 FL (ref 78–100)
MONOCYTES # BLD AUTO: 0.5 10E3/UL (ref 0–1.3)
MONOCYTES NFR BLD AUTO: 7 %
NEUTROPHILS # BLD AUTO: 5 10E3/UL (ref 1.6–8.3)
NEUTROPHILS NFR BLD AUTO: 69 %
PLATELET # BLD AUTO: 212 10E3/UL (ref 150–450)
PROT SERPL-MCNC: 7.1 G/DL (ref 6.8–8.8)
RBC # BLD AUTO: 3.96 10E6/UL (ref 3.8–5.2)
WBC # BLD AUTO: 7.3 10E3/UL (ref 4–11)

## 2022-03-15 PROCEDURE — 73630 X-RAY EXAM OF FOOT: CPT | Mod: FY | Performed by: RADIOLOGY

## 2022-03-15 PROCEDURE — 73130 X-RAY EXAM OF HAND: CPT | Mod: 59 | Performed by: RADIOLOGY

## 2022-03-15 PROCEDURE — 85652 RBC SED RATE AUTOMATED: CPT

## 2022-03-15 PROCEDURE — 36415 COLL VENOUS BLD VENIPUNCTURE: CPT

## 2022-03-15 PROCEDURE — 86140 C-REACTIVE PROTEIN: CPT

## 2022-03-15 PROCEDURE — 85025 COMPLETE CBC W/AUTO DIFF WBC: CPT

## 2022-03-15 PROCEDURE — 80076 HEPATIC FUNCTION PANEL: CPT

## 2022-03-15 PROCEDURE — 82565 ASSAY OF CREATININE: CPT

## 2022-06-01 ENCOUNTER — OFFICE VISIT (OUTPATIENT)
Dept: DERMATOLOGY | Facility: CLINIC | Age: 67
End: 2022-06-01
Payer: COMMERCIAL

## 2022-06-01 VITALS — HEART RATE: 77 BPM | OXYGEN SATURATION: 98 % | SYSTOLIC BLOOD PRESSURE: 132 MMHG | DIASTOLIC BLOOD PRESSURE: 77 MMHG

## 2022-06-01 DIAGNOSIS — D18.01 ANGIOMA OF SKIN: ICD-10-CM

## 2022-06-01 DIAGNOSIS — Z85.820 HISTORY OF MELANOMA: Primary | ICD-10-CM

## 2022-06-01 DIAGNOSIS — L82.1 SEBORRHEIC KERATOSIS: ICD-10-CM

## 2022-06-01 DIAGNOSIS — D23.9 DERMAL NEVUS: ICD-10-CM

## 2022-06-01 DIAGNOSIS — D22.9 NEVUS: ICD-10-CM

## 2022-06-01 DIAGNOSIS — L81.4 LENTIGO: ICD-10-CM

## 2022-06-01 PROCEDURE — 99213 OFFICE O/P EST LOW 20 MIN: CPT | Performed by: DERMATOLOGY

## 2022-06-01 NOTE — NURSING NOTE
Chief Complaint   Patient presents with     Skin Check     No concerns       Vitals:    06/01/22 1402   BP: 132/77   BP Location: Right arm   Patient Position: Sitting   Cuff Size: Adult Regular   Pulse: 77   SpO2: 98%     Wt Readings from Last 1 Encounters:   10/18/21 85.7 kg (189 lb)       Nelli Melendez LPN .................6/1/2022

## 2022-06-01 NOTE — PROGRESS NOTES
Mine Meléndez is an extremely pleasant 67 year old year old female patient here today for hx of 0.3mm melanoma on thigh.  She denies any new or changing skin lesions./  Patient has no other skin complaints today.  Remainder of the HPI, Meds, PMH, Allergies, FH, and SH was reviewed in chart.      Past Medical History:   Diagnosis Date     Herpes simplex without mention of complication      Hypertension      Malignant melanoma (H)      Obesity, unspecified      Osteomyelitis of jaw 2008     Rheumatoid arthritis(714.0)      Trochanteric bursitis of right hip 2013       Past Surgical History:   Procedure Laterality Date     COLONOSCOPY  06     EXCISE MASS FOOT Left 10/5/2021    Procedure: EXCISION, MASS, LEFT FOOT;  Surgeon: Tommy Pelaez DPM;  Location: [facility]     HYSTERECTOMY, PAP NO LONGER INDICATED       HYSTERECTOMY, VAGINAL      TVH,TVT procedure     RELEASE CARPAL TUNNEL Right 10/11/2016    Procedure: RELEASE CARPAL TUNNEL;  Surgeon: Emely Blanca MD;  Location: [facility]     SURGICAL HISTORY OF -       tubal ligation     SURGICAL HISTORY OF -       excision gangliion cyst rt heel        Family History   Problem Relation Age of Onset     Diabetes Father      Dementia Father      Diabetes Brother      Diabetes Sister      Hypertension Sister      Hypertension Sister        Social History     Socioeconomic History     Marital status:      Spouse name: Not on file     Number of children: Not on file     Years of education: Not on file     Highest education level: Not on file   Occupational History     Not on file   Tobacco Use     Smoking status: Former Smoker     Years: 25.00     Quit date: 2000     Years since quittin.0     Smokeless tobacco: Never Used   Vaping Use     Vaping Use: Never used   Substance and Sexual Activity     Alcohol use: Yes     Comment: 6 pack of beer on a weekend     Drug use: No     Sexual activity: Yes     Partners: Male     Birth  control/protection: Surgical     Comment: [email or web address]   Other Topics Concern     Parent/sibling w/ CABG, MI or angioplasty before 65F 55M? No   Social History Narrative     Not on file     Social Determinants of Health     Financial Resource Strain: Not on file   Food Insecurity: Not on file   Transportation Needs: Not on file   Physical Activity: Not on file   Stress: Not on file   Social Connections: Not on file   Intimate Partner Violence: Not on file   Housing Stability: Not on file       Outpatient Encounter Medications as of 6/1/2022   Medication Sig Dispense Refill     acetaminophen (TYLENOL) 325 MG tablet Take 2 tablets (650 mg) by mouth every 4 hours as needed for mild pain 50 tablet 0     ASPIRIN 81 MG OR TABS ONE DAILY 100 3     folic acid (FOLVITE) 1 MG tablet Take 1 tablet (1,000 mcg) by mouth daily 90 tablet 2     hydroxychloroquine (PLAQUENIL) 200 MG tablet Hydroxychloroquine 200mg daily; and an additional 200mg every other day. Yearly eye exam, including 10-2 VF and SD-OCT, are required. 135 tablet 1     ibuprofen (ADVIL/MOTRIN) 800 MG tablet Take 1 tablet (800 mg) by mouth every 8 hours as needed for moderate pain 90 tablet 0     levothyroxine (SYNTHROID/LEVOTHROID) 50 MCG tablet Take 1 tablet (50 mcg) by mouth daily VISIT DUE 90 tablet 0     lisinopril-hydrochlorothiazide (ZESTORETIC) 10-12.5 MG tablet Take 1 tablet by mouth daily VISIT DUE 90 tablet 3     methotrexate sodium 2.5 MG TABS Take 8 tablets (20 mg) by mouth once a week . Take all 8 tablets on the same day of each week. 104 tablet 2     MULTI-VITAMIN OR 1 daily       Probiotic Product (PROBIOTIC PO)        simvastatin (ZOCOR) 20 MG tablet TAKE ONE TABLET BY MOUTH AT BEDTIME 90 tablet 1     Facility-Administered Encounter Medications as of 6/1/2022   Medication Dose Route Frequency Provider Last Rate Last Admin     betamethasone acet & sod phos (CELESTONE) injection 6 mg  6 mg   Michael Loredo MD   6 mg at 10/01/21 1510      ropivacaine (NAROPIN) injection 0.5 mL  0.5 mL   Michael Loredo MD   0.5 mL at 10/01/21 1510             O:   NAD, WDWN, Alert & Oriented, Mood & Affect wnl, Vitals stable   Here today alone   /77 (BP Location: Right arm, Patient Position: Sitting, Cuff Size: Adult Regular)   Pulse 77   LMP 11/12/2004 (Approximate)   SpO2 98%    General appearance normal   Vitals stable   Alert, oriented and in no acute distress      Following lymph nodes palpated: inguinal no lad   pigmented macules on trunk and ext with regular borders and pigment networks      Stuck on papules and brown macules on trunk and ext   Red papules on trunk  Flesh colored papules on trunk     The remainder of the full exam was normal; the following areas were examined:  conjunctiva/lids, oral mucosa, neck, peripheral vascular system, abdomen, lymph nodes, digits/nails, eccrine and apocrine glands, scalp/hair, face, neck, chest, abdomen, buttocks, back, RUE, LUE, RLE, LLE       Eyes: Conjunctivae/lids:Normal     ENT: Lips, buccal mucosa, tongue: normal    MSK:Normal    Cardiovascular: peripheral edema none    Pulm: Breathing Normal    Lymph Nodes: No Head and Neck Lymphadenopathy     Neuro/Psych: Orientation:Alert and Orientedx3 ; Mood/Affect:normal       A/P:  1. Seborrheic keratosis, lentigo, angioma, dermal nevus, nevi, hx of melanoma  It was a pleasure speaking to Mine Meléndez today.  Previous clinic notes and pertinent laboratory tests were reviewed prior to Mine Meléndez's visit.  Patient encouraged to perform monthly skin exams.  UV precautions reviewed with patient.  Return to clinic 6 months

## 2022-06-01 NOTE — LETTER
6/1/2022         RE: Mine Meléndez  [id number]  [facility] [address]        Dear Colleague,    Thank you for referring your patient, Mine Meléndez, to the [facility]. Please see a copy of my visit note below.    Mine Meléndez is an extremely pleasant 67 year old year old female patient here today for hx of 0.3mm melanoma on thigh.  She denies any new or changing skin lesions./  Patient has no other skin complaints today.  Remainder of the HPI, Meds, PMH, Allergies, FH, and SH was reviewed in chart.      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/2008     Rheumatoid arthritis(714.0)      Trochanteric bursitis of right hip 8/16/2013       Past Surgical History:   Procedure Laterality Date     COLONOSCOPY  1/20/06     EXCISE MASS FOOT Left 10/5/2021    Procedure: EXCISION, MASS, LEFT FOOT;  Surgeon: Tommy Pelaez DPM;  Location: [facility]     HYSTERECTOMY, PAP NO LONGER INDICATED       HYSTERECTOMY, VAGINAL  11/04    TVH,TVT procedure     RELEASE CARPAL TUNNEL Right 10/11/2016    Procedure: RELEASE CARPAL TUNNEL;  Surgeon: Emely Blanca MD;  Location: [facility]     SURGICAL HISTORY OF -   1988    tubal ligation     SURGICAL HISTORY OF -   2001    excision gangliion cyst rt heel        Family History   Problem Relation Age of Onset     Diabetes Father      Dementia Father      Diabetes Brother      Diabetes Sister      Hypertension Sister      Hypertension Sister        Social History     Socioeconomic History     Marital status:      Spouse name: Not on file     Number of children: Not on file     Years of education: Not on file     Highest education level: Not on file   Occupational History     Not on file   Tobacco Use     Smoking status: Former Smoker     Years: 25.00     Quit date: 5/6/2000     Years since  quittin.0     Smokeless tobacco: Never Used   Vaping Use     Vaping Use: Never used   Substance and Sexual Activity     Alcohol use: Yes     Comment: 6 pack of beer on a weekend     Drug use: No     Sexual activity: Yes     Partners: Male     Birth control/protection: Surgical     Comment: [email or web address]   Other Topics Concern     Parent/sibling w/ CABG, MI or angioplasty before 65F 55M? No   Social History Narrative     Not on file     Social Determinants of Health     Financial Resource Strain: Not on file   Food Insecurity: Not on file   Transportation Needs: Not on file   Physical Activity: Not on file   Stress: Not on file   Social Connections: Not on file   Intimate Partner Violence: Not on file   Housing Stability: Not on file       Outpatient Encounter Medications as of 2022   Medication Sig Dispense Refill     acetaminophen (TYLENOL) 325 MG tablet Take 2 tablets (650 mg) by mouth every 4 hours as needed for mild pain 50 tablet 0     ASPIRIN 81 MG OR TABS ONE DAILY 100 3     folic acid (FOLVITE) 1 MG tablet Take 1 tablet (1,000 mcg) by mouth daily 90 tablet 2     hydroxychloroquine (PLAQUENIL) 200 MG tablet Hydroxychloroquine 200mg daily; and an additional 200mg every other day. Yearly eye exam, including 10-2 VF and SD-OCT, are required. 135 tablet 1     ibuprofen (ADVIL/MOTRIN) 800 MG tablet Take 1 tablet (800 mg) by mouth every 8 hours as needed for moderate pain 90 tablet 0     levothyroxine (SYNTHROID/LEVOTHROID) 50 MCG tablet Take 1 tablet (50 mcg) by mouth daily VISIT DUE 90 tablet 0     lisinopril-hydrochlorothiazide (ZESTORETIC) 10-12.5 MG tablet Take 1 tablet by mouth daily VISIT DUE 90 tablet 3     methotrexate sodium 2.5 MG TABS Take 8 tablets (20 mg) by mouth once a week . Take all 8 tablets on the same day of each week. 104 tablet 2     MULTI-VITAMIN OR 1 daily       Probiotic Product (PROBIOTIC PO)        simvastatin (ZOCOR) 20 MG tablet TAKE ONE TABLET BY MOUTH AT BEDTIME 90 tablet 1      Facility-Administered Encounter Medications as of 6/1/2022   Medication Dose Route Frequency Provider Last Rate Last Admin     betamethasone acet & sod phos (CELESTONE) injection 6 mg  6 mg   Michael Loredo MD   6 mg at 10/01/21 1510     ropivacaine (NAROPIN) injection 0.5 mL  0.5 mL   Michael Loredo MD   0.5 mL at 10/01/21 1510             O:   NAD, WDWN, Alert & Oriented, Mood & Affect wnl, Vitals stable   Here today alone   /77 (BP Location: Right arm, Patient Position: Sitting, Cuff Size: Adult Regular)   Pulse 77   LMP 11/12/2004 (Approximate)   SpO2 98%    General appearance normal   Vitals stable   Alert, oriented and in no acute distress      Following lymph nodes palpated: inguinal no lad   pigmented macules on trunk and ext with regular borders and pigment networks      Stuck on papules and brown macules on trunk and ext   Red papules on trunk  Flesh colored papules on trunk     The remainder of the full exam was normal; the following areas were examined:  conjunctiva/lids, oral mucosa, neck, peripheral vascular system, abdomen, lymph nodes, digits/nails, eccrine and apocrine glands, scalp/hair, face, neck, chest, abdomen, buttocks, back, RUE, LUE, RLE, LLE       Eyes: Conjunctivae/lids:Normal     ENT: Lips, buccal mucosa, tongue: normal    MSK:Normal    Cardiovascular: peripheral edema none    Pulm: Breathing Normal    Lymph Nodes: No Head and Neck Lymphadenopathy     Neuro/Psych: Orientation:Alert and Orientedx3 ; Mood/Affect:normal       A/P:  1. Seborrheic keratosis, lentigo, angioma, dermal nevus, nevi, hx of melanoma  It was a pleasure speaking to Mine Meléndez today.  Previous clinic notes and pertinent laboratory tests were reviewed prior to Mine Meléndez's visit.  Patient encouraged to perform monthly skin exams.  UV precautions reviewed with patient.  Return to clinic 6 months        Again, thank you for allowing me to participate in the care of your patient.         Sincerely,        Pankaj Castro MD

## 2022-06-19 ENCOUNTER — HEALTH MAINTENANCE LETTER (OUTPATIENT)
Age: 67
End: 2022-06-19

## 2022-07-05 ENCOUNTER — LAB (OUTPATIENT)
Dept: LAB | Facility: CLINIC | Age: 67
End: 2022-07-05
Payer: COMMERCIAL

## 2022-07-05 DIAGNOSIS — M05.9 SEROPOSITIVE RHEUMATOID ARTHRITIS (H): ICD-10-CM

## 2022-07-05 DIAGNOSIS — Z79.899 HIGH RISK MEDICATION USE: ICD-10-CM

## 2022-07-05 LAB
ALBUMIN SERPL-MCNC: 3.5 G/DL (ref 3.4–5)
ALP SERPL-CCNC: 136 U/L (ref 40–150)
ALT SERPL W P-5'-P-CCNC: 33 U/L (ref 0–50)
AST SERPL W P-5'-P-CCNC: 35 U/L (ref 0–45)
BASOPHILS # BLD AUTO: 0 10E3/UL (ref 0–0.2)
BASOPHILS NFR BLD AUTO: 1 %
BILIRUB DIRECT SERPL-MCNC: 0.1 MG/DL (ref 0–0.2)
BILIRUB SERPL-MCNC: 0.4 MG/DL (ref 0.2–1.3)
CREAT SERPL-MCNC: 0.52 MG/DL (ref 0.52–1.04)
CRP SERPL-MCNC: 7.8 MG/L (ref 0–8)
EOSINOPHIL # BLD AUTO: 0.2 10E3/UL (ref 0–0.7)
EOSINOPHIL NFR BLD AUTO: 3 %
ERYTHROCYTE [DISTWIDTH] IN BLOOD BY AUTOMATED COUNT: 13.2 % (ref 10–15)
ERYTHROCYTE [SEDIMENTATION RATE] IN BLOOD BY WESTERGREN METHOD: 12 MM/HR (ref 0–30)
GFR SERPL CREATININE-BSD FRML MDRD: >90 ML/MIN/1.73M2
HCT VFR BLD AUTO: 42.3 % (ref 35–47)
HGB BLD-MCNC: 14.1 G/DL (ref 11.7–15.7)
LYMPHOCYTES # BLD AUTO: 1.4 10E3/UL (ref 0.8–5.3)
LYMPHOCYTES NFR BLD AUTO: 22 %
MCH RBC QN AUTO: 34.8 PG (ref 26.5–33)
MCHC RBC AUTO-ENTMCNC: 33.3 G/DL (ref 31.5–36.5)
MCV RBC AUTO: 104 FL (ref 78–100)
MONOCYTES # BLD AUTO: 0.9 10E3/UL (ref 0–1.3)
MONOCYTES NFR BLD AUTO: 15 %
NEUTROPHILS # BLD AUTO: 3.9 10E3/UL (ref 1.6–8.3)
NEUTROPHILS NFR BLD AUTO: 60 %
PLATELET # BLD AUTO: 205 10E3/UL (ref 150–450)
PROT SERPL-MCNC: 7.7 G/DL (ref 6.8–8.8)
RBC # BLD AUTO: 4.05 10E6/UL (ref 3.8–5.2)
WBC # BLD AUTO: 6.4 10E3/UL (ref 4–11)

## 2022-07-05 PROCEDURE — 85025 COMPLETE CBC W/AUTO DIFF WBC: CPT

## 2022-07-05 PROCEDURE — 86140 C-REACTIVE PROTEIN: CPT

## 2022-07-05 PROCEDURE — 80076 HEPATIC FUNCTION PANEL: CPT

## 2022-07-05 PROCEDURE — 36415 COLL VENOUS BLD VENIPUNCTURE: CPT

## 2022-07-05 PROCEDURE — 85652 RBC SED RATE AUTOMATED: CPT

## 2022-07-05 PROCEDURE — 82565 ASSAY OF CREATININE: CPT

## 2022-07-06 ENCOUNTER — TELEPHONE (OUTPATIENT)
Dept: RHEUMATOLOGY | Facility: CLINIC | Age: 67
End: 2022-07-06

## 2022-07-06 NOTE — TELEPHONE ENCOUNTER
Call received from pt-she had appt scheduled with Dr. Baltazar today (7/6/22) that she missed: her mother had been on hospice and passed away late last night, her appt with Dr. Baltazar today completely slipped her mind. Pt was very apologetic, appt rescheduled for 7/20/22.     She had her labs done yesterday (7/5/22), is wondering if she would need to have her labs repeated just prior to her appt now on 7/20/22?     Ok to leave detailed message on [employer], or send message through [employer] with response.     Routed to  RHEUMATOLOGY PATIENT CARE POOL

## 2022-07-06 NOTE — TELEPHONE ENCOUNTER
Called pt and let her know that she will not need repeat labs prior. Pt verbalized understanding, gave her teams condolences.    Cynthia GRUBER RN Specialty Triage 7/6/2022 4:02 PM

## 2022-07-20 ENCOUNTER — OFFICE VISIT (OUTPATIENT)
Dept: RHEUMATOLOGY | Facility: CLINIC | Age: 67
End: 2022-07-20
Payer: COMMERCIAL

## 2022-07-20 VITALS
BODY MASS INDEX: 32.27 KG/M2 | OXYGEN SATURATION: 96 % | SYSTOLIC BLOOD PRESSURE: 146 MMHG | HEART RATE: 71 BPM | WEIGHT: 195.4 LBS | DIASTOLIC BLOOD PRESSURE: 63 MMHG

## 2022-07-20 DIAGNOSIS — Z79.899 HIGH RISK MEDICATION USE: ICD-10-CM

## 2022-07-20 DIAGNOSIS — M05.9 SEROPOSITIVE RHEUMATOID ARTHRITIS (H): Primary | ICD-10-CM

## 2022-07-20 PROCEDURE — 99214 OFFICE O/P EST MOD 30 MIN: CPT | Performed by: INTERNAL MEDICINE

## 2022-07-20 RX ORDER — LEFLUNOMIDE 10 MG/1
10 TABLET ORAL DAILY
Qty: 30 TABLET | Refills: 3 | Status: SHIPPED | OUTPATIENT
Start: 2022-07-20 | End: 2022-11-03

## 2022-07-20 RX ORDER — METHOTREXATE 2.5 MG/1
20 TABLET ORAL WEEKLY
Qty: 104 TABLET | Refills: 2 | Status: SHIPPED | OUTPATIENT
Start: 2022-07-20 | End: 2023-03-23

## 2022-07-20 RX ORDER — HYDROXYCHLOROQUINE SULFATE 200 MG/1
TABLET, FILM COATED ORAL
Qty: 135 TABLET | Refills: 0 | Status: SHIPPED | OUTPATIENT
Start: 2022-07-20 | End: 2022-08-25

## 2022-07-20 NOTE — PROGRESS NOTES
Rheumatology Clinic Visit      Mine Meléndez MRN# [id number]   YOB: 1955 Age: 67 year old      Date of visit: 7/20/22     Chief Complaint   Patient presents with:  Rheumatoid Arthritis    Assessment and Plan     1. Seropositive Erosive Rheumatoid Arthritis (RF+, ): Dx'd with RA prior to 2002. Previously on SSZ (stomatitis). Currently on MTX 20mg PO once weekly (no improvement when MTX was increased to 25mg SQ wkly when used as monotherapy), folic acid 1mg daily, and hydroxychloroquine 300mg daily on average (loose stools with 400mg daily, worsening arthritis symptoms when on 200 mg daily).  Mild synovitis on exam today.  History of melanoma so will not add a biologic DMARD at this time; discussed leflunomide and she is in agreement.  Start leflunomide as noted below.    Chronic illness, progressive.    - Start leflunomide 10 mg daily  - Continue Methotrexate 20mg PO once weekly  - Continue folic acid 1mg daily  - Continue hydroxychloroquine 200mg daily with an additional 200mg every other day  (1/22/2021 eye exam by Dr. Cobb at [facility] [place] did not show evidence of hydroxychloroquine toxicity, but no OCT was performed; same on 1/28/2022.  refill by MD only)  - Ophthalmology referral for hydroxychloroquine toxicity monitoring   - Labs monthly [id number]: CBC, Cr, Hepatic Panel  - Labs in 3 months: CBC, Creatinine, Hepatic Panel, ESR, CRP    High risk medication requiring intensive toxicity monitoring at least quarterly: labs ordered include CBC, Creatinine, Hepatic panel to monitor for cytopenia and hepatotoxicity; checking creatinine as it affects clearance of medication.     2. Primary osteoarthritis of both hands: voltaren gel PRN.    - Continue diclofenac (VOLTAREN) 1 % topical gel; Apply up to 2 grams of 1% gel to hands up to 4 times daily as needed for joint pain (maximum: 8 g per upper extremity per day)  Dispense: 200 g; Refill: 3    3.  QuantiFERON-TB gold plus: Note that in 2020  she had a positive QuantiFERON-TB gold plus, but later the lab notified me that the TB test was actually negative.  This is documented here for reference only.    4. Melanoma history: documented here for historical significance.     5.  Vaccinations: Vaccinations reviewed with Ms. Meléndez.  Risks and benefits of vaccinations were discussed.    - Influenza: up to date  - [id number]: up to date  - [id number]: up to date  - Shingrix: Up to date  - COVID-19: has received the Pfizer vaccine on 3-8-21, 3-29-21, 11-24-21; moderna 5/2/2022.  A 5th mRNA vaccine (2nd booster) may be received at least 4 months after the 4th dose.   Based on our current understanding (and this may change over time as we learn more), medications should be adjusted as noted below, if disease activity allows:  - NSAIDs and Acetaminophen: hold for 24 hours prior to vaccination if able to do so  - Methotrexate should be held for 1-2 weeks after each COVID-19 vaccine (as disease activity allows)  - Leflunomide should be held for 1-2 weeks after each COVID-19 vaccine (as disease activity allows)     6. Elevated blood pressure:  [place] to follow up with Primary Care provider regarding elevated blood pressure.      Total minutes spent in evaluation with patient, documentation, , and review of pertinent studies and chart notes: 20      Ms. Meléndez verbalized agreement with and understanding of the rational for the diagnosis and treatment plan.  All questions were answered to best of my ability and the patient's satisfaction. Ms. Meléndez was advised to contact the clinic with any questions that may arise after the clinic visit.      Thank you for involving me in the care of the patient    Return to clinic: 3-4 months      HPI   Mine Meléndez is a 67 year old female with a past medical history significant for hypertension, hyperlipidemia, hypothyroidism, migraines, melanoma history, and rheumatoid arthritis who presents for follow-up of rheumatoid  arthritis.     Today, 7/20/2022: Pain and swelling of the right second and third MCPs and left second MCP that is worse in the morning and improves with time and activity.  Stiffness may last for 2-3 hours.  Other joints are doing well.  Tolerating medications well.    Tobacco: Former smoker  EtOH: 4 drinks per day on the weekends  Drugs: None  Occupation: retired June 2020, was working in [employer]   12 point review of system was completed and negative except as noted in the HPI    Active Problem List     Patient Active Problem List   Diagnosis     Seropositive rheumatoid arthritis (H)     DYSPEPSIA     Obesity     Essential hypertension     Hypothyroidism     HYPERLIPIDEMIA LDL GOAL <130     Advanced directives, counseling/discussion     Dermatitis     High risk medications (not anticoagulants) long-term use     Female stress incontinence     Migraine     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)     Past Medical History     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/2008     Rheumatoid arthritis(714.0)      Trochanteric bursitis of right hip 8/16/2013     Past Surgical History     Past Surgical History:   Procedure Laterality Date     COLONOSCOPY  1/20/06     EXCISE MASS FOOT Left 10/5/2021    Procedure: EXCISION, MASS, LEFT FOOT;  Surgeon: Tommy Pelaez DPM;  Location: [facility]     HYSTERECTOMY, PAP NO LONGER INDICATED       HYSTERECTOMY, VAGINAL  11/04    TVH,TVT procedure     RELEASE CARPAL TUNNEL Right 10/11/2016    Procedure: RELEASE CARPAL TUNNEL;  Surgeon: Emely Blanca MD;  Location: [facility]     SURGICAL HISTORY OF -   1988    tubal ligation     SURGICAL HISTORY OF -   2001    excision gangliion cyst rt heel     Allergy     Allergies   Allergen Reactions     Codeine Rash     Flagyl [Metronidazole] Rash      Septra [Sulfamethoxazole W/Trimethoprim] Itching     Puffy eyes, flushed.      Current Medication List     Current Outpatient Medications   Medication Sig     acetaminophen (TYLENOL) 325 MG tablet Take 2 tablets (650 mg) by mouth every 4 hours as needed for mild pain     ASPIRIN 81 MG OR TABS ONE DAILY     folic acid (FOLVITE) 1 MG tablet Take 1 tablet (1,000 mcg) by mouth daily     hydroxychloroquine (PLAQUENIL) 200 MG tablet Hydroxychloroquine 200mg daily; and an additional 200mg every other day. Yearly eye exam, including 10-2 VF and SD-OCT, are required.     ibuprofen (ADVIL/MOTRIN) 800 MG tablet Take 1 tablet (800 mg) by mouth every 8 hours as needed for moderate pain     levothyroxine (SYNTHROID/LEVOTHROID) 50 MCG tablet Take 1 tablet (50 mcg) by mouth daily VISIT DUE     lisinopril-hydrochlorothiazide (ZESTORETIC) 10-12.5 MG tablet Take 1 tablet by mouth daily VISIT DUE     methotrexate sodium 2.5 MG TABS Take 8 tablets (20 mg) by mouth once a week . Take all 8 tablets on the same day of each week.     MULTI-VITAMIN OR 1 daily     Probiotic Product (PROBIOTIC PO)      simvastatin (ZOCOR) 20 MG tablet VISIT DUE TAKE ONE TABLET BY MOUTH AT BEDTIME     Current Facility-Administered Medications   Medication     betamethasone acet & sod phos (CELESTONE) injection 6 mg     ropivacaine (NAROPIN) injection 0.5 mL         Social History   See HPI    Family History     Family History   Problem Relation Age of Onset     Diabetes Father      Dementia Father      Diabetes Brother      Diabetes Sister      Hypertension Sister      Hypertension Sister      Physical Exam     Temp Readings from Last 3 Encounters:   10/05/21 98.9  F (37.2  C) (Oral)   09/30/21 97.9  F (36.6  C) (Tympanic)   08/30/21 97  F (36.1  C) (Tympanic)     BP Readings from Last 5 Encounters:   07/20/22 (!) 146/63   06/01/22 132/77   11/30/21 123/77   10/18/21 136/83   10/11/21 134/78     Pulse Readings from Last 1 Encounters:   07/20/22 71     Resp  "Readings from Last 1 Encounters:   10/05/21 16     Estimated body mass index is 32.27 kg/m  as calculated from the following:    Height as of 10/18/21: 1.657 m (5' 5.25\").    Weight as of this encounter: 88.6 kg (195 lb 6.4 oz).      GEN: NAD.   HEENT:  Anicteric, noninjected sclera. No obvious external lesions of the ear and nose. Hearing intact.  CV: S1, S2. RRR. No m/r/g  PULM: No increased work of breathing. CTA bilaterally   MSK: Synovial swelling and tenderness palpation of the bilateral second MCPs and the right third MCP.  Other MCPs, PIPs, DIPs without swelling or tenderness to palpation.  Wrists without swelling or tenderness to palpation.  Elbows and shoulders without swelling or tenderness to palpation.  Knees, ankles, and MTPs without swelling or tenderness to palpation.    SKIN: No rash or jaundice seen  PSYCH: Alert. Appropriate.        Labs / Imaging (select studies)     RF/CCP  Recent Labs   Lab Test 05/01/18  1538   CCPIGG 182*     CBC  Recent Labs   Lab Test 07/05/22  1547 03/15/22  1439 12/09/21  0830 08/23/21  0848 05/12/21  1158 01/12/21  1547 10/23/20  1519   WBC 6.4 7.3 6.3   < > 6.8 7.4 7.0   RBC 4.05 3.96 4.26   < > 4.12 4.12 4.21   HGB 14.1 13.7 14.5   < > 14.0 14.5 14.4   HCT 42.3 40.9 43.0   < > 41.9 43.1 43.1   * 103* 101*   < > 102* 105* 102*   RDW 13.2 13.3 13.5   < > 14.1 13.6 13.6    212 214   < > 217 182 220   MCH 34.8* 34.6* 34.0*   < > 34.0* 35.2* 34.2*   MCHC 33.3 33.5 33.7   < > 33.4 33.6 33.4   NEUTROPHIL 60 69 62   < > 75.2 68.7 69.2   LYMPH 22 22 27   < > 17.7 19.6 22.7   MONOCYTE 15 7 9   < > 5.2 9.2 6.1   EOSINOPHIL 3 2 3   < > 1.6 2.4 1.7   BASOPHIL 1 0 1   < > 0.3 0.1 0.3   ANEU  --   --   --   --  5.1 5.1 4.9   ALYM  --   --   --   --  1.2 1.4 1.6   SHYLA  --   --   --   --  0.4 0.7 0.4   AEOS  --   --   --   --  0.1 0.2 0.1   ABAS  --   --   --   --  0.0 0.0 0.0   ANEUTAUTO 3.9 5.0 3.9   < >  --   --   --    ALYMPAUTO 1.4 1.6 1.7   < >  --   --   --  "   AMONOAUTO 0.9 0.5 0.6   < >  --   --   --    AEOSAUTO 0.2 0.2 0.2   < >  --   --   --    ABSBASO 0.0 0.0 0.0   < >  --   --   --     < > = values in this interval not displayed.     CMP  Recent Labs   Lab Test 07/05/22  1547 03/15/22  1439 12/09/21  0830 08/23/21  0847 05/12/21  1158 04/05/21  1255 01/12/21  1547 11/09/20  0939 08/05/19  0933 05/22/19  1404   NA  --   --   --   --   --  132*  --  137  --  134   POTASSIUM  --   --   --   --   --  3.5  --  3.7  --  3.4   CHLORIDE  --   --   --   --   --  98  --  104  --  99   CO2  --   --   --   --   --  26  --  30  --  32   ANIONGAP  --   --   --   --   --  8  --  3  --  3   GLC  --   --   --   --   --  151*  --  115*  --  123*   BUN  --   --   --   --   --  7  --  10  --  13   CR 0.52 0.57 0.61   < > 0.58 0.55 0.65 0.57   < > 0.58   GFRESTIMATED >90 >90 >90   < > >90 >90 >90 >90   < > >90   GFRESTBLACK  --   --   --   --  >90 >90 >90 >90   < > >90   ROSELYN  --   --   --   --   --  8.7  --  9.3  --  9.3   BILITOTAL 0.4 0.4 0.8   < > 0.5  --  0.7  --    < > 1.3   ALBUMIN 3.5 3.5 3.5   < > 3.5  --  3.6  --    < > 3.9   PROTTOTAL 7.7 7.1 7.8   < > 7.5  --  7.5  --    < > 7.6   ALKPHOS 136 114 110   < > 121  --  118  --    < > 132   AST 35 25 39   < > 23  --  28  --    < > 21   ALT 33 28 34   < > 24  --  27  --    < > 25    < > = values in this interval not displayed.     Calcium/VitaminD  Recent Labs   Lab Test 04/05/21  1255 11/09/20  0939 05/22/19  1404   ROSELYN 8.7 9.3 9.3     ESR/CRP  Recent Labs   Lab Test 07/05/22  1547 03/15/22  1439 12/09/21  0830   SED 12 11 12   CRP 7.8 8.3* 9.1*     Lipid Panel  Recent Labs   Lab Test 11/09/20  0939 04/17/19  1526 04/30/18  1601   CHOL 153 159 163   TRIG 65 102 170*   HDL 74 63 55   LDL 66 76 74   NHDL 79 96 108     Hepatitis B  Recent Labs   Lab Test 10/23/20  1519 05/01/18  1538   HBCAB Nonreactive Nonreactive   HEPBANG Nonreactive Nonreactive     Hepatitis C  Recent Labs   Lab Test 10/23/20  1519 09/11/14  1650   HCVAB  Nonreactive Negative     Tuberculosis Screening  Recent Labs   Lab Test 10/23/20  1519   TBRST Negative     Immunization History     Immunization History   Administered Date(s) Administered     COVID-19,PF,Pfizer (12+ Yrs) 03/08/2021, 03/29/2021, 11/24/2021     Influenza (H1N1) 12/31/2009     Influenza (High Dose) 3 valent vaccine 11/05/2019     Influenza (IIV3) PF 11/03/2004, 10/30/2012, 10/20/2013, 10/03/2016     Influenza Vaccine IM > 6 months Valent IIV4 (Alfuria,Fluzone) 10/23/2017     Influenza, Quad, High Dose, Pf, 65yr+ (Fluzone HD) 11/09/2020, 09/30/2021     Pneumo Conj 13-V (2010&after) 11/12/2018     Pneumococcal 23 valent 02/19/2019     TD (ADULT, 7+) 04/28/1995     TDAP Vaccine (Adacel) 12/05/2007, 02/01/2018     Zoster vaccine recombinant adjuvanted (SHINGRIX) 04/17/2019, 07/10/2019     Zoster vaccine, live 06/07/2016          Chart documentation done in part with Dragon Voice recognition Software. Although reviewed after completion, some word and grammatical error may remain.    Nasir Baltazar MD

## 2022-07-20 NOTE — NURSING NOTE
RAPID3 (0-30) Cumulative Score  4.8          RAPID3 Weighted Score (divide #4 by 3 and that is the weighted score)  1.6

## 2022-07-20 NOTE — PATIENT INSTRUCTIONS
RHEUMATOLOGY    Dr. Nasir Baltazar    [facility]  [address] [place] [address] [place] [address]  Phone number: [phone]  Fax number: [phone]      Thank you for choosing [facility]!    Rachel Uriarte CMA Rheumatology    -----------------------------------  COVID-19 vaccination    A 5th mRNA vaccine (2nd booster) may be received at least 4 months after the 4th dose.   Based on our current understanding (and this may change over time as we learn more), medications should be adjusted as noted below, if disease activity allows:  - NSAIDs and Acetaminophen: hold for 24 hours prior to vaccination if able to do so  - Methotrexate should be held for 1-2 weeks after each COVID-19 vaccine (as disease activity allows)  - Leflunomide should be held for 1-2 weeks after each COVID-19 vaccine (as disease activity allows)     -----------------------------------  Influenza vaccination to be in October 2022  -----------------------------------  Start leflunomide 10mg daily   Labs monthly

## 2022-07-26 ENCOUNTER — TELEPHONE (OUTPATIENT)
Dept: FAMILY MEDICINE | Facility: CLINIC | Age: 67
End: 2022-07-26

## 2022-07-26 DIAGNOSIS — E03.8 OTHER SPECIFIED HYPOTHYROIDISM: ICD-10-CM

## 2022-07-27 NOTE — TELEPHONE ENCOUNTER
"Requested Prescriptions   Pending Prescriptions Disp Refills     levothyroxine (SYNTHROID/LEVOTHROID) 50 MCG tablet [Pharmacy Med Name: LEVOTHYROXINE SODIUM 50MCG TABS] 90 tablet 0     Sig: TAKE 1 TABLET (50 MCG) BY MOUTH DAILY VISIT DUE       Thyroid Protocol Failed - 7/26/2022  5:02 AM        Failed - Normal TSH on file in past 12 months     Recent Labs   Lab Test 04/05/21  1255   TSH 1.30              Passed - Patient is 12 years or older        Passed - Recent (12 mo) or future (30 days) visit within the authorizing provider's specialty     Patient has had an office visit with the authorizing provider or a provider within the authorizing providers department within the previous 12 mos or has a future within next 30 days. See \"Patient Info\" tab in inbasket, or \"Choose Columns\" in Meds & Orders section of the refill encounter.              Passed - Medication is active on med list        Passed - No active pregnancy on record     If patient is pregnant or has had a positive pregnancy test, please check TSH.          Passed - No positive pregnancy test in past 12 months     If patient is pregnant or has had a positive pregnancy test, please check TSH.               "

## 2022-07-28 RX ORDER — LEVOTHYROXINE SODIUM 50 UG/1
50 TABLET ORAL DAILY
Qty: 30 TABLET | Refills: 0 | Status: SHIPPED | OUTPATIENT
Start: 2022-07-28 | End: 2022-08-30

## 2022-07-29 NOTE — TELEPHONE ENCOUNTER
Scheduled 9/26 with . Please place future orders for labs if needed and writer will call pt to schedule those ahead of time.  Cynthia Eaton on 7/29/2022 at 9:33 AM

## 2022-08-12 ENCOUNTER — TELEPHONE (OUTPATIENT)
Dept: OPHTHALMOLOGY | Facility: CLINIC | Age: 67
End: 2022-08-12

## 2022-08-12 NOTE — TELEPHONE ENCOUNTER
Referred by Dr. Baltazar for HVF 10-2 and OCT for hydroxychloroquine use.  Will see Dr. Chapman 8/23/22 at 1:20.

## 2022-08-12 NOTE — TELEPHONE ENCOUNTER
Patient was instructed by Dr. Baltazar to have an eye exam. I can not find the referral, and I do not see any information from the last appointment regarding what specifically the patient needs to be seen for. Please provide instructions as to what type of appointment needs to be scheduled.

## 2022-08-23 ENCOUNTER — OFFICE VISIT (OUTPATIENT)
Dept: OPHTHALMOLOGY | Facility: CLINIC | Age: 67
End: 2022-08-23
Payer: COMMERCIAL

## 2022-08-23 DIAGNOSIS — Z79.899 HIGH RISK MEDICATION USE: ICD-10-CM

## 2022-08-23 PROCEDURE — 92083 EXTENDED VISUAL FIELD XM: CPT | Performed by: OPHTHALMOLOGY

## 2022-08-23 PROCEDURE — 92134 CPTRZ OPH DX IMG PST SGM RTA: CPT | Performed by: OPHTHALMOLOGY

## 2022-08-23 PROCEDURE — 92002 INTRM OPH EXAM NEW PATIENT: CPT | Performed by: OPHTHALMOLOGY

## 2022-08-23 ASSESSMENT — REFRACTION_WEARINGRX
OD_SPHERE: -3.00
SPECS_TYPE: PAL
OS_AXIS: 174
OS_SPHERE: -3.00
OS_ADD: +3.00
OD_AXIS: 173
OD_ADD: +3.00
OS_CYLINDER: +1.25
OD_CYLINDER: +1.00

## 2022-08-23 ASSESSMENT — VISUAL ACUITY
OD_CC: 20/300
CORRECTION_TYPE: GLASSES
OD_PH_CC: 20/200
METHOD: SNELLEN - LINEAR
OS_PH_CC: 20/25
OS_CC: 20/50
OS_CC+: -1

## 2022-08-23 ASSESSMENT — REFRACTION_MANIFEST
OD_CYLINDER: +1.50
OS_CYLINDER: +1.25
OS_SPHERE: -4.00
OS_AXIS: 001
OD_SPHERE: +3.25
OS_ADD: +3.00
OD_AXIS: 063

## 2022-08-23 NOTE — LETTER
8/23/2022         RE: Mine Meléndez  [address]  [facility] [address]        Dear Colleague,    Thank you for referring your patient, Mine Meléndez, to the [facility]. Please see a copy of my visit note below.     Current Eye Medications:  None.      Subjective:  Dr. Baltazar recommended Plaquenil testing with Dr. Colon.  Patient is here for a visual field and OCT.  No specific eye changes or concerns. Intermittently at night, she is aware of a stationary black blob in her vision, potentially in her left eye.  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    She has been taking Plaquenil for about 2 years. Alternates 200 mg twice daily and once daily.    She was previously followed at [facility] [place], but they were not forwarding information to .  Dr. Baltazar recommended she see Dr. Colon. Last visit at [facility] [place] for similar testing was 6 months ago.    History of patching; longstanding poor vision right eye; no surgery.     Objective:  See Ophthalmology Exam.       Assessment:  Baseline retinal OCT and central Keita Visual Field in patient on chronic Plaquenil therapy for RA.  No contraindication to continued use.      Plan:  Will update Dr. Baltazar with today's visit.  Return visit in 1 year for Plaquenil OCT and 10-2 Keita Visual Field.  Return visit in 6 months for a complete exam.  Abraham Colon M.D.  [phone]          Again, thank you for allowing me to participate in the care of your patient.        Sincerely,        Abraham Colon MD

## 2022-08-23 NOTE — PROGRESS NOTES
Current Eye Medications:  None.      Subjective:  Dr. Baltazar recommended Plaquenil testing with Dr. Colon.  Patient is here for a visual field and OCT.  No specific eye changes or concerns. Intermittently at night, she is aware of a stationary black blob in her vision, potentially in her left eye.  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    She has been taking Plaquenil for about 2 years. Alternates 200 mg twice daily and once daily.    She was previously followed at [facility], but they were not forwarding information to .  Dr. Baltazar recommended she see Dr. Colon. Last visit at [facility] for similar testing was 6 months ago.    History of patching; longstanding poor vision right eye; no surgery.     Objective:  See Ophthalmology Exam.       Assessment:  Baseline retinal OCT and central Keita Visual Field in patient on chronic Plaquenil therapy for RA.  No contraindication to continued use.      Plan:  Will update Dr. Baltazar with today's visit.  Return visit in 1 year for Plaquenil OCT and 10-2 Keita Visual Field.  Return visit in 6 months for a complete exam.  Abraham Colon M.D.  [phone]

## 2022-08-23 NOTE — PATIENT INSTRUCTIONS
Will update Dr. Baltazar with today's visit.  Return visit in 1 year for Plaquenil OCT and 10-2 Keita Visual Field.  Return visit in 6 months for a complete exam.  Abraham Colon M.D.  [phone]

## 2022-08-24 ENCOUNTER — LAB (OUTPATIENT)
Dept: LAB | Facility: CLINIC | Age: 67
End: 2022-08-24
Payer: COMMERCIAL

## 2022-08-24 DIAGNOSIS — Z79.899 HIGH RISK MEDICATION USE: ICD-10-CM

## 2022-08-24 DIAGNOSIS — M05.9 SEROPOSITIVE RHEUMATOID ARTHRITIS (H): ICD-10-CM

## 2022-08-24 LAB
ALBUMIN SERPL-MCNC: 3.5 G/DL (ref 3.4–5)
ALP SERPL-CCNC: 128 U/L (ref 40–150)
ALT SERPL W P-5'-P-CCNC: 29 U/L (ref 0–50)
AST SERPL W P-5'-P-CCNC: 29 U/L (ref 0–45)
BASOPHILS # BLD AUTO: 0 10E3/UL (ref 0–0.2)
BASOPHILS NFR BLD AUTO: 1 %
BILIRUB DIRECT SERPL-MCNC: 0.2 MG/DL (ref 0–0.2)
BILIRUB SERPL-MCNC: 0.6 MG/DL (ref 0.2–1.3)
CREAT SERPL-MCNC: 0.52 MG/DL (ref 0.52–1.04)
EOSINOPHIL # BLD AUTO: 0.2 10E3/UL (ref 0–0.7)
EOSINOPHIL NFR BLD AUTO: 3 %
ERYTHROCYTE [DISTWIDTH] IN BLOOD BY AUTOMATED COUNT: 13.9 % (ref 10–15)
GFR SERPL CREATININE-BSD FRML MDRD: >90 ML/MIN/1.73M2
HCT VFR BLD AUTO: 42.8 % (ref 35–47)
HGB BLD-MCNC: 14.3 G/DL (ref 11.7–15.7)
IMM GRANULOCYTES # BLD: 0 10E3/UL
IMM GRANULOCYTES NFR BLD: 0 %
LYMPHOCYTES # BLD AUTO: 1.3 10E3/UL (ref 0.8–5.3)
LYMPHOCYTES NFR BLD AUTO: 20 %
MCH RBC QN AUTO: 34.8 PG (ref 26.5–33)
MCHC RBC AUTO-ENTMCNC: 33.4 G/DL (ref 31.5–36.5)
MCV RBC AUTO: 104 FL (ref 78–100)
MONOCYTES # BLD AUTO: 0.7 10E3/UL (ref 0–1.3)
MONOCYTES NFR BLD AUTO: 11 %
NEUTROPHILS # BLD AUTO: 4.3 10E3/UL (ref 1.6–8.3)
NEUTROPHILS NFR BLD AUTO: 65 %
PLATELET # BLD AUTO: 189 10E3/UL (ref 150–450)
PROT SERPL-MCNC: 7.2 G/DL (ref 6.8–8.8)
RBC # BLD AUTO: 4.11 10E6/UL (ref 3.8–5.2)
WBC # BLD AUTO: 6.5 10E3/UL (ref 4–11)

## 2022-08-24 PROCEDURE — 85025 COMPLETE CBC W/AUTO DIFF WBC: CPT

## 2022-08-24 PROCEDURE — 80076 HEPATIC FUNCTION PANEL: CPT

## 2022-08-24 PROCEDURE — 82565 ASSAY OF CREATININE: CPT

## 2022-08-24 PROCEDURE — 36415 COLL VENOUS BLD VENIPUNCTURE: CPT

## 2022-08-25 DIAGNOSIS — M05.9 SEROPOSITIVE RHEUMATOID ARTHRITIS (H): ICD-10-CM

## 2022-08-25 RX ORDER — HYDROXYCHLOROQUINE SULFATE 200 MG/1
TABLET, FILM COATED ORAL
Qty: 135 TABLET | Refills: 2 | Status: SHIPPED | OUTPATIENT
Start: 2022-08-25 | End: 2022-12-05

## 2022-08-25 ASSESSMENT — SLIT LAMP EXAM - LIDS
COMMENTS: NORMAL
COMMENTS: NORMAL

## 2022-08-25 ASSESSMENT — EXTERNAL EXAM - RIGHT EYE: OD_EXAM: NORMAL

## 2022-08-25 ASSESSMENT — EXTERNAL EXAM - LEFT EYE: OS_EXAM: NORMAL

## 2022-08-30 DIAGNOSIS — E03.8 OTHER SPECIFIED HYPOTHYROIDISM: ICD-10-CM

## 2022-08-30 RX ORDER — LEVOTHYROXINE SODIUM 50 UG/1
50 TABLET ORAL DAILY
Qty: 30 TABLET | Refills: 0 | Status: SHIPPED | OUTPATIENT
Start: 2022-08-30 | End: 2022-09-26

## 2022-08-30 NOTE — TELEPHONE ENCOUNTER
Pending Prescriptions:                       Disp   Refills    levothyroxine (SYNTHROID/LEVOTHROID) 50 M*30 tab*0            Sig: Take 1 tablet (50 mcg) by mouth daily VISIT DUE    Routing refill request to provider for review/approval because:  Labs not current:    TSH   Date Value Ref Range Status   04/05/2021 1.30 0.40 - 4.00 mU/L Final     Esvin Alfaro RN

## 2022-09-14 ENCOUNTER — MYC MEDICAL ADVICE (OUTPATIENT)
Dept: FAMILY MEDICINE | Facility: CLINIC | Age: 67
End: 2022-09-14

## 2022-09-14 DIAGNOSIS — E03.9 HYPOTHYROIDISM, UNSPECIFIED TYPE: ICD-10-CM

## 2022-09-14 DIAGNOSIS — E78.5 HYPERLIPIDEMIA LDL GOAL <130: ICD-10-CM

## 2022-09-14 DIAGNOSIS — I10 ESSENTIAL HYPERTENSION: Primary | ICD-10-CM

## 2022-09-21 ENCOUNTER — LAB (OUTPATIENT)
Dept: LAB | Facility: CLINIC | Age: 67
End: 2022-09-21
Payer: COMMERCIAL

## 2022-09-21 DIAGNOSIS — Z79.899 HIGH RISK MEDICATION USE: ICD-10-CM

## 2022-09-21 DIAGNOSIS — E03.9 HYPOTHYROIDISM, UNSPECIFIED TYPE: ICD-10-CM

## 2022-09-21 DIAGNOSIS — M05.9 SEROPOSITIVE RHEUMATOID ARTHRITIS (H): ICD-10-CM

## 2022-09-21 DIAGNOSIS — E78.5 HYPERLIPIDEMIA LDL GOAL <130: ICD-10-CM

## 2022-09-21 DIAGNOSIS — R73.01 ELEVATED FASTING GLUCOSE: ICD-10-CM

## 2022-09-21 DIAGNOSIS — I10 ESSENTIAL HYPERTENSION: ICD-10-CM

## 2022-09-21 LAB
ALBUMIN SERPL BCG-MCNC: 4 G/DL (ref 3.5–5.2)
ALP SERPL-CCNC: 125 U/L (ref 35–104)
ALT SERPL W P-5'-P-CCNC: 28 U/L (ref 10–35)
ANION GAP SERPL CALCULATED.3IONS-SCNC: 9 MMOL/L (ref 7–15)
AST SERPL W P-5'-P-CCNC: 41 U/L (ref 10–35)
BASOPHILS # BLD AUTO: 0 10E3/UL (ref 0–0.2)
BASOPHILS NFR BLD AUTO: 1 %
BILIRUB DIRECT SERPL-MCNC: 0.21 MG/DL (ref 0–0.3)
BILIRUB SERPL-MCNC: 0.8 MG/DL
BUN SERPL-MCNC: 7.4 MG/DL (ref 8–23)
CALCIUM SERPL-MCNC: 9 MG/DL (ref 8.8–10.2)
CHLORIDE SERPL-SCNC: 96 MMOL/L (ref 98–107)
CHOLEST SERPL-MCNC: 149 MG/DL
CREAT SERPL-MCNC: 0.57 MG/DL (ref 0.51–0.95)
DEPRECATED HCO3 PLAS-SCNC: 28 MMOL/L (ref 22–29)
EOSINOPHIL # BLD AUTO: 0.1 10E3/UL (ref 0–0.7)
EOSINOPHIL NFR BLD AUTO: 2 %
ERYTHROCYTE [DISTWIDTH] IN BLOOD BY AUTOMATED COUNT: 14 % (ref 10–15)
GFR SERPL CREATININE-BSD FRML MDRD: >90 ML/MIN/1.73M2
GLUCOSE SERPL-MCNC: 120 MG/DL (ref 70–99)
HCT VFR BLD AUTO: 43.2 % (ref 35–47)
HDLC SERPL-MCNC: 75 MG/DL
HGB BLD-MCNC: 14.7 G/DL (ref 11.7–15.7)
IMM GRANULOCYTES # BLD: 0 10E3/UL
IMM GRANULOCYTES NFR BLD: 0 %
LDLC SERPL CALC-MCNC: 58 MG/DL
LYMPHOCYTES # BLD AUTO: 1.1 10E3/UL (ref 0.8–5.3)
LYMPHOCYTES NFR BLD AUTO: 18 %
MCH RBC QN AUTO: 35.1 PG (ref 26.5–33)
MCHC RBC AUTO-ENTMCNC: 34 G/DL (ref 31.5–36.5)
MCV RBC AUTO: 103 FL (ref 78–100)
MONOCYTES # BLD AUTO: 0.7 10E3/UL (ref 0–1.3)
MONOCYTES NFR BLD AUTO: 12 %
NEUTROPHILS # BLD AUTO: 4.2 10E3/UL (ref 1.6–8.3)
NEUTROPHILS NFR BLD AUTO: 68 %
NONHDLC SERPL-MCNC: 74 MG/DL
PLATELET # BLD AUTO: 175 10E3/UL (ref 150–450)
POTASSIUM SERPL-SCNC: 3.5 MMOL/L (ref 3.4–5.3)
PROT SERPL-MCNC: 7 G/DL (ref 6.4–8.3)
RBC # BLD AUTO: 4.19 10E6/UL (ref 3.8–5.2)
SODIUM SERPL-SCNC: 133 MMOL/L (ref 136–145)
TRIGL SERPL-MCNC: 80 MG/DL
TSH SERPL DL<=0.005 MIU/L-ACNC: 2.37 UIU/ML (ref 0.3–4.2)
WBC # BLD AUTO: 6.2 10E3/UL (ref 4–11)

## 2022-09-21 PROCEDURE — 36415 COLL VENOUS BLD VENIPUNCTURE: CPT

## 2022-09-21 PROCEDURE — 84443 ASSAY THYROID STIM HORMONE: CPT

## 2022-09-21 PROCEDURE — 83036 HEMOGLOBIN GLYCOSYLATED A1C: CPT

## 2022-09-21 PROCEDURE — 80061 LIPID PANEL: CPT

## 2022-09-21 PROCEDURE — 85025 COMPLETE CBC W/AUTO DIFF WBC: CPT

## 2022-09-21 PROCEDURE — 82248 BILIRUBIN DIRECT: CPT

## 2022-09-21 PROCEDURE — 80053 COMPREHEN METABOLIC PANEL: CPT

## 2022-09-26 ENCOUNTER — OFFICE VISIT (OUTPATIENT)
Dept: FAMILY MEDICINE | Facility: CLINIC | Age: 67
End: 2022-09-26
Payer: COMMERCIAL

## 2022-09-26 VITALS
HEART RATE: 95 BPM | DIASTOLIC BLOOD PRESSURE: 64 MMHG | BODY MASS INDEX: 31.18 KG/M2 | HEIGHT: 66 IN | OXYGEN SATURATION: 96 % | WEIGHT: 194 LBS | RESPIRATION RATE: 14 BRPM | TEMPERATURE: 99 F | SYSTOLIC BLOOD PRESSURE: 132 MMHG

## 2022-09-26 DIAGNOSIS — E03.8 OTHER SPECIFIED HYPOTHYROIDISM: ICD-10-CM

## 2022-09-26 DIAGNOSIS — I10 ESSENTIAL HYPERTENSION WITH GOAL BLOOD PRESSURE LESS THAN 140/90: ICD-10-CM

## 2022-09-26 DIAGNOSIS — R73.01 ELEVATED FASTING GLUCOSE: ICD-10-CM

## 2022-09-26 DIAGNOSIS — Z00.00 ENCOUNTER FOR MEDICARE ANNUAL WELLNESS EXAM: Primary | ICD-10-CM

## 2022-09-26 DIAGNOSIS — E78.5 HYPERLIPIDEMIA LDL GOAL <130: ICD-10-CM

## 2022-09-26 LAB — HBA1C MFR BLD: 5 % (ref 0–5.6)

## 2022-09-26 PROCEDURE — 99214 OFFICE O/P EST MOD 30 MIN: CPT | Mod: 25 | Performed by: FAMILY MEDICINE

## 2022-09-26 PROCEDURE — G0439 PPPS, SUBSEQ VISIT: HCPCS | Performed by: FAMILY MEDICINE

## 2022-09-26 RX ORDER — LEVOTHYROXINE SODIUM 50 UG/1
50 TABLET ORAL DAILY
Qty: 90 TABLET | Refills: 3 | Status: SHIPPED | OUTPATIENT
Start: 2022-09-26 | End: 2023-09-25

## 2022-09-26 RX ORDER — LISINOPRIL/HYDROCHLOROTHIAZIDE 10-12.5 MG
1 TABLET ORAL DAILY
Qty: 90 TABLET | Refills: 3 | Status: SHIPPED | OUTPATIENT
Start: 2022-09-26 | End: 2023-11-20

## 2022-09-26 RX ORDER — SIMVASTATIN 20 MG
TABLET ORAL
Qty: 90 TABLET | Refills: 3 | Status: SHIPPED | OUTPATIENT
Start: 2022-09-26 | End: 2023-12-18

## 2022-09-26 ASSESSMENT — ENCOUNTER SYMPTOMS
DIARRHEA: 1
BREAST MASS: 0
DIZZINESS: 1

## 2022-09-26 ASSESSMENT — ACTIVITIES OF DAILY LIVING (ADL): CURRENT_FUNCTION: NO ASSISTANCE NEEDED

## 2022-09-26 ASSESSMENT — PAIN SCALES - GENERAL: PAINLEVEL: NO PAIN (0)

## 2022-09-26 NOTE — PROGRESS NOTES
"SUBJECTIVE:   Helena is a 67 year old who presents for Preventive Visit.      Patient has been advised of split billing requirements and indicates understanding: Yes  Are you in the first 12 months of your Medicare coverage?  No    Healthy Habits:     In general, how would you rate your overall health?  Good    Frequency of exercise:  None    Do you usually eat at least 4 servings of fruit and vegetables a day, include whole grains    & fiber and avoid regularly eating high fat or \"junk\" foods?  No    Taking medications regularly:  Yes    Medication side effects:  None    Ability to successfully perform activities of daily living:  No assistance needed    Home Safety:  No safety concerns identified    Hearing Impairment:  No hearing concerns    In the past 6 months, have you been bothered by leaking of urine? Yes    In general, how would you rate your overall mental or emotional health?  Excellent      PHQ-2 Total Score: 0    Additional concerns today:  Yes    Do you feel safe in your environment? Yes    Have you ever done Advance Care Planning? (For example, a Health Directive, POLST, or a discussion with a medical provider or your loved ones about your wishes): Yes, patient states has an Advance Care Planning document and will bring a copy to the clinic.       Fall risk  Fallen 2 or more times in the past year?: No  Any fall with injury in the past year?: No    Cognitive Screening   1) Repeat 3 items (Leader, Season, Table)    2) Clock draw: NORMAL  3) 3 item recall: Recalls 2 objects   Results: NORMAL clock, 1-2 items recalled: COGNITIVE IMPAIRMENT LESS LIKELY    Mini-CogTM Copyright VIOLETA Partida. Licensed by the author for use in [facility]; reprinted with permission (casey@.[facility]). All rights reserved.      Do you have sleep apnea, excessive snoring or daytime drowsiness?: no    Reviewed and updated as needed this visit by clinical staff   Tobacco  Allergies  Meds   Med Hx  Surg Hx  Fam Hx  Soc Hx "          Reviewed and updated as needed this visit by Provider                   Social History     Tobacco Use     Smoking status: Former Smoker     Years: 25.00     Quit date: 2000     Years since quittin.4     Smokeless tobacco: Never Used   Substance Use Topics     Alcohol use: Yes     Comment: 6 pack of beer on a weekend     If you drink alcohol do you typically have >3 drinks per day or >7 drinks per week? No    Alcohol Use 2022   Prescreen: >3 drinks/day or >7 drinks/week? Yes   Prescreen: >3 drinks/day or >7 drinks/week? -   AUDIT SCORE  7     - She will get Moderna and flu vaccine at pharmacy    Hyperlipidemia Follow-Up  Simvastatin 20mg qd    Are you regularly taking any medication or supplement to lower your cholesterol?   Yes- statin    Are you having muscle aches or other side effects that you think could be caused by your cholesterol lowering medication?  No    Hypertension Follow-up  Lisinopril-hydrochlorothiazide 10-12.5mg qd    Do you check your blood pressure regularly outside of the clinic? No     Are you following a low salt diet? No    Are your blood pressures ever more than 140 on the top number (systolic) OR more   than 90 on the bottom number (diastolic), for example 140/90? N/A    BP Readings from Last 6 Encounters:   22 132/64   22 (!) 146/63   22 132/77   21 123/77   10/18/21 136/83   10/11/21 134/78       Hypothyroidism Follow-up  Levothyroxine 50mcg qd    Since last visit, patient describes the following symptoms: loose stools    TSH   Date Value Ref Range Status   2022 2.37 0.30 - 4.20 uIU/mL Final   2021 1.30 0.40 - 4.00 mU/L Final     T4 Free   Date Value Ref Range Status   2014 0.95 0.76 - 1.46 ng/dL Final     Comment:     Effective 2014, the reference range for this assay has changed to reflect   new instrumentation/methodology.         Current providers sharing in care for this patient include:   Patient Care  "Team:  Britany Norton MD as PCP - General (Family Practice)  Britany Norton MD as Assigned PCP  Pankaj Castro MD as Assigned Surgical Provider  Tommy Pelaez DPM as Assigned Musculoskeletal Provider  Nasir Baltazar MD as Assigned Rheumatology Provider    The following health maintenance items are reviewed in Epic and correct as of today:  Health Maintenance   Topic Date Due     ANNUAL REVIEW OF HM ORDERS  Never done     COVID-19 Vaccine (5 - Booster for Pfizer series) 06/27/2022     INFLUENZA VACCINE (1) 09/01/2022     MAMMO SCREENING  06/21/2023     TSH W/FREE T4 REFLEX  09/21/2023     MEDICARE ANNUAL WELLNESS VISIT  09/26/2023     FALL RISK ASSESSMENT  09/26/2023     COLORECTAL CANCER SCREENING  04/29/2024     DEXA  11/05/2024     ADVANCE CARE PLANNING  04/05/2026     LIPID  09/21/2027     DTAP/TDAP/TD IMMUNIZATION (4 - Td or Tdap) 02/01/2028     HEPATITIS C SCREENING  Completed     MIGRAINE ACTION PLAN  Completed     PHQ-2 (once per calendar year)  Completed     Pneumococcal Vaccine: 65+ Years  Completed     ZOSTER IMMUNIZATION  Completed     IPV IMMUNIZATION  Aged Out     MENINGITIS IMMUNIZATION  Aged Out     HEPATITIS B IMMUNIZATION  Aged Out     Lab work is in process  Labs reviewed in EPIC      FHS-7: No flowsheet data found.    Mammogram Screening: Recommended annual mammography  Pertinent mammograms are reviewed under the imaging tab.    Review of Systems  Constitutional, HEENT, cardiovascular, pulmonary, gi and gu systems are negative, except as otherwise noted.    OBJECTIVE:   /64   Pulse 95   Temp 99  F (37.2  C) (Tympanic)   Resp 14   Ht 1.676 m (5' 6\")   Wt 88 kg (194 lb)   LMP 11/12/2004 (Approximate)   SpO2 96%   Breastfeeding No   BMI 31.31 kg/m   Estimated body mass index is 31.31 kg/m  as calculated from the following:    Height as of this encounter: 1.676 m (5' 6\").    Weight as of this encounter: 88 kg (194 lb).  Physical Exam  GENERAL: healthy, alert and " "no distress  NECK: no adenopathy, no asymmetry, masses, or scars and thyroid normal to palpation  RESP: lungs clear to auscultation - no rales, rhonchi or wheezes  CV: regular rate and rhythm, normal S1 S2, no S3 or S4, no murmur, click or rub, no peripheral edema and peripheral pulses strong  ABDOMEN: soft, nontender, no hepatosplenomegaly, no masses and bowel sounds normal  MS: no gross musculoskeletal defects noted, no edema  SKIN: hair thinning - right frontal    Labs from last week reviewed    No results found for any visits on 09/26/22.      ASSESSMENT / PLAN:   (Z00.00) Encounter for Medicare annual wellness exam  (primary encounter diagnosis)  Comment:    Plan:      (E03.8) Other specified hypothyroidism  Comment:  TSH normal last week, refilled x 1 year  Plan: levothyroxine (SYNTHROID/LEVOTHROID) 50 MCG         tablet             (E78.5) Hyperlipidemia LDL goal <130  Comment:    Plan: simvastatin (ZOCOR) 20 MG tablet             (I10) Essential hypertension with goal blood pressure less than 140/90  Comment: well controlled  Plan: lisinopril-hydrochlorothiazide (ZESTORETIC)         10-12.5 MG tablet             (R73.01) Elevated fasting glucose  Comment:  Add on HgbA1c to labs done last week, if unable to add will have them do HgbA1c with labs next month  Plan: Hemoglobin A1c           Rheumatoid Arthritis  -sees Dr. Baltazar  -started on Arava recently - having diarrhea and hair thinning. Both common side effects but has helped significantly for her pain.  -?add fiber   -talk to Dr. Baltazar about the side effects.       Patient has been advised of split billing requirements and indicates understanding: Yes    COUNSELING:  Reviewed preventive health counseling, as reflected in patient instructions       Regular exercise       Healthy diet/nutrition    Estimated body mass index is 31.31 kg/m  as calculated from the following:    Height as of this encounter: 1.676 m (5' 6\").    Weight as of this encounter: " 88 kg (194 lb).        She reports that she quit smoking about 22 years ago. She quit after 25.00 years of use. She has never used smokeless tobacco.      Appropriate preventive services were discussed with this patient, including applicable screening as appropriate for cardiovascular disease, diabetes, osteopenia/osteoporosis, and glaucoma.  As appropriate for age/gender, discussed screening for colorectal cancer, prostate cancer, breast cancer, and cervical cancer. Checklist reviewing preventive services available has been given to the patient.    Reviewed patients plan of care and provided an AVS. The Basic Care Plan (routine screening as documented in Health Maintenance) for Mine meets the Care Plan requirement. This Care Plan has been established and reviewed with the Patient.    Counseling Resources:  ATP IV Guidelines  Pooled Cohorts Equation Calculator  Breast Cancer Risk Calculator  Breast Cancer: Medication to Reduce Risk  FRAX Risk Assessment  ICSI Preventive Guidelines  Dietary Guidelines for Americans, 2010  USDA's MyPlate  ASA Prophylaxis  Lung CA Screening    Britany Norton MD  [facility]    Identified Health Risks:

## 2022-09-26 NOTE — PATIENT INSTRUCTIONS
Health Maintenance reviewed and plan for update discussed.  Patient asked to schedule her mammogram [phone]      Patient Education   Personalized Prevention Plan  You are due for the preventive services outlined below.  Your care team is available to assist you in scheduling these services.  If you have already completed any of these items, please share that information with your care team to update in your medical record.  Health Maintenance Due   Topic Date Due    ANNUAL REVIEW OF HM ORDERS  Never done    PHQ-2 (once per calendar year)  01/01/2022    FALL RISK ASSESSMENT  04/05/2022    COVID-19 Vaccine (5 - Booster for Pfizer series) 06/27/2022    Flu Vaccine (1) 09/01/2022

## 2022-10-26 ENCOUNTER — LAB (OUTPATIENT)
Dept: LAB | Facility: CLINIC | Age: 67
End: 2022-10-26
Payer: COMMERCIAL

## 2022-10-26 DIAGNOSIS — Z79.899 HIGH RISK MEDICATION USE: ICD-10-CM

## 2022-10-26 DIAGNOSIS — M05.9 SEROPOSITIVE RHEUMATOID ARTHRITIS (H): ICD-10-CM

## 2022-10-26 LAB
ALBUMIN SERPL BCG-MCNC: 3.8 G/DL (ref 3.5–5.2)
ALP SERPL-CCNC: 116 U/L (ref 35–104)
ALT SERPL W P-5'-P-CCNC: 29 U/L (ref 10–35)
AST SERPL W P-5'-P-CCNC: 42 U/L (ref 10–35)
BASOPHILS # BLD AUTO: 0 10E3/UL (ref 0–0.2)
BASOPHILS NFR BLD AUTO: 1 %
BILIRUB DIRECT SERPL-MCNC: <0.2 MG/DL (ref 0–0.3)
BILIRUB SERPL-MCNC: 0.7 MG/DL
CREAT SERPL-MCNC: 0.53 MG/DL (ref 0.51–0.95)
CRP SERPL-MCNC: 22.32 MG/L
EOSINOPHIL # BLD AUTO: 0.2 10E3/UL (ref 0–0.7)
EOSINOPHIL NFR BLD AUTO: 3 %
ERYTHROCYTE [DISTWIDTH] IN BLOOD BY AUTOMATED COUNT: 14.9 % (ref 10–15)
ERYTHROCYTE [SEDIMENTATION RATE] IN BLOOD BY WESTERGREN METHOD: 17 MM/HR (ref 0–30)
GFR SERPL CREATININE-BSD FRML MDRD: >90 ML/MIN/1.73M2
HCT VFR BLD AUTO: 40.5 % (ref 35–47)
HGB BLD-MCNC: 13.6 G/DL (ref 11.7–15.7)
IMM GRANULOCYTES # BLD: 0 10E3/UL
IMM GRANULOCYTES NFR BLD: 0 %
LYMPHOCYTES # BLD AUTO: 0.9 10E3/UL (ref 0.8–5.3)
LYMPHOCYTES NFR BLD AUTO: 13 %
MCH RBC QN AUTO: 35.2 PG (ref 26.5–33)
MCHC RBC AUTO-ENTMCNC: 33.6 G/DL (ref 31.5–36.5)
MCV RBC AUTO: 105 FL (ref 78–100)
MONOCYTES # BLD AUTO: 0.9 10E3/UL (ref 0–1.3)
MONOCYTES NFR BLD AUTO: 13 %
NEUTROPHILS # BLD AUTO: 4.6 10E3/UL (ref 1.6–8.3)
NEUTROPHILS NFR BLD AUTO: 70 %
PLATELET # BLD AUTO: 194 10E3/UL (ref 150–450)
PROT SERPL-MCNC: 6.3 G/DL (ref 6.4–8.3)
RBC # BLD AUTO: 3.86 10E6/UL (ref 3.8–5.2)
WBC # BLD AUTO: 6.5 10E3/UL (ref 4–11)

## 2022-10-26 PROCEDURE — 80076 HEPATIC FUNCTION PANEL: CPT

## 2022-10-26 PROCEDURE — 85652 RBC SED RATE AUTOMATED: CPT

## 2022-10-26 PROCEDURE — 86140 C-REACTIVE PROTEIN: CPT

## 2022-10-26 PROCEDURE — 85025 COMPLETE CBC W/AUTO DIFF WBC: CPT

## 2022-10-26 PROCEDURE — 82565 ASSAY OF CREATININE: CPT

## 2022-10-26 PROCEDURE — 36415 COLL VENOUS BLD VENIPUNCTURE: CPT

## 2022-11-02 ENCOUNTER — E-VISIT (OUTPATIENT)
Dept: FAMILY MEDICINE | Facility: CLINIC | Age: 67
End: 2022-11-02
Payer: COMMERCIAL

## 2022-11-02 DIAGNOSIS — N39.0 ACUTE UTI (URINARY TRACT INFECTION): Primary | ICD-10-CM

## 2022-11-02 PROCEDURE — 99421 OL DIG E/M SVC 5-10 MIN: CPT | Performed by: FAMILY MEDICINE

## 2022-11-02 RX ORDER — CIPROFLOXACIN 250 MG/1
250 TABLET, FILM COATED ORAL 2 TIMES DAILY
Qty: 6 TABLET | Refills: 0 | Status: SHIPPED | OUTPATIENT
Start: 2022-11-02 | End: 2022-11-05

## 2022-11-02 NOTE — PATIENT INSTRUCTIONS
Dear Helena,     After reviewing your responses, I've been able to diagnose you with a urinary tract infection, which is a common infection of the bladder with bacteria.  This is not a sexually transmitted infection, though urinating immediately after intercourse can help prevent infections.  Drinking lots of fluids is also helpful to clear your current infection and prevent the next one.      I have sent a prescription for antibiotics to your pharmacy to treat this infection.    It is important that you take all of your prescribed medication even if your symptoms are improving after a few doses.  Taking all of your medicine helps prevent the symptoms from returning.     If your symptoms worsen, you develop pain in your back or stomach, develop fevers, or are not improving in 5 days, please contact your primary care provider for an appointment or visit any of our convenient Walk-in or Urgent Care Centers to be seen, which can be found on our website here.    Thanks again for choosing us as your health care partner,    Britany Norton MD

## 2022-11-03 ENCOUNTER — OFFICE VISIT (OUTPATIENT)
Dept: RHEUMATOLOGY | Facility: CLINIC | Age: 67
End: 2022-11-03
Payer: COMMERCIAL

## 2022-11-03 VITALS
WEIGHT: 185.6 LBS | HEART RATE: 91 BPM | SYSTOLIC BLOOD PRESSURE: 122 MMHG | DIASTOLIC BLOOD PRESSURE: 78 MMHG | OXYGEN SATURATION: 90 % | BODY MASS INDEX: 29.96 KG/M2

## 2022-11-03 DIAGNOSIS — M05.9 SEROPOSITIVE RHEUMATOID ARTHRITIS (H): Primary | ICD-10-CM

## 2022-11-03 DIAGNOSIS — R19.7 DIARRHEA, UNSPECIFIED TYPE: ICD-10-CM

## 2022-11-03 DIAGNOSIS — Z79.899 HIGH RISK MEDICATION USE: ICD-10-CM

## 2022-11-03 PROCEDURE — 99214 OFFICE O/P EST MOD 30 MIN: CPT | Performed by: INTERNAL MEDICINE

## 2022-11-03 RX ORDER — FOLIC ACID 1 MG/1
1000 TABLET ORAL DAILY
Qty: 90 TABLET | Refills: 2 | Status: SHIPPED | OUTPATIENT
Start: 2022-11-03 | End: 2023-06-28

## 2022-11-03 RX ORDER — LEFLUNOMIDE 10 MG/1
10 TABLET ORAL EVERY OTHER DAY
Qty: 15 TABLET | Refills: 0 | Status: SHIPPED | OUTPATIENT
Start: 2022-11-03 | End: 2022-12-05

## 2022-11-03 NOTE — PATIENT INSTRUCTIONS
RHEUMATOLOGY    Dr. Nasir Baltazar    [facility]  [address] [place] [address]  [place] [address]  Phone number: [phone]  Fax number: [phone]    You may schedule your FLU shot by calling [phone] or if you would like to get your shot at a [facility] pharmacy you may schedule online at [email or web address].    Thank you for choosing [facility]!    Rachel Uriarte CMA Rheumatology    --------------------------------------    Stop leflunomide and hydroxychloroquine for 2 weeks.  Then when the diarrhea has resolved start leflunomide 10 mg every other day.      If the diarrhea does not resolve, then you need to submit  the stool sample to [facility] for C diff infection.

## 2022-11-03 NOTE — NURSING NOTE
Blood pressure rechecked after visit    122/78  Rachel Uriarte CMA Rheumatology  11/3/2022                                 RAPID3 (0-30) Cumulative Score  4.8          RAPID3 Weighted Score (divide #4 by 3 and that is the weighted score)  1.6

## 2022-11-03 NOTE — PROGRESS NOTES
Rheumatology Clinic Visit      Mine Meléndez MRN# [id number]   YOB: 1955 Age: 67 year old      Date of visit: 11/03/22     Chief Complaint   Patient presents with:  Rheumatoid Arthritis: Feeling good. Still gets diarrhea from the medication    Assessment and Plan     1. Seropositive Erosive Rheumatoid Arthritis (RF+, ): Dx'd with RA prior to 2002. Previously on SSZ (stomatitis). Currently on MTX 20mg PO once weekly (no improvement when MTX was increased to 25mg SQ wkly when used as monotherapy), folic acid 1mg daily, hydroxychloroquine 300mg daily on average (loose stools with 400mg daily, worsening arthritis symptoms when on 200 mg daily) and leflunomide 10 mg daily.  Arthritis is well controlled while on the current regimen but she is having diarrhea for 4 hours after each leflunomide dose that she takes each morning.  Therefore, considering that both leflunomide and hydroxychloroquine have resulted in loose stools but leflunomide is very effective, do not take hydroxychloroquine or leflunomide for the next 2 or 3 weeks and when the diarrhea has resolved and I asked that she restart leflunomide at the changed dose of 10 mg every other day.  If the diarrhea does not stop then she is to submit a sample for C. difficile testing.    Chronic illness, side effect from treatment.    - Stop leflunomide 10 mg daily, and if diarrhea resolves after 2-3 weeks then to resume leflunomide at the dose of 10 mg every other day  - Continue Methotrexate 20mg PO once weekly  - Continue folic acid 1mg daily  - Stop hydroxychloroquine 200mg daily with an additional 200mg every other day  (normal eye exam on 8/23/2022 with Dr. Colon)  - Labs monthly [id number]: CBC, Cr, Hepatic Panel  - Labs in 3 months: CBC, Creatinine, Hepatic Panel, ESR, CRP    High risk medication requiring intensive toxicity monitoring at least quarterly: labs ordered include CBC, Creatinine, Hepatic panel to monitor for cytopenia and  hepatotoxicity; checking creatinine as it affects clearance of medication.     2. Primary osteoarthritis of both hands: voltaren gel PRN.    - Continue diclofenac (VOLTAREN) 1 % topical gel; Apply up to 2 grams of 1% gel to hands up to 4 times daily as needed for joint pain (maximum: 8 g per upper extremity per day)  Dispense: 200 g; Refill: 3    3.  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  This is documented here for reference only.    4. Melanoma history: documented here for historical significance.     5.  Vaccinations: Vaccinations reviewed with Ms. Meléndez.    - Influenza: encouraged yearly vaccination  - [id number]: up to date  - [id number]: up to date  - Shingrix: Up to date  - COVID-19: Advised updating, and to hold methotrexate and leflunomide for 2 weeks afterward     6. Elevated blood pressure:  Helena to follow up with Primary Care provider regarding elevated blood pressure.      Total minutes spent in evaluation with patient, documentation, , and review of pertinent studies and chart notes: 20      Ms. Meléndez verbalized agreement with and understanding of the rational for the diagnosis and treatment plan.  All questions were answered to best of my ability and the patient's satisfaction. Ms. Meléndez was advised to contact the clinic with any questions that may arise after the clinic visit.      Thank you for involving me in the care of the patient    Return to clinic: 4-5 weeks    HPI   Mine Meléndez is a 67 year old female with a past medical history significant for hypertension, hyperlipidemia, hypothyroidism, migraines, melanoma history, and rheumatoid arthritis who presents for follow-up of rheumatoid arthritis.     7/20/2022: Pain and swelling of the right second and third MCPs and left second MCP that is worse in the morning and improves with time and activity.  Stiffness  may last for 2-3 hours.  Other joints are doing well.  Tolerating medications well.    Today, 11/3/2022: Leflunomide has resolved the pain and swelling at the MCPs.  No joint pain or swelling at this time.  Morning stiffness for less than 30 minutes.  However, she takes leflunomide each morning around 8 AM and has diarrhea until 11 AM or noon.  She questions if the diarrhea is due to leflunomide alone or because of the combination of hydroxychloroquine that caused loose stools and leflunomide.  Nonbloody stools.    Tobacco: Former smoker  EtOH: 4 drinks per day on the weekends  Drugs: None  Occupation: retired June 2020, was working in [employer]   12 point review of system was completed and negative except as noted in the HPI    Active Problem List     Patient Active Problem List   Diagnosis     Seropositive rheumatoid arthritis (H)     DYSPEPSIA     Obesity     Essential hypertension     Hypothyroidism     HYPERLIPIDEMIA LDL GOAL <130     Advanced directives, counseling/discussion     Dermatitis     High risk medications (not anticoagulants) long-term use     Female stress incontinence     Migraine     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)     Past Medical History     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/2008     Rheumatoid arthritis(714.0)      Trochanteric bursitis of right hip 8/16/2013     Past Surgical History     Past Surgical History:   Procedure Laterality Date     COLONOSCOPY  1/20/06     EXCISE MASS FOOT Left 10/5/2021    Procedure: EXCISION, MASS, LEFT FOOT;  Surgeon: Tommy Pelaez DPM;  Location: [facility]     HYSTERECTOMY, PAP NO LONGER INDICATED       HYSTERECTOMY, VAGINAL  11/04    TVH,TVT procedure     RELEASE CARPAL TUNNEL Right 10/11/2016    Procedure: RELEASE CARPAL TUNNEL;  Surgeon: Rianna  Emely Atkins MD;  Location: [facility] OR     SURGICAL HISTORY OF -   1988    tubal ligation     SURGICAL HISTORY OF -   2001    excision gangliion cyst rt heel     Allergy     Allergies   Allergen Reactions     Codeine Rash     Flagyl [Metronidazole] Rash     Septra [Sulfamethoxazole W/Trimethoprim] Itching     Puffy eyes, flushed.      Current Medication List     Current Outpatient Medications   Medication Sig     acetaminophen (TYLENOL) 325 MG tablet Take 2 tablets (650 mg) by mouth every 4 hours as needed for mild pain     ASPIRIN 81 MG OR TABS ONE DAILY     ciprofloxacin (CIPRO) 250 MG tablet Take 1 tablet (250 mg) by mouth 2 times daily for 3 days     folic acid (FOLVITE) 1 MG tablet Take 1 tablet (1,000 mcg) by mouth daily     hydroxychloroquine (PLAQUENIL) 200 MG tablet Hydroxychloroquine 200mg daily; and an additional 200mg every other day. Yearly eye exam, including 10-2 VF and SD-OCT, is required.     ibuprofen (ADVIL/MOTRIN) 800 MG tablet Take 1 tablet (800 mg) by mouth every 8 hours as needed for moderate pain     leflunomide (ARAVA) 10 MG tablet Take 1 tablet (10 mg) by mouth daily     levothyroxine (SYNTHROID/LEVOTHROID) 50 MCG tablet Take 1 tablet (50 mcg) by mouth daily     lisinopril-hydrochlorothiazide (ZESTORETIC) 10-12.5 MG tablet Take 1 tablet by mouth daily     methotrexate sodium 2.5 MG TABS Take 8 tablets (20 mg) by mouth once a week . Take all 8 tablets on the same day of each week.     MULTI-VITAMIN OR 1 daily     Probiotic Product (PROBIOTIC PO)      simvastatin (ZOCOR) 20 MG tablet TAKE ONE TABLET BY MOUTH AT BEDTIME     Current Facility-Administered Medications   Medication     betamethasone acet & sod phos (CELESTONE) injection 6 mg     ropivacaine (NAROPIN) injection 0.5 mL         Social History   See HPI    Family History     Family History   Problem Relation Age of Onset     Diabetes Father      Dementia Father      Diabetes Brother      Diabetes Sister      Hypertension Sister       "Hypertension Sister      Physical Exam     Temp Readings from Last 3 Encounters:   09/26/22 99  F (37.2  C) (Tympanic)   10/05/21 98.9  F (37.2  C) (Oral)   09/30/21 97.9  F (36.6  C) (Tympanic)     BP Readings from Last 5 Encounters:   11/03/22 (!) 140/76   09/26/22 132/64   07/20/22 (!) 146/63   06/01/22 132/77   11/30/21 123/77     Pulse Readings from Last 1 Encounters:   11/03/22 91     Resp Readings from Last 1 Encounters:   09/26/22 14     Estimated body mass index is 29.96 kg/m  as calculated from the following:    Height as of 9/26/22: 1.676 m (5' 6\").    Weight as of this encounter: 84.2 kg (185 lb 9.6 oz).      GEN: NAD.   HEENT:  Anicteric, noninjected sclera. No obvious external lesions of the ear and nose. Hearing intact.  CV: S1, S2. RRR. No m/r/g  PULM: No increased work of breathing. CTA bilaterally   MSK:  MCPs, PIPs, DIPs without swelling or tenderness to palpation.  Wrists without swelling or tenderness to palpation.  Elbows and shoulders without swelling or tenderness to palpation.  Knees, ankles, and MTPs without swelling or tenderness to palpation.    SKIN: No rash or jaundice seen  PSYCH: Alert. Appropriate.        Labs / Imaging (select studies)     RF/CCP  Recent Labs   Lab Test 05/01/18  1538   CCPIGG 182*     CBC  Recent Labs   Lab Test 10/26/22  1313 09/21/22  1318 08/24/22  1306 08/23/21  0848 05/12/21  1158 01/12/21  1547 10/23/20  1519   WBC 6.5 6.2 6.5   < > 6.8 7.4 7.0   RBC 3.86 4.19 4.11   < > 4.12 4.12 4.21   HGB 13.6 14.7 14.3   < > 14.0 14.5 14.4   HCT 40.5 43.2 42.8   < > 41.9 43.1 43.1   * 103* 104*   < > 102* 105* 102*   RDW 14.9 14.0 13.9   < > 14.1 13.6 13.6    175 189   < > 217 182 220   MCH 35.2* 35.1* 34.8*   < > 34.0* 35.2* 34.2*   MCHC 33.6 34.0 33.4   < > 33.4 33.6 33.4   NEUTROPHIL 70 68 65   < > 75.2 68.7 69.2   LYMPH 13 18 20   < > 17.7 19.6 22.7   MONOCYTE 13 12 11   < > 5.2 9.2 6.1   EOSINOPHIL 3 2 3   < > 1.6 2.4 1.7   BASOPHIL 1 1 1   < > 0.3 0.1 " 0.3   ANEU  --   --   --   --  5.1 5.1 4.9   ALYM  --   --   --   --  1.2 1.4 1.6   SHYLA  --   --   --   --  0.4 0.7 0.4   AEOS  --   --   --   --  0.1 0.2 0.1   ABAS  --   --   --   --  0.0 0.0 0.0   ANEUTAUTO 4.6 4.2 4.3   < >  --   --   --    ALYMPAUTO 0.9 1.1 1.3   < >  --   --   --    AMONOAUTO 0.9 0.7 0.7   < >  --   --   --    AEOSAUTO 0.2 0.1 0.2   < >  --   --   --    ABSBASO 0.0 0.0 0.0   < >  --   --   --     < > = values in this interval not displayed.     CMP  Recent Labs   Lab Test 10/26/22  1313 09/21/22  1318 08/24/22  1306 08/23/21  0847 05/12/21  1158 04/05/21  1255 01/12/21  1547 11/09/20  0939   NA  --  133*  --   --   --  132*  --  137   POTASSIUM  --  3.5  --   --   --  3.5  --  3.7   CHLORIDE  --  96*  --   --   --  98  --  104   CO2  --  28  --   --   --  26  --  30   ANIONGAP  --  9  --   --   --  8  --  3   GLC  --  120*  --   --   --  151*  --  115*   BUN  --  7.4*  --   --   --  7  --  10   CR 0.53 0.57 0.52   < > 0.58 0.55 0.65 0.57   GFRESTIMATED >90 >90 >90   < > >90 >90 >90 >90   GFRESTBLACK  --   --   --   --  >90 >90 >90 >90   ROSELYN  --  9.0  --   --   --  8.7  --  9.3   BILITOTAL 0.7 0.8 0.6   < > 0.5  --  0.7  --    ALBUMIN 3.8 4.0 3.5   < > 3.5  --  3.6  --    PROTTOTAL 6.3* 7.0 7.2   < > 7.5  --  7.5  --    ALKPHOS 116* 125* 128   < > 121  --  118  --    AST 42* 41* 29   < > 23  --  28  --    ALT 29 28 29   < > 24  --  27  --     < > = values in this interval not displayed.     Calcium/VitaminD  Recent Labs   Lab Test 09/21/22  1318 04/05/21  1255 11/09/20  0939   ROSELYN 9.0 8.7 9.3     ESR/CRP  Recent Labs   Lab Test 10/26/22  1313 07/05/22  1547 03/15/22  1439   SED 17 12 11   CRP 22.32* 7.8 8.3*     Lipid Panel  Recent Labs   Lab Test 09/21/22  1318 11/09/20  0939 04/17/19  1526   CHOL 149 153 159   TRIG 80 65 102   HDL 75 74 63   LDL 58 66 76   NHDL 74 79 96     Hepatitis B  Recent Labs   Lab Test 10/23/20  1519 05/01/18  1538   HBCAB Nonreactive Nonreactive   HEPBANG Nonreactive  Nonreactive     Hepatitis C  Recent Labs   Lab Test 10/23/20  1519 09/11/14  1650   HCVAB Nonreactive Negative     Tuberculosis Screening  Recent Labs   Lab Test 10/23/20  1519   TBRST Negative     Immunization History     Immunization History   Administered Date(s) Administered     COVID-19,PF,Moderna 05/02/2022     COVID-19,PF,Pfizer (12+ Yrs) 03/08/2021, 03/29/2021, 11/24/2021     Influenza (H1N1) 12/31/2009     Influenza (High Dose) 3 valent vaccine 11/05/2019     Influenza (IIV3) PF 11/03/2004, 10/30/2012, 10/20/2013, 10/03/2016     Influenza Vaccine IM > 6 months Valent IIV4 (Alfuria,Fluzone) 10/23/2017     Influenza, Quad, High Dose, Pf, 65yr+ (Fluzone HD) 11/09/2020, 09/30/2021     Pneumo Conj 13-V (2010&after) 11/12/2018     Pneumococcal 23 valent 02/19/2019     TD (ADULT, 7+) 04/28/1995     TDAP Vaccine (Adacel) 12/05/2007, 02/01/2018     Zoster vaccine recombinant adjuvanted (SHINGRIX) 04/17/2019, 07/10/2019     Zoster vaccine, live 06/07/2016          Chart documentation done in part with Dragon Voice recognition Software. Although reviewed after completion, some word and grammatical error may remain.    Nasir Baltazar MD

## 2022-11-17 ENCOUNTER — LAB (OUTPATIENT)
Dept: LAB | Facility: CLINIC | Age: 67
End: 2022-11-17
Payer: COMMERCIAL

## 2022-11-17 ENCOUNTER — MYC MEDICAL ADVICE (OUTPATIENT)
Dept: FAMILY MEDICINE | Facility: CLINIC | Age: 67
End: 2022-11-17

## 2022-11-17 ENCOUNTER — E-VISIT (OUTPATIENT)
Dept: FAMILY MEDICINE | Facility: CLINIC | Age: 67
End: 2022-11-17
Payer: COMMERCIAL

## 2022-11-17 DIAGNOSIS — R19.7 DIARRHEA, UNSPECIFIED TYPE: ICD-10-CM

## 2022-11-17 DIAGNOSIS — R19.7 DIARRHEA, UNSPECIFIED TYPE: Primary | ICD-10-CM

## 2022-11-17 PROCEDURE — 87493 C DIFF AMPLIFIED PROBE: CPT

## 2022-11-17 PROCEDURE — 99421 OL DIG E/M SVC 5-10 MIN: CPT | Performed by: FAMILY MEDICINE

## 2022-11-17 NOTE — PATIENT INSTRUCTIONS
Thank you for choosing us for your care. Given your symptoms, I would like you to do a lab-only visit to determine what is causing them.  This will involve collecting stool for clostridium difficile (c-diff).    I have placed the orders.  Please schedule an appointment with the lab right here in [facility], or call [phone].  I will let you know when the results are back and next steps to take.    If unable to schedule a lab only visit today through this method, call [phone] option 2 to speak to the care team.      Basically you need to come in and get stool collection items so we can further evaluate.    Britany Norton M.D.        Diarrhea with Uncertain Cause (Adult)    Diarrhea is when stools are loose and watery. This can be caused by:    Viral infections    Bacterial infections    Food poisoning    Parasites    Irritable bowel syndrome (IBS)    Inflammatory bowel diseases such as ulcerative colitis, Crohn's disease, and celiac disease    Food intolerance, such as to lactose, the sugar found in milk and milk products    Reaction to medicines like antibiotics, laxatives, cancer drugs, and antacids  Along with diarrhea, you may also have:    Abdominal pain and cramping    Nausea and vomiting    Loss of bowel control    Fever and chills    Bloody stools  In some cases, antibiotics may help to treat diarrhea. You may have a stool sample test. This is done to see what is causing your diarrhea, and if antibiotics will help treat it. The results of a stool sample test may take up to 2 days. The healthcare provider may not give you antibiotics until he or she has the stool test results.  Diarrhea can cause dehydration. This is the loss of too much water and other fluids from the body. When this occurs, body fluid must be replaced. This can be done with oral rehydration solutions. Oral rehydration solutions are available at drugstores and grocery stores without a prescription. Sports drinks are not the best  choice if you are very dehydrated. They have too much sugar and not enough electrolytes.  Home care  Follow all instructions given by your healthcare provider. Rest at home for the next 24 hours, or until you feel better. Avoid caffeine, tobacco, and alcohol. These can make diarrhea, cramping, and pain worse.  If taking medicines:    Over-the-counter nausea and diarrhea medicines are generally OK unless you experience fever or blood stool. Check with your doctor first in those circumstances.    You may use acetaminophen or NSAID medicines like ibuprofen or naproxen to reduce pain and fever. Don t use these if you have chronic liver or kidney disease, or ever had a stomach ulcer or gastrointestinal bleeding. Don't use NSAID medicines if you are already taking one for another condition (like arthritis) or are on daily aspirin therapy (such as for heart disease or after a stroke). Talk with your healthcare provider first.    If antibiotics were prescribed, be sure you take them until they are finished. Don t stop taking them even when you feel better. Antibiotics must be taken as a full course.  To prevent the spread of illness:    Remember that washing with soap and water and using alcohol-based  is the best way to prevent the spread of infection. Dry your hands with a single use towel (like a paper towel).    Clean the toilet after each use.    Wash your hands before eating.    Wash your hands before and after preparing food. Keep in mind that people with diarrhea or vomiting should not prepare food for others.    Wash your hands after using cutting boards, countertops, and knives that have been in contact with raw foods.    Wash and then peel fruits and vegetables.    Keep uncooked meats away from cooked and ready-to-eat foods.    Use a food thermometer when cooking. Cook poultry to at least 165 F (74 C). Cook ground meat (beef, veal, pork, lamb) to at least 160 F (71 C). Cook fresh beef, veal, lamb, and  pork to at least 145 F (63 C).    Don t eat raw or undercooked eggs (poached or kelby side up), poultry, meat, or unpasteurized milk and juices.  Food and drinks  The main goal while treating vomiting or diarrhea is to prevent dehydration. This is done by taking small amounts of liquids often.    Keep in mind that liquids are more important than food right now.    Drink only small amounts of liquids at a time.    Don t force yourself to eat, especially if you are having cramping, vomiting, or diarrhea. Don t eat large amounts at a time, even if you are hungry.    If you eat, avoid fatty, greasy, spicy, or fried foods.    Don t eat dairy foods or drink milk if you have diarrhea. These can make diarrhea worse.  During the first 24 hours you can try:    Oral rehydration solutions.  Sports drinks may be used if you are not too dehydrated and are otherwise healthy.    Soft drinks without caffeine    Ginger ale    Water (plain or flavored)    Decaf tea or coffee    Clear broth, consommé, or bouillon    Gelatin, popsicles, or frozen fruit juice bars  The second 24 hours, if you are feeling better, you can add:    Hot cereal, plain toast, bread, rolls, or crackers    Plain noodles, rice, mashed potatoes, chicken noodle soup, or rice soup    Unsweetened canned fruit (no pineapple)    Bananas  As you recover:    Limit fat intake to less than 15 grams per day. Don t eat margarine, butter, oils, mayonnaise, sauces, gravies, fried foods, peanut butter, meat, poultry, or fish.    Limit fiber. Don t eat raw or cooked vegetables, fresh fruits except bananas, or bran cereals.    Limit caffeine and chocolate.    Limit dairy.    Don t use spices or seasonings except salt.    Go back to your normal diet over time, as you feel better and your symptoms improve.    If the symptoms come back, go back to a simple diet or clear liquids.  Follow-up care  Follow up with your healthcare provider, or as advised. If a stool sample was taken or  cultures were done, call the healthcare provider for the results as instructed.  Call 911  Call 911 if you have any of these symptoms:    Trouble breathing    Confusion    Extreme drowsiness or trouble walking    Loss of consciousness    Rapid heart rate    Chest pain    Stiff neck    Seizure  When to seek medical advice  Call your healthcare provider right away if any of these occur:    Abdominal pain that gets worse    Constant lower right abdominal pain    Continued vomiting and inability to keep liquids down    Diarrhea more than 5 times a day    Blood in vomit or stool    Dark urine or no urine for 8 hours, dry mouth and tongue, tiredness, weakness, or dizziness    Drowsiness    New rash    You don t get better in 2 to 3 days    Fever of 100.4 F (38 C) or higher, or as directed by your healthcare provider  Theo last reviewed this educational content on 6/1/2018 2000-2021 [employer]. All rights reserved. This information is not intended as a substitute for professional medical care. Always follow your healthcare professional's instructions.

## 2022-11-18 ENCOUNTER — MYC MEDICAL ADVICE (OUTPATIENT)
Dept: FAMILY MEDICINE | Facility: CLINIC | Age: 67
End: 2022-11-18

## 2022-11-18 DIAGNOSIS — R10.9 ABDOMINAL CRAMPING: Primary | ICD-10-CM

## 2022-11-18 LAB — C DIFF TOX B STL QL: NEGATIVE

## 2022-11-18 RX ORDER — HYOSCYAMINE SULFATE 0.125 MG
0.12 TABLET ORAL EVERY 6 HOURS PRN
Qty: 12 TABLET | Refills: 0 | Status: SHIPPED | OUTPATIENT
Start: 2022-11-18

## 2022-11-18 NOTE — TELEPHONE ENCOUNTER
Please see my chart message.     Negative C-dif result  Wondering next step    Neli Uriarte RN on 11/18/2022 at 2:19 PM

## 2022-11-18 NOTE — TELEPHONE ENCOUNTER
Please see MyChart reply    Patient has been on pro biotic for a while  [place] Dr Rivas recommend anything for abdominal cramps, they only occur in morning for a short time but are pretty intense.    Reuben FLORENCE [facility]

## 2022-11-18 NOTE — TELEPHONE ENCOUNTER
With negative C. difficile, given that this started after antibiotics, I would recommend trial of a probiotic such as Align, Florastor or similar.

## 2022-11-21 ENCOUNTER — OFFICE VISIT (OUTPATIENT)
Dept: FAMILY MEDICINE | Facility: CLINIC | Age: 67
End: 2022-11-21
Payer: COMMERCIAL

## 2022-11-21 VITALS
SYSTOLIC BLOOD PRESSURE: 132 MMHG | BODY MASS INDEX: 30.02 KG/M2 | OXYGEN SATURATION: 97 % | RESPIRATION RATE: 14 BRPM | TEMPERATURE: 98.2 F | WEIGHT: 186 LBS | DIASTOLIC BLOOD PRESSURE: 70 MMHG | HEART RATE: 80 BPM

## 2022-11-21 DIAGNOSIS — R19.7 DIARRHEA, UNSPECIFIED TYPE: ICD-10-CM

## 2022-11-21 DIAGNOSIS — R10.9 ABDOMINAL CRAMPING: Primary | ICD-10-CM

## 2022-11-21 PROCEDURE — 0134A COVID-19,PF,MODERNA BIVALENT: CPT | Performed by: NURSE PRACTITIONER

## 2022-11-21 PROCEDURE — 99213 OFFICE O/P EST LOW 20 MIN: CPT | Performed by: NURSE PRACTITIONER

## 2022-11-21 PROCEDURE — 91313 COVID-19,PF,MODERNA BIVALENT: CPT | Performed by: NURSE PRACTITIONER

## 2022-11-21 ASSESSMENT — PAIN SCALES - GENERAL: PAINLEVEL: MILD PAIN (2)

## 2022-11-21 NOTE — PATIENT INSTRUCTIONS
Continue to monitor symptoms.       Okay to the medication Hyoscyamine as needed for abdominal cramping.     Increase fiber in your diet. This will help bulk up your stool.

## 2022-11-21 NOTE — PROGRESS NOTES
Assessment & Plan     Abdominal cramping  Diarrhea, unspecified type  Overall symptoms improving. Discussed ongoing at home management. No labs today as she is improving. Follow up discussed if symptoms return/ worsen.               Patient Instructions   Continue to monitor symptoms.       Okay to the medication Hyoscyamine as needed for abdominal cramping.     Increase fiber in your diet. This will help bulk up your stool.       Return if symptoms worsen or fail to improve.    ISRAEL Ely CNP  [facility]   Helena is a 67 year old, presenting for the following health issues:  Gastric Problem (Started 4 weeks ago with terrible diarrhea. Stopped her rheumatology medications (per them), also was on antibiotics for a UTI .  Symptoms seem to be gradually improving. )      History of Present Illness       Reason for visit:  Sromach issues  Symptom onset:  3-4 weeks ago  Symptoms include:  Stomach cramps  Symptom intensity:  Moderate  Symptom progression:  Improving  Had these symptoms before:  No  What makes it worse:  No  What makes it better:  Ginger ale    She eats 2-3 servings of fruits and vegetables daily.She consumes 0 sweetened beverage(s) daily.She exercises with enough effort to increase her heart rate 9 or less minutes per day.  She exercises with enough effort to increase her heart rate 3 or less days per week.   She is taking medications regularly.     She has had persistent symptoms for almost 1 month. She was have severe diarrhea and was using immodium at the end of October. It caused constipation and then severe stomach cramping. Then she was treated for a UTI with 3 days of Cipro, the diarrhea continued. The stomach cramping was present only the morning. Over the last couple days she has noticed some improvement and has had formed stools for the last 1 week or so, cramping is reduced significantly. No acid reflux symptoms, or vomiting. No other stomach  symptoms are present.       Review of Systems   Constitutional, HEENT, cardiovascular, pulmonary, gi and gu systems are negative, except as otherwise noted.      Objective    /70   Pulse 80   Temp 98.2  F (36.8  C) (Tympanic)   Resp 14   Wt 84.4 kg (186 lb)   LMP 11/12/2004 (Approximate)   SpO2 97%   BMI 30.02 kg/m    Body mass index is 30.02 kg/m .     Physical Exam   GENERAL: healthy, alert and no distress  NECK: no adenopathy, no asymmetry, masses, or scars and thyroid normal to palpation  RESP: lungs clear to auscultation - no rales, rhonchi or wheezes  CV: regular rate and rhythm, normal S1 S2, no S3 or S4, no murmur, click or rub, no peripheral edema and peripheral pulses strong  ABDOMEN: soft, nontender, no hepatosplenomegaly, no masses and bowel sounds normal  MS: no gross musculoskeletal defects noted, no edema  LYMPH: no cervical, supraclavicular, axillary, or inguinal adenopathy    Lab on 11/17/2022   Component Date Value Ref Range Status     C Difficile Toxin B by PCR 11/17/2022 Negative  Negative Final    A negative result does not exclude actual disease due to C. difficile and may be due to improper collection, handling and storage of the specimen or the number of organisms in the specimen is below the detection limit of the assay.

## 2022-11-30 ENCOUNTER — LAB (OUTPATIENT)
Dept: LAB | Facility: CLINIC | Age: 67
End: 2022-11-30
Payer: COMMERCIAL

## 2022-11-30 DIAGNOSIS — Z79.899 HIGH RISK MEDICATION USE: ICD-10-CM

## 2022-11-30 DIAGNOSIS — M05.9 SEROPOSITIVE RHEUMATOID ARTHRITIS (H): ICD-10-CM

## 2022-11-30 LAB
ALBUMIN SERPL BCG-MCNC: 3.9 G/DL (ref 3.5–5.2)
ALP SERPL-CCNC: 126 U/L (ref 35–104)
ALT SERPL W P-5'-P-CCNC: 18 U/L (ref 10–35)
AST SERPL W P-5'-P-CCNC: 26 U/L (ref 10–35)
BASOPHILS # BLD AUTO: 0.1 10E3/UL (ref 0–0.2)
BASOPHILS NFR BLD AUTO: 1 %
BILIRUB DIRECT SERPL-MCNC: <0.2 MG/DL (ref 0–0.3)
BILIRUB SERPL-MCNC: 0.7 MG/DL
CREAT SERPL-MCNC: 0.56 MG/DL (ref 0.51–0.95)
EOSINOPHIL # BLD AUTO: 0.1 10E3/UL (ref 0–0.7)
EOSINOPHIL NFR BLD AUTO: 1 %
ERYTHROCYTE [DISTWIDTH] IN BLOOD BY AUTOMATED COUNT: 14.5 % (ref 10–15)
GFR SERPL CREATININE-BSD FRML MDRD: >90 ML/MIN/1.73M2
HCT VFR BLD AUTO: 42.1 % (ref 35–47)
HGB BLD-MCNC: 14 G/DL (ref 11.7–15.7)
IMM GRANULOCYTES # BLD: 0 10E3/UL
IMM GRANULOCYTES NFR BLD: 0 %
LYMPHOCYTES # BLD AUTO: 1.4 10E3/UL (ref 0.8–5.3)
LYMPHOCYTES NFR BLD AUTO: 19 %
MCH RBC QN AUTO: 34.7 PG (ref 26.5–33)
MCHC RBC AUTO-ENTMCNC: 33.3 G/DL (ref 31.5–36.5)
MCV RBC AUTO: 104 FL (ref 78–100)
MONOCYTES # BLD AUTO: 0.5 10E3/UL (ref 0–1.3)
MONOCYTES NFR BLD AUTO: 7 %
NEUTROPHILS # BLD AUTO: 5.3 10E3/UL (ref 1.6–8.3)
NEUTROPHILS NFR BLD AUTO: 72 %
PLATELET # BLD AUTO: 246 10E3/UL (ref 150–450)
PROT SERPL-MCNC: 6.6 G/DL (ref 6.4–8.3)
RBC # BLD AUTO: 4.04 10E6/UL (ref 3.8–5.2)
WBC # BLD AUTO: 7.4 10E3/UL (ref 4–11)

## 2022-11-30 PROCEDURE — 85025 COMPLETE CBC W/AUTO DIFF WBC: CPT

## 2022-11-30 PROCEDURE — 80076 HEPATIC FUNCTION PANEL: CPT

## 2022-11-30 PROCEDURE — 82565 ASSAY OF CREATININE: CPT

## 2022-11-30 PROCEDURE — 36415 COLL VENOUS BLD VENIPUNCTURE: CPT

## 2022-12-05 ENCOUNTER — VIRTUAL VISIT (OUTPATIENT)
Dept: RHEUMATOLOGY | Facility: CLINIC | Age: 67
End: 2022-12-05
Payer: COMMERCIAL

## 2022-12-05 DIAGNOSIS — M05.9 SEROPOSITIVE RHEUMATOID ARTHRITIS (H): Primary | ICD-10-CM

## 2022-12-05 DIAGNOSIS — Z79.899 HIGH RISK MEDICATION USE: ICD-10-CM

## 2022-12-05 PROCEDURE — 99214 OFFICE O/P EST MOD 30 MIN: CPT | Mod: 95 | Performed by: INTERNAL MEDICINE

## 2022-12-05 NOTE — PROGRESS NOTES
Mine Meléndez  is a 67 year old year old who is being evaluated via a billable video visit.      How would you like to obtain your AVS? MyChart  If the video visit is dropped, the invitation should be resent by: Text to cell phone: [phone]   Will anyone else be joining your video visit? No     Rheumatology Video Visit      Mine Meléndez MRN# [id number]   YOB: 1955 Age: 67 year old      Date of visit: 12/05/22     Chief Complaint   Patient presents with:  Rheumatoid Arthritis    Assessment and Plan     1. Seropositive Erosive Rheumatoid Arthritis (RF+, ): Dx'd with RA prior to 2002. Previously on sulfasalazine (stomatitis), hydroxychloroquine (loose stools), leflunomide (loose stools).  Currently on methotrexate 20 mg oral once weekly.  Arthritis is well controlled at this time.    Chronic illness, stable.      - Continue Methotrexate 20mg PO once weekly  - Continue folic acid 1mg daily  - Labs in 3 months: CBC, Creatinine, Hepatic Panel, ESR, CRP    High risk medication requiring intensive toxicity monitoring at least quarterly: labs ordered include CBC, Creatinine, Hepatic panel to monitor for cytopenia and hepatotoxicity; checking creatinine as it affects clearance of medication.     2. Primary osteoarthritis of both hands: voltaren gel PRN.    - Continue diclofenac (VOLTAREN) 1 % topical gel; Apply up to 2 grams of 1% gel to hands up to 4 times daily as needed for joint pain (maximum: 8 g per upper extremity per day)  Dispense: 200 g; Refill: 3    3.  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  This is documented here for reference only.    4. Melanoma history: documented here for historical significance.     5.  Vaccinations: Vaccinations reviewed with Ms. Meléndez.    - Influenza: encouraged yearly vaccination  - [id number]: up to date  - [id number]: up to date  -  Shingrix: Up to date  - COVID-19: Up to date      Total minutes spent in evaluation with patient, documentation, , and review of pertinent studies and chart notes: 12      Ms. Meléndez verbalized agreement with and understanding of the rational for the diagnosis and treatment plan.  All questions were answered to best of my ability and the patient's satisfaction. Ms. Meléndez was advised to contact the clinic with any questions that may arise after the clinic visit.      Thank you for involving me in the care of the patient    Return to clinic: 3-4 months    HPI   Mine Meléndez is a 67 year old female with a past medical history significant for hypertension, hyperlipidemia, hypothyroidism, migraines, melanoma history, and rheumatoid arthritis who presents for follow-up of rheumatoid arthritis.     Today, 12/5/2022: No longer with loose stools since stopping leflunomide and hydroxychloroquine.  No joint pain or swelling.  No morning stiffness or gelling phenomenon.  Arthritis is not limiting any of her daily activities.   strength is normal, per patient.  She states that she continues to follow with dermatology yearly for skin cancer screening.    Tobacco: Former smoker  EtOH: 4 drinks per day on the weekends  Drugs: None  Occupation: retired June 2020, was working in [employer]   12 point review of system was completed and negative except as noted in the HPI    Active Problem List     Patient Active Problem List   Diagnosis     Seropositive rheumatoid arthritis (H)     DYSPEPSIA     Obesity     Essential hypertension     Hypothyroidism     HYPERLIPIDEMIA LDL GOAL <130     Advanced directives, counseling/discussion     Dermatitis     High risk medications (not anticoagulants) long-term use     Female stress incontinence     Migraine     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)     Past Medical History     Past Medical History:   Diagnosis Date     Herpes simplex  without mention of complication      Hypertension      Malignant melanoma (H)      Obesity, unspecified      Osteomyelitis of jaw 5/22/2008     Rheumatoid arthritis(714.0)      Trochanteric bursitis of right hip 8/16/2013     Past Surgical History     Past Surgical History:   Procedure Laterality Date     COLONOSCOPY  1/20/06     EXCISE MASS FOOT Left 10/5/2021    Procedure: EXCISION, MASS, LEFT FOOT;  Surgeon: Tommy Pelaez DPM;  Location: [facility]     HYSTERECTOMY, PAP NO LONGER INDICATED       HYSTERECTOMY, VAGINAL  11/04    TVH,TVT procedure     RELEASE CARPAL TUNNEL Right 10/11/2016    Procedure: RELEASE CARPAL TUNNEL;  Surgeon: Emely Blanca MD;  Location: [facility]     SURGICAL HISTORY OF -   1988    tubal ligation     SURGICAL HISTORY OF -   2001    excision gangliion cyst rt heel     Allergy     Allergies   Allergen Reactions     Codeine Rash     Flagyl [Metronidazole] Rash     Septra [Sulfamethoxazole W/Trimethoprim] Itching     Puffy eyes, flushed.      Current Medication List     Current Outpatient Medications   Medication Sig     acetaminophen (TYLENOL) 325 MG tablet Take 2 tablets (650 mg) by mouth every 4 hours as needed for mild pain     ASPIRIN 81 MG OR TABS ONE DAILY     folic acid (FOLVITE) 1 MG tablet Take 1 tablet (1,000 mcg) by mouth daily     ibuprofen (ADVIL/MOTRIN) 800 MG tablet Take 1 tablet (800 mg) by mouth every 8 hours as needed for moderate pain     levothyroxine (SYNTHROID/LEVOTHROID) 50 MCG tablet Take 1 tablet (50 mcg) by mouth daily     lisinopril-hydrochlorothiazide (ZESTORETIC) 10-12.5 MG tablet Take 1 tablet by mouth daily     methotrexate sodium 2.5 MG TABS Take 8 tablets (20 mg) by mouth once a week . Take all 8 tablets on the same day of each week.     MULTI-VITAMIN OR 1 daily     Probiotic Product (PROBIOTIC PO)      simvastatin (ZOCOR) 20 MG tablet TAKE ONE TABLET BY MOUTH AT BEDTIME     hydroxychloroquine (PLAQUENIL) 200 MG tablet Hydroxychloroquine 200mg daily;  "and an additional 200mg every other day. Yearly eye exam, including 10-2 VF and SD-OCT, is required. (Patient not taking: Reported on 11/21/2022)     hyoscyamine (LEVSIN) 0.125 MG tablet Take 1 tablet (125 mcg) by mouth every 6 hours as needed for cramping (Patient not taking: Reported on 11/21/2022)     leflunomide (ARAVA) 10 MG tablet Take 1 tablet (10 mg) by mouth every other day (Patient not taking: Reported on 11/21/2022)     Current Facility-Administered Medications   Medication     betamethasone acet & sod phos (CELESTONE) injection 6 mg     ropivacaine (NAROPIN) injection 0.5 mL         Social History   See HPI    Family History     Family History   Problem Relation Age of Onset     Diabetes Father      Dementia Father      Diabetes Brother      Diabetes Sister      Hypertension Sister      Hypertension Sister      Physical Exam     Temp Readings from Last 3 Encounters:   11/21/22 98.2  F (36.8  C) (Tympanic)   09/26/22 99  F (37.2  C) (Tympanic)   10/05/21 98.9  F (37.2  C) (Oral)     BP Readings from Last 5 Encounters:   11/21/22 132/70   11/03/22 122/78   09/26/22 132/64   07/20/22 (!) 146/63   06/01/22 132/77     Pulse Readings from Last 1 Encounters:   11/21/22 80     Resp Readings from Last 1 Encounters:   11/21/22 14     Estimated body mass index is 30.02 kg/m  as calculated from the following:    Height as of 9/26/22: 1.676 m (5' 6\").    Weight as of 11/21/22: 84.4 kg (186 lb).      GEN: NAD.   HEENT:  Anicteric, noninjected sclera. No obvious external lesions of the ear and nose. Hearing intact.  PULM: No increased work of breathing  MSK:  Hands and wrists without swelling.   SKIN: No rash or jaundice seen  PSYCH: Alert. Appropriate.        Labs / Imaging (select studies)     RF/CCP  Recent Labs   Lab Test 05/01/18  1538   CCPIGG 182*     CBC  Recent Labs   Lab Test 11/30/22  1326 10/26/22  1313 09/21/22  1318 08/23/21  0848 05/12/21  1158 01/12/21  1547 10/23/20  1519   WBC 7.4 6.5 6.2   < > 6.8 " 7.4 7.0   RBC 4.04 3.86 4.19   < > 4.12 4.12 4.21   HGB 14.0 13.6 14.7   < > 14.0 14.5 14.4   HCT 42.1 40.5 43.2   < > 41.9 43.1 43.1   * 105* 103*   < > 102* 105* 102*   RDW 14.5 14.9 14.0   < > 14.1 13.6 13.6    194 175   < > 217 182 220   MCH 34.7* 35.2* 35.1*   < > 34.0* 35.2* 34.2*   MCHC 33.3 33.6 34.0   < > 33.4 33.6 33.4   NEUTROPHIL 72 70 68   < > 75.2 68.7 69.2   LYMPH 19 13 18   < > 17.7 19.6 22.7   MONOCYTE 7 13 12   < > 5.2 9.2 6.1   EOSINOPHIL 1 3 2   < > 1.6 2.4 1.7   BASOPHIL 1 1 1   < > 0.3 0.1 0.3   ANEU  --   --   --   --  5.1 5.1 4.9   ALYM  --   --   --   --  1.2 1.4 1.6   SHYLA  --   --   --   --  0.4 0.7 0.4   AEOS  --   --   --   --  0.1 0.2 0.1   ABAS  --   --   --   --  0.0 0.0 0.0   ANEUTAUTO 5.3 4.6 4.2   < >  --   --   --    ALYMPAUTO 1.4 0.9 1.1   < >  --   --   --    AMONOAUTO 0.5 0.9 0.7   < >  --   --   --    AEOSAUTO 0.1 0.2 0.1   < >  --   --   --    ABSBASO 0.1 0.0 0.0   < >  --   --   --     < > = values in this interval not displayed.     CMP  Recent Labs   Lab Test 11/30/22  1326 10/26/22  1313 09/21/22  1318 08/23/21  0847 05/12/21  1158 04/05/21  1255 01/12/21  1547 11/09/20  0939   NA  --   --  133*  --   --  132*  --  137   POTASSIUM  --   --  3.5  --   --  3.5  --  3.7   CHLORIDE  --   --  96*  --   --  98  --  104   CO2  --   --  28  --   --  26  --  30   ANIONGAP  --   --  9  --   --  8  --  3   GLC  --   --  120*  --   --  151*  --  115*   BUN  --   --  7.4*  --   --  7  --  10   CR 0.56 0.53 0.57   < > 0.58 0.55 0.65 0.57   GFRESTIMATED >90 >90 >90   < > >90 >90 >90 >90   GFRESTBLACK  --   --   --   --  >90 >90 >90 >90   ROSELYN  --   --  9.0  --   --  8.7  --  9.3   BILITOTAL 0.7 0.7 0.8   < > 0.5  --  0.7  --    ALBUMIN 3.9 3.8 4.0   < > 3.5  --  3.6  --    PROTTOTAL 6.6 6.3* 7.0   < > 7.5  --  7.5  --    ALKPHOS 126* 116* 125*   < > 121  --  118  --    AST 26 42* 41*   < > 23  --  28  --    ALT 18 29 28   < > 24  --  27  --     < > = values in this interval  not displayed.     Calcium/VitaminD  Recent Labs   Lab Test 09/21/22  1318 04/05/21  1255 11/09/20  0939   ROSELYN 9.0 8.7 9.3     ESR/CRP  Recent Labs   Lab Test 10/26/22  1313 07/05/22  1547 03/15/22  1439   SED 17 12 11   CRP 22.32* 7.8 8.3*     Lipid Panel  Recent Labs   Lab Test 09/21/22  1318 11/09/20  0939 04/17/19  1526   CHOL 149 153 159   TRIG 80 65 102   HDL 75 74 63   LDL 58 66 76   NHDL 74 79 96     Hepatitis B  Recent Labs   Lab Test 10/23/20  1519 05/01/18  1538   HBCAB Nonreactive Nonreactive   HEPBANG Nonreactive Nonreactive     Hepatitis C  Recent Labs   Lab Test 10/23/20  1519   HCVAB Nonreactive       Tuberculosis Screening  Recent Labs   Lab Test 10/23/20  1519   TBRST Negative       Immunization History     Immunization History   Administered Date(s) Administered     COVID-19 Vaccine 12+ (Pfizer) 03/08/2021, 03/29/2021, 11/24/2021     COVID-19 Vaccine 18+ (Moderna) 05/02/2022     COVID-19 Vaccine Bivalent Booster 18+ (Moderna) 11/21/2022     FLU 6-35 months 10/05/2009     Influenza (H1N1) 12/31/2009     Influenza (High Dose) 3 valent vaccine 11/05/2019     Influenza (IIV3) PF 11/03/2004, 10/04/2010, 10/01/2012, 10/30/2012, 10/07/2013, 10/20/2013, 10/07/2014, 10/03/2016     Influenza Vaccine 65+ (Fluzone HD) 11/09/2020, 09/30/2021, 11/11/2022     Influenza Vaccine >6 months (Alfuria,Fluzone) 10/23/2017     Influenza Vaccine, 6+MO IM (QUADRIVALENT W/PRESERVATIVES) 10/06/2015, 09/26/2018     Pneumo Conj 13-V (2010&after) 11/12/2018     Pneumococcal 23 valent 02/19/2019     TD (ADULT, 7+) 04/28/1995     TDAP Vaccine (Adacel) 12/05/2007, 02/01/2018     Zoster vaccine recombinant adjuvanted (SHINGRIX) 04/17/2019, 07/10/2019     Zoster vaccine, live 06/07/2016          Chart documentation done in part with Dragon Voice recognition Software. Although reviewed after completion, some word and grammatical error may remain.      Video-Visit Details    Type of service:  Video Visit    Video Start Time: 12:30  PM    Video End Time: 12:38 PM    Originating Location (pt. Location): Home, [place]    Distant Location (provider location):  Off-site, [place]    Platform used for Video Visit: Taisha Baltazar MD

## 2022-12-05 NOTE — PATIENT INSTRUCTIONS
RHEUMATOLOGY    Dr. Nasir Baltazar    [facility]  [address] [place] [address]  [place] [address]  Phone number: [phone]  Fax number: [phone]    You may schedule your FLU shot by calling [phone] or if you would like to get your shot at a [facility] you may schedule online at [email or web address].    Thank you for choosing [facility]!    Rachel Uriarte CMA Rheumatology

## 2022-12-23 ENCOUNTER — VIRTUAL VISIT (OUTPATIENT)
Dept: FAMILY MEDICINE | Facility: CLINIC | Age: 67
End: 2022-12-23
Payer: COMMERCIAL

## 2022-12-23 ENCOUNTER — MYC MEDICAL ADVICE (OUTPATIENT)
Dept: FAMILY MEDICINE | Facility: CLINIC | Age: 67
End: 2022-12-23

## 2022-12-23 DIAGNOSIS — J02.9 SORE THROAT: Primary | ICD-10-CM

## 2022-12-23 PROCEDURE — 99213 OFFICE O/P EST LOW 20 MIN: CPT | Mod: 95 | Performed by: FAMILY MEDICINE

## 2022-12-23 RX ORDER — AMOXICILLIN 500 MG/1
500 CAPSULE ORAL 2 TIMES DAILY
Qty: 20 CAPSULE | Refills: 0 | Status: SHIPPED | OUTPATIENT
Start: 2022-12-23 | End: 2023-01-02

## 2022-12-23 NOTE — PROGRESS NOTES
Helena is a 67 year old who is being evaluated via a billable video visit.      How would you like to obtain your AVS? MyChart  If the video visit is dropped, the invitation should be resent by: Text to cell phone: [phone]  Will anyone else be joining your video visit? No          Assessment & Plan     Sore throat  Suspect strep throat as cause of pain, but will swab for this and COVID.  Less likely to have influenza with only sore throat, will hold off on that testing, patient in agreement.  Given holiday weekend, we will go ahead and treat her with amoxicilin, and if PCR testing returns negative we will have her stop.  Continue supportive cares with OTC analgesics, salt water gargles, chicken soup, etc.  Follow up as needed.  - Streptococcus A Rapid Screen w/Reflex to PCR - Clinic Collect; Future  - Symptomatic COVID-19 Virus (Coronavirus) by PCR Nose; Future  - amoxicillin (AMOXIL) 500 MG capsule; Take 1 capsule (500 mg) by mouth 2 times daily for 10 days      15 minutes spent on the date of the encounter doing chart review, history and exam, documentation and further activities per the note       See Patient Instructions    No follow-ups on file.    Nadeen Folres MD  [facility]    Subjective   Helena is a 67 year old, presenting for the following health issues:  No chief complaint on file.      History of Present Illness       Reason for visit:  Very sore throat  Symptom onset:  1-3 days ago  Symptoms include:  Hurt when i swallow  Symptom intensity:  Moderate  Symptom progression:  Staying the same  Had these symptoms before:  No  What makes it worse:  No  What makes it better:  Ibuprofen    She eats 0-1 servings of fruits and vegetables daily.She consumes 1 sweetened beverage(s) daily.She exercises with enough effort to increase her heart rate 9 or less minutes per day.  She exercises with enough effort to increase her heart rate 3 or less days per week.   She is taking  medications regularly.       Acute Illness  Acute illness concerns: Sore Throat   Onset/Duration: 1-3 days ago   Symptoms:  Fever: No  Chills/Sweats: No  Headache (location?): No  Sinus Pressure: No  Conjunctivitis:  No  Ear Pain: no  Rhinorrhea: No  Congestion: No  Sore Throat: YES  Cough: YES-non-productive  Wheeze: No  Decreased Appetite: No  Nausea: No  Vomiting: No  Diarrhea: No  Dysuria/Freq.: No  Dysuria or Hematuria: No  Fatigue/Achiness: No  Sick/Strep Exposure: No  Therapies tried and outcome: Ibuprofen     Sore throat for 3 days, a little bit of a cough, no fever. No nasal congestion, runny nose, or post nasal drip.  No body aches, nausea or vomiting    Review of Systems   Constitutional, HEENT, cardiovascular, pulmonary, gi and gu systems are negative, except as otherwise noted.      Objective           Vitals:  No vitals were obtained today due to virtual visit.    Physical Exam   GENERAL: Healthy, alert and no distress  EYES: Eyes grossly normal to inspection.  No discharge or erythema, or obvious scleral/conjunctival abnormalities.  RESP: No audible wheeze, cough, or visible cyanosis.  No visible retractions or increased work of breathing.    SKIN: Visible skin clear. No significant rash, abnormal pigmentation or lesions.  NEURO: Cranial nerves grossly intact.  Mentation and speech appropriate for age.  PSYCH: Mentation appears normal, affect normal/bright, judgement and insight intact, normal speech and appearance well-groomed.            Video-Visit Details    Type of service:  Video Visit   Video Start Time: 11:09 am  Video End Time:11:20 AM    Originating Location (pt. Location): Home    Distant Location (provider location):  Off-site  Platform used for Video Visit: Taisha

## 2022-12-26 ENCOUNTER — LAB (OUTPATIENT)
Dept: FAMILY MEDICINE | Facility: CLINIC | Age: 67
End: 2022-12-26
Attending: FAMILY MEDICINE
Payer: COMMERCIAL

## 2022-12-26 DIAGNOSIS — J02.9 SORE THROAT: ICD-10-CM

## 2022-12-26 LAB
DEPRECATED S PYO AG THROAT QL EIA: NEGATIVE
GROUP A STREP BY PCR: NOT DETECTED
SARS-COV-2 RNA RESP QL NAA+PROBE: NEGATIVE

## 2022-12-26 PROCEDURE — U0003 INFECTIOUS AGENT DETECTION BY NUCLEIC ACID (DNA OR RNA); SEVERE ACUTE RESPIRATORY SYNDROME CORONAVIRUS 2 (SARS-COV-2) (CORONAVIRUS DISEASE [COVID-19]), AMPLIFIED PROBE TECHNIQUE, MAKING USE OF HIGH THROUGHPUT TECHNOLOGIES AS DESCRIBED BY CMS-2020-01-R: HCPCS

## 2022-12-26 PROCEDURE — U0005 INFEC AGEN DETEC AMPLI PROBE: HCPCS

## 2022-12-26 PROCEDURE — 87651 STREP A DNA AMP PROBE: CPT

## 2023-02-24 ENCOUNTER — OFFICE VISIT (OUTPATIENT)
Dept: OPHTHALMOLOGY | Facility: CLINIC | Age: 68
End: 2023-02-24
Payer: COMMERCIAL

## 2023-02-24 DIAGNOSIS — D31.31 CHOROIDAL NEVUS OF RIGHT EYE: ICD-10-CM

## 2023-02-24 DIAGNOSIS — Z01.01 ENCOUNTER FOR EXAMINATION OF EYES AND VISION WITH ABNORMAL FINDINGS: ICD-10-CM

## 2023-02-24 DIAGNOSIS — H25.813 COMBINED FORMS OF AGE-RELATED CATARACT OF BOTH EYES: Primary | ICD-10-CM

## 2023-02-24 DIAGNOSIS — H02.834 DERMATOCHALASIS OF BOTH UPPER EYELIDS: ICD-10-CM

## 2023-02-24 DIAGNOSIS — H52.4 PRESBYOPIA: ICD-10-CM

## 2023-02-24 DIAGNOSIS — H02.831 DERMATOCHALASIS OF BOTH UPPER EYELIDS: ICD-10-CM

## 2023-02-24 PROCEDURE — 92014 COMPRE OPH EXAM EST PT 1/>: CPT | Performed by: OPHTHALMOLOGY

## 2023-02-24 PROCEDURE — 92015 DETERMINE REFRACTIVE STATE: CPT | Performed by: OPHTHALMOLOGY

## 2023-02-24 ASSESSMENT — VISUAL ACUITY
OS_PH_CC: 20/60
METHOD: SNELLEN - LINEAR
OD_PH_CC: 20/200
OS_CC: 20/70
OS_CC+: -2
OD_CC: 20/300

## 2023-02-24 ASSESSMENT — REFRACTION_WEARINGRX
OD_ADD: +3.00
SPECS_TYPE: PAL
OS_AXIS: 175
OS_SPHERE: -3.00
OD_SPHERE: -3.00
OS_ADD: +3.00
OD_CYLINDER: +1.25
OD_AXIS: 172
OS_CYLINDER: +1.25

## 2023-02-24 ASSESSMENT — CONF VISUAL FIELD
OD_NORMAL: 1
METHOD: COUNTING FINGERS
OS_SUPERIOR_NASAL_RESTRICTION: 0
OS_SUPERIOR_TEMPORAL_RESTRICTION: 0
OS_INFERIOR_NASAL_RESTRICTION: 0
OD_SUPERIOR_TEMPORAL_RESTRICTION: 0
OD_INFERIOR_TEMPORAL_RESTRICTION: 0
OS_NORMAL: 1
OD_INFERIOR_NASAL_RESTRICTION: 0
OS_INFERIOR_TEMPORAL_RESTRICTION: 0
OD_SUPERIOR_NASAL_RESTRICTION: 0

## 2023-02-24 ASSESSMENT — REFRACTION_MANIFEST
OD_ADD: +3.00
OD_CYLINDER: +1.00
OS_CYLINDER: +1.50
OS_ADD: +3.00
OS_AXIS: 165
OD_SPHERE: +2.00
OD_AXIS: 070
OS_SPHERE: -4.50

## 2023-02-24 ASSESSMENT — TONOMETRY
IOP_METHOD: APPLANATION
OD_IOP_MMHG: 13
OS_IOP_MMHG: 14

## 2023-02-24 NOTE — PROGRESS NOTES
" Current Eye Medications:  none     Subjective: comprehensive eye exam - having more trouble with vision distance and intermediate >near, hard to see things at the grocery store and see print on the television. History of poor vision right eye since birth, patched as a child. Stopped hydroxychloroquine 3 months ago by Dr. Baltazar due to stomach issues. Concerned about BUL drooping, gradual - noticeable the last few months.    Was told about CN right eye about 7 yrs ago.     Objective:  See Ophthalmology Exam.       Assessment:  Baseline dilated exam in patient recently off Plaquenil.  Mild-moderate cataracts.      ICD-10-CM    1. Combined forms of age-related cataract, mild-mod, of both eyes  H25.813       2. Dermatochalasis of both upper eyelids  H02.831 Adult Eye  Referral    H02.834       3. Choroidal nevus of right eye  D31.31       4. Encounter for examination of eyes and vision with abnormal findings  Z01.01 EYE EXAM (SIMPLE-NONBILLABLE)      5. Presbyopia  H52.4 REFRACTION           Plan:  Glasses prescription given - optional  May use artificial tears up to four times a day (like Refresh Optive, Systane Balance, TheraTears, or generic artificial tears are ok. Avoid \"get the red out\" drops).   Will put in referral to Dr. Smallwood for eyelid evaluation;  Return visit in Aug as scheduled but will change to refraction, intraocular pressure, dilation.  Abraham Colon M.D.  [phone]       "

## 2023-02-24 NOTE — PATIENT INSTRUCTIONS
"Glasses prescription given - optional  May use artificial tears up to four times a day (like Refresh Optive, Systane Balance, TheraTears, or generic artificial tears are ok. Avoid \"get the red out\" drops).   Will put in referral to Dr. Smallwood for eyelid evaluation;  Return visit in Aug as scheduled but will change to refraction, intraocular pressure, dilation.  Abraham Colon M.D.  [phone]    "

## 2023-02-24 NOTE — LETTER
"    2/24/2023         RE: Mine Meléndez  [address]  [facility] [address]        Dear Colleague,    Thank you for referring your patient, Mine Meléndez, to the [facility]. Please see a copy of my visit note below.     Current Eye Medications:  none     Subjective: comprehensive eye exam - having more trouble with vision distance and intermediate >near, hard to see things at the grocery store and see print on the television. History of poor vision right eye since birth, patched as a child. Stopped hydroxychloroquine 3 months ago by Dr. Baltazar due to stomach issues. Concerned about BUL drooping, gradual - noticeable the last few months.    Was told about CN right eye about 7 yrs ago.     Objective:  See Ophthalmology Exam.       Assessment:  Baseline dilated exam in patient recently off Plaquenil.  Mild-moderate cataracts.      ICD-10-CM    1. Combined forms of age-related cataract, mild-mod, of both eyes  H25.813       2. Dermatochalasis of both upper eyelids  H02.831 Adult Eye  Referral    H02.834       3. Choroidal nevus of right eye  D31.31       4. Encounter for examination of eyes and vision with abnormal findings  Z01.01 EYE EXAM (SIMPLE-NONBILLABLE)      5. Presbyopia  H52.4 REFRACTION           Plan:  Glasses prescription given - optional  May use artificial tears up to four times a day (like Refresh Optive, Systane Balance, TheraTears, or generic artificial tears are ok. Avoid \"get the red out\" drops).   Will put in referral to Dr. Smallwood for eyelid evaluation;  Return visit in Aug as scheduled but will change to refraction, intraocular pressure, dilation.  Abraham Colon M.D.  [phone]           Again, thank you for allowing me to participate in the care of your patient.        Sincerely,        Abraham Colon MD    "

## 2023-03-04 PROBLEM — D31.31 CHOROIDAL NEVUS OF RIGHT EYE: Status: ACTIVE | Noted: 2023-03-04

## 2023-03-04 PROBLEM — H25.813 COMBINED FORMS OF AGE-RELATED CATARACT OF BOTH EYES: Status: ACTIVE | Noted: 2023-03-04

## 2023-03-04 PROBLEM — H02.834 DERMATOCHALASIS OF BOTH UPPER EYELIDS: Status: ACTIVE | Noted: 2023-03-04

## 2023-03-04 PROBLEM — H02.831 DERMATOCHALASIS OF BOTH UPPER EYELIDS: Status: ACTIVE | Noted: 2023-03-04

## 2023-03-04 ASSESSMENT — CUP TO DISC RATIO
OD_RATIO: 0.1
OS_RATIO: 0.1

## 2023-03-04 ASSESSMENT — EXTERNAL EXAM - LEFT EYE: OS_EXAM: 1+ BROW PTOSIS

## 2023-03-04 ASSESSMENT — EXTERNAL EXAM - RIGHT EYE: OD_EXAM: 1+ BROW PTOSIS

## 2023-03-04 ASSESSMENT — SLIT LAMP EXAM - LIDS
COMMENTS: 2-3+ DERMATOCHALASIS
COMMENTS: 2-3+ DERMATOCHALASIS

## 2023-03-20 ENCOUNTER — LAB (OUTPATIENT)
Dept: LAB | Facility: CLINIC | Age: 68
End: 2023-03-20
Payer: COMMERCIAL

## 2023-03-20 DIAGNOSIS — M05.9 SEROPOSITIVE RHEUMATOID ARTHRITIS (H): ICD-10-CM

## 2023-03-20 DIAGNOSIS — Z79.899 HIGH RISK MEDICATION USE: ICD-10-CM

## 2023-03-20 LAB
ALBUMIN SERPL BCG-MCNC: 4.3 G/DL (ref 3.5–5.2)
ALP SERPL-CCNC: 129 U/L (ref 35–104)
ALT SERPL W P-5'-P-CCNC: 20 U/L (ref 10–35)
AST SERPL W P-5'-P-CCNC: 30 U/L (ref 10–35)
BASOPHILS # BLD AUTO: 0 10E3/UL (ref 0–0.2)
BASOPHILS NFR BLD AUTO: 0 %
BILIRUB DIRECT SERPL-MCNC: <0.2 MG/DL (ref 0–0.3)
BILIRUB SERPL-MCNC: 0.6 MG/DL
CREAT SERPL-MCNC: 0.61 MG/DL (ref 0.51–0.95)
CRP SERPL-MCNC: 16.94 MG/L
EOSINOPHIL # BLD AUTO: 0.1 10E3/UL (ref 0–0.7)
EOSINOPHIL NFR BLD AUTO: 2 %
ERYTHROCYTE [DISTWIDTH] IN BLOOD BY AUTOMATED COUNT: 14 % (ref 10–15)
ERYTHROCYTE [SEDIMENTATION RATE] IN BLOOD BY WESTERGREN METHOD: 15 MM/HR (ref 0–30)
GFR SERPL CREATININE-BSD FRML MDRD: >90 ML/MIN/1.73M2
HCT VFR BLD AUTO: 42.4 % (ref 35–47)
HGB BLD-MCNC: 14.1 G/DL (ref 11.7–15.7)
IMM GRANULOCYTES # BLD: 0 10E3/UL
IMM GRANULOCYTES NFR BLD: 0 %
LYMPHOCYTES # BLD AUTO: 1.6 10E3/UL (ref 0.8–5.3)
LYMPHOCYTES NFR BLD AUTO: 20 %
MCH RBC QN AUTO: 35.3 PG (ref 26.5–33)
MCHC RBC AUTO-ENTMCNC: 33.3 G/DL (ref 31.5–36.5)
MCV RBC AUTO: 106 FL (ref 78–100)
MONOCYTES # BLD AUTO: 0.6 10E3/UL (ref 0–1.3)
MONOCYTES NFR BLD AUTO: 8 %
NEUTROPHILS # BLD AUTO: 5.5 10E3/UL (ref 1.6–8.3)
NEUTROPHILS NFR BLD AUTO: 70 %
PLATELET # BLD AUTO: 214 10E3/UL (ref 150–450)
PROT SERPL-MCNC: 7.6 G/DL (ref 6.4–8.3)
RBC # BLD AUTO: 4 10E6/UL (ref 3.8–5.2)
WBC # BLD AUTO: 7.9 10E3/UL (ref 4–11)

## 2023-03-20 PROCEDURE — 36415 COLL VENOUS BLD VENIPUNCTURE: CPT

## 2023-03-20 PROCEDURE — 80076 HEPATIC FUNCTION PANEL: CPT

## 2023-03-20 PROCEDURE — 85652 RBC SED RATE AUTOMATED: CPT

## 2023-03-20 PROCEDURE — 82565 ASSAY OF CREATININE: CPT

## 2023-03-20 PROCEDURE — 86140 C-REACTIVE PROTEIN: CPT

## 2023-03-20 PROCEDURE — 85025 COMPLETE CBC W/AUTO DIFF WBC: CPT

## 2023-03-23 ENCOUNTER — VIRTUAL VISIT (OUTPATIENT)
Dept: RHEUMATOLOGY | Facility: CLINIC | Age: 68
End: 2023-03-23
Payer: COMMERCIAL

## 2023-03-23 DIAGNOSIS — M05.9 SEROPOSITIVE RHEUMATOID ARTHRITIS (H): Primary | ICD-10-CM

## 2023-03-23 DIAGNOSIS — Z79.899 HIGH RISK MEDICATION USE: ICD-10-CM

## 2023-03-23 PROCEDURE — 99214 OFFICE O/P EST MOD 30 MIN: CPT | Mod: VID | Performed by: INTERNAL MEDICINE

## 2023-03-23 RX ORDER — METHOTREXATE 2.5 MG/1
20 TABLET ORAL WEEKLY
Qty: 104 TABLET | Refills: 2 | Status: SHIPPED | OUTPATIENT
Start: 2023-03-23 | End: 2024-01-05

## 2023-03-23 NOTE — PROGRESS NOTES
Mine Meléndez  is a 67 year old year old who is being evaluated via a billable video visit.      How would you like to obtain your AVS? MyChart  If the video visit is dropped, the invitation should be resent by: Text to cell phone: [phone]   Will anyone else be joining your video visit? No     Rheumatology Video Visit      Mine Meléndez MRN# [id number]   YOB: 1955 Age: 67 year old      Date of visit: 3/23/23     Chief Complaint   Patient presents with:  Rheumatoid Arthritis    Assessment and Plan     1. Seropositive Erosive Rheumatoid Arthritis (RF+, ): Dx'd with RA prior to 2002. Previously on sulfasalazine (stomatitis), hydroxychloroquine (loose stools), leflunomide (loose stools).  Currently on methotrexate 20 mg oral once weekly.  RA controlled.    Chronic illness, stable.      - Continue Methotrexate 20mg PO once weekly  - Continue folic acid 1mg daily  - Labs in 3 months: CBC, Creatinine, Hepatic Panel, ESR, CRP    High risk medication requiring intensive toxicity monitoring at least quarterly: labs ordered include CBC, Creatinine, Hepatic panel to monitor for cytopenia and hepatotoxicity; checking creatinine as it affects clearance of medication.     2. Primary osteoarthritis of both hands: voltaren gel PRN.    - Continue diclofenac (VOLTAREN) 1 % topical gel; Apply up to 2 grams of 1% gel to hands up to 4 times daily as needed for joint pain (maximum: 8 g per upper extremity per day)      3.  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  This is documented here for reference only.    4. Melanoma history: documented here for historical significance.     5.  Vaccinations: Vaccinations reviewed with Ms. Meléndez.    - Influenza: encouraged yearly vaccination  - [id number]: up to date  - [id number]: up to date  - Shingrix: Up to date  - COVID-19: Up to date      6. Elevated  blood pressure:  [place] to follow up with Primary Care provider regarding elevated blood pressure.     Total minutes spent in evaluation with patient, documentation, , and review of pertinent studies and chart notes: 10      Ms. Meléndez verbalized agreement with and understanding of the rational for the diagnosis and treatment plan.  All questions were answered to best of my ability and the patient's satisfaction. Ms. Meléndez was advised to contact the clinic with any questions that may arise after the clinic visit.      Thank you for involving me in the care of the patient    Return to clinic: 3-4 months    HPI   Mine Meléndez is a 67 year old female with a past medical history significant for hypertension, hyperlipidemia, hypothyroidism, migraines, melanoma history, and rheumatoid arthritis who presents for follow-up of rheumatoid arthritis.     Today, 3/23/2023: Currently doing well with regard to rheumatoid arthritis.  Morning stiffness for no more than 5-10 minutes.  No joint pain or swelling.  No gelling phenomenon.  Arthritis is not limiting her daily activities. Following with dermatology for skin checks.     Tobacco: Former smoker  EtOH: 4 drinks per day on the weekends  Drugs: None  Occupation: retired June 2020, was working in [employer]   12 point review of system was completed and negative except as noted in the HPI    Active Problem List     Patient Active Problem List   Diagnosis     Seropositive rheumatoid arthritis (H)     DYSPEPSIA     Obesity     Essential hypertension     Hypothyroidism     HYPERLIPIDEMIA LDL GOAL <130     Advanced directives, counseling/discussion     Dermatitis     High risk medications (not anticoagulants) long-term use     Female stress incontinence     Migraine     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)     Combined forms of age-related cataract, mild-mod, of both eyes     Dermatochalasis of both upper eyelids      Choroidal nevus of right eye     Past Medical History     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/2008     Rheumatoid arthritis(714.0)      Trochanteric bursitis of right hip 8/16/2013     Past Surgical History     Past Surgical History:   Procedure Laterality Date     COLONOSCOPY  1/20/06     EXCISE MASS FOOT Left 10/5/2021    Procedure: EXCISION, MASS, LEFT FOOT;  Surgeon: Tommy Pelaez DPM;  Location: [facility]     HYSTERECTOMY, PAP NO LONGER INDICATED       HYSTERECTOMY, VAGINAL  11/04    TVH,TVT procedure     RELEASE CARPAL TUNNEL Right 10/11/2016    Procedure: RELEASE CARPAL TUNNEL;  Surgeon: Emely Blanca MD;  Location: [facility]     SURGICAL HISTORY OF -   1988    tubal ligation     SURGICAL HISTORY OF -   2001    excision gangliion cyst rt heel     Allergy     Allergies   Allergen Reactions     Codeine Rash     Flagyl [Metronidazole] Rash     Septra [Sulfamethoxazole W/Trimethoprim] Itching     Puffy eyes, flushed.      Current Medication List     Current Outpatient Medications   Medication Sig     acetaminophen (TYLENOL) 325 MG tablet Take 2 tablets (650 mg) by mouth every 4 hours as needed for mild pain     ASPIRIN 81 MG OR TABS ONE DAILY     folic acid (FOLVITE) 1 MG tablet Take 1 tablet (1,000 mcg) by mouth daily     hyoscyamine (LEVSIN) 0.125 MG tablet Take 1 tablet (125 mcg) by mouth every 6 hours as needed for cramping     ibuprofen (ADVIL/MOTRIN) 800 MG tablet Take 1 tablet (800 mg) by mouth every 8 hours as needed for moderate pain     levothyroxine (SYNTHROID/LEVOTHROID) 50 MCG tablet Take 1 tablet (50 mcg) by mouth daily     lisinopril-hydrochlorothiazide (ZESTORETIC) 10-12.5 MG tablet Take 1 tablet by mouth daily     methotrexate sodium 2.5 MG TABS Take 8 tablets (20 mg) by mouth once a week . Take all 8 tablets on the same day of each  "week.     MULTI-VITAMIN OR 1 daily     Probiotic Product (PROBIOTIC PO)      simvastatin (ZOCOR) 20 MG tablet TAKE ONE TABLET BY MOUTH AT BEDTIME     Current Facility-Administered Medications   Medication     betamethasone acet & sod phos (CELESTONE) injection 6 mg     ropivacaine (NAROPIN) injection 0.5 mL         Social History   See HPI    Family History     Family History   Problem Relation Age of Onset     Diabetes Father      Dementia Father      Diabetes Brother      Diabetes Sister      Hypertension Sister      Hypertension Sister      Physical Exam     Temp Readings from Last 3 Encounters:   11/21/22 98.2  F (36.8  C) (Tympanic)   09/26/22 99  F (37.2  C) (Tympanic)   10/05/21 98.9  F (37.2  C) (Oral)     BP Readings from Last 5 Encounters:   11/21/22 132/70   11/03/22 122/78   09/26/22 132/64   07/20/22 (!) 146/63   06/01/22 132/77     Pulse Readings from Last 1 Encounters:   11/21/22 80     Resp Readings from Last 1 Encounters:   11/21/22 14     Estimated body mass index is 30.02 kg/m  as calculated from the following:    Height as of 9/26/22: 1.676 m (5' 6\").    Weight as of 11/21/22: 84.4 kg (186 lb).      GEN: NAD.   HEENT:  Anicteric, noninjected sclera. No obvious external lesions of the ear and nose. Hearing intact.  PULM: No increased work of breathing  MSK: Mild swelling of the left second PIP that she says is nontender to self-palpation.  Other joints of the hands and wrists without swelling.  SKIN: No rash or jaundice seen  PSYCH: Alert. Appropriate.        Labs / Imaging (select studies)     RF/CCP  Recent Labs   Lab Test 05/01/18  1538   CCPIGG 182*     CBC  Recent Labs   Lab Test 03/20/23  1334 11/30/22  1326 10/26/22  1313 08/23/21  0848 05/12/21  1158 01/12/21  1547 10/23/20  1519   WBC 7.9 7.4 6.5   < > 6.8 7.4 7.0   RBC 4.00 4.04 3.86   < > 4.12 4.12 4.21   HGB 14.1 14.0 13.6   < > 14.0 14.5 14.4   HCT 42.4 42.1 40.5   < > 41.9 43.1 43.1   * 104* 105*   < > 102* 105* 102*   RDW " 14.0 14.5 14.9   < > 14.1 13.6 13.6    246 194   < > 217 182 220   MCH 35.3* 34.7* 35.2*   < > 34.0* 35.2* 34.2*   MCHC 33.3 33.3 33.6   < > 33.4 33.6 33.4   NEUTROPHIL 70 72 70   < > 75.2 68.7 69.2   LYMPH 20 19 13   < > 17.7 19.6 22.7   MONOCYTE 8 7 13   < > 5.2 9.2 6.1   EOSINOPHIL 2 1 3   < > 1.6 2.4 1.7   BASOPHIL 0 1 1   < > 0.3 0.1 0.3   ANEU  --   --   --   --  5.1 5.1 4.9   ALYM  --   --   --   --  1.2 1.4 1.6   SHYLA  --   --   --   --  0.4 0.7 0.4   AEOS  --   --   --   --  0.1 0.2 0.1   ABAS  --   --   --   --  0.0 0.0 0.0   ANEUTAUTO 5.5 5.3 4.6   < >  --   --   --    ALYMPAUTO 1.6 1.4 0.9   < >  --   --   --    AMONOAUTO 0.6 0.5 0.9   < >  --   --   --    AEOSAUTO 0.1 0.1 0.2   < >  --   --   --    ABSBASO 0.0 0.1 0.0   < >  --   --   --     < > = values in this interval not displayed.     CMP  Recent Labs   Lab Test 03/20/23  1334 11/30/22  1326 10/26/22  1313 09/21/22  1318 08/23/21  0847 05/12/21  1158 04/05/21  1255 01/12/21  1547 11/09/20  0939   NA  --   --   --  133*  --   --  132*  --  137   POTASSIUM  --   --   --  3.5  --   --  3.5  --  3.7   CHLORIDE  --   --   --  96*  --   --  98  --  104   CO2  --   --   --  28  --   --  26  --  30   ANIONGAP  --   --   --  9  --   --  8  --  3   GLC  --   --   --  120*  --   --  151*  --  115*   BUN  --   --   --  7.4*  --   --  7  --  10   CR 0.61 0.56 0.53 0.57   < > 0.58 0.55 0.65 0.57   GFRESTIMATED >90 >90 >90 >90   < > >90 >90 >90 >90   GFRESTBLACK  --   --   --   --   --  >90 >90 >90 >90   ROSELYN  --   --   --  9.0  --   --  8.7  --  9.3   BILITOTAL 0.6 0.7 0.7 0.8   < > 0.5  --  0.7  --    ALBUMIN 4.3 3.9 3.8 4.0   < > 3.5  --  3.6  --    PROTTOTAL 7.6 6.6 6.3* 7.0   < > 7.5  --  7.5  --    ALKPHOS 129* 126* 116* 125*   < > 121  --  118  --    AST 30 26 42* 41*   < > 23  --  28  --    ALT 20 18 29 28   < > 24  --  27  --     < > = values in this interval not displayed.     Calcium/VitaminD  Recent Labs   Lab Test 09/21/22  1318 04/05/21  5515  11/09/20  0939   ROSELYN 9.0 8.7 9.3     ESR/CRP  Recent Labs   Lab Test 03/20/23  1334 10/26/22  1313 07/05/22  1547 03/15/22  1439 12/09/21  0830   SED 15 17 12 11 12   CRP  --   --  7.8 8.3* 9.1*   CRPI 16.94* 22.32*  --   --   --      Lipid Panel  Recent Labs   Lab Test 09/21/22  1318 11/09/20  0939 04/17/19  1526   CHOL 149 153 159   TRIG 80 65 102   HDL 75 74 63   LDL 58 66 76   NHDL 74 79 96     Hepatitis B  Recent Labs   Lab Test 10/23/20  1519 05/01/18  1538   HBCAB Nonreactive Nonreactive   HEPBANG Nonreactive Nonreactive     Hepatitis C  Recent Labs   Lab Test 10/23/20  1519   HCVAB Nonreactive     Tuberculosis Screening  Recent Labs   Lab Test 10/23/20  1519   TBRST Negative       Immunization History     Immunization History   Administered Date(s) Administered     COVID-19 Vaccine 12+ (Pfizer) 03/08/2021, 03/29/2021, 11/24/2021     COVID-19 Vaccine 18+ (Moderna) 05/02/2022     COVID-19 Vaccine Bivalent Booster 18+ (Moderna) 11/21/2022     FLU 6-35 months 10/05/2009     Influenza (H1N1) 12/31/2009     Influenza (High Dose) 3 valent vaccine 11/05/2019     Influenza (IIV3) PF 11/03/2004, 10/04/2010, 10/01/2012, 10/30/2012, 10/07/2013, 10/20/2013, 10/07/2014, 10/03/2016     Influenza Vaccine 65+ (Fluzone HD) 11/09/2020, 09/30/2021, 11/11/2022     Influenza Vaccine >6 months (Alfuria,Fluzone) 10/23/2017     Influenza Vaccine, 6+MO IM (QUADRIVALENT W/PRESERVATIVES) 10/06/2015, 09/26/2018     Pneumo Conj 13-V (2010&after) 11/12/2018     Pneumococcal 23 valent 02/19/2019     TD,PF 7+ (Tenivac) 04/28/1995     TDAP Vaccine (Adacel) 12/05/2007, 02/01/2018     Zoster recombinant adjuvanted (SHINGRIX) 04/17/2019, 07/10/2019     Zoster vaccine, live 06/07/2016          Chart documentation done in part with Dragon Voice recognition Software. Although reviewed after completion, some word and grammatical error may remain.      Video-Visit Details    Type of service:  Video Visit    Video Start Time: 12:00 PM    Video End  Time: 12:06 PM    Originating Location (pt. Location): [place], [place]    Distant Location (provider location):  in office, [facility] in [place], [place]    Platform used for Video Visit: Siva Baltazar MD

## 2023-03-23 NOTE — PATIENT INSTRUCTIONS
RHEUMATOLOGY    Dr. Nasir Baltazar    [facility]  [address] [place] [address]  Phone number: [phone]  Fax number: [phone]      Thank you for choosing [facility]!

## 2023-03-28 ENCOUNTER — OFFICE VISIT (OUTPATIENT)
Dept: DERMATOLOGY | Facility: CLINIC | Age: 68
End: 2023-03-28
Payer: COMMERCIAL

## 2023-03-28 DIAGNOSIS — L82.1 SEBORRHEIC KERATOSIS: ICD-10-CM

## 2023-03-28 DIAGNOSIS — Z85.820 HISTORY OF MELANOMA: ICD-10-CM

## 2023-03-28 DIAGNOSIS — L81.4 LENTIGO: Primary | ICD-10-CM

## 2023-03-28 DIAGNOSIS — D18.01 ANGIOMA OF SKIN: ICD-10-CM

## 2023-03-28 DIAGNOSIS — D23.9 DERMAL NEVUS: ICD-10-CM

## 2023-03-28 PROCEDURE — 99213 OFFICE O/P EST LOW 20 MIN: CPT | Performed by: DERMATOLOGY

## 2023-03-28 ASSESSMENT — PAIN SCALES - GENERAL: PAINLEVEL: NO PAIN (0)

## 2023-03-28 NOTE — LETTER
3/28/2023         RE: Mine Meléndez  [id number]  [facility] [address]        Dear Colleague,    Thank you for referring your patient, Mine Meléndez, to the [facility]. Please see a copy of my visit note below.    Mine Meléndez is an extremely pleasant 67 year old year old female patient here today for hx of melanoma 0.3mm on right thigh.  Today she notes spot on left back.   .   Patient states this has been present for a while.  Patient reports the following symptoms:  new.  Patient reports the following previous treatments none.  These treatments did not work.  Patient reports the following modifying factors none.  Associated symptoms: none.  Patient has no other skin complaints today.  Remainder of the HPI, Meds, PMH, Allergies, FH, and SH was reviewed in chart.      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/2008     Rheumatoid arthritis(714.0)      Trochanteric bursitis of right hip 8/16/2013       Past Surgical History:   Procedure Laterality Date     COLONOSCOPY  1/20/06     EXCISE MASS FOOT Left 10/5/2021    Procedure: EXCISION, MASS, LEFT FOOT;  Surgeon: Tommy Pelaez DPM;  Location: [facility]     HYSTERECTOMY, PAP NO LONGER INDICATED       HYSTERECTOMY, VAGINAL  11/04    TVH,TVT procedure     RELEASE CARPAL TUNNEL Right 10/11/2016    Procedure: RELEASE CARPAL TUNNEL;  Surgeon: Emely Blanca MD;  Location: [facility]     SURGICAL HISTORY OF -   1988    tubal ligation     SURGICAL HISTORY OF -   2001    excision gangliion cyst rt heel        Family History   Problem Relation Age of Onset     Diabetes Father      Dementia Father      Diabetes Brother      Diabetes Sister      Hypertension Sister      Hypertension Sister        Social History     Socioeconomic History     Marital status:      Spouse name: Not on  file     Number of children: Not on file     Years of education: Not on file     Highest education level: Not on file   Occupational History     Not on file   Tobacco Use     Smoking status: Former     Years: 25.00     Types: Cigarettes     Quit date: 2000     Years since quittin.9     Passive exposure: Never     Smokeless tobacco: Never   Vaping Use     Vaping Use: Never used   Substance and Sexual Activity     Alcohol use: Yes     Comment: 6 pack of beer on a weekend     Drug use: No     Sexual activity: Yes     Partners: Male     Birth control/protection: Surgical     Comment: [email or web address]   Other Topics Concern     Parent/sibling w/ CABG, MI or angioplasty before 65F 55M? No   Social History Narrative     Not on file     Social Determinants of Health     Financial Resource Strain: Not on file   Food Insecurity: Not on file   Transportation Needs: Not on file   Physical Activity: Not on file   Stress: Not on file   Social Connections: Not on file   Intimate Partner Violence: Not on file   Housing Stability: Not on file       Outpatient Encounter Medications as of 3/28/2023   Medication Sig Dispense Refill     acetaminophen (TYLENOL) 325 MG tablet Take 2 tablets (650 mg) by mouth every 4 hours as needed for mild pain 50 tablet 0     ASPIRIN 81 MG OR TABS ONE DAILY 100 3     folic acid (FOLVITE) 1 MG tablet Take 1 tablet (1,000 mcg) by mouth daily 90 tablet 2     hyoscyamine (LEVSIN) 0.125 MG tablet Take 1 tablet (125 mcg) by mouth every 6 hours as needed for cramping 12 tablet 0     ibuprofen (ADVIL/MOTRIN) 800 MG tablet Take 1 tablet (800 mg) by mouth every 8 hours as needed for moderate pain 90 tablet 0     levothyroxine (SYNTHROID/LEVOTHROID) 50 MCG tablet Take 1 tablet (50 mcg) by mouth daily 90 tablet 3     lisinopril-hydrochlorothiazide (ZESTORETIC) 10-12.5 MG tablet Take 1 tablet by mouth daily 90 tablet 3     methotrexate sodium 2.5 MG TABS Take 8 tablets (20 mg) by mouth once a week . Take all  8 tablets on the same day of each week. 104 tablet 2     MULTI-VITAMIN OR 1 daily       Probiotic Product (PROBIOTIC PO)        simvastatin (ZOCOR) 20 MG tablet TAKE ONE TABLET BY MOUTH AT BEDTIME 90 tablet 3     Facility-Administered Encounter Medications as of 3/28/2023   Medication Dose Route Frequency Provider Last Rate Last Admin     betamethasone acet & sod phos (CELESTONE) injection 6 mg  6 mg   Michael Loredo MD   6 mg at 10/01/21 1510     ropivacaine (NAROPIN) injection 0.5 mL  0.5 mL   Michael Loredo MD   0.5 mL at 10/01/21 1510             O:   NAD, WDWN, Alert & Oriented, Mood & Affect wnl, Vitals stable   Here today alone   LMP 11/12/2004 (Approximate)    General appearance normal   Vitals stable   Alert, oriented and in no acute distress      Following lymph nodes palpated: femoral no lad  L back stuck on papule      Stuck on papules and brown macules on trunk and ext   Red papules on trunk  Flesh colored papules on trunk     The remainder of the full exam was normal; the following areas were examined:  conjunctiva/lids, , neck, peripheral vascular system, abdomen, lymph nodes, digits/nails, eccrine and apocrine glands, scalp/hair, face, neck, chest, abdomen, buttocks, back, RUE, LUE, RLE, LLE       Eyes: Conjunctivae/lids:Normal     ENT: Lips, buccal mucosa, tongue: normal    MSK:Normal    Cardiovascular: peripheral edema none    Pulm: Breathing Normal    Lymph Nodes: No Head and Neck Lymphadenopathy     Neuro/Psych: Orientation:Alert and Orientedx3 ; Mood/Affect:normal       A/P:  1. Seborrheic keratosis, lentigo, angioma, dermal nevus, hx of melanoma  It was a pleasure speaking to Mine Meléndez today.  Previous clinic notes and pertinent laboratory tests were reviewed prior to Mine Meléndez's visit.  Nature of benign skin lesions dicussed with patient.  Signs and Symptoms of skin cancer discussed with patient.  Patient encouraged to perform monthly skin exams.  UV precautions reviewed  with patient.  Return to clinic 6 months        Again, thank you for allowing me to participate in the care of your patient.        Sincerely,        Pankaj Castro MD

## 2023-03-28 NOTE — PROGRESS NOTES
Mine Meléndez is an extremely pleasant 67 year old year old female patient here today for hx of melanoma 0.3mm on right thigh.  Today she notes spot on left back.   .   Patient states this has been present for a while.  Patient reports the following symptoms:  new.  Patient reports the following previous treatments none.  These treatments did not work.  Patient reports the following modifying factors none.  Associated symptoms: none.  Patient has no other skin complaints today.  Remainder of the HPI, Meds, PMH, Allergies, FH, and SH was reviewed in chart.      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/2008     Rheumatoid arthritis(714.0)      Trochanteric bursitis of right hip 8/16/2013       Past Surgical History:   Procedure Laterality Date     COLONOSCOPY  1/20/06     EXCISE MASS FOOT Left 10/5/2021    Procedure: EXCISION, MASS, LEFT FOOT;  Surgeon: Tommy Pelaez DPM;  Location: [facility]     HYSTERECTOMY, PAP NO LONGER INDICATED       HYSTERECTOMY, VAGINAL  11/04    TVH,TVT procedure     RELEASE CARPAL TUNNEL Right 10/11/2016    Procedure: RELEASE CARPAL TUNNEL;  Surgeon: Emely Blanca MD;  Location: [facility]     SURGICAL HISTORY OF -   1988    tubal ligation     SURGICAL HISTORY OF -   2001    excision gangliion cyst rt heel        Family History   Problem Relation Age of Onset     Diabetes Father      Dementia Father      Diabetes Brother      Diabetes Sister      Hypertension Sister      Hypertension Sister        Social History     Socioeconomic History     Marital status:      Spouse name: Not on file     Number of children: Not on file     Years of education: Not on file     Highest education level: Not on file   Occupational History     Not on file   Tobacco Use     Smoking status: Former     Years: 25.00     Types: Cigarettes     Quit  date: 2000     Years since quittin.9     Passive exposure: Never     Smokeless tobacco: Never   Vaping Use     Vaping Use: Never used   Substance and Sexual Activity     Alcohol use: Yes     Comment: 6 pack of beer on a weekend     Drug use: No     Sexual activity: Yes     Partners: Male     Birth control/protection: Surgical     Comment: [email or web address].partial   Other Topics Concern     Parent/sibling w/ CABG, MI or angioplasty before 65F 55M? No   Social History Narrative     Not on file     Social Determinants of Health     Financial Resource Strain: Not on file   Food Insecurity: Not on file   Transportation Needs: Not on file   Physical Activity: Not on file   Stress: Not on file   Social Connections: Not on file   Intimate Partner Violence: Not on file   Housing Stability: Not on file       Outpatient Encounter Medications as of 3/28/2023   Medication Sig Dispense Refill     acetaminophen (TYLENOL) 325 MG tablet Take 2 tablets (650 mg) by mouth every 4 hours as needed for mild pain 50 tablet 0     ASPIRIN 81 MG OR TABS ONE DAILY 100 3     folic acid (FOLVITE) 1 MG tablet Take 1 tablet (1,000 mcg) by mouth daily 90 tablet 2     hyoscyamine (LEVSIN) 0.125 MG tablet Take 1 tablet (125 mcg) by mouth every 6 hours as needed for cramping 12 tablet 0     ibuprofen (ADVIL/MOTRIN) 800 MG tablet Take 1 tablet (800 mg) by mouth every 8 hours as needed for moderate pain 90 tablet 0     levothyroxine (SYNTHROID/LEVOTHROID) 50 MCG tablet Take 1 tablet (50 mcg) by mouth daily 90 tablet 3     lisinopril-hydrochlorothiazide (ZESTORETIC) 10-12.5 MG tablet Take 1 tablet by mouth daily 90 tablet 3     methotrexate sodium 2.5 MG TABS Take 8 tablets (20 mg) by mouth once a week . Take all 8 tablets on the same day of each week. 104 tablet 2     MULTI-VITAMIN OR 1 daily       Probiotic Product (PROBIOTIC PO)        simvastatin (ZOCOR) 20 MG tablet TAKE ONE TABLET BY MOUTH AT BEDTIME 90 tablet 3     Facility-Administered Encounter  Medications as of 3/28/2023   Medication Dose Route Frequency Provider Last Rate Last Admin     betamethasone acet & sod phos (CELESTONE) injection 6 mg  6 mg   Michael Loredo MD   6 mg at 10/01/21 1510     ropivacaine (NAROPIN) injection 0.5 mL  0.5 mL   Michael Loredo MD   0.5 mL at 10/01/21 1510             O:   NAD, WDWN, Alert & Oriented, Mood & Affect wnl, Vitals stable   Here today alone   LMP 11/12/2004 (Approximate)    General appearance normal   Vitals stable   Alert, oriented and in no acute distress      Following lymph nodes palpated: femoral no lad  L back stuck on papule      Stuck on papules and brown macules on trunk and ext   Red papules on trunk  Flesh colored papules on trunk     The remainder of the full exam was normal; the following areas were examined:  conjunctiva/lids, , neck, peripheral vascular system, abdomen, lymph nodes, digits/nails, eccrine and apocrine glands, scalp/hair, face, neck, chest, abdomen, buttocks, back, RUE, LUE, RLE, LLE       Eyes: Conjunctivae/lids:Normal     ENT: Lips, buccal mucosa, tongue: normal    MSK:Normal    Cardiovascular: peripheral edema none    Pulm: Breathing Normal    Lymph Nodes: No Head and Neck Lymphadenopathy     Neuro/Psych: Orientation:Alert and Orientedx3 ; Mood/Affect:normal       A/P:  1. Seborrheic keratosis, lentigo, angioma, dermal nevus, hx of melanoma  It was a pleasure speaking to Mine Meléndez today.  Previous clinic notes and pertinent laboratory tests were reviewed prior to Mine Meléndez's visit.  Nature of benign skin lesions dicussed with patient.  Signs and Symptoms of skin cancer discussed with patient.  Patient encouraged to perform monthly skin exams.  UV precautions reviewed with patient.  Return to clinic 6 months

## 2023-04-05 ENCOUNTER — OFFICE VISIT (OUTPATIENT)
Dept: OPHTHALMOLOGY | Facility: CLINIC | Age: 68
End: 2023-04-05
Attending: OPHTHALMOLOGY
Payer: COMMERCIAL

## 2023-04-05 DIAGNOSIS — H02.132 SENILE ECTROPION OF RIGHT LOWER EYELID: ICD-10-CM

## 2023-04-05 DIAGNOSIS — H02.135 SENILE ECTROPION OF LEFT LOWER EYELID: ICD-10-CM

## 2023-04-05 DIAGNOSIS — H02.403 INVOLUTIONAL PTOSIS, ACQUIRED, BILATERAL: Primary | ICD-10-CM

## 2023-04-05 DIAGNOSIS — H02.834 DERMATOCHALASIS OF BOTH UPPER EYELIDS: ICD-10-CM

## 2023-04-05 DIAGNOSIS — H02.831 DERMATOCHALASIS OF BOTH UPPER EYELIDS: ICD-10-CM

## 2023-04-05 PROCEDURE — 92285 EXTERNAL OCULAR PHOTOGRAPHY: CPT | Performed by: OPHTHALMOLOGY

## 2023-04-05 PROCEDURE — 99214 OFFICE O/P EST MOD 30 MIN: CPT | Performed by: OPHTHALMOLOGY

## 2023-04-05 PROCEDURE — 92081 LIMITED VISUAL FIELD XM: CPT | Performed by: OPHTHALMOLOGY

## 2023-04-05 ASSESSMENT — CONF VISUAL FIELD
OS_INFERIOR_TEMPORAL_RESTRICTION: 0
OD_INFERIOR_TEMPORAL_RESTRICTION: 0
OD_NORMAL: 1
OD_SUPERIOR_TEMPORAL_RESTRICTION: 0
OS_INFERIOR_NASAL_RESTRICTION: 0
OD_SUPERIOR_NASAL_RESTRICTION: 0
OD_INFERIOR_NASAL_RESTRICTION: 0
OS_SUPERIOR_NASAL_RESTRICTION: 0
OS_NORMAL: 1
OS_SUPERIOR_TEMPORAL_RESTRICTION: 0

## 2023-04-05 ASSESSMENT — VISUAL ACUITY
OD_CC: 20/300
CORRECTION_TYPE: GLASSES
OS_CC: 20/50
METHOD: SNELLEN - LINEAR

## 2023-04-05 ASSESSMENT — TONOMETRY
OS_IOP_MMHG: 09
IOP_METHOD: ICARE
OD_IOP_MMHG: 09

## 2023-04-05 NOTE — PROGRESS NOTES
Oculoplastic Clinic New Patient    Patient: Mine Meléndez MRN# [id number]   YOB: 1955 Age: 67 year old   Date of Visit: Apr 5, 2023    CC: Droopy eyelids obstructing vision.              HPI:     Chief Complaint(s) and History of Present Illness(es)     Dermatochalasis Evaluation            Laterality: right upper lid and left upper lid          Comments    Patient is here for dermatochalasis evaluation of both upper eyelids.  She notes her vision is affected by the drooping of the eyelids. She has   been noticing it for about 6 months.      Mine Meléndez is a 67 year old female who has noted gradual onset of droopy eyelids over the past years. The droopy eyelid is interfering with activities of daily living including driving, and reading. The patient denies double vision, variability of the eyelid position, or dry eye symptoms. She does have watery eyes. She is on methotrexate for RA.     EXAM:     MRD1: 1.5 mm both eyes   Dermatochalasis with excess skin touching eyelashes Aponeurotic blepharoptosis    VISUAL FIELD:  Right eye untaped:15 degrees Right eye taped:38 degrees  Left eye untaped:20 degrees Left eye taped:40 degrees    Assessment & Plan     Mine Meléndez is a 67 year old female with the following diagnoses:   1. Involutional ptosis, acquired, bilateral    2. Dermatochalasis of both upper eyelids    3. Senile ectropion of right lower eyelid    4. Senile ectropion of left lower eyelid       We did discuss option of addressing lower eyelid ectropion at the same time. She does have epiphora, but finds it only minimally bothersome so will observe.    She is symptomatic of her dermatochalasis and blepharoptosis.    Plan:      Both upper eyelid blepharoplasty (SONCF) and Bilateral ptosis repair external levator approach    [id number]    ANTICOAGULATION:    Aspirin, Ibuprofen, MVI, can hold.          PHOTOS DEMONSTRATE:    Significant dermatochalasis with lids resting on eyelashes and  obstructing visual axis  Blepharoptosis  Both lower eyelid ectropion    Attending Physician Attestation: Complete documentation of historical and exam elements from today's encounter can be found in the full encounter summary report (not reduplicated in this progress note). I personally obtained the chief complaint(s) and history of present illness. I confirmed and edited as necessary the review of systems, past medical/surgical history, family history, social history, and examination findings as documented by others; and I examined the patient myself. I personally reviewed the relevant tests, images, and reports as documented above. I formulated and edited as necessary the assessment and plan and discussed the findings and management plan with the patient. Cl Pearl MD      Today with Mine Meléndez I reviewed the indications, risks, benefits, and alternatives of the proposed surgical procedure including, but not limited to, failure obtain the desired result  and need for additional surgery, bleeding, infection, loss of vision, loss of the eye, and the remote possibility of permanent damage to any organ system or death with the use of anesthesia.  I provided multiple opportunities for the questions, answered all questions to the best of my ability, and confirmed that my answers and my discussion were understood.

## 2023-04-05 NOTE — LETTER
4/5/2023         RE: Mine Meléndez  [id number] [facility] [address]        Dear Dr. Colon,    Thank you for referring your patient, Mine Meléndez, to the [facility]. Please see a copy of my visit note below.    Oculoplastic Clinic New Patient    Patient: Mine Meléndez MRN# [id number]   YOB: 1955 Age: 67 year old   Date of Visit: Apr 5, 2023    CC: Droopy eyelids obstructing vision.             HPI:     Chief Complaint(s) and History of Present Illness(es)     Dermatochalasis Evaluation            Laterality: right upper lid and left upper lid          Comments    Patient is here for dermatochalasis evaluation of both upper eyelids.  She notes her vision is affected by the drooping of the eyelids. She has   been noticing it for about 6 months.      Mine Meléndez is a 67 year old female who has noted gradual onset of droopy eyelids over the past years. The droopy eyelid is interfering with activities of daily living including driving, and reading. The patient denies double vision, variability of the eyelid position, or dry eye symptoms. She does have watery eyes. She is on methotrexate for RA.     EXAM:     MRD1: 1.5 mm both eyes   Dermatochalasis with excess skin touching eyelashes Aponeurotic blepharoptosis    VISUAL FIELD:  Right eye untaped:15 degrees Right eye taped:38 degrees  Left eye untaped:20 degrees Left eye taped:40 degrees    Assessment & Plan     Mine Meléndez is a 67 year old female with the following diagnoses:   1. Involutional ptosis, acquired, bilateral    2. Dermatochalasis of both upper eyelids    3. Senile ectropion of right lower eyelid    4. Senile ectropion of left lower eyelid       We did discuss option of addressing lower eyelid ectropion at the same time. She does have epiphora, but finds it only minimally bothersome so will observe.    She is symptomatic of her dermatochalasis and blepharoptosis.    Plan:      Both upper  eyelid blepharoplasty (SONCF) and Bilateral ptosis repair external levator approach    [id number]  [id number]    ANTICOAGULATION:    Aspirin, Ibuprofen, MVI, can hold.         PHOTOS DEMONSTRATE:    Significant dermatochalasis with lids resting on eyelashes and obstructing visual axis  Blepharoptosis  Both lower eyelid ectropion    Attending Physician Attestation: Complete documentation of historical and exam elements from today's encounter can be found in the full encounter summary report (not reduplicated in this progress note). I personally obtained the chief complaint(s) and history of present illness. I confirmed and edited as necessary the review of systems, past medical/surgical history, family history, social history, and examination findings as documented by others; and I examined the patient myself. I personally reviewed the relevant tests, images, and reports as documented above. I formulated and edited as necessary the assessment and plan and discussed the findings and management plan with the patient. Cl Pearl MD      Today with Mine Meléndez I reviewed the indications, risks, benefits, and alternatives of the proposed surgical procedure including, but not limited to, failure obtain the desired result  and need for additional surgery, bleeding, infection, loss of vision, loss of the eye, and the remote possibility of permanent damage to any organ system or death with the use of anesthesia.  I provided multiple opportunities for the questions, answered all questions to the best of my ability, and confirmed that my answers and my discussion were understood.           Again, thank you for allowing me to participate in the care of your patient.        Sincerely,        Cl Pearl MD

## 2023-04-05 NOTE — NURSING NOTE
Chief Complaints and History of Present Illnesses   Patient presents with     Dermatochalasis Evaluation       Chief Complaint(s) and History of Present Illness(es)     Dermatochalasis Evaluation            Laterality: right upper lid and left upper lid          Comments    Patient is here for dermatochalasis evaluation of both upper eyelids.  She notes her vision is affected by the drooping of the eyelids. She has been noticing it for about 6 months.                   ANDREIA Jaime  9:19 AM 04/05/2023

## 2023-04-24 ENCOUNTER — MYC MEDICAL ADVICE (OUTPATIENT)
Dept: FAMILY MEDICINE | Facility: CLINIC | Age: 68
End: 2023-04-24
Payer: COMMERCIAL

## 2023-05-01 ENCOUNTER — OFFICE VISIT (OUTPATIENT)
Dept: FAMILY MEDICINE | Facility: CLINIC | Age: 68
End: 2023-05-01
Payer: COMMERCIAL

## 2023-05-01 VITALS
HEART RATE: 81 BPM | WEIGHT: 188.8 LBS | DIASTOLIC BLOOD PRESSURE: 66 MMHG | SYSTOLIC BLOOD PRESSURE: 130 MMHG | BODY MASS INDEX: 30.34 KG/M2 | RESPIRATION RATE: 16 BRPM | OXYGEN SATURATION: 98 % | HEIGHT: 66 IN | TEMPERATURE: 98.7 F

## 2023-05-01 DIAGNOSIS — N89.8 VAGINAL DISCHARGE: Primary | ICD-10-CM

## 2023-05-01 LAB
CLUE CELLS: ABNORMAL
TRICHOMONAS, WET PREP: ABNORMAL
WBC'S/HIGH POWER FIELD, WET PREP: ABNORMAL
YEAST, WET PREP: ABNORMAL

## 2023-05-01 PROCEDURE — 87210 SMEAR WET MOUNT SALINE/INK: CPT | Performed by: NURSE PRACTITIONER

## 2023-05-01 PROCEDURE — 99214 OFFICE O/P EST MOD 30 MIN: CPT | Performed by: NURSE PRACTITIONER

## 2023-05-01 NOTE — PROGRESS NOTES
"  Assessment & Plan     Vaginal discharge  ? fistula  - Wet prep - Clinic Collect             BMI:   Estimated body mass index is 30.71 kg/m  as calculated from the following:    Height as of this encounter: 1.67 m (5' 5.75\").    Weight as of this encounter: 85.6 kg (188 lb 12.8 oz).       FURTHER TESTING:       - pelvic ultrasound- will get MRI if unremarkable.     FUTURE APPOINTMENTS:       - Follow-up visit in case of new or worsening symptoms    See Patient Instructions    Time spent in chart review in preparation to see patient, time with patient for interview/exam, ordering medications/tests/and/or procedures, and time spent in charting and coordinating care: 30 minutes.     ISRAEL Marie CNP [facility]   Helena is a 67 year old, presenting for the following health issues:  Vaginal Bleeding        5/1/2023     2:32 PM   Additional Questions   Roomed by Leeann ROLAND     History of Present Illness       Reason for visit:  Menstral cycle  Symptom onset:  3-7 days ago  Symptoms include:  Leakage of brownish grey stuff  Symptom intensity:  Mild  Symptom progression:  Improving  Had these symptoms before:  Yes  Has tried/received treatment for these symptoms:  No  What makes it worse:  No  What makes it better:  No    She eats 2-3 servings of fruits and vegetables daily.She consumes 2 sweetened beverage(s) daily.She exercises with enough effort to increase her heart rate 9 or less minutes per day.  She exercises with enough effort to increase her heart rate 3 or less days per week.   She is taking medications regularly.       Menstrual Concern  Onset/Duration: a month ago, and then again last week   Description:   Duration of bleeding episodes: 5-7 days  Frequency of periods: (1st day of one to 1st day of next): postpenopausal   Describe bleeding/flow:   Clots: No  Number of pads/day: 3 light day pads         Cramping: mild and during  Accompanying Signs & " "Symptoms:  Lightheadedness: No  Temperature intolerance: No  Nosebleeds/Easy bruising: No  Vaginal Discharge: YES- brownish gray   Acne: No  Change in body hair: No  History:  Patient's last menstrual period was 11/12/2004 (approximate).  Previous normal periods: no - partial hysterectomy 2004- still has ovaries  Contraceptive use: NO  Sexually active: No  Any bleeding after intercourse: No  Abnormal PAP Smears: No  Precipitating or alleviating factors: None  Therapies tried and outcome: None          Review of Systems   Constitutional, HEENT, cardiovascular, pulmonary, gi and gu systems are negative, except as otherwise noted.      Objective    /66   Pulse 81   Temp 98.7  F (37.1  C) (Tympanic)   Resp 16   Ht 1.67 m (5' 5.75\")   Wt 85.6 kg (188 lb 12.8 oz)   LMP 11/12/2004 (Approximate)   SpO2 98%   BMI 30.71 kg/m    Body mass index is 30.71 kg/m .  Physical Exam   GENERAL: healthy, alert and no distress  RESP: lungs clear to auscultation - no rales, rhonchi or wheezes  CV: regular rates and rhythm, normal S1 S2, no S3 or S4 and no murmur, click or rub  ABDOMEN: soft, nontender, without hepatosplenomegaly or masses and no palpable or pulsatile masses   (female): normal female external genitalia, normal urethral meatus , vaginal discharge - yellow, thick and malodorous, vaginal mucosal atrophy and normal post-hysterectomy exam without masses.   NEURO: Normal strength and tone, mentation intact and speech normal    Results for orders placed or performed in visit on 05/01/23 (from the past 24 hour(s))   Wet prep - Clinic Collect    Specimen: Vagina; Swab   Result Value Ref Range    Trichomonas Absent Absent    Yeast Absent Absent    Clue Cells Absent Absent    WBCs/high power field 3+ (A) None               "

## 2023-05-02 ENCOUNTER — TELEPHONE (OUTPATIENT)
Dept: FAMILY MEDICINE | Facility: CLINIC | Age: 68
End: 2023-05-02
Payer: COMMERCIAL

## 2023-05-02 NOTE — TELEPHONE ENCOUNTER
Quin Rowan called from [facility] Ultrasound, pt has appt tomorrow at 10:50 and needs orders corrected for Pelvic Ultrasound, can you put in new orders with a reason for visit?

## 2023-05-03 ENCOUNTER — HOSPITAL ENCOUNTER (OUTPATIENT)
Dept: ULTRASOUND IMAGING | Facility: CLINIC | Age: 68
Discharge: HOME | End: 2023-05-03
Attending: NURSE PRACTITIONER | Admitting: NURSE PRACTITIONER
Payer: COMMERCIAL

## 2023-05-03 ENCOUNTER — OFFICE VISIT (OUTPATIENT)
Dept: OBGYN | Facility: CLINIC | Age: 68
End: 2023-05-03
Payer: COMMERCIAL

## 2023-05-03 VITALS
BODY MASS INDEX: 31.32 KG/M2 | HEART RATE: 88 BPM | HEIGHT: 65 IN | RESPIRATION RATE: 16 BRPM | SYSTOLIC BLOOD PRESSURE: 133 MMHG | WEIGHT: 188 LBS | DIASTOLIC BLOOD PRESSURE: 62 MMHG | TEMPERATURE: 98.8 F

## 2023-05-03 DIAGNOSIS — N89.8 VAGINAL DISCHARGE: ICD-10-CM

## 2023-05-03 DIAGNOSIS — N89.8 VAGINAL DISCHARGE: Primary | ICD-10-CM

## 2023-05-03 PROCEDURE — 99204 OFFICE O/P NEW MOD 45 MIN: CPT | Performed by: OBSTETRICS & GYNECOLOGY

## 2023-05-03 PROCEDURE — 76856 US EXAM PELVIC COMPLETE: CPT

## 2023-05-03 NOTE — PROGRESS NOTES
[facility]  OB/GYN Clinic   Gynecology Consult Note    CC:  Chief Complaint   Patient presents with     Consult     Post menopausal bleeding       HPI: Ms. Meléndez is a 67 year old  being seen for GYN consultation for ?PMB.  Three weeks ago starting having a brownish discharge. No vaginal itching or burning with discharge. No inciting event. No fevers.     GYN Hx: No PMB. Used HRT for 5 years after hysterectomy..    Denies any hx of STI or PID.   Not sexually active. No abnormal PAP smears.   Thinks she had a partial hysterectomy? Not a whole one. Unsure why she had a hysterectomy, age 49. Hx says TVH.     ROS: A 10 pt ROS was completed and found to be otherwise negative unless mentioned in the HPI.     PMH:   Past Medical History:   Diagnosis Date     Herpes simplex without mention of complication      Hypertension      Malignant melanoma (H)      Obesity, unspecified      Osteomyelitis of jaw 2008     Rheumatoid arthritis(714.0)      Trochanteric bursitis of right hip 2013       PSHx:   Past Surgical History:   Procedure Laterality Date     COLONOSCOPY  06     EXCISE MASS FOOT Left 10/5/2021    Procedure: EXCISION, MASS, LEFT FOOT;  Surgeon: Tommy Pelaez DPM;  Location: [facility] OR     HYSTERECTOMY, PAP NO LONGER INDICATED       HYSTERECTOMY, VAGINAL      TVH,TVT procedure     RELEASE CARPAL TUNNEL Right 10/11/2016    Procedure: RELEASE CARPAL TUNNEL;  Surgeon: Emely Blanca MD;  Location: [facility] OR     SURGICAL HISTORY OF -       tubal ligation     SURGICAL HISTORY OF -       excision gangliion cyst rt heel       OBHx:   OB History    Para Term  AB Living   4 4 4 0 0 2   SAB IAB Ectopic Multiple Live Births   0 0 0 0 0      # Outcome Date GA Lbr Ej/2nd Weight Sex Delivery Anes PTL Lv   4 Term            3 Term            2 Term            1 Term               Obstetric Comments    x 2       Medications:   acetaminophen (TYLENOL) 325 MG  tablet, Take 2 tablets (650 mg) by mouth every 4 hours as needed for mild pain  ASPIRIN 81 MG OR TABS, ONE DAILY  folic acid (FOLVITE) 1 MG tablet, Take 1 tablet (1,000 mcg) by mouth daily  hyoscyamine (LEVSIN) 0.125 MG tablet, Take 1 tablet (125 mcg) by mouth every 6 hours as needed for cramping  ibuprofen (ADVIL/MOTRIN) 800 MG tablet, Take 1 tablet (800 mg) by mouth every 8 hours as needed for moderate pain  levothyroxine (SYNTHROID/LEVOTHROID) 50 MCG tablet, Take 1 tablet (50 mcg) by mouth daily  lisinopril-hydrochlorothiazide (ZESTORETIC) 10-12.5 MG tablet, Take 1 tablet by mouth daily  methotrexate sodium 2.5 MG TABS, Take 8 tablets (20 mg) by mouth once a week . Take all 8 tablets on the same day of each week.  MULTI-VITAMIN OR, 1 daily  Probiotic Product (PROBIOTIC PO),   simvastatin (ZOCOR) 20 MG tablet, TAKE ONE TABLET BY MOUTH AT BEDTIME    betamethasone acet & sod phos (CELESTONE) injection 6 mg  ropivacaine (NAROPIN) injection 0.5 mL        Allergies:      Allergies   Allergen Reactions     Flagyl [Metronidazole] Rash     Morphine And Related Rash     Septra [Sulfamethoxazole-Trimethoprim] Itching     Puffy eyes, flushed.        Social History:   Social History     Socioeconomic History     Marital status:      Spouse name: Not on file     Number of children: Not on file     Years of education: Not on file     Highest education level: Not on file   Occupational History     Not on file   Tobacco Use     Smoking status: Former     Years: 25.00     Types: Cigarettes     Quit date: 2000     Years since quittin.0     Passive exposure: Never     Smokeless tobacco: Never   Vaping Use     Vaping status: Never Used     Passive vaping exposure: Yes   Substance and Sexual Activity     Alcohol use: Yes     Comment: 6 pack of beer on a weekend     Drug use: No     Sexual activity: Yes     Partners: Male     Birth control/protection: Surgical     Comment: [email or web address]   Other Topics Concern      "Parent/sibling w/ CABG, MI or angioplasty before 65F 55M? No   Social History Narrative     Not on file     Social Determinants of Health     Financial Resource Strain: Not on file   Food Insecurity: Not on file   Transportation Needs: Not on file   Physical Activity: Not on file   Stress: Not on file   Social Connections: Not on file   Intimate Partner Violence: Not on file   Housing Stability: Not on file     Social History     Socioeconomic History     Marital status:      Spouse name: None     Number of children: None     Years of education: None     Highest education level: None   Tobacco Use     Smoking status: Former     Years: 25.00     Types: Cigarettes     Quit date: 2000     Years since quittin.0     Passive exposure: Never     Smokeless tobacco: Never   Vaping Use     Vaping status: Never Used     Passive vaping exposure: Yes   Substance and Sexual Activity     Alcohol use: Yes     Comment: 6 pack of beer on a weekend     Drug use: No     Sexual activity: Yes     Partners: Male     Birth control/protection: Surgical     Comment: [email or web address]   Other Topics Concern     Parent/sibling w/ CABG, MI or angioplasty before 65F 55M? No       Family History:   Family History   Problem Relation Age of Onset     Diabetes Father      Dementia Father      Diabetes Brother      Diabetes Sister      Hypertension Sister      Hypertension Sister    No uterine, cervical or ovarian cancer on her family.     Physical Exam:   Vitals:    23 1339   BP: 133/62   BP Location: Left arm   Patient Position: Chair   Cuff Size: Adult Regular   Pulse: 88   Resp: 16   Temp: 98.8  F (37.1  C)   TempSrc: Tympanic   Weight: 85.3 kg (188 lb)   Height: 1.651 m (5' 5\")      Estimated body mass index is 31.28 kg/m  as calculated from the following:    Height as of this encounter: 1.651 m (5' 5\").    Weight as of this encounter: 85.3 kg (188 lb).    Gen: Pleasant, talkative female in no apparent distress   Respiratory: " breathing comfortably on room air   Cardiac: Regular rate, warm and well-perfused.   GI: Abd soft and non-tender  : External genitalia is free of lesion with mod vulvovaginal atrophy, narrowed introitus. There is a brown purulent discharge in the vagina, not obviously fecal material. At the left side of the vaginal cuff, there is a small vascularized mass, ?granulation tissue vs prolapsed fimbriae of a fallopian tube. I did probe the area with a q-tip and was not able to appreciate a tunnel or opening. Not clear that the brown purulent material is coming from this area.   MSK: Grossly normal movement of all four extremities  Psych: mood and affect bright   Lower extremity: edema not present     Labs/Imaging:   US PELVIC TRANSABDOMINAL AND TRANSVAGINAL 5/3/2023 11:19 AM     CLINICAL HISTORY: Vaginal discharge - question fistula.      TECHNIQUE: Transabdominal scans were performed. Endovaginal ultrasound  was performed to better visualize the adnexa.     COMPARISON: None.     FINDINGS:     UTERUS: Uterus is obscured by bowel gas but is estimated at 2.7 x 1.9  x 1.3 cm. Very limited images of the uterus appear grossly  unremarkable.     ENDOMETRIUM: Not seen.     RIGHT OVARY: Not seen due to bowel gas.     LEFT OVARY: Not seen due to bowel gas.     No significant free fluid.                                                                      IMPRESSION: Very limited exam due to bowel gas. Consider further  evaluation with contrast-enhanced CT of the abdomen and pelvis if  clinically indicated.      Wet prep: +3 WBCs    A&P: 66 yo F with brown purulent discharge and vaginal cuff mass.   I did discuss prudence for further eval for possible rectovaginal fistula with a CT scan with rectal contrast (per Rads req). Additionally, desquamative inflammatory vaginitis is also high on the differential diagnosis.   I did discuss need for excision/biopsy of this vaginal cuff mass ?prolapsed fallopian tubes vs granulation tissue.  I am not sure if this is connected to this possible fistula, so will hold off until imaging is complete. She prefers this to be done in the office instead of OR.     I spent 20 min with the patient and then an additional 25 min in chart review, documentation and care coordination.     Tiffany Cantor MD  OB/GYN  5/3/2023

## 2023-05-03 NOTE — NURSING NOTE
"Initial /62 (BP Location: Left arm, Patient Position: Chair, Cuff Size: Adult Regular)   Pulse 88   Temp 98.8  F (37.1  C) (Tympanic)   Resp 16   Ht 1.651 m (5' 5\")   Wt 85.3 kg (188 lb)   LMP 11/12/2004 (Approximate)   BMI 31.28 kg/m   Estimated body mass index is 31.28 kg/m  as calculated from the following:    Height as of this encounter: 1.651 m (5' 5\").    Weight as of this encounter: 85.3 kg (188 lb). .      "

## 2023-05-04 NOTE — RESULT ENCOUNTER NOTE
Nae Malik    Your ultrasound was unremarkable- I see GYN had already discussed and ordered a CT scan. Please let us know if you have any questions.     Take care,    ISRAEL Maurice CNP

## 2023-05-12 ENCOUNTER — HOSPITAL ENCOUNTER (OUTPATIENT)
Dept: CT IMAGING | Facility: CLINIC | Age: 68
Discharge: HOME | End: 2023-05-12
Attending: OBSTETRICS & GYNECOLOGY | Admitting: OBSTETRICS & GYNECOLOGY
Payer: COMMERCIAL

## 2023-05-12 DIAGNOSIS — N89.8 VAGINAL DISCHARGE: ICD-10-CM

## 2023-05-12 PROCEDURE — 74177 CT ABD & PELVIS W/CONTRAST: CPT

## 2023-05-12 PROCEDURE — 250N000009 HC RX 250: Performed by: OBSTETRICS & GYNECOLOGY

## 2023-05-12 PROCEDURE — 250N000011 HC RX IP 250 OP 636: Performed by: OBSTETRICS & GYNECOLOGY

## 2023-05-12 RX ORDER — IOPAMIDOL 755 MG/ML
83 INJECTION, SOLUTION INTRAVASCULAR ONCE
Status: COMPLETED | OUTPATIENT
Start: 2023-05-12 | End: 2023-05-12

## 2023-05-12 RX ADMIN — SODIUM CHLORIDE 63 ML: 9 INJECTION, SOLUTION INTRAVENOUS at 10:41

## 2023-05-12 RX ADMIN — IOPAMIDOL 83 ML: 755 INJECTION, SOLUTION INTRAVENOUS at 10:41

## 2023-05-15 ENCOUNTER — OFFICE VISIT (OUTPATIENT)
Dept: OBGYN | Facility: CLINIC | Age: 68
End: 2023-05-15
Payer: COMMERCIAL

## 2023-05-15 VITALS
BODY MASS INDEX: 30.66 KG/M2 | SYSTOLIC BLOOD PRESSURE: 138 MMHG | HEIGHT: 65 IN | DIASTOLIC BLOOD PRESSURE: 81 MMHG | HEART RATE: 81 BPM | TEMPERATURE: 98.3 F | RESPIRATION RATE: 16 BRPM | WEIGHT: 184 LBS

## 2023-05-15 DIAGNOSIS — N76.0 ACUTE VAGINITIS: ICD-10-CM

## 2023-05-15 DIAGNOSIS — N89.8 VAGINAL LESION: Primary | ICD-10-CM

## 2023-05-15 PROCEDURE — 88305 TISSUE EXAM BY PATHOLOGIST: CPT | Performed by: PATHOLOGY

## 2023-05-15 PROCEDURE — 99213 OFFICE O/P EST LOW 20 MIN: CPT | Mod: 25 | Performed by: OBSTETRICS & GYNECOLOGY

## 2023-05-15 PROCEDURE — 57135 EXCISION VAGINAL CYST/TUMOR: CPT | Performed by: OBSTETRICS & GYNECOLOGY

## 2023-05-15 PROCEDURE — 88312 SPECIAL STAINS GROUP 1: CPT | Performed by: PATHOLOGY

## 2023-05-15 RX ORDER — CLINDAMYCIN PHOSPHATE 20 MG/G
1 CREAM VAGINAL AT BEDTIME
Qty: 40 G | Refills: 0 | Status: SHIPPED | OUTPATIENT
Start: 2023-05-15 | End: 2023-05-25

## 2023-05-15 NOTE — PROGRESS NOTES
[facility]  OB/GYN Clinic   Gynecology Consult Note     CC: F/U vaginal discharge and vaginal cuff mass    HPI: Ms. Meléndez is a 67 year old  being seen for GYN consultation for the aforementioned chief complaint.   Continues to have the same brownish white discharge. Has had three days of copious diarrhea from the rectal contrast.     GYN Hx: No PMB. Used HRT for 5 years after hysterectomy..    Denies any hx of STI or PID.   Not sexually active. No abnormal PAP smears.   Thinks she had a partial hysterectomy? Not a whole one. Unsure why she had a hysterectomy, age 49. Hx says TVH.      ROS: A 10 pt ROS was completed and found to be otherwise negative unless mentioned in the HPI.      PMH:   Past Medical History        Past Medical History:   Diagnosis Date     Herpes simplex without mention of complication       Hypertension       Malignant melanoma (H)       Obesity, unspecified       Osteomyelitis of jaw 2008     Rheumatoid arthritis(714.0)       Trochanteric bursitis of right hip 2013            PSHx:   Past Surgical History         Past Surgical History:   Procedure Laterality Date     COLONOSCOPY   06     EXCISE MASS FOOT Left 10/5/2021     Procedure: EXCISION, MASS, LEFT FOOT;  Surgeon: Tommy Pelaez DPM;  Location: [facility] OR     HYSTERECTOMY, PAP NO LONGER INDICATED         HYSTERECTOMY, VAGINAL        TVH,TVT procedure     RELEASE CARPAL TUNNEL Right 10/11/2016     Procedure: RELEASE CARPAL TUNNEL;  Surgeon: Emely Blanca MD;  Location: [facility] OR     SURGICAL HISTORY OF -         tubal ligation     SURGICAL HISTORY OF -         excision gangliion cyst rt heel            OBHx:                    OB History    Para Term  AB Living   4 4 4 0 0 2   SAB IAB Ectopic Multiple Live Births      0 0 0 0 0          # Outcome Date GA Lbr Ej/2nd Weight Sex Delivery Anes PTL Lv   4 Term                     3 Term                     2 Term                      1 Term                         Obstetric Comments    x 2         Medications:   acetaminophen (TYLENOL) 325 MG tablet, Take 2 tablets (650 mg) by mouth every 4 hours as needed for mild pain  ASPIRIN 81 MG OR TABS, ONE DAILY  folic acid (FOLVITE) 1 MG tablet, Take 1 tablet (1,000 mcg) by mouth daily  hyoscyamine (LEVSIN) 0.125 MG tablet, Take 1 tablet (125 mcg) by mouth every 6 hours as needed for cramping  ibuprofen (ADVIL/MOTRIN) 800 MG tablet, Take 1 tablet (800 mg) by mouth every 8 hours as needed for moderate pain  levothyroxine (SYNTHROID/LEVOTHROID) 50 MCG tablet, Take 1 tablet (50 mcg) by mouth daily  lisinopril-hydrochlorothiazide (ZESTORETIC) 10-12.5 MG tablet, Take 1 tablet by mouth daily  methotrexate sodium 2.5 MG TABS, Take 8 tablets (20 mg) by mouth once a week . Take all 8 tablets on the same day of each week.  MULTI-VITAMIN OR, 1 daily  Probiotic Product (PROBIOTIC PO),   simvastatin (ZOCOR) 20 MG tablet, TAKE ONE TABLET BY MOUTH AT BEDTIME     betamethasone acet & sod phos (CELESTONE) injection 6 mg  ropivacaine (NAROPIN) injection 0.5 mL           Allergies:            Allergies   Allergen Reactions     Flagyl [Metronidazole] Rash     Morphine And Related Rash     Septra [Sulfamethoxazole-Trimethoprim] Itching       Puffy eyes, flushed.          Social History:   Social History   Social History            Socioeconomic History     Marital status:        Spouse name: Not on file     Number of children: Not on file     Years of education: Not on file     Highest education level: Not on file   Occupational History     Not on file   Tobacco Use     Smoking status: Former       Years: 25.00       Types: Cigarettes       Quit date: 2000       Years since quittin.0       Passive exposure: Never     Smokeless tobacco: Never   Vaping Use     Vaping status: Never Used       Passive vaping exposure: Yes   Substance and Sexual Activity     Alcohol use: Yes       Comment: 6  "pack of beer on a weekend     Drug use: No     Sexual activity: Yes       Partners: Male       Birth control/protection: Surgical       Comment: [email or web address]   Other Topics Concern     Parent/sibling w/ CABG, MI or angioplasty before 65F 55M? No   Social History Narrative     Not on file      Social Determinants of Health      Financial Resource Strain: Not on file   Food Insecurity: Not on file   Transportation Needs: Not on file   Physical Activity: Not on file   Stress: Not on file   Social Connections: Not on file   Intimate Partner Violence: Not on file   Housing Stability: Not on file         Social History            Socioeconomic History     Marital status:        Spouse name: None     Number of children: None     Years of education: None     Highest education level: None   Tobacco Use     Smoking status: Former       Years: 25.00       Types: Cigarettes       Quit date: 2000       Years since quittin.0       Passive exposure: Never     Smokeless tobacco: Never   Vaping Use     Vaping status: Never Used       Passive vaping exposure: Yes   Substance and Sexual Activity     Alcohol use: Yes       Comment: 6 pack of beer on a weekend     Drug use: No     Sexual activity: Yes       Partners: Male       Birth control/protection: Surgical       Comment: [email or web address].partial   Other Topics Concern     Parent/sibling w/ CABG, MI or angioplasty before 65F 55M? No         Family History:   Family History         Family History   Problem Relation Age of Onset     Diabetes Father       Dementia Father       Diabetes Brother       Diabetes Sister       Hypertension Sister       Hypertension Sister        No uterine, cervical or ovarian cancer on her family.      Physical Exam:   /81 (BP Location: Right arm, Patient Position: Chair, Cuff Size: Adult Regular)   Pulse 81   Temp 98.3  F (36.8  C) (Tympanic)   Resp 16   Ht 1.651 m (5' 5\")   Wt 83.5 kg (184 lb)   LMP 2004 (Approximate)   BMI " 30.62 kg/m       Gen: Pleasant, talkative female in no apparent distress   Respiratory: breathing comfortably on room air   Cardiac: Regular rate, warm and well-perfused.   GI: Abd soft and non-tender  : External genitalia is free of lesion with mod vulvovaginal atrophy, narrowed introitus. There is a brown purulent discharge in the vagina, not obviously fecal material. At the left side of the vaginal cuff, there is a small vascularized mass, ?granulation tissue vs prolapsed fimbriae of a fallopian tube  MSK: Grossly normal movement of all four extremities  Psych: mood and affect bright   Lower extremity: edema not present      Labs/Imaging:   CT ABDOMEN AND PELVIS WITH CONTRAST 5/12/2023 11:29 AM     CLINICAL HISTORY: Concern for rectovaginal fistula. Vaginal discharge.     TECHNIQUE: CT scan of the abdomen and pelvis was performed following  injection of IV contrast. Multiplanar reformats were obtained. Dose  reduction techniques were used.     CONTRAST: 83mL Isovue-370     COMPARISON: None.     FINDINGS:   LOWER CHEST: Normal.     HEPATOBILIARY: Diffuse hepatic steatosis. No significant mass. No bile  duct dilatation. No calcified gallstones.     PANCREAS: Normal.     SPLEEN: Normal.     ADRENAL GLANDS: Normal.     KIDNEYS/BLADDER: Normal.     BOWEL: A thin soft tissue band containing a single focus of gas is  seen centrally in the low pelvis, suggestive of the superior most  aspect of the vaginal cuff/cervix (coronal series 4, images 68 and  69). No contrast material is seen along this pelvic soft tissue  band/vaginal cuff. The vaginal cuff lies in close proximity to the mid  to high rectum and sigmoid colon. Thin soft tissue strands extends  from the vaginal cuff to the adjacent colon, suggestive of scarring.  No contrast is seen along these thin bandlike areas (axial series 3,  images 179-184). There is circumferential wall thickening involving a  short segment of the sigmoid colon with multiple colonic  diverticula  in this region, which results in mild to moderate luminal narrowing.  The inflamed portion of the sigmoid colon lies in close proximity to  the vaginal cuff with loss of normal fat planes. No definite  contrast-filled fistula track from the inflamed colon to the vaginal  cuff is seen. Contrast material in this region appears to be contained  within the lumen of the sigmoid colon (coronal series 4, image 62). No  contrast material is seen along the vagina. There is mild fat  stranding and edema in the pelvis. The small bowel loops are  noninflamed. No signs of bowel obstruction.     PELVIC ORGANS: The uterus is absent. Please see bowel section above  for additional details. No cystic or solid adnexal mass. No free  pelvic fluid.     ADDITIONAL FINDINGS: None.      MUSCULOSKELETAL: Mild multilevel degenerative changes of the spine.  Mild anterolisthesis of L4 on L5 of a few millimeters. No acute  osseous abnormality.                                                                      IMPRESSION:   1. 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 No definite colovaginal fistula or  extravasation of contrast along the vagina.  2. Prior hysterectomy.  3. Hepatic steatosis.     JEFRY BRITO MD           Wet prep: +3 WBCs     A&P: 66 yo F with brown purulent discharge and vaginal cuff mass.   CT scan reviewed with no evidence for active fistula.   Will treat for desquamative inflammatory vaginitis with clindamycin inserts.   Plan for excision/biopsy of this vaginal cuff mass ?prolapsed fallopian tubes vs granulation tissue.    Vaginal Cuff Biopsy Procedure Note    Mine HEATHER Medhat  1955  [id number]    The patient was counseled on the risks (including pain and bleeding). Verbal and written consent were obtained.    Technique: The patient was placed in the dorsal lithotomy position.  A  speculum was placed in the vagina and the vaginal cuff mass was visualized, grapsed with a clamp and excised. Silver nitrate was used to coagulate and cauterize the base. The patient tolerated the procedure well and there were no complications. EBL: 0cc.      I spent 10 min with the patient (this does not include the time for the procedure) and then an additional 10 min in chart review, documentation and care coordination.      Tiffany Cantor MD  OB/GYN  5/15/2023

## 2023-05-15 NOTE — NURSING NOTE
"Initial /81 (BP Location: Right arm, Patient Position: Chair, Cuff Size: Adult Regular)   Pulse 81   Temp 98.3  F (36.8  C) (Tympanic)   Resp 16   Ht 1.651 m (5' 5\")   Wt 83.5 kg (184 lb)   LMP 11/12/2004 (Approximate)   BMI 30.62 kg/m   Estimated body mass index is 30.62 kg/m  as calculated from the following:    Height as of this encounter: 1.651 m (5' 5\").    Weight as of this encounter: 83.5 kg (184 lb). .      "

## 2023-05-17 ENCOUNTER — ANCILLARY PROCEDURE (OUTPATIENT)
Dept: MAMMOGRAPHY | Facility: CLINIC | Age: 68
End: 2023-05-17
Attending: FAMILY MEDICINE
Payer: COMMERCIAL

## 2023-05-17 DIAGNOSIS — Z12.31 VISIT FOR SCREENING MAMMOGRAM: ICD-10-CM

## 2023-05-17 PROCEDURE — 77063 BREAST TOMOSYNTHESIS BI: CPT | Mod: TC | Performed by: RADIOLOGY

## 2023-05-17 PROCEDURE — 77067 SCR MAMMO BI INCL CAD: CPT | Mod: TC | Performed by: RADIOLOGY

## 2023-05-18 LAB
PATH REPORT.ADDENDUM SPEC: NORMAL
PATH REPORT.COMMENTS IMP SPEC: NORMAL
PATH REPORT.FINAL DX SPEC: NORMAL
PATH REPORT.GROSS SPEC: NORMAL
PATH REPORT.MICROSCOPIC SPEC OTHER STN: NORMAL
PATH REPORT.RELEVANT HX SPEC: NORMAL
PHOTO IMAGE: NORMAL

## 2023-06-01 ENCOUNTER — LAB (OUTPATIENT)
Dept: LAB | Facility: CLINIC | Age: 68
End: 2023-06-01
Payer: COMMERCIAL

## 2023-06-01 ENCOUNTER — OFFICE VISIT (OUTPATIENT)
Dept: FAMILY MEDICINE | Facility: CLINIC | Age: 68
End: 2023-06-01
Payer: COMMERCIAL

## 2023-06-01 VITALS
TEMPERATURE: 98.8 F | HEART RATE: 91 BPM | RESPIRATION RATE: 20 BRPM | BODY MASS INDEX: 30.72 KG/M2 | HEIGHT: 65 IN | DIASTOLIC BLOOD PRESSURE: 66 MMHG | SYSTOLIC BLOOD PRESSURE: 139 MMHG | OXYGEN SATURATION: 97 % | WEIGHT: 184.38 LBS

## 2023-06-01 DIAGNOSIS — I10 ESSENTIAL HYPERTENSION: ICD-10-CM

## 2023-06-01 DIAGNOSIS — E78.5 HYPERLIPIDEMIA LDL GOAL <130: ICD-10-CM

## 2023-06-01 DIAGNOSIS — Z01.818 PREOP GENERAL PHYSICAL EXAM: ICD-10-CM

## 2023-06-01 DIAGNOSIS — H02.403 PTOSIS OF EYELID, BILATERAL: ICD-10-CM

## 2023-06-01 DIAGNOSIS — Z01.818 PREOP GENERAL PHYSICAL EXAM: Primary | ICD-10-CM

## 2023-06-01 DIAGNOSIS — E03.9 HYPOTHYROIDISM, UNSPECIFIED TYPE: ICD-10-CM

## 2023-06-01 DIAGNOSIS — M05.9 SEROPOSITIVE RHEUMATOID ARTHRITIS (H): ICD-10-CM

## 2023-06-01 LAB
ANION GAP SERPL CALCULATED.3IONS-SCNC: 8 MMOL/L (ref 7–15)
BUN SERPL-MCNC: 9.5 MG/DL (ref 8–23)
CALCIUM SERPL-MCNC: 9.5 MG/DL (ref 8.8–10.2)
CHLORIDE SERPL-SCNC: 98 MMOL/L (ref 98–107)
CREAT SERPL-MCNC: 0.5 MG/DL (ref 0.51–0.95)
DEPRECATED HCO3 PLAS-SCNC: 31 MMOL/L (ref 22–29)
GFR SERPL CREATININE-BSD FRML MDRD: >90 ML/MIN/1.73M2
GLUCOSE SERPL-MCNC: 113 MG/DL (ref 70–99)
POTASSIUM SERPL-SCNC: 3.8 MMOL/L (ref 3.4–5.3)
SODIUM SERPL-SCNC: 137 MMOL/L (ref 136–145)

## 2023-06-01 PROCEDURE — 36415 COLL VENOUS BLD VENIPUNCTURE: CPT

## 2023-06-01 PROCEDURE — 99214 OFFICE O/P EST MOD 30 MIN: CPT | Performed by: FAMILY MEDICINE

## 2023-06-01 PROCEDURE — 80048 BASIC METABOLIC PNL TOTAL CA: CPT

## 2023-06-01 ASSESSMENT — PAIN SCALES - GENERAL: PAINLEVEL: NO PAIN (0)

## 2023-06-01 NOTE — PROGRESS NOTES
[facility]  [address] [facility] [address]  Phone: [phone]  Primary Provider: Britany Wong  Pre-op Performing Provider: BRITANY WONG      PREOPERATIVE EVALUATION:  Today's date: 6/1/2023    Mine Meléndez is a 68 year old female who presents for a preoperative evaluation.    Surgical Information:  Surgery/Procedure: Bilateral upper eyelid blepharoplasty and ptosis repair  Surgery Location: [facility]  Surgeon: Dae  Surgery Date: 6/19/2023  Time of Surgery: 8:35AM  Where patient plans to recover: At home with family  Fax number for surgical facility: Note does not need to be faxed, will be available electronically in Epic.    Assessment & Plan     The proposed surgical procedure is considered LOW risk.    Preop general physical exam     - Basic metabolic panel  (Ca, Cl, CO2, Creat, Gluc, K, Na, BUN); Future    Ptosis of eyelid, bilateral       Hypothyroidism, unspecified type  euthyroid    Hyperlipidemia LDL goal <130   on statin    Essential hypertension  Hold lisinopril/hctz the day of surgery  Adequate blood pressure control    Seropositive rheumatoid arthritis (H)  Okay to continue methotrexate               Risks and Recommendations:  The patient has the following additional risks and recommendations for perioperative complications:   - No identified additional risk factors other than previously addressed    Antiplatelet or Anticoagulation Medication Instructions:   - aspirin: Discontinue aspirin 7-10 days prior to procedure to reduce bleeding risk. It should be resumed postoperatively.     Additional Medication Instructions:  Patient is to take all scheduled medications on the day of surgery EXCEPT for modifications listed below:   - ACE/ARB: HOLD on day of surgery (minimum 11 hours for general anesthesia).   - Diuretics: HOLD on the day of surgery.    RECOMMENDATION:  APPROVAL GIVEN to proceed with proposed procedure, without further diagnostic  evaluation.            Subjective       HPI related to upcoming procedure: 69 yo female with hypertension, hyperlipidemia, RA (on methotrexate) here for preoperative evaluation for bilateral eyelid blepharoplasty as she has had progressive problems with decreased peripheral vision.         5/30/2023    10:26 AM   Preop Questions   1. Have you ever had a heart attack or stroke? No   2. Have you ever had surgery on your heart or blood vessels, such as a stent placement, a coronary artery bypass, or surgery on an artery in your head, neck, heart, or legs? No   3. Do you have chest pain with activity? No   4. Do you have a history of  heart failure? No   5. Do you currently have a cold, bronchitis or symptoms of other infection? No   6. Do you have a cough, shortness of breath, or wheezing? No   7. Do you or anyone in your family have previous history of blood clots? No   8. Do you or does anyone in your family have a serious bleeding problem such as prolonged bleeding following surgeries or cuts? No   9. Have you ever had problems with anemia or been told to take iron pills? No   10. Have you had any abnormal blood loss such as black, tarry or bloody stools, or abnormal vaginal bleeding? No   11. Have you ever had a blood transfusion? No   12. Are you willing to have a blood transfusion if it is medically needed before, during, or after your surgery? Yes   13. Have you or any of your relatives ever had problems with anesthesia? No   14. Do you have sleep apnea, excessive snoring or daytime drowsiness? No   15. Do you have any artifical heart valves or other implanted medical devices like a pacemaker, defibrillator, or continuous glucose monitor? No   16. Do you have artificial joints? No   17. Are you allergic to latex? No       Health Care Directive:  Patient does not have a Health Care Directive or Living Will:     Preoperative Review of :   reviewed - no record of controlled substances prescribed.      Status  of Chronic Conditions:  See problem list for active medical problems.  Problems all longstanding and stable, except as noted/documented.  See ROS for pertinent symptoms related to these conditions.      Review of Systems  CONSTITUTIONAL: NEGATIVE for fever, chills, change in weight  ENT/MOUTH: NEGATIVE for ear, mouth and throat problems  RESP: NEGATIVE for significant cough or SOB  CV: NEGATIVE for chest pain, palpitations or peripheral edema    Patient Active Problem List    Diagnosis Date Noted     Involutional ptosis, acquired, bilateral 04/05/2023     Priority: Medium     Added automatically from request for surgery [id number]       Combined forms of age-related cataract, mild-mod, of both eyes 03/04/2023     Priority: Medium     Dermatochalasis of both upper eyelids 03/04/2023     Priority: Medium     Choroidal nevus of right eye 03/04/2023     Priority: Medium     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    Edith Lozano  April 15, 2011         HYPERLIPIDEMIA LDL GOAL <130 10/31/2010     Priority: Medium     Hypothyroidism 05/22/2009     Priority: Medium     Essential hypertension 09/27/2006     Priority: Medium     Problem list name updated by automated process. Provider to review       Obesity      Priority: Medium     Problem list name updated by automated process. Provider to review       Seropositive rheumatoid arthritis (H) 06/13/2005     Priority: Medium     Followed by Dr. Jackson  Problem list  name updated by automated process. Provider to review       DYSPEPSIA 06/13/2005     Priority: Medium      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/2008     Rheumatoid arthritis(714.0)      Trochanteric bursitis of right hip 8/16/2013     Past Surgical History:   Procedure Laterality Date     COLONOSCOPY  1/20/06     EXCISE MASS FOOT Left 10/5/2021    Procedure: EXCISION, MASS, LEFT FOOT;  Surgeon: Tommy Pelaez DPM;  Location: [facility] OR     HYSTERECTOMY, PAP NO LONGER INDICATED       HYSTERECTOMY, VAGINAL  11/04    TVH,TVT procedure     RELEASE CARPAL TUNNEL Right 10/11/2016    Procedure: RELEASE CARPAL TUNNEL;  Surgeon: Emely Blanca MD;  Location: [facility] OR     SURGICAL HISTORY OF -   1988    tubal ligation     SURGICAL HISTORY OF -   2001    excision gangliion cyst rt heel     Current Outpatient Medications   Medication Sig Dispense Refill     ASPIRIN 81 MG OR TABS ONE DAILY 100 3     folic acid (FOLVITE) 1 MG tablet Take 1 tablet (1,000 mcg) by mouth daily 90 tablet 2     levothyroxine (SYNTHROID/LEVOTHROID) 50 MCG tablet Take 1 tablet (50 mcg) by mouth daily 90 tablet 3     lisinopril-hydrochlorothiazide (ZESTORETIC) 10-12.5 MG tablet Take 1 tablet by mouth daily 90 tablet 3     methotrexate sodium 2.5 MG TABS Take 8 tablets (20 mg) by mouth once a week . Take all 8 tablets on the same day of each week. 104 tablet 2     MULTI-VITAMIN OR 1 daily       Probiotic Product (PROBIOTIC PO)        simvastatin (ZOCOR) 20 MG tablet TAKE ONE TABLET BY MOUTH AT BEDTIME 90 tablet 3     acetaminophen (TYLENOL) 325 MG tablet Take 2 tablets (650 mg) by mouth every 4 hours as needed for mild pain 50 tablet 0     hyoscyamine (LEVSIN) 0.125 MG tablet Take 1 tablet (125 mcg) by mouth every 6 hours as needed for cramping 12 tablet 0     ibuprofen (ADVIL/MOTRIN) 800 MG  "tablet Take 1 tablet (800 mg) by mouth every 8 hours as needed for moderate pain 90 tablet 0       Allergies   Allergen Reactions     Flagyl [Metronidazole] Rash     Morphine And Related Rash     Septra [Sulfamethoxazole-Trimethoprim] Itching     Puffy eyes, flushed.         Social History     Tobacco Use     Smoking status: Former     Years: 25.00     Types: Cigarettes     Quit date: 2000     Years since quittin.0     Passive exposure: Never     Smokeless tobacco: Never   Vaping Use     Vaping status: Never Used     Passive vaping exposure: Yes   Substance Use Topics     Alcohol use: Yes     Comment: 6 pack of beer on a weekend     Family History   Problem Relation Age of Onset     Diabetes Father      Dementia Father      Diabetes Brother      Diabetes Sister      Hypertension Sister      Hypertension Sister      History   Drug Use No         Objective     /66   Pulse 91   Temp 98.8  F (37.1  C) (Tympanic)   Resp 20   Ht 1.651 m (5' 5\")   Wt 83.6 kg (184 lb 6 oz)   LMP 2004 (Approximate)   SpO2 97%   BMI 30.68 kg/m      Physical Exam  GENERAL APPEARANCE: healthy, alert and no distress  HENT: ear canals and TM's normal and nose and mouth without ulcers or lesions  RESP: lungs clear to auscultation - no rales, rhonchi or wheezes  CV: regular rates and rhythm, normal S1 S2, no S3 or S4, no murmur, click or rub, no irregular beats and grade 2/6 systolic murmur heard best over the rusb  ABDOMEN: soft, nontender, no HSM or masses and bowel sounds normal  NEURO: Normal strength and tone, sensory exam grossly normal, mentation intact and speech normal    Recent Labs   Lab Test 23  1334 22  1326 10/26/22  1313 22  1318 21  0830 21  0848   HGB 14.1 14.0   < > 14.7   < > 14.1    246   < > 175   < > 225   NA  --   --   --  133*  --   --    POTASSIUM  --   --   --  3.5  --   --    CR 0.61 0.56   < > 0.57   < >  --    A1C  --   --   --  5.0  --  5.3    < > = " values in this interval not displayed.        Diagnostics:  Labs pending at this time.  Results will be reviewed when available.   No EKG required for low risk surgery (cataract, skin procedure, breast biopsy, etc).    Revised Cardiac Risk Index (RCRI):  The patient has the following serious cardiovascular risks for perioperative complications:   - No serious cardiac risks = 0 points     RCRI Interpretation: 0 points: Class I (very low risk - 0.4% complication rate)           Signed Electronically by: Britany Norton MD  Copy of this evaluation report is provided to requesting physician.

## 2023-06-01 NOTE — H&P (VIEW-ONLY)
[facility]  [address] [facility] [address]  Phone: [phone]  Primary Provider: Britany Wong  Pre-op Performing Provider: BRITANY WONG      PREOPERATIVE EVALUATION:  Today's date: 6/1/2023    Mine Meléndez is a 68 year old female who presents for a preoperative evaluation.    Surgical Information:  Surgery/Procedure: Bilateral upper eyelid blepharoplasty and ptosis repair  Surgery Location: [place]  Surgeon: Dae  Surgery Date: 6/19/2023  Time of Surgery: 8:35AM  Where patient plans to recover: At home with family  Fax number for surgical facility: Note does not need to be faxed, will be available electronically in Epic.    Assessment & Plan     The proposed surgical procedure is considered LOW risk.    Preop general physical exam     - Basic metabolic panel  (Ca, Cl, CO2, Creat, Gluc, K, Na, BUN); Future    Ptosis of eyelid, bilateral       Hypothyroidism, unspecified type  euthyroid    Hyperlipidemia LDL goal <130   on statin    Essential hypertension  Hold lisinopril/hctz the day of surgery  Adequate blood pressure control    Seropositive rheumatoid arthritis (H)  Okay to continue methotrexate               Risks and Recommendations:  The patient has the following additional risks and recommendations for perioperative complications:   - No identified additional risk factors other than previously addressed    Antiplatelet or Anticoagulation Medication Instructions:   - aspirin: Discontinue aspirin 7-10 days prior to procedure to reduce bleeding risk. It should be resumed postoperatively.     Additional Medication Instructions:  Patient is to take all scheduled medications on the day of surgery EXCEPT for modifications listed below:   - ACE/ARB: HOLD on day of surgery (minimum 11 hours for general anesthesia).   - Diuretics: HOLD on the day of surgery.    RECOMMENDATION:  APPROVAL GIVEN to proceed with proposed procedure, without further diagnostic  evaluation.            Subjective       HPI related to upcoming procedure: 67 yo female with hypertension, hyperlipidemia, RA (on methotrexate) here for preoperative evaluation for bilateral eyelid blepharoplasty as she has had progressive problems with decreased peripheral vision.         5/30/2023    10:26 AM   Preop Questions   1. Have you ever had a heart attack or stroke? No   2. Have you ever had surgery on your heart or blood vessels, such as a stent placement, a coronary artery bypass, or surgery on an artery in your head, neck, heart, or legs? No   3. Do you have chest pain with activity? No   4. Do you have a history of  heart failure? No   5. Do you currently have a cold, bronchitis or symptoms of other infection? No   6. Do you have a cough, shortness of breath, or wheezing? No   7. Do you or anyone in your family have previous history of blood clots? No   8. Do you or does anyone in your family have a serious bleeding problem such as prolonged bleeding following surgeries or cuts? No   9. Have you ever had problems with anemia or been told to take iron pills? No   10. Have you had any abnormal blood loss such as black, tarry or bloody stools, or abnormal vaginal bleeding? No   11. Have you ever had a blood transfusion? No   12. Are you willing to have a blood transfusion if it is medically needed before, during, or after your surgery? Yes   13. Have you or any of your relatives ever had problems with anesthesia? No   14. Do you have sleep apnea, excessive snoring or daytime drowsiness? No   15. Do you have any artifical heart valves or other implanted medical devices like a pacemaker, defibrillator, or continuous glucose monitor? No   16. Do you have artificial joints? No   17. Are you allergic to latex? No       Health Care Directive:  Patient does not have a Health Care Directive or Living Will:     Preoperative Review of :   reviewed - no record of controlled substances prescribed.      Status  of Chronic Conditions:  See problem list for active medical problems.  Problems all longstanding and stable, except as noted/documented.  See ROS for pertinent symptoms related to these conditions.      Review of Systems  CONSTITUTIONAL: NEGATIVE for fever, chills, change in weight  ENT/MOUTH: NEGATIVE for ear, mouth and throat problems  RESP: NEGATIVE for significant cough or SOB  CV: NEGATIVE for chest pain, palpitations or peripheral edema    Patient Active Problem List    Diagnosis Date Noted     Involutional ptosis, acquired, bilateral 04/05/2023     Priority: Medium     Added automatically from request for surgery [id number]       Combined forms of age-related cataract, mild-mod, of both eyes 03/04/2023     Priority: Medium     Dermatochalasis of both upper eyelids 03/04/2023     Priority: Medium     Choroidal nevus of right eye 03/04/2023     Priority: Medium     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    Edith Lozano  April 15, 2011         HYPERLIPIDEMIA LDL GOAL <130 10/31/2010     Priority: Medium     Hypothyroidism 05/22/2009     Priority: Medium     Essential hypertension 09/27/2006     Priority: Medium     Problem list name updated by automated process. Provider to review       Obesity      Priority: Medium     Problem list name updated by automated process. Provider to review       Seropositive rheumatoid arthritis (H) 06/13/2005     Priority: Medium     Followed by Dr. Jackson  Problem list  name updated by automated process. Provider to review       DYSPEPSIA 06/13/2005     Priority: Medium      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/2008     Rheumatoid arthritis(714.0)      Trochanteric bursitis of right hip 8/16/2013     Past Surgical History:   Procedure Laterality Date     COLONOSCOPY  1/20/06     EXCISE MASS FOOT Left 10/5/2021    Procedure: EXCISION, MASS, LEFT FOOT;  Surgeon: Tommy Pelaez DPM;  Location: [facility]     HYSTERECTOMY, PAP NO LONGER INDICATED       HYSTERECTOMY, VAGINAL  11/04    TVH,TVT procedure     RELEASE CARPAL TUNNEL Right 10/11/2016    Procedure: RELEASE CARPAL TUNNEL;  Surgeon: Emely Blanca MD;  Location: [facility] OR     SURGICAL HISTORY OF -   1988    tubal ligation     SURGICAL HISTORY OF -   2001    excision gangliion cyst rt heel     Current Outpatient Medications   Medication Sig Dispense Refill     ASPIRIN 81 MG OR TABS ONE DAILY 100 3     folic acid (FOLVITE) 1 MG tablet Take 1 tablet (1,000 mcg) by mouth daily 90 tablet 2     levothyroxine (SYNTHROID/LEVOTHROID) 50 MCG tablet Take 1 tablet (50 mcg) by mouth daily 90 tablet 3     lisinopril-hydrochlorothiazide (ZESTORETIC) 10-12.5 MG tablet Take 1 tablet by mouth daily 90 tablet 3     methotrexate sodium 2.5 MG TABS Take 8 tablets (20 mg) by mouth once a week . Take all 8 tablets on the same day of each week. 104 tablet 2     MULTI-VITAMIN OR 1 daily       Probiotic Product (PROBIOTIC PO)        simvastatin (ZOCOR) 20 MG tablet TAKE ONE TABLET BY MOUTH AT BEDTIME 90 tablet 3     acetaminophen (TYLENOL) 325 MG tablet Take 2 tablets (650 mg) by mouth every 4 hours as needed for mild pain 50 tablet 0     hyoscyamine (LEVSIN) 0.125 MG tablet Take 1 tablet (125 mcg) by mouth every 6 hours as needed for cramping 12 tablet 0     ibuprofen (ADVIL/MOTRIN) 800 MG  "tablet Take 1 tablet (800 mg) by mouth every 8 hours as needed for moderate pain 90 tablet 0       Allergies   Allergen Reactions     Flagyl [Metronidazole] Rash     Morphine And Related Rash     Septra [Sulfamethoxazole-Trimethoprim] Itching     Puffy eyes, flushed.         Social History     Tobacco Use     Smoking status: Former     Years: 25.00     Types: Cigarettes     Quit date: 2000     Years since quittin.0     Passive exposure: Never     Smokeless tobacco: Never   Vaping Use     Vaping status: Never Used     Passive vaping exposure: Yes   Substance Use Topics     Alcohol use: Yes     Comment: 6 pack of beer on a weekend     Family History   Problem Relation Age of Onset     Diabetes Father      Dementia Father      Diabetes Brother      Diabetes Sister      Hypertension Sister      Hypertension Sister      History   Drug Use No         Objective     /66   Pulse 91   Temp 98.8  F (37.1  C) (Tympanic)   Resp 20   Ht 1.651 m (5' 5\")   Wt 83.6 kg (184 lb 6 oz)   LMP 2004 (Approximate)   SpO2 97%   BMI 30.68 kg/m      Physical Exam  GENERAL APPEARANCE: healthy, alert and no distress  HENT: ear canals and TM's normal and nose and mouth without ulcers or lesions  RESP: lungs clear to auscultation - no rales, rhonchi or wheezes  CV: regular rates and rhythm, normal S1 S2, no S3 or S4, no murmur, click or rub, no irregular beats and grade 2/6 systolic murmur heard best over the rusb  ABDOMEN: soft, nontender, no HSM or masses and bowel sounds normal  NEURO: Normal strength and tone, sensory exam grossly normal, mentation intact and speech normal    Recent Labs   Lab Test 23  1334 22  1326 10/26/22  1313 22  1318 21  0830 21  0848   HGB 14.1 14.0   < > 14.7   < > 14.1    246   < > 175   < > 225   NA  --   --   --  133*  --   --    POTASSIUM  --   --   --  3.5  --   --    CR 0.61 0.56   < > 0.57   < >  --    A1C  --   --   --  5.0  --  5.3    < > = " values in this interval not displayed.        Diagnostics:  Labs pending at this time.  Results will be reviewed when available.   No EKG required for low risk surgery (cataract, skin procedure, breast biopsy, etc).    Revised Cardiac Risk Index (RCRI):  The patient has the following serious cardiovascular risks for perioperative complications:   - No serious cardiac risks = 0 points     RCRI Interpretation: 0 points: Class I (very low risk - 0.4% complication rate)           Signed Electronically by: Britany Norton MD  Copy of this evaluation report is provided to requesting physician.

## 2023-06-01 NOTE — PATIENT INSTRUCTIONS
For informational purposes only. Not to replace the advice of your health care provider. Copyright   2003,  [employer] [facility]. All rights reserved. Clinically reviewed by Ashly Barbour MD. [place] [id number] - REV .  Preparing for Your Surgery  Getting started  A nurse will call you to review your health history and instructions. They will give you an arrival time based on your scheduled surgery time. Please be ready to share:  Your doctor's clinic name and phone number  Your medical, surgical, and anesthesia history  A list of allergies and sensitivities  A list of medicines, including herbal treatments and over-the-counter drugs  Whether the patient has a legal guardian (ask how to send us the papers in advance)  Please tell us if you're pregnant--or if there's any chance you might be pregnant. Some surgeries may injure a fetus (unborn baby), so they require a pregnancy test. Surgeries that are safe for a fetus don't always need a test, and you can choose whether to have one.   If you have a child who's having surgery, please ask for a copy of Preparing for Your Child's Surgery.    Preparing for surgery  Within 10 to 30 days of surgery: Have a pre-op exam (sometimes called an H&P, or History and Physical). This can be done at a clinic or pre-operative center.  If you're having a , you may not need this exam. Talk to your care team.  At your pre-op exam, talk to your care team about all medicines you take. If you need to stop any medicines before surgery, ask when to start taking them again.  We do this for your safety. Many medicines can make you bleed too much during surgery. Some change how well surgery (anesthesia) drugs work.  Call your insurance company to let them know you're having surgery. (If you don't have insurance, call [phone].)  Call your clinic if there's any change in your health. This includes signs of a cold or flu (sore throat, runny nose, cough, rash, fever). It  also includes a scrape or scratch near the surgery site.  If you have questions on the day of surgery, call your hospital or surgery center.  Eating and drinking guidelines  For your safety: Unless your surgeon tells you otherwise, follow the guidelines below.  Eat and drink as usual until 8 hours before you arrive for surgery. After that, no food or milk.  Drink clear liquids until 2 hours before you arrive. These are liquids you can see through, like water, Gatorade, and Propel Water. They also include plain black coffee and tea (no cream or milk), candy, and breath mints. You can spit out gum when you arrive.  If you drink alcohol: Stop drinking it the night before surgery.  If your care team tells you to take medicine on the morning of surgery, it's okay to take it with a sip of water.  Preventing infection  Shower or bathe the night before and morning of your surgery. Follow the instructions your clinic gave you. (If no instructions, use regular soap.)  Don't shave or clip hair near your surgery site. We'll remove the hair if needed.  Don't smoke or vape the morning of surgery. You may chew nicotine gum up to 2 hours before surgery. A nicotine patch is okay.  Note: Some surgeries require you to completely quit smoking and nicotine. Check with your surgeon.  Your care team will make every effort to keep you safe from infection. We will:  Clean our hands often with soap and water (or an alcohol-based hand rub).  Clean the skin at your surgery site with a special soap that kills germs.  Give you a special gown to keep you warm. (Cold raises the risk of infection.)  Wear special hair covers, masks, gowns and gloves during surgery.  Give antibiotic medicine, if prescribed. Not all surgeries need antibiotics.  What to bring on the day of surgery  Photo ID and insurance card  Copy of your health care directive, if you have one  Glasses and hearing aids (bring cases)  You can't wear contacts during surgery  Inhaler and  eye drops, if you use them (tell us about these when you arrive)  CPAP machine or breathing device, if you use them  A few personal items, if spending the night  If you have . . .  A pacemaker, ICD (cardiac defibrillator) or other implant: Bring the ID card.  An implanted stimulator: Bring the remote control.  A legal guardian: Bring a copy of the certified (court-stamped) guardianship papers.  Please remove any jewelry, including body piercings. Leave jewelry and other valuables at home.  If you're going home the day of surgery  You must have a responsible adult drive you home. They should stay with you overnight as well.  If you don't have someone to stay with you, and you aren't safe to go home alone, we may keep you overnight. Insurance often won't pay for this.  After surgery  If it's hard to control your pain or you need more pain medicine, please call your surgeon's office.  Questions?   If you have any questions for your care team, list them here: _________________________________________________________________________________________________________________________________________________________________________ ____________________________________ ____________________________________ ____________________________________    How to Take Your Medication Before Surgery  - HOLD (do not take) lisinopril/hydrochlorothiazide the morning of surgery

## 2023-06-02 NOTE — RESULT ENCOUNTER NOTE
Mine,    These labs are normal or acceptable.    Please contact my office if you have questions.    Britany Norton M.D.

## 2023-06-15 ENCOUNTER — ANESTHESIA EVENT (OUTPATIENT)
Dept: SURGERY | Facility: AMBULATORY SURGERY CENTER | Age: 68
End: 2023-06-15
Payer: COMMERCIAL

## 2023-06-15 NOTE — ANESTHESIA PREPROCEDURE EVALUATION
Anesthesia Pre-Procedure Evaluation    Patient: Mine Meléndez   MRN: [id number] : 1955        Procedure : Procedure(s):  Bilateral upper eyelid blepharoplasty and ptosis repair          Past Medical History:   Diagnosis Date     Herpes simplex without mention of complication      Hypertension      Malignant melanoma (H)      Obesity, unspecified      Osteomyelitis of jaw 2008     Rheumatoid arthritis(714.0)      Trochanteric bursitis of right hip 2013      Past Surgical History:   Procedure Laterality Date     COLONOSCOPY  06     EXCISE MASS FOOT Left 10/5/2021    Procedure: EXCISION, MASS, LEFT FOOT;  Surgeon: Tommy Pelaez DPM;  Location: [facility]     HYSTERECTOMY, PAP NO LONGER INDICATED       HYSTERECTOMY, VAGINAL      TVH,TVT procedure     RELEASE CARPAL TUNNEL Right 10/11/2016    Procedure: RELEASE CARPAL TUNNEL;  Surgeon: Emely Blanca MD;  Location: [facility]     SURGICAL HISTORY OF -       tubal ligation     SURGICAL HISTORY OF -       excision gangliion cyst rt heel      Allergies   Allergen Reactions     Flagyl [Metronidazole] Rash     Morphine And Related Rash     Septra [Sulfamethoxazole-Trimethoprim] Itching     Puffy eyes, flushed.       Social History     Tobacco Use     Smoking status: Former     Years: 25.00     Types: Cigarettes     Quit date: 2000     Years since quittin.1     Passive exposure: Never     Smokeless tobacco: Never   Vaping Use     Vaping status: Never Used     Passive vaping exposure: Yes   Substance Use Topics     Alcohol use: Yes     Comment: 6 pack of beer on a weekend      Wt Readings from Last 1 Encounters:   23 83.6 kg (184 lb 6 oz)           Physical Exam    Airway  airway exam normal      Mallampati: II   TM distance: > 3 FB   Neck ROM: full   Mouth opening: > 3 cm    Respiratory Devices and Support         Dental       (+) Minor Abnormalities - some fillings, tiny chips      Cardiovascular   cardiovascular exam  normal          Pulmonary   pulmonary exam normal                OUTSIDE LABS:  CBC:   Lab Results   Component Value Date    WBC 7.9 03/20/2023    WBC 7.4 11/30/2022    HGB 14.1 03/20/2023    HGB 14.0 11/30/2022    HCT 42.4 03/20/2023    HCT 42.1 11/30/2022     03/20/2023     11/30/2022     BMP:   Lab Results   Component Value Date     06/01/2023     (L) 09/21/2022    POTASSIUM 3.8 06/01/2023    POTASSIUM 3.5 09/21/2022    CHLORIDE 98 06/01/2023    CHLORIDE 96 (L) 09/21/2022    CO2 31 (H) 06/01/2023    CO2 28 09/21/2022    BUN 9.5 06/01/2023    BUN 7.4 (L) 09/21/2022    CR 0.50 (L) 06/01/2023    CR 0.61 03/20/2023     (H) 06/01/2023     (H) 09/21/2022     COAGS: No results found for: PTT, INR, FIBR  POC: No results found for: BGM, HCG, HCGS  HEPATIC:   Lab Results   Component Value Date    ALBUMIN 4.3 03/20/2023    PROTTOTAL 7.6 03/20/2023    ALT 20 03/20/2023    AST 30 03/20/2023    ALKPHOS 129 (H) 03/20/2023    BILITOTAL 0.6 03/20/2023     OTHER:   Lab Results   Component Value Date    A1C 5.0 09/21/2022    ROSELYN 9.5 06/01/2023    PHOS 3.5 01/21/2015    TSH 2.37 09/21/2022    T4 0.95 09/11/2014    CRP 7.8 07/05/2022    SED 15 03/20/2023       Anesthesia Plan    ASA Status:  2   NPO Status:  NPO Appropriate    Anesthesia Type: MAC.     - Reason for MAC: straight local not clinically adequate   Induction: Intravenous, Propofol.   Maintenance: TIVA.        Consents    Anesthesia Plan(s) and associated risks, benefits, and realistic alternatives discussed. Questions answered and patient/representative(s) expressed understanding.    - Discussed:     - Discussed with:  Patient      - Extended Intubation/Ventilatory Support Discussed: No.      - Patient is DNR/DNI Status: No    Use of blood products discussed: No .     Postoperative Care    Pain management: IV analgesics, Oral pain medications, Multi-modal analgesia.   PONV prophylaxis: Dexamethasone or Solumedrol, Ondansetron (or  other 5HT-3), Background Propofol Infusion     Comments:                Dima Lovett MD

## 2023-06-19 ENCOUNTER — HOSPITAL ENCOUNTER (OUTPATIENT)
Facility: AMBULATORY SURGERY CENTER | Age: 68
Discharge: HOME | End: 2023-06-19
Attending: OPHTHALMOLOGY | Admitting: OPHTHALMOLOGY
Payer: COMMERCIAL

## 2023-06-19 ENCOUNTER — ANESTHESIA (OUTPATIENT)
Dept: SURGERY | Facility: AMBULATORY SURGERY CENTER | Age: 68
End: 2023-06-19
Payer: COMMERCIAL

## 2023-06-19 VITALS
HEART RATE: 64 BPM | SYSTOLIC BLOOD PRESSURE: 132 MMHG | RESPIRATION RATE: 16 BRPM | TEMPERATURE: 97.4 F | DIASTOLIC BLOOD PRESSURE: 54 MMHG | OXYGEN SATURATION: 94 %

## 2023-06-19 DIAGNOSIS — H02.403 INVOLUTIONAL PTOSIS, ACQUIRED, BILATERAL: ICD-10-CM

## 2023-06-19 PROCEDURE — G8918 PT W/O PREOP ORDER IV AB PRO: HCPCS

## 2023-06-19 PROCEDURE — G8907 PT DOC NO EVENTS ON DISCHARG: HCPCS

## 2023-06-19 PROCEDURE — 67904 REPAIR EYELID DEFECT: CPT | Mod: 50 | Performed by: OPHTHALMOLOGY

## 2023-06-19 PROCEDURE — 67904 REPAIR EYELID DEFECT: CPT | Mod: E1

## 2023-06-19 RX ORDER — MEPERIDINE HYDROCHLORIDE 25 MG/ML
12.5 INJECTION INTRAMUSCULAR; INTRAVENOUS; SUBCUTANEOUS EVERY 5 MIN PRN
Status: DISCONTINUED | OUTPATIENT
Start: 2023-06-19 | End: 2023-06-20 | Stop reason: HOSPADM

## 2023-06-19 RX ORDER — FENTANYL CITRATE 50 UG/ML
50 INJECTION, SOLUTION INTRAMUSCULAR; INTRAVENOUS EVERY 5 MIN PRN
Status: DISCONTINUED | OUTPATIENT
Start: 2023-06-19 | End: 2023-06-20 | Stop reason: HOSPADM

## 2023-06-19 RX ORDER — DEXAMETHASONE SODIUM PHOSPHATE 4 MG/ML
4 INJECTION, SOLUTION INTRA-ARTICULAR; INTRALESIONAL; INTRAMUSCULAR; INTRAVENOUS; SOFT TISSUE
Status: DISCONTINUED | OUTPATIENT
Start: 2023-06-19 | End: 2023-06-20 | Stop reason: HOSPADM

## 2023-06-19 RX ORDER — HALOPERIDOL 5 MG/ML
1 INJECTION INTRAMUSCULAR
Status: DISCONTINUED | OUTPATIENT
Start: 2023-06-19 | End: 2023-06-20 | Stop reason: HOSPADM

## 2023-06-19 RX ORDER — ONDANSETRON 2 MG/ML
4 INJECTION INTRAMUSCULAR; INTRAVENOUS EVERY 30 MIN PRN
Status: DISCONTINUED | OUTPATIENT
Start: 2023-06-19 | End: 2023-06-20 | Stop reason: HOSPADM

## 2023-06-19 RX ORDER — DIAZEPAM 10 MG/2ML
2.5 INJECTION, SOLUTION INTRAMUSCULAR; INTRAVENOUS
Status: DISCONTINUED | OUTPATIENT
Start: 2023-06-19 | End: 2023-06-20 | Stop reason: HOSPADM

## 2023-06-19 RX ORDER — TETRACAINE HYDROCHLORIDE 5 MG/ML
SOLUTION OPHTHALMIC PRN
Status: DISCONTINUED | OUTPATIENT
Start: 2023-06-19 | End: 2023-06-19 | Stop reason: HOSPADM

## 2023-06-19 RX ORDER — LABETALOL HYDROCHLORIDE 5 MG/ML
10 INJECTION, SOLUTION INTRAVENOUS
Status: DISCONTINUED | OUTPATIENT
Start: 2023-06-19 | End: 2023-06-20 | Stop reason: HOSPADM

## 2023-06-19 RX ORDER — PROPOFOL 10 MG/ML
INJECTION, EMULSION INTRAVENOUS PRN
Status: DISCONTINUED | OUTPATIENT
Start: 2023-06-19 | End: 2023-06-19

## 2023-06-19 RX ORDER — ACETAMINOPHEN 325 MG/1
975 TABLET ORAL ONCE
Status: COMPLETED | OUTPATIENT
Start: 2023-06-19 | End: 2023-06-19

## 2023-06-19 RX ORDER — ERYTHROMYCIN 5 MG/G
OINTMENT OPHTHALMIC PRN
Status: DISCONTINUED | OUTPATIENT
Start: 2023-06-19 | End: 2023-06-19 | Stop reason: HOSPADM

## 2023-06-19 RX ORDER — SODIUM CHLORIDE, SODIUM LACTATE, POTASSIUM CHLORIDE, CALCIUM CHLORIDE 600; 310; 30; 20 MG/100ML; MG/100ML; MG/100ML; MG/100ML
INJECTION, SOLUTION INTRAVENOUS CONTINUOUS
Status: DISCONTINUED | OUTPATIENT
Start: 2023-06-19 | End: 2023-06-20 | Stop reason: HOSPADM

## 2023-06-19 RX ORDER — ALBUTEROL SULFATE 0.83 MG/ML
2.5 SOLUTION RESPIRATORY (INHALATION) EVERY 4 HOURS PRN
Status: DISCONTINUED | OUTPATIENT
Start: 2023-06-19 | End: 2023-06-20 | Stop reason: HOSPADM

## 2023-06-19 RX ORDER — ERYTHROMYCIN 5 MG/G
OINTMENT OPHTHALMIC
Qty: 3.5 G | Refills: 0 | Status: SHIPPED | OUTPATIENT
Start: 2023-06-19 | End: 2023-07-10

## 2023-06-19 RX ORDER — OXYCODONE HYDROCHLORIDE 5 MG/1
5 TABLET ORAL
Status: DISCONTINUED | OUTPATIENT
Start: 2023-06-19 | End: 2023-06-20 | Stop reason: HOSPADM

## 2023-06-19 RX ORDER — LIDOCAINE HYDROCHLORIDE 20 MG/ML
INJECTION, SOLUTION INFILTRATION; PERINEURAL PRN
Status: DISCONTINUED | OUTPATIENT
Start: 2023-06-19 | End: 2023-06-19

## 2023-06-19 RX ORDER — FENTANYL CITRATE 50 UG/ML
INJECTION, SOLUTION INTRAMUSCULAR; INTRAVENOUS PRN
Status: DISCONTINUED | OUTPATIENT
Start: 2023-06-19 | End: 2023-06-19

## 2023-06-19 RX ORDER — HYDROXYZINE HYDROCHLORIDE 10 MG/1
10 TABLET, FILM COATED ORAL EVERY 6 HOURS PRN
Status: DISCONTINUED | OUTPATIENT
Start: 2023-06-19 | End: 2023-06-20 | Stop reason: HOSPADM

## 2023-06-19 RX ORDER — KETOROLAC TROMETHAMINE 30 MG/ML
15 INJECTION, SOLUTION INTRAMUSCULAR; INTRAVENOUS
Status: DISCONTINUED | OUTPATIENT
Start: 2023-06-19 | End: 2023-06-20 | Stop reason: HOSPADM

## 2023-06-19 RX ORDER — FENTANYL CITRATE 50 UG/ML
25 INJECTION, SOLUTION INTRAMUSCULAR; INTRAVENOUS
Status: DISCONTINUED | OUTPATIENT
Start: 2023-06-19 | End: 2023-06-20 | Stop reason: HOSPADM

## 2023-06-19 RX ORDER — ONDANSETRON 4 MG/1
4 TABLET, ORALLY DISINTEGRATING ORAL EVERY 30 MIN PRN
Status: DISCONTINUED | OUTPATIENT
Start: 2023-06-19 | End: 2023-06-20 | Stop reason: HOSPADM

## 2023-06-19 RX ORDER — OXYCODONE HYDROCHLORIDE 5 MG/1
10 TABLET ORAL
Status: DISCONTINUED | OUTPATIENT
Start: 2023-06-19 | End: 2023-06-20 | Stop reason: HOSPADM

## 2023-06-19 RX ORDER — FENTANYL CITRATE 50 UG/ML
25 INJECTION, SOLUTION INTRAMUSCULAR; INTRAVENOUS EVERY 5 MIN PRN
Status: DISCONTINUED | OUTPATIENT
Start: 2023-06-19 | End: 2023-06-20 | Stop reason: HOSPADM

## 2023-06-19 RX ORDER — LIDOCAINE 40 MG/G
CREAM TOPICAL
Status: DISCONTINUED | OUTPATIENT
Start: 2023-06-19 | End: 2023-06-20 | Stop reason: HOSPADM

## 2023-06-19 RX ADMIN — SODIUM CHLORIDE, SODIUM LACTATE, POTASSIUM CHLORIDE, CALCIUM CHLORIDE: 600; 310; 30; 20 INJECTION, SOLUTION INTRAVENOUS at 08:53

## 2023-06-19 RX ADMIN — PROPOFOL 30 MG: 10 INJECTION, EMULSION INTRAVENOUS at 09:03

## 2023-06-19 RX ADMIN — ACETAMINOPHEN 975 MG: 325 TABLET ORAL at 07:03

## 2023-06-19 RX ADMIN — FENTANYL CITRATE 25 MCG: 50 INJECTION, SOLUTION INTRAMUSCULAR; INTRAVENOUS at 09:00

## 2023-06-19 RX ADMIN — LIDOCAINE HYDROCHLORIDE 60 MG: 20 INJECTION, SOLUTION INFILTRATION; PERINEURAL at 09:02

## 2023-06-19 RX ADMIN — PROPOFOL 100 MG: 10 INJECTION, EMULSION INTRAVENOUS at 09:02

## 2023-06-19 NOTE — OP NOTE
PREOPERATIVE DIAGNOSES:   1. Bilateral upper eyelid dermatochalasis.   2. Bilateral upper eyelid ptosis.   POSTOPERATIVE DIAGNOSES:   1. Bilateral upper eyelid dermatochalasis.   2. Bilateral upper eyelid ptosis.   PROCEDURES:  1. Bilateral upper eyelid ptosis repair by external levator resection.  2. Bilateral upper eyelid blepharoplasty.  SURGEON: Cl Pearl MD.  ASSISTANT: None  ANESTHESIA: Monitored with local infiltration of a 50/50 mixture of 2% lidocaine with epinephrine and 0.5% Marcaine.   COMPLICATIONS: None.   ESTIMATED BLOOD LOSS: 3 mL.   SPECIMEN: * No specimens in log *  HISTORY: Mine Meléndez presented with upper eyelid drooping secondary to dermatochalasis and ptosis of the upper eyelids leading to blockage of the superior visual field and interfering with activities of daily living. After the risks, benefits and alternatives to the proposed procedure were explained, informed consent was obtained.   PROCEDURE: The patient was brought to the operating room and placed supine on the operating table. IV sedation was given. Each upper lid crease and the excess upper eyelid skin  was marked in a blepharoplasty ellipse with a marking pen.  These areas were all infiltrated with local anesthetic and  was prepped and draped in the typical sterile fashion for oculoplastic surgery. Attention was directed to the right side. The skin was incised following the marked lines. The skin flap was excised with a high temperature cautery. Hemostasis was obtained with high temperature cautery. The orbicularis and orbital septum were opened horizontally and levator aponeurosis identified and dissected from the superior tarsal border. Nasal and central fat was conservatively debulked. A strip of pretarsal orbicularis was removed. A 6-0 Vicryl was used to advance levator aponeurosis. Attention was directed to the left side where the same procedure was performed. The sutures were adjusted for symmetry then tied in a  permanent fashion.  Each skin incision was closed with a running 6-0 plain gut suture. Ophthalmic antibiotic ointment was applied to the incisions and into both eyes. The patient tolerated the procedure well and left the operating room in stable condition.   Cl Pearl MD

## 2023-06-19 NOTE — DISCHARGE INSTRUCTIONS
Post-operative Instructions  Ophthalmic Plastic and Reconstructive Surgery    Cl Pearl M.D.     All instructions apply to the operated eye(s) or eyelid(s).    Wound care and personal care  ? Apply ice compresses and gentle pressure 15 minutes on, 15 minutes off, for 2 days. If you are sleeping, you don't need to wake up to ice. As long as there is no further bleeding, after two days, switch to warm water compresses for five minutes, four times a day until seen by your physician.   ? You may shower or wash your hair the day after surgery. Do not go swimming for at least 2 weeks to prevent contamination of your wounds.  ? You may go for walks and light activity is ok, but no heavy (over 15 pounds) lifting, bending or excessive straining for one week.   ? Do not apply make-up to the eyes or eyelids for 2 weeks after surgery.  ? Expect some swelling, bruising, black eye (even into the lower eyelids and cheeks). Also expect serum caking, crusting and discharge from the eye and/or incisions. A small amount of surface bleeding, and depending on the type of surgery, bleeding from the inside of the eyelid, is normal for the first 48 hours.  ? Avoid straining, bending at the waist, or lifting more than 15 pounds for 1 week. Sleeping with your head elevated, such as in a recliner, for the first several days can help swelling resolve more quickly.   ? Do continue to ambulate (walk) as you normally would - being sedentary after surgery can cause blood clots.   ? Your eye(s) and eyelid(s) may be painful and tender. This is normal after surgery.      Contact information and follow-up  ? Return to the Eye Clinic for a follow-up appointment with your physician as scheduled. If no appointment has been scheduled:     -  [facility] eye clinic: [phone] for an appointment with Dr. Pearl within 2 to 3 weeks from your date of surgery.     ? For severe pain, bleeding, or loss of vision, call the [facility] at [phone] or [facility] at [phone].     After hours or on weekends and holidays, call [phone] and ask to speak with the ophthalmologist on call.    An on call person can be reached after hours for concerns. The on call doctor should not call in medication refill requests after hours or on weekends, so please plan accordingly. An effort has been made to provide adequate pain medications following every surgery, and refills will not be provided in most instances.     Medications  ? Restart all regular home medications and eye drops. If you take Plavix or Aspirin on a regular basis, wait for 72 hours after your surgery before restarting these in order to decrease the risk of bleeding complications.  ? Avoid aspirin and aspirin-like medications (Motrin, Aleve, Ibuprofen, Lisa-[place] etc) for 72 hours to reduce the risk of bleeding. You may take Tylenol (acetaminophen) for pain.  ? In addition to your home medications, take the following post-operative medications as prescribed by your physician.    ? Apply antibiotic ointment to all sutures three times a day, and into the operated eye(s) at night.   Once you run out, you can apply Vaseline or Aquaphor (over the counter) to the incisions. Don't put the Vaseline or Aquaphor into your eyes.   ? If you have ocular irritation, you can use over the counter artificial tears such as Refresh, Systane, or Blink. Do not use Visine, Clear Eyes, or any other drop that gets the red out.     [facility]  Same-Day Surgery   Adult Discharge Orders & Instructions   For 24 hours after surgery  Get plenty of rest.  A responsible adult must stay with you for at least 24 hours after you leave the hospital.   Do not drive or use heavy equipment.  If you have weakness or tingling, don't drive or use heavy equipment until this feeling goes away.  Do not drink alcohol.  Avoid strenuous or risky activities.  Ask for  help when climbing stairs.   You may feel lightheaded.  IF so, sit for a few minutes before standing.  Have someone help you get up.   If you have nausea (feel sick to your stomach): Drink only clear liquids such as apple juice, ginger ale, broth or 7-Up.  Rest may also help.  Be sure to drink enough fluids.  Move to a regular diet as you feel able.  You may have a slight fever. Call the doctor if your fever is over 100 F (37.7 C) (taken under the tongue) or lasts longer than 24 hours.  You may have a dry mouth, a sore throat, muscle aches or trouble sleeping.  These should go away after 24 hours.  Do not make important or legal decisions.   Call your doctor for any of the followin.  Signs of infection (fever, growing tenderness at the surgery site, a large amount of drainage or bleeding, severe pain, foul-smelling drainage, redness, swelling).    2. It has been over 8 to 10 hours since surgery and you are still not able to urinate (pass water).    3.  Headache for over 24 hours.    4.  Numbness, tingling or weakness the day after surgery (if you had spinal anesthesia).  To contact a doctor, call ___________________________

## 2023-06-19 NOTE — INTERVAL H&P NOTE
"I have reviewed the surgical (or preoperative) H&P that is linked to this encounter, and examined the patient. There are no significant changes    Clinical Conditions Present on Arrival:  Clinically Significant Risk Factors Present on Admission                 # Drug Induced Platelet Defect: home medication list includes an antiplatelet medication  # Obesity: Estimated body mass index is 30.68 kg/m  as calculated from the following:    Height as of 6/1/23: 1.651 m (5' 5\").    Weight as of 6/1/23: 83.6 kg (184 lb 6 oz).       "

## 2023-06-19 NOTE — ANESTHESIA POSTPROCEDURE EVALUATION
Patient: Mine Meléndez    Procedure: Procedure(s):  Bilateral upper eyelid blepharoplasty and ptosis repair       Anesthesia Type:  MAC    Note:  Disposition: Outpatient   Postop Pain Control: Uneventful            Sign Out: Well controlled pain   PONV: No   Neuro/Psych: Uneventful            Sign Out: Acceptable/Baseline neuro status   Airway/Respiratory: Uneventful            Sign Out: Acceptable/Baseline resp. status   CV/Hemodynamics: Uneventful            Sign Out: Acceptable CV status; No obvious hypovolemia; No obvious fluid overload   Other NRE:    DID A NON-ROUTINE EVENT OCCUR?            Last vitals:  Vitals Value Taken Time   /54 06/19/23 0954   Temp 97.4  F (36.3  C) 06/19/23 0954   Pulse 64 06/19/23 0954   Resp 16 06/19/23 0954   SpO2 94 % 06/19/23 0954       Electronically Signed By: Dima Lovett MD  June 19, 2023  10:09 AM

## 2023-06-19 NOTE — ANESTHESIA CARE TRANSFER NOTE
Patient: Mine Meléndez    Procedure: Procedure(s):  Bilateral upper eyelid blepharoplasty and ptosis repair       Diagnosis: Dermatochalasis of both upper eyelids [H02.831, H02.834]  Involutional ptosis, acquired, bilateral [H02.403]  Diagnosis Additional Information: No value filed.    Anesthesia Type:   MAC     Note:    Oropharynx: oropharynx clear of all foreign objects  Level of Consciousness: awake  Oxygen Supplementation: room air    Independent Airway: airway patency satisfactory and stable  Dentition: dentition unchanged  Vital Signs Stable: post-procedure vital signs reviewed and stable  Report to RN Given: handoff report given  Patient transferred to: Phase II  Comments: To Phase II. Report to RN.  VSS Resp status stable.  Handoff Report: Identifed the Patient, Identified the Reponsible Provider, Reviewed the pertinent medical history, Discussed the surgical course, Reviewed Intra-OP anesthesia mangement and issues during anesthesia, Set expectations for post-procedure period and Allowed opportunity for questions and acknowledgement of understanding      Vitals:  Vitals Value Taken Time   BP     Temp     Pulse     Resp     SpO2         Electronically Signed By: ISRAEL Hunter CRNA  June 19, 2023  9:43 AM

## 2023-06-21 ENCOUNTER — TELEPHONE (OUTPATIENT)
Dept: OPHTHALMOLOGY | Facility: CLINIC | Age: 68
End: 2023-06-21
Payer: COMMERCIAL

## 2023-06-21 NOTE — TELEPHONE ENCOUNTER
Phone call to pt, answered questions about using warm and cold packs.  Pt states she has no further questions at this time.  Latoya Holguin RN

## 2023-06-21 NOTE — TELEPHONE ENCOUNTER
[facility] Call Center    Phone Message    May a detailed message be left on voicemail: yes     Reason for Call: Other: Patient has questions regarding the hot pack postop.  Please call.  Thank you.     Action Taken: Message routed to:  [facility] ([facility] Ophthalmology    Travel Screening: Not Applicable

## 2023-06-28 ENCOUNTER — VIRTUAL VISIT (OUTPATIENT)
Dept: RHEUMATOLOGY | Facility: CLINIC | Age: 68
End: 2023-06-28
Payer: COMMERCIAL

## 2023-06-28 DIAGNOSIS — M05.9 SEROPOSITIVE RHEUMATOID ARTHRITIS (H): Primary | ICD-10-CM

## 2023-06-28 DIAGNOSIS — Z79.899 HIGH RISK MEDICATION USE: ICD-10-CM

## 2023-06-28 PROCEDURE — 99214 OFFICE O/P EST MOD 30 MIN: CPT | Mod: VID | Performed by: INTERNAL MEDICINE

## 2023-06-28 RX ORDER — FOLIC ACID 1 MG/1
1000 TABLET ORAL DAILY
Qty: 90 TABLET | Refills: 2 | Status: SHIPPED | OUTPATIENT
Start: 2023-06-28 | End: 2024-01-05

## 2023-06-28 NOTE — PATIENT INSTRUCTIONS
RHEUMATOLOGY    [facility]  [address]  [place] [address]    Phone number: [phone]  Fax number: [phone]      Thank you for choosing [facility]!    Rachel Uriarte CMA Rheumatology

## 2023-06-28 NOTE — PROGRESS NOTES
Mine Meléndez  is a 68 year old year old who is being evaluated via a billable video visit.      How would you like to obtain your AVS? MyChart  If the video visit is dropped, the invitation should be resent by: Text to cell phone: [phone]   Will anyone else be joining your video visit? No     Rheumatology Video Visit      Mine Meléndez MRN# [id number]   YOB: 1955 Age: 68 year old      Date of visit: 6/28/23     Chief Complaint   Patient presents with:  Rheumatoid Arthritis    Assessment and Plan     1. Seropositive Erosive Rheumatoid Arthritis (RF+, ): Dx'd with RA prior to 2002. Previously on sulfasalazine (stomatitis), hydroxychloroquine (loose stools), leflunomide (loose stools).  Currently on methotrexate 20 mg oral once weekly.  RA controlled.    Chronic illness, stable.      - Continue Methotrexate 20mg PO once weekly  - Continue folic acid 1mg daily  - Labs now: CBC, Creatinine, Hepatic Panel  - Labs in 3 months: CBC, Creatinine, Hepatic Panel  - Labs in 6 months: CBC, Creatinine, Hepatic Panel, ESR, CRP     High risk medication requiring intensive toxicity monitoring at least quarterly.     2. Primary osteoarthritis of both hands: voltaren gel PRN.  Has not required Voltaren gel recently because she has been doing so well, per patient  - Continue diclofenac (VOLTAREN) 1 % topical gel; Apply up to 2 grams of 1% gel to hands up to 4 times daily as needed for joint pain (maximum: 8 g per upper extremity per day)      3.  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  This is documented here for reference only.    4. Melanoma history: documented here for historical significance.     5.  Vaccinations: Vaccinations reviewed with Ms. Meléndez.    - Influenza: encouraged yearly vaccination  - [id number]: up to date  - Onetzfqua39: up to date  - Shingrix: Up to date  - COVID-19: Up  to date      6. Elevated blood pressure:  [place] to follow up with Primary Care provider regarding elevated blood pressure.     Total minutes spent in evaluation with patient, documentation, , and review of pertinent studies and chart notes: 12      Ms. Meléndez verbalized agreement with and understanding of the rational for the diagnosis and treatment plan.  All questions were answered to best of my ability and the patient's satisfaction. Ms. Meléndez was advised to contact the clinic with any questions that may arise after the clinic visit.      Thank you for involving me in the care of the patient    Return to clinic: 3-4 months    HPI   Mine Meléndez is a 68 year old female with a past medical history significant for hypertension, hyperlipidemia, hypothyroidism, migraines, melanoma history, and rheumatoid arthritis who presents for follow-up of rheumatoid arthritis.     Today, 6/28/2023: Currently doing well with regard to rheumatoid arthritis plaques on the right third PIP is not painful.  No other joint swelling.  Joints without overlying erythema or increased warmth. Morning stiffness <20 min.  Recently had eyelid surgery resulting in mil bilaterally d bruising under her eyes.     Tobacco: Former smoker  EtOH: 4 drinks per day on the weekends  Drugs: None  Occupation: retired June 2020, was working in [employer]   12 point review of system was completed and negative except as noted in the HPI    Active Problem List     Patient Active Problem List   Diagnosis     Seropositive rheumatoid arthritis (H)     DYSPEPSIA     Obesity     Essential hypertension     Hypothyroidism     HYPERLIPIDEMIA LDL GOAL <130     Advanced directives, counseling/discussion     Dermatitis     High risk medications (not anticoagulants) long-term use     Female stress incontinence     Migraine     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)     Combined forms of age-related cataract,  "mild-mod, of both eyes     Dermatochalasis of both upper eyelids     Choroidal nevus of right eye     Involutional ptosis, acquired, bilateral     Past Medical History     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/2008     Rheumatoid arthritis(714.0)      Trochanteric bursitis of right hip 8/16/2013     Past Surgical History     Past Surgical History:   Procedure Laterality Date     COLONOSCOPY  1/20/06     COMBINED REPAIR PTOSIS WITH BLEPHAROPLASTY BILATERAL Bilateral 6/19/2023    Procedure: Bilateral upper eyelid blepharoplasty and ptosis repair;  Surgeon: Cl Pearl MD;  Location: [facility] OR     EXCISE MASS FOOT Left 10/5/2021    Procedure: EXCISION, MASS, LEFT FOOT;  Surgeon: Tommy Pelaez DPM;  Location: [facility] OR     HYSTERECTOMY, PAP NO LONGER INDICATED       HYSTERECTOMY, VAGINAL  11/04    TVH,TVT procedure     RELEASE CARPAL TUNNEL Right 10/11/2016    Procedure: RELEASE CARPAL TUNNEL;  Surgeon: Emely Blanca MD;  Location: [facility] OR     SURGICAL HISTORY OF -   1988    tubal ligation     SURGICAL HISTORY OF -   2001    excision gangliion cyst rt heel     Allergy     Allergies   Allergen Reactions     Flagyl [Metronidazole] Rash     Morphine And Related Rash     Septra [Sulfamethoxazole-Trimethoprim] Itching     Puffy eyes, flushed.      Current Medication List     Current Outpatient Medications   Medication Sig     acetaminophen (TYLENOL) 325 MG tablet Take 2 tablets (650 mg) by mouth every 4 hours as needed for mild pain     ASPIRIN 81 MG OR TABS ONE DAILY     erythromycin (ROMYCIN) 5 MG/GM ophthalmic ointment Apply small amount to incision site three times a day and apply a 1/2\" strip into both eyes at bedtime.     folic acid (FOLVITE) 1 MG tablet Take 1 tablet (1,000 mcg) by mouth daily     hyoscyamine (LEVSIN) 0.125 MG tablet Take 1 tablet (125 " "mcg) by mouth every 6 hours as needed for cramping     ibuprofen (ADVIL/MOTRIN) 800 MG tablet Take 1 tablet (800 mg) by mouth every 8 hours as needed for moderate pain     levothyroxine (SYNTHROID/LEVOTHROID) 50 MCG tablet Take 1 tablet (50 mcg) by mouth daily     lisinopril-hydrochlorothiazide (ZESTORETIC) 10-12.5 MG tablet Take 1 tablet by mouth daily     methotrexate sodium 2.5 MG TABS Take 8 tablets (20 mg) by mouth once a week . Take all 8 tablets on the same day of each week.     MULTI-VITAMIN OR 1 daily     Probiotic Product (PROBIOTIC PO)      simvastatin (ZOCOR) 20 MG tablet TAKE ONE TABLET BY MOUTH AT BEDTIME     Current Facility-Administered Medications   Medication     betamethasone acet & sod phos (CELESTONE) injection 6 mg     ropivacaine (NAROPIN) injection 0.5 mL         Social History   See HPI    Family History     Family History   Problem Relation Age of Onset     Diabetes Father      Dementia Father      Diabetes Brother      Diabetes Sister      Hypertension Sister      Hypertension Sister      Physical Exam     Temp Readings from Last 3 Encounters:   06/19/23 97.4  F (36.3  C) (Temporal)   06/01/23 98.8  F (37.1  C) (Tympanic)   05/15/23 98.3  F (36.8  C) (Tympanic)     BP Readings from Last 5 Encounters:   06/19/23 132/54   06/01/23 139/66   05/15/23 138/81   05/03/23 133/62   05/01/23 130/66     Pulse Readings from Last 1 Encounters:   06/19/23 64     Resp Readings from Last 1 Encounters:   06/19/23 16     Estimated body mass index is 30.68 kg/m  as calculated from the following:    Height as of 6/1/23: 1.651 m (5' 5\").    Weight as of 6/1/23: 83.6 kg (184 lb 6 oz).        GEN: NAD. Healthy appearing adult.   HEENT:  Anicteric, noninjected sclera. No obvious external lesions of the ear and nose. Hearing intact.  PULM: No increased work of breathing  MSK:  Mild swelling without overlying erythema of the right 3rd PIP   SKIN: No rash or jaundice seen  PSYCH: Alert. Appropriate.        Labs / " Imaging (select studies)     RF/CCP  Recent Labs   Lab Test 05/01/18  1538   CCPIGG 182*     CBC  Recent Labs   Lab Test 03/20/23  1334 11/30/22  1326 10/26/22  1313 08/23/21  0848 05/12/21  1158 01/12/21  1547 10/23/20  1519   WBC 7.9 7.4 6.5   < > 6.8 7.4 7.0   RBC 4.00 4.04 3.86   < > 4.12 4.12 4.21   HGB 14.1 14.0 13.6   < > 14.0 14.5 14.4   HCT 42.4 42.1 40.5   < > 41.9 43.1 43.1   * 104* 105*   < > 102* 105* 102*   RDW 14.0 14.5 14.9   < > 14.1 13.6 13.6    246 194   < > 217 182 220   MCH 35.3* 34.7* 35.2*   < > 34.0* 35.2* 34.2*   MCHC 33.3 33.3 33.6   < > 33.4 33.6 33.4   NEUTROPHIL 70 72 70   < > 75.2 68.7 69.2   LYMPH 20 19 13   < > 17.7 19.6 22.7   MONOCYTE 8 7 13   < > 5.2 9.2 6.1   EOSINOPHIL 2 1 3   < > 1.6 2.4 1.7   BASOPHIL 0 1 1   < > 0.3 0.1 0.3   ANEU  --   --   --   --  5.1 5.1 4.9   ALYM  --   --   --   --  1.2 1.4 1.6   SHYLA  --   --   --   --  0.4 0.7 0.4   AEOS  --   --   --   --  0.1 0.2 0.1   ABAS  --   --   --   --  0.0 0.0 0.0   ANEUTAUTO 5.5 5.3 4.6   < >  --   --   --    ALYMPAUTO 1.6 1.4 0.9   < >  --   --   --    AMONOAUTO 0.6 0.5 0.9   < >  --   --   --    AEOSAUTO 0.1 0.1 0.2   < >  --   --   --    ABSBASO 0.0 0.1 0.0   < >  --   --   --     < > = values in this interval not displayed.     CMP  Recent Labs   Lab Test 06/01/23  1339 03/20/23  1334 11/30/22  1326 10/26/22  1313 09/21/22  1318 08/23/21  0847 05/12/21  1158 04/05/21  1255 01/12/21  1547     --   --   --  133*  --   --  132*  --    POTASSIUM 3.8  --   --   --  3.5  --   --  3.5  --    CHLORIDE 98  --   --   --  96*  --   --  98  --    CO2 31*  --   --   --  28  --   --  26  --    ANIONGAP 8  --   --   --  9  --   --  8  --    *  --   --   --  120*  --   --  151*  --    BUN 9.5  --   --   --  7.4*  --   --  7  --    CR 0.50* 0.61 0.56 0.53 0.57   < > 0.58 0.55 0.65   GFRESTIMATED >90 >90 >90 >90 >90   < > >90 >90 >90   GFRESTBLACK  --   --   --   --   --   --  >90 >90 >90   ROSELYN 9.5  --   --   --   9.0  --   --  8.7  --    BILITOTAL  --  0.6 0.7 0.7 0.8   < > 0.5  --  0.7   ALBUMIN  --  4.3 3.9 3.8 4.0   < > 3.5  --  3.6   PROTTOTAL  --  7.6 6.6 6.3* 7.0   < > 7.5  --  7.5   ALKPHOS  --  129* 126* 116* 125*   < > 121  --  118   AST  --  30 26 42* 41*   < > 23  --  28   ALT  --  20 18 29 28   < > 24  --  27    < > = values in this interval not displayed.     Calcium/VitaminD  Recent Labs   Lab Test 06/01/23  1339 09/21/22  1318 04/05/21  1255   ROSELYN 9.5 9.0 8.7     ESR/CRP  Recent Labs   Lab Test 03/20/23  1334 10/26/22  1313 07/05/22  1547 03/15/22  1439 12/09/21  0830   SED 15 17 12 11 12   CRP  --   --  7.8 8.3* 9.1*   CRPI 16.94* 22.32*  --   --   --      Lipid Panel  Recent Labs   Lab Test 09/21/22  1318 11/09/20  0939 04/17/19  1526   CHOL 149 153 159   TRIG 80 65 102   HDL 75 74 63   LDL 58 66 76   NHDL 74 79 96     Hepatitis B  Recent Labs   Lab Test 10/23/20  1519 05/01/18  1538   HBCAB Nonreactive Nonreactive   HEPBANG Nonreactive Nonreactive     Hepatitis C  Recent Labs   Lab Test 10/23/20  1519   HCVAB Nonreactive     Tuberculosis Screening  Recent Labs   Lab Test 10/23/20  1519   TBRST Negative       Immunization History     Immunization History   Administered Date(s) Administered     COVID-19 Bivalent 18+ (Moderna) 11/21/2022     COVID-19 MONOVALENT 12+ (Pfizer) 03/08/2021, 03/29/2021, 11/24/2021     COVID-19 Monovalent 18+ (Moderna) 05/02/2022     FLU 6-35 months 10/05/2009     Influenza (H1N1) 12/31/2009     Influenza (High Dose) 3 valent vaccine 11/05/2019     Influenza (IIV3) PF 11/03/2004, 10/04/2010, 10/01/2012, 10/30/2012, 10/07/2013, 10/20/2013, 10/07/2014, 10/03/2016     Influenza Vaccine 65+ (Fluzone HD) 11/09/2020, 09/30/2021, 11/11/2022     Influenza Vaccine >6 months (Alfuria,Fluzone) 10/23/2017     Influenza Vaccine, 6+MO IM (QUADRIVALENT W/PRESERVATIVES) 10/06/2015, 09/26/2018     Pneumo Conj 13-V (2010&after) 11/12/2018     Pneumococcal 23 valent 02/19/2019     TD,PF 7+ (Tenivac)  04/28/1995     TDAP Vaccine (Adacel) 12/05/2007, 02/01/2018     Zoster recombinant adjuvanted (SHINGRIX) 04/17/2019, 07/10/2019     Zoster vaccine, live 06/07/2016          Chart documentation done in part with Dragon Voice recognition Software. Although reviewed after completion, some word and grammatical error may remain.      Video-Visit Details    Type of service:  Video Visit    Video Start Time: 3:45 PM     Video End Time: 3:54 PM    Originating Location (pt. Location): [place], [place]    [facility] [place] [place]    Platform used for Video Visit: Siva Baltazar MD

## 2023-06-30 ENCOUNTER — LAB (OUTPATIENT)
Dept: LAB | Facility: CLINIC | Age: 68
End: 2023-06-30
Payer: COMMERCIAL

## 2023-06-30 DIAGNOSIS — M05.9 SEROPOSITIVE RHEUMATOID ARTHRITIS (H): ICD-10-CM

## 2023-06-30 DIAGNOSIS — Z79.899 HIGH RISK MEDICATION USE: ICD-10-CM

## 2023-06-30 LAB
ALBUMIN SERPL BCG-MCNC: 4.2 G/DL (ref 3.5–5.2)
ALP SERPL-CCNC: 127 U/L (ref 35–104)
ALT SERPL W P-5'-P-CCNC: 32 U/L (ref 0–50)
AST SERPL W P-5'-P-CCNC: 35 U/L (ref 0–45)
BASOPHILS # BLD AUTO: 0 10E3/UL (ref 0–0.2)
BASOPHILS NFR BLD AUTO: 1 %
BILIRUB DIRECT SERPL-MCNC: <0.2 MG/DL (ref 0–0.3)
BILIRUB SERPL-MCNC: 0.5 MG/DL
CREAT SERPL-MCNC: 0.62 MG/DL (ref 0.51–0.95)
EOSINOPHIL # BLD AUTO: 0.1 10E3/UL (ref 0–0.7)
EOSINOPHIL NFR BLD AUTO: 1 %
ERYTHROCYTE [DISTWIDTH] IN BLOOD BY AUTOMATED COUNT: 15.1 % (ref 10–15)
GFR SERPL CREATININE-BSD FRML MDRD: >90 ML/MIN/1.73M2
HCT VFR BLD AUTO: 44.2 % (ref 35–47)
HGB BLD-MCNC: 14.7 G/DL (ref 11.7–15.7)
IMM GRANULOCYTES # BLD: 0 10E3/UL
IMM GRANULOCYTES NFR BLD: 0 %
LYMPHOCYTES # BLD AUTO: 1.6 10E3/UL (ref 0.8–5.3)
LYMPHOCYTES NFR BLD AUTO: 21 %
MCH RBC QN AUTO: 34.9 PG (ref 26.5–33)
MCHC RBC AUTO-ENTMCNC: 33.3 G/DL (ref 31.5–36.5)
MCV RBC AUTO: 105 FL (ref 78–100)
MONOCYTES # BLD AUTO: 0.4 10E3/UL (ref 0–1.3)
MONOCYTES NFR BLD AUTO: 5 %
NEUTROPHILS # BLD AUTO: 5.5 10E3/UL (ref 1.6–8.3)
NEUTROPHILS NFR BLD AUTO: 72 %
PLATELET # BLD AUTO: 284 10E3/UL (ref 150–450)
PROT SERPL-MCNC: 7.5 G/DL (ref 6.4–8.3)
RBC # BLD AUTO: 4.21 10E6/UL (ref 3.8–5.2)
WBC # BLD AUTO: 7.7 10E3/UL (ref 4–11)

## 2023-06-30 PROCEDURE — 36415 COLL VENOUS BLD VENIPUNCTURE: CPT

## 2023-06-30 PROCEDURE — 80076 HEPATIC FUNCTION PANEL: CPT

## 2023-06-30 PROCEDURE — 85025 COMPLETE CBC W/AUTO DIFF WBC: CPT

## 2023-06-30 PROCEDURE — 82565 ASSAY OF CREATININE: CPT

## 2023-07-05 ENCOUNTER — OFFICE VISIT (OUTPATIENT)
Dept: OPHTHALMOLOGY | Facility: CLINIC | Age: 68
End: 2023-07-05
Payer: COMMERCIAL

## 2023-07-05 DIAGNOSIS — H02.403 INVOLUTIONAL PTOSIS, ACQUIRED, BILATERAL: ICD-10-CM

## 2023-07-05 DIAGNOSIS — H02.834 DERMATOCHALASIS OF BOTH UPPER EYELIDS: Primary | ICD-10-CM

## 2023-07-05 DIAGNOSIS — H02.831 DERMATOCHALASIS OF BOTH UPPER EYELIDS: Primary | ICD-10-CM

## 2023-07-05 PROCEDURE — 99024 POSTOP FOLLOW-UP VISIT: CPT | Performed by: OPHTHALMOLOGY

## 2023-07-05 ASSESSMENT — VISUAL ACUITY
METHOD: SNELLEN - LINEAR
OS_SC: 20/40
CORRECTION_TYPE: GLASSES
OD_SC: 20/300

## 2023-07-05 ASSESSMENT — TONOMETRY
OS_IOP_MMHG: 12
OD_IOP_MMHG: 14
IOP_METHOD: ICARE

## 2023-07-05 NOTE — NURSING NOTE
Chief Complaints and History of Present Illnesses   Patient presents with     Post Op (Ophthalmology) Both Eyes       Chief Complaint(s) and History of Present Illness(es)     Post Op (Ophthalmology) Both Eyes            Laterality: both eyes          Comments    S/P 1. Bilateral upper eyelid ptosis repair by external levator resection, 2. Bilateral upper eyelid blepharoplasty 06/19/2023.  Some numbness along right side, continues to get better everyday.   Feels like there is a film on her eyes in the mornings. No lubricating drops used.   EES paty completed, switched to Aquaphor.                      Alexy Monaco, Ophthalmic Assistant

## 2023-07-05 NOTE — PROGRESS NOTES
"Chief Complaint(s) and History of Present Illness(es)     Post Op (Ophthalmology) Both Eyes            Laterality: both eyes          Comments    S/P 1. Bilateral upper eyelid ptosis repair by external levator resection,   2. Bilateral upper eyelid blepharoplasty 06/19/2023.  Some numbness along right side, continues to get better everyday.   Feels like there is a film on her eyes in the mornings. No lubricating   drops used.   EES paty completed, switched to Aquaphor.              Doing well. Happy with outcome. Sutures removed. Left about 1 mm lower than right but also a bit more swollen. Likely will settle. Lives far away, prefers f/u as needed.    Patient Instructions   - Apply warm compresses for 1 minute three times a day until your bruising has resolved.  - Cool compresses can help with swelling and itching, you can alternate cool compresses with warm compresses if you find it helpful.  - Apply Aquaphor or Vaseline to the incision at bedtime.   - If you have symptoms of eye irritation, it is good to use over the counter artificial tears. Good brands include Refresh, Blink, and Systane. Do NOT get drops that are for \"red eyes.\"   - It is normal for the incision to appear raised, red, itch and have small lumps. You can gently massage any small bumps along the incision line. These can take up to six months to resolve.          Attending Physician Attestation: Complete documentation of historical and exam elements from today's encounter can be found in the full encounter summary report (not reduplicated in this progress note). I personally obtained the chief complaint(s) and history of present illness. I confirmed and edited as necessary the review of systems, past medical/surgical history, family history, social history, and examination findings as documented by others; and I examined the patient myself. I personally reviewed the relevant tests, images, and reports as documented above. I formulated and edited as " necessary the assessment and plan and discussed the findings and management plan with the patient and family. I personally reviewed the ophthalmic test(s) associated with this encounter, agree with the interpretation(s) as documented by the resident/fellow, and have edited the corresponding report(s) as necessary. Cl Pearl MD

## 2023-07-10 ENCOUNTER — OFFICE VISIT (OUTPATIENT)
Dept: OBGYN | Facility: CLINIC | Age: 68
End: 2023-07-10
Payer: COMMERCIAL

## 2023-07-10 VITALS
TEMPERATURE: 98.7 F | DIASTOLIC BLOOD PRESSURE: 62 MMHG | RESPIRATION RATE: 16 BRPM | WEIGHT: 189.3 LBS | SYSTOLIC BLOOD PRESSURE: 148 MMHG | HEART RATE: 94 BPM | BODY MASS INDEX: 31.54 KG/M2 | HEIGHT: 65 IN

## 2023-07-10 DIAGNOSIS — N30.90 BLADDER INFECTION: ICD-10-CM

## 2023-07-10 DIAGNOSIS — R30.0 DYSURIA: Primary | ICD-10-CM

## 2023-07-10 LAB
ALBUMIN UR-MCNC: NEGATIVE MG/DL
APPEARANCE UR: CLEAR
BACTERIA #/AREA URNS HPF: ABNORMAL /HPF
BILIRUB UR QL STRIP: NEGATIVE
COLOR UR AUTO: YELLOW
GLUCOSE UR STRIP-MCNC: NEGATIVE MG/DL
HGB UR QL STRIP: ABNORMAL
KETONES UR STRIP-MCNC: NEGATIVE MG/DL
LEUKOCYTE ESTERASE UR QL STRIP: ABNORMAL
NITRATE UR QL: POSITIVE
PH UR STRIP: 7 [PH] (ref 5–7)
RBC #/AREA URNS AUTO: ABNORMAL /HPF
SP GR UR STRIP: 1.01 (ref 1–1.03)
SQUAMOUS #/AREA URNS AUTO: ABNORMAL /LPF
UROBILINOGEN UR STRIP-ACNC: 0.2 E.U./DL
WBC #/AREA URNS AUTO: ABNORMAL /HPF
WBC CLUMPS #/AREA URNS HPF: PRESENT /HPF

## 2023-07-10 PROCEDURE — 81001 URINALYSIS AUTO W/SCOPE: CPT | Performed by: OBSTETRICS & GYNECOLOGY

## 2023-07-10 PROCEDURE — 99214 OFFICE O/P EST MOD 30 MIN: CPT | Performed by: OBSTETRICS & GYNECOLOGY

## 2023-07-10 PROCEDURE — 87186 SC STD MICRODIL/AGAR DIL: CPT | Performed by: OBSTETRICS & GYNECOLOGY

## 2023-07-10 PROCEDURE — 87086 URINE CULTURE/COLONY COUNT: CPT | Performed by: OBSTETRICS & GYNECOLOGY

## 2023-07-10 RX ORDER — NITROFURANTOIN 25; 75 MG/1; MG/1
100 CAPSULE ORAL 2 TIMES DAILY
Qty: 10 CAPSULE | Refills: 0 | Status: SHIPPED | OUTPATIENT
Start: 2023-07-10 | End: 2023-07-15

## 2023-07-10 NOTE — PROGRESS NOTES
[facility]  OB/GYN Clinic   Gynecology Consult Note     CC: F/U vaginal discharge and UTI symptoms     HPI: Ms. Meléndez is a 67 year old  being seen for f/u on vaginal discharge.   She had a CT scan to rule out fistula and I did excised granulation tissue at her last appt   Had bad diarrhea from the CT scan with contrast.  I did send a course of clindamycin to treat suspected DIV, which she never took. She reports that the discharge is minor and not bothersome and would prefer not to treat it.     Also reports UTI symptoms of dysuria and urinary frequency and urgency. No hematuria or fever/back pain.      GYN Hx: No PMB. Used HRT for 5 years after hysterectomy..    Denies any hx of STI or PID.   Not sexually active. No abnormal PAP smears.   Thinks she had a partial hysterectomy? Not a whole one. Unsure why she had a hysterectomy, age 49. Hx says TVH.      ROS: A 10 pt ROS was completed and found to be otherwise negative unless mentioned in the HPI.      PMH:   Past Medical History           Past Medical History:   Diagnosis Date     Herpes simplex without mention of complication       Hypertension       Malignant melanoma (H)       Obesity, unspecified       Osteomyelitis of jaw 2008     Rheumatoid arthritis(714.0)       Trochanteric bursitis of right hip 2013            PSHx:   Past Surgical History             Past Surgical History:   Procedure Laterality Date     COLONOSCOPY   06     EXCISE MASS FOOT Left 10/5/2021     Procedure: EXCISION, MASS, LEFT FOOT;  Surgeon: Tommy Pelaez DPM;  Location: [facility] OR     HYSTERECTOMY, PAP NO LONGER INDICATED         HYSTERECTOMY, VAGINAL        TVH,TVT procedure     RELEASE CARPAL TUNNEL Right 10/11/2016     Procedure: RELEASE CARPAL TUNNEL;  Surgeon: Emely Blanca MD;  Location: [facility] OR     SURGICAL HISTORY OF -         tubal ligation     SURGICAL HISTORY OF -         excision gangliion cyst rt heel             OBHx:                                   OB History    Para Term  AB Living   4 4 4 0 0 2   SAB IAB Ectopic Multiple Live Births         0 0 0 0 0             # Outcome Date GA Lbr Ej/2nd Weight Sex Delivery Anes PTL Lv   4 Term                     3 Term                     2 Term                     1 Term                         Obstetric Comments    x 2         Medications:   acetaminophen (TYLENOL) 325 MG tablet, Take 2 tablets (650 mg) by mouth every 4 hours as needed for mild pain  ASPIRIN 81 MG OR TABS, ONE DAILY  folic acid (FOLVITE) 1 MG tablet, Take 1 tablet (1,000 mcg) by mouth daily  hyoscyamine (LEVSIN) 0.125 MG tablet, Take 1 tablet (125 mcg) by mouth every 6 hours as needed for cramping  ibuprofen (ADVIL/MOTRIN) 800 MG tablet, Take 1 tablet (800 mg) by mouth every 8 hours as needed for moderate pain  levothyroxine (SYNTHROID/LEVOTHROID) 50 MCG tablet, Take 1 tablet (50 mcg) by mouth daily  lisinopril-hydrochlorothiazide (ZESTORETIC) 10-12.5 MG tablet, Take 1 tablet by mouth daily  methotrexate sodium 2.5 MG TABS, Take 8 tablets (20 mg) by mouth once a week . Take all 8 tablets on the same day of each week.  MULTI-VITAMIN OR, 1 daily  Probiotic Product (PROBIOTIC PO),   simvastatin (ZOCOR) 20 MG tablet, TAKE ONE TABLET BY MOUTH AT BEDTIME     betamethasone acet & sod phos (CELESTONE) injection 6 mg  ropivacaine (NAROPIN) injection 0.5 mL           Allergies:                Allergies   Allergen Reactions     Flagyl [Metronidazole] Rash     Morphine And Related Rash     Septra [Sulfamethoxazole-Trimethoprim] Itching       Puffy eyes, flushed.          Social History:   Social History   Social History                Socioeconomic History     Marital status:        Spouse name: Not on file     Number of children: Not on file     Years of education: Not on file     Highest education level: Not on file   Occupational History     Not on file   Tobacco Use     Smoking status:  Former       Years: .00       Types: Cigarettes       Quit date: 2000       Years since quittin.0       Passive exposure: Never     Smokeless tobacco: Never   Vaping Use     Vaping status: Never Used       Passive vaping exposure: Yes   Substance and Sexual Activity     Alcohol use: Yes       Comment: 6 pack of beer on a weekend     Drug use: No     Sexual activity: Yes       Partners: Male       Birth control/protection: Surgical       Comment: [email or web address].partial   Other Topics Concern     Parent/sibling w/ CABG, MI or angioplasty before 65F 55M? No   Social History Narrative     Not on file      Social Determinants of Health      Financial Resource Strain: Not on file   Food Insecurity: Not on file   Transportation Needs: Not on file   Physical Activity: Not on file   Stress: Not on file   Social Connections: Not on file   Intimate Partner Violence: Not on file   Housing Stability: Not on file         Social History                Socioeconomic History     Marital status:        Spouse name: None     Number of children: None     Years of education: None     Highest education level: None   Tobacco Use     Smoking status: Former       Years: 25.00       Types: Cigarettes       Quit date: 2000       Years since quittin.0       Passive exposure: Never     Smokeless tobacco: Never   Vaping Use     Vaping status: Never Used       Passive vaping exposure: Yes   Substance and Sexual Activity     Alcohol use: Yes       Comment: 6 pack of beer on a weekend     Drug use: No     Sexual activity: Yes       Partners: Male       Birth control/protection: Surgical       Comment: [email or web address].partial   Other Topics Concern     Parent/sibling w/ CABG, MI or angioplasty before 65F 55M? No         Family History:   Family History             Family History   Problem Relation Age of Onset     Diabetes Father       Dementia Father       Diabetes Brother       Diabetes Sister       Hypertension Sister       Hypertension  "Sister        No uterine, cervical or ovarian cancer on her family.      Physical Exam:   BP (!) 148/62 (BP Location: Right arm, Patient Position: Chair, Cuff Size: Adult Regular)   Pulse 94   Temp 98.7  F (37.1  C) (Tympanic)   Resp 16   Ht 1.651 m (5' 5\")   Wt 85.9 kg (189 lb 4.8 oz)   LMP 11/12/2004 (Approximate)   BMI 31.50 kg/m       Gen: Pleasant, talkative female in no apparent distress   Respiratory: breathing comfortably on room air   Cardiac: Regular rate, warm and well-perfused, mild HTN  MSK: Grossly normal movement of all four extremities  Psych: mood and affect bright   Lower extremity: edema not present      Labs/Imaging:   CT ABDOMEN AND PELVIS WITH CONTRAST 5/12/2023 11:29 AM     CLINICAL HISTORY: Concern for rectovaginal fistula. Vaginal discharge.     TECHNIQUE: CT scan of the abdomen and pelvis was performed following  injection of IV contrast. Multiplanar reformats were obtained. Dose  reduction techniques were used.     CONTRAST: 83mL Isovue-370     COMPARISON: None.     FINDINGS:   LOWER CHEST: Normal.     HEPATOBILIARY: Diffuse hepatic steatosis. No significant mass. No bile  duct dilatation. No calcified gallstones.     PANCREAS: Normal.     SPLEEN: Normal.     ADRENAL GLANDS: Normal.     KIDNEYS/BLADDER: Normal.     BOWEL: A thin soft tissue band containing a single focus of gas is  seen centrally in the low pelvis, suggestive of the superior most  aspect of the vaginal cuff/cervix (coronal series 4, images 68 and  69). No contrast material is seen along this pelvic soft tissue  band/vaginal cuff. The vaginal cuff lies in close proximity to the mid  to high rectum and sigmoid colon. Thin soft tissue strands extends  from the vaginal cuff to the adjacent colon, suggestive of scarring.  No contrast is seen along these thin bandlike areas (axial series 3,  images 179-184). There is circumferential wall thickening involving a  short segment of the sigmoid colon with multiple colonic " diverticula  in this region, which results in mild to moderate luminal narrowing.  The inflamed portion of the sigmoid colon lies in close proximity to  the vaginal cuff with loss of normal fat planes. No definite  contrast-filled fistula track from the inflamed colon to the vaginal  cuff is seen. Contrast material in this region appears to be contained  within the lumen of the sigmoid colon (coronal series 4, image 62). No  contrast material is seen along the vagina. There is mild fat  stranding and edema in the pelvis. The small bowel loops are  noninflamed. No signs of bowel obstruction.     PELVIC ORGANS: The uterus is absent. Please see bowel section above  for additional details. No cystic or solid adnexal mass. No free  pelvic fluid.     ADDITIONAL FINDINGS: None.      MUSCULOSKELETAL: Mild multilevel degenerative changes of the spine.  Mild anterolisthesis of L4 on L5 of a few millimeters. No acute  osseous abnormality.                                                                      IMPRESSION:   1. 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 No definite colovaginal fistula or  extravasation of contrast along the vagina.  2. Prior hysterectomy.  3. Hepatic steatosis.     JEFRY BRITO MD            Wet prep: +3 WBCs     A&P: 68 yo F with persistent vaginal discharge, suspect DIV.   CT scan reviewed with no evidence for active fistula. I did excise a piece of granlation tissue from the vaginal cuff, otherwise pelvic exam was normal.   Recommended clindamycin inserts, but patient declines at the discharge does not bother her.   F/U as needed and for annual exam.     As far as her UTI symptoms, UA/UCx was collected.     Tiffany Cantor MD  OB/GYN

## 2023-07-10 NOTE — PROGRESS NOTES
"Initial BP (!) 148/62 (BP Location: Right arm, Patient Position: Chair, Cuff Size: Adult Regular)   Pulse 94   Temp 98.7  F (37.1  C) (Tympanic)   Resp 16   Ht 1.651 m (5' 5\")   Wt 85.9 kg (189 lb 4.8 oz)   LMP 11/12/2004 (Approximate)   BMI 31.50 kg/m   Estimated body mass index is 31.5 kg/m  as calculated from the following:    Height as of this encounter: 1.651 m (5' 5\").    Weight as of this encounter: 85.9 kg (189 lb 4.8 oz). .    "

## 2023-07-11 LAB — BACTERIA UR CULT: ABNORMAL

## 2023-07-21 ENCOUNTER — OFFICE VISIT (OUTPATIENT)
Dept: OBGYN | Facility: CLINIC | Age: 68
End: 2023-07-21
Payer: COMMERCIAL

## 2023-07-21 VITALS
DIASTOLIC BLOOD PRESSURE: 73 MMHG | RESPIRATION RATE: 16 BRPM | TEMPERATURE: 98.4 F | HEART RATE: 82 BPM | SYSTOLIC BLOOD PRESSURE: 141 MMHG | HEIGHT: 65 IN | WEIGHT: 188.8 LBS | BODY MASS INDEX: 31.46 KG/M2

## 2023-07-21 DIAGNOSIS — R30.0 DYSURIA: Primary | ICD-10-CM

## 2023-07-21 DIAGNOSIS — N30.90 BLADDER INFECTION: ICD-10-CM

## 2023-07-21 LAB
ALBUMIN UR-MCNC: 100 MG/DL
APPEARANCE UR: ABNORMAL
BACTERIA #/AREA URNS HPF: ABNORMAL /HPF
BILIRUB UR QL STRIP: NEGATIVE
COLOR UR AUTO: YELLOW
GLUCOSE UR STRIP-MCNC: NEGATIVE MG/DL
HGB UR QL STRIP: ABNORMAL
KETONES UR STRIP-MCNC: NEGATIVE MG/DL
LEUKOCYTE ESTERASE UR QL STRIP: ABNORMAL
NITRATE UR QL: NEGATIVE
PH UR STRIP: 7 [PH] (ref 5–7)
RBC #/AREA URNS AUTO: ABNORMAL /HPF
SP GR UR STRIP: 1.02 (ref 1–1.03)
SQUAMOUS #/AREA URNS AUTO: ABNORMAL /LPF
UROBILINOGEN UR STRIP-ACNC: 0.2 E.U./DL
WBC #/AREA URNS AUTO: >100 /HPF
WBC CLUMPS #/AREA URNS HPF: PRESENT /HPF

## 2023-07-21 PROCEDURE — 99213 OFFICE O/P EST LOW 20 MIN: CPT | Performed by: OBSTETRICS & GYNECOLOGY

## 2023-07-21 PROCEDURE — 81001 URINALYSIS AUTO W/SCOPE: CPT | Performed by: OBSTETRICS & GYNECOLOGY

## 2023-07-21 PROCEDURE — 87086 URINE CULTURE/COLONY COUNT: CPT | Performed by: OBSTETRICS & GYNECOLOGY

## 2023-07-21 RX ORDER — CIPROFLOXACIN 500 MG/1
500 TABLET, FILM COATED ORAL 2 TIMES DAILY
Qty: 14 TABLET | Refills: 0 | Status: SHIPPED | OUTPATIENT
Start: 2023-07-21 | End: 2023-07-28

## 2023-07-21 NOTE — PROGRESS NOTES
"Initial BP (!) 141/73 (BP Location: Right arm, Patient Position: Chair, Cuff Size: Adult Regular)   Pulse 82   Temp 98.4  F (36.9  C) (Tympanic)   Resp 16   Ht 1.651 m (5' 5\")   Wt 85.6 kg (188 lb 12.8 oz)   LMP 11/12/2004 (Approximate)   BMI 31.42 kg/m   Estimated body mass index is 31.42 kg/m  as calculated from the following:    Height as of this encounter: 1.651 m (5' 5\").    Weight as of this encounter: 85.6 kg (188 lb 12.8 oz). .      "

## 2023-07-21 NOTE — PROGRESS NOTES
[facility]  OB/GYN Clinic   Gynecology Consult Note    CC:  Dysuria, also wants to talk about night sweats    HPI: Ms. Meléndez is a 68 year old  being seen for GYN consultation for dysuria. Was diagnosed with a UTI on 7/10 based on symtpoms of dysuria, urinary frequency and urgency. UCx had E. Coli UTI. I did send a 5 day course of macrobid. Felt better for about five days   She did to the clindamycin inserts f, feels like the discharge is back but she doesn't really mind   No fever. Mild bilateral back pain this AM.   Eating or drinking fine.     Having night sweats, 4-5x a night. Only last a few minutes. Used to be dripping wet. Using ceiling fans. Used HRT for awhile, cant' remember why she stopped it.   Went into menopause 15-20years ago. Feels like things are getting worse. Occasional daytime hot flashes.       GYN Hx: No PMB. Used HRT for 5 years after hysterectomy..    Denies any hx of STI or PID.   Not sexually active. No abnormal PAP smears.   Thinks she had a partial hysterectomy? Not a whole one. Unsure why she had a hysterectomy, age 49. Hx says TV.       ROS: A 10 pt ROS was completed and found to be otherwise negative unless mentioned in the HPI.     PMH:   Past Medical History:   Diagnosis Date     Herpes simplex without mention of complication      Hypertension      Malignant melanoma (H)      Obesity, unspecified      Osteomyelitis of jaw 2008     Rheumatoid arthritis(714.0)      Trochanteric bursitis of right hip 2013       PSHx:   Past Surgical History:   Procedure Laterality Date     COLONOSCOPY  06     COMBINED REPAIR PTOSIS WITH BLEPHAROPLASTY BILATERAL Bilateral 2023    Procedure: Bilateral upper eyelid blepharoplasty and ptosis repair;  Surgeon: Cl Pearl MD;  Location: [facility] OR     EXCISE MASS FOOT Left 10/5/2021    Procedure: EXCISION, MASS, LEFT FOOT;  Surgeon: Tommy Pelaez DPM;  Location: [facility] OR     HYSTERECTOMY, PAP NO LONGER  INDICATED       HYSTERECTOMY, VAGINAL      TVH,TVT procedure     RELEASE CARPAL TUNNEL Right 10/11/2016    Procedure: RELEASE CARPAL TUNNEL;  Surgeon: Emely Blanca MD;  Location: [facility] OR     SURGICAL HISTORY OF -       tubal ligation     SURGICAL HISTORY OF -       excision gangliion cyst rt heel       OBHx:   OB History    Para Term  AB Living   4 4 4 0 0 2   SAB IAB Ectopic Multiple Live Births   0 0 0 0 0      # Outcome Date GA Lbr Ej/2nd Weight Sex Delivery Anes PTL Lv   4 Term            3 Term            2 Term            1 Term               Obstetric Comments    x 2       Medications:   acetaminophen (TYLENOL) 325 MG tablet, Take 2 tablets (650 mg) by mouth every 4 hours as needed for mild pain (Patient not taking: Reported on 7/10/2023)  ASPIRIN 81 MG OR TABS, ONE DAILY (Patient not taking: Reported on 7/10/2023)  folic acid (FOLVITE) 1 MG tablet, Take 1 tablet (1,000 mcg) by mouth daily  hyoscyamine (LEVSIN) 0.125 MG tablet, Take 1 tablet (125 mcg) by mouth every 6 hours as needed for cramping  ibuprofen (ADVIL/MOTRIN) 800 MG tablet, Take 1 tablet (800 mg) by mouth every 8 hours as needed for moderate pain  levothyroxine (SYNTHROID/LEVOTHROID) 50 MCG tablet, Take 1 tablet (50 mcg) by mouth daily  lisinopril-hydrochlorothiazide (ZESTORETIC) 10-12.5 MG tablet, Take 1 tablet by mouth daily  methotrexate sodium 2.5 MG TABS, Take 8 tablets (20 mg) by mouth once a week . Take all 8 tablets on the same day of each week.  MULTI-VITAMIN OR, 1 daily  Probiotic Product (PROBIOTIC PO),   simvastatin (ZOCOR) 20 MG tablet, TAKE ONE TABLET BY MOUTH AT BEDTIME    betamethasone acet & sod phos (CELESTONE) injection 6 mg  ropivacaine (NAROPIN) injection 0.5 mL        Allergies:      Allergies   Allergen Reactions     Flagyl [Metronidazole] Rash     Morphine And Related Rash     Septra [Sulfamethoxazole-Trimethoprim] Itching     Puffy eyes, flushed.        Social History:   Social  History     Socioeconomic History     Marital status:      Spouse name: Not on file     Number of children: Not on file     Years of education: Not on file     Highest education level: Not on file   Occupational History     Not on file   Tobacco Use     Smoking status: Former     Years:      Types: Cigarettes     Quit date: 2000     Years since quittin.2     Passive exposure: Never     Smokeless tobacco: Never   Vaping Use     Vaping Use: Never used   Substance and Sexual Activity     Alcohol use: Yes     Comment: 6 pack of beer on a weekend     Drug use: No     Sexual activity: Yes     Partners: Male     Birth control/protection: Surgical     Comment: [email or web address].partial   Other Topics Concern     Parent/sibling w/ CABG, MI or angioplasty before 65F 55M? No   Social History Narrative     Not on file     Social Determinants of Health     Financial Resource Strain: Not on file   Food Insecurity: Not on file   Transportation Needs: Not on file   Physical Activity: Not on file   Stress: Not on file   Social Connections: Not on file   Intimate Partner Violence: Not on file   Housing Stability: Not on file     Social History     Socioeconomic History     Marital status:    Tobacco Use     Smoking status: Former     Years:      Types: Cigarettes     Quit date: 2000     Years since quittin.2     Passive exposure: Never     Smokeless tobacco: Never   Vaping Use     Vaping Use: Never used   Substance and Sexual Activity     Alcohol use: Yes     Comment: 6 pack of beer on a weekend     Drug use: No     Sexual activity: Yes     Partners: Male     Birth control/protection: Surgical     Comment: [email or web address].partial   Other Topics Concern     Parent/sibling w/ CABG, MI or angioplasty before 65F 55M? No       Family History:   Family History   Problem Relation Age of Onset     Diabetes Father      Dementia Father      Diabetes Brother      Diabetes Sister      Hypertension Sister      Hypertension  "Sister      No GYN malignancy     Physical Exam:   Vitals:    07/21/23 1112   BP: (!) 141/73   BP Location: Right arm   Patient Position: Chair   Cuff Size: Adult Regular   Pulse: 82   Resp: 16   Temp: 98.4  F (36.9  C)   TempSrc: Tympanic   Weight: 85.6 kg (188 lb 12.8 oz)   Height: 1.651 m (5' 5\")      Estimated body mass index is 31.42 kg/m  as calculated from the following:    Height as of this encounter: 1.651 m (5' 5\").    Weight as of this encounter: 85.6 kg (188 lb 12.8 oz).    Gen: Pleasant, talkative female in no apparent distress   Respiratory: breathing comfortably on room air   Cardiac: Regular rate, warm and well-perfused.   MSK: Grossly normal movement of all four extremities  Psych: mood and affect bright     Labs/Imaging:   UA with mod blood, 100 protein and large LE    A&P: 68 yo F with UTI symptoms and UA with ?infection.   Will send cipro given recent bladder infection treatment with rebound symptoms.     As far as her night sweats; I did discuss with her that the symptoms she is describing are very classically described during menopause. Discussed that the standard of care for hot flashes is to start hormone replacement therapy. Since she does not have a uterus she would only need estrogen.  Given that she is >10 years out from menopause, this would increase risks of CVD.   I did review that there may be some off label uses of other medications such as gabapentin and  Effexor.     Ok with just using lifestyle modifications to manage symptoms.   I spent 20 min with the patient and 7 minutes in chart review and documentation.     Tiffany Cantor MD  OB/GYN  7/21/2023            "

## 2023-07-22 LAB — BACTERIA UR CULT: NORMAL

## 2023-07-24 ENCOUNTER — MYC MEDICAL ADVICE (OUTPATIENT)
Dept: OBGYN | Facility: CLINIC | Age: 68
End: 2023-07-24
Payer: COMMERCIAL

## 2023-07-24 DIAGNOSIS — N76.1 DESQUAMATIVE INFLAMMATORY VAGINITIS: Primary | ICD-10-CM

## 2023-07-24 NOTE — TELEPHONE ENCOUNTER
Last office visit 7/21/2023  Urine culture negative.    Please see patient mychart message.    Onelia FLORENCE   Ob/Gyn Clinic

## 2023-08-24 ENCOUNTER — LAB (OUTPATIENT)
Dept: LAB | Facility: CLINIC | Age: 68
End: 2023-08-24
Payer: COMMERCIAL

## 2023-08-24 ENCOUNTER — OFFICE VISIT (OUTPATIENT)
Dept: FAMILY MEDICINE | Facility: CLINIC | Age: 68
End: 2023-08-24
Payer: COMMERCIAL

## 2023-08-24 ENCOUNTER — ANCILLARY PROCEDURE (OUTPATIENT)
Dept: GENERAL RADIOLOGY | Facility: CLINIC | Age: 68
End: 2023-08-24
Attending: FAMILY MEDICINE
Payer: COMMERCIAL

## 2023-08-24 VITALS
TEMPERATURE: 99.5 F | DIASTOLIC BLOOD PRESSURE: 70 MMHG | BODY MASS INDEX: 31.32 KG/M2 | RESPIRATION RATE: 20 BRPM | WEIGHT: 188 LBS | SYSTOLIC BLOOD PRESSURE: 118 MMHG | OXYGEN SATURATION: 96 % | HEART RATE: 112 BPM | HEIGHT: 65 IN

## 2023-08-24 DIAGNOSIS — D72.829 LEUKOCYTOSIS, UNSPECIFIED TYPE: ICD-10-CM

## 2023-08-24 DIAGNOSIS — Z20.822 SUSPECTED COVID-19 VIRUS INFECTION: ICD-10-CM

## 2023-08-24 DIAGNOSIS — J18.9 COMMUNITY ACQUIRED PNEUMONIA, UNSPECIFIED LATERALITY: Primary | ICD-10-CM

## 2023-08-24 PROCEDURE — 87635 SARS-COV-2 COVID-19 AMP PRB: CPT

## 2023-08-24 PROCEDURE — 36415 COLL VENOUS BLD VENIPUNCTURE: CPT

## 2023-08-24 PROCEDURE — 85025 COMPLETE CBC W/AUTO DIFF WBC: CPT

## 2023-08-24 PROCEDURE — 71046 X-RAY EXAM CHEST 2 VIEWS: CPT | Mod: TC | Performed by: RADIOLOGY

## 2023-08-24 PROCEDURE — 99214 OFFICE O/P EST MOD 30 MIN: CPT | Performed by: FAMILY MEDICINE

## 2023-08-24 RX ORDER — AZITHROMYCIN 250 MG/1
TABLET, FILM COATED ORAL
Qty: 6 TABLET | Refills: 0 | Status: SHIPPED | OUTPATIENT
Start: 2023-08-24 | End: 2023-08-29

## 2023-08-24 ASSESSMENT — PAIN SCALES - GENERAL: PAINLEVEL: NO PAIN (0)

## 2023-08-24 NOTE — PROGRESS NOTES
"  Assessment & Plan     Community acquired pneumonia, unspecified laterality  ?retrocardiac.     Start treatment for CAP given the significant leukocytosis  If not improving in 48 hours, should go to ER    - azithromycin (ZITHROMAX) 250 MG tablet; Take 2 tablets (500 mg) by mouth daily for 1 day, THEN 1 tablet (250 mg) daily for 4 days.  - amoxicillin-clavulanate (AUGMENTIN) 875-125 MG tablet; Take 1 tablet by mouth 2 times daily for 7 days  - CBC with platelets and differential; Future    Leukocytosis, unspecified type  Recheck WBC Monday/Tuesday of next week to ensure this is improving    - CBC with platelets and differential; Future    Suspected COVID-19 virus infection   Negative test at home  - CBC with platelets and differential; Future  - XR Chest 2 Views; Future  - Symptomatic COVID-19 Virus (Coronavirus) by PCR; Future             BMI:   Estimated body mass index is 31.28 kg/m  as calculated from the following:    Height as of this encounter: 1.651 m (5' 5\").    Weight as of this encounter: 85.3 kg (188 lb).           Britany Norton MD  [facility]   Helena is a 68 year old, presenting for the following health issues:  Cough        8/24/2023     2:48 PM   Additional Questions   Roomed by Stacy FLEMING CMA   Accompanied by        HPI     Acute Illness  Acute illness concerns: Cough, fatigue and headache. Covid19 test negative yesterday. She declines Covid19 test today in clinic  Onset/Duration: 7 days  Symptoms:  Fever: YES- Low fever  Chills/Sweats: No  Headache (location?): YES  Sinus Pressure: No  Conjunctivitis:  No  Ear Pain: no  Rhinorrhea: No  Congestion: No  Sore Throat: No  Cough: YES-non-productive  Wheeze: No  Decreased Appetite: No  Nausea: YES  Vomiting: No  Diarrhea: No  Dysuria/Freq.: No  Dysuria or Hematuria: No  Fatigue/Achiness: YES  Sick/Strep Exposure: No  Therapies tried and outcome: Tylenol    Fever Monday night  Tuesday 103  Wednesday 94 " "overnight    Has shaking chills, today is the first day without that.      Yesterday did a covid test at home and that was negative.    Review of Systems   Constitutional, HEENT, cardiovascular, pulmonary, gi and gu systems are negative, except as otherwise noted.      Objective    /70   Pulse 112   Temp 99.5  F (37.5  C) (Tympanic)   Resp 20   Ht 1.651 m (5' 5\")   Wt 85.3 kg (188 lb)   LMP 11/12/2004 (Approximate)   SpO2 96%   BMI 31.28 kg/m    Body mass index is 31.28 kg/m .  Physical Exam   GENERAL: healthy, alert and no distress  NECK: no adenopathy, no asymmetry, masses, or scars and thyroid normal to palpation  RESP: lungs clear to auscultation - no rales, rhonchi or wheezes  CV: tachycardia, normal S1 S2, no S3 or S4, no murmur, click or rub, peripheral pulses strong, and no peripheral edema  ABDOMEN: soft, nontender, no hepatosplenomegaly, no masses and bowel sounds normal  MS: no gross musculoskeletal defects noted, no edema    CXR - Reviewed and interpreted by me Normal- no infiltrates, effusions, pneumothoraces, cardiomegaly or masses  No results found for this or any previous visit (from the past 24 hour(s)).      WBC 28k with left shift - needs to be sent to [place] for differential              "

## 2023-08-25 LAB
BASOPHILS # BLD AUTO: 0.2 10E3/UL (ref 0–0.2)
BASOPHILS NFR BLD AUTO: 1 %
EOSINOPHIL # BLD AUTO: 0 10E3/UL (ref 0–0.7)
EOSINOPHIL NFR BLD AUTO: 0 %
ERYTHROCYTE [DISTWIDTH] IN BLOOD BY AUTOMATED COUNT: 13.8 % (ref 10–15)
HCT VFR BLD AUTO: 44.2 % (ref 35–47)
HGB BLD-MCNC: 15.2 G/DL (ref 11.7–15.7)
IMM GRANULOCYTES # BLD: 0.3 10E3/UL
IMM GRANULOCYTES NFR BLD: 1 %
LYMPHOCYTES # BLD AUTO: 1.2 10E3/UL (ref 0.8–5.3)
LYMPHOCYTES NFR BLD AUTO: 4 %
MCH RBC QN AUTO: 35.4 PG (ref 26.5–33)
MCHC RBC AUTO-ENTMCNC: 34.4 G/DL (ref 31.5–36.5)
MCV RBC AUTO: 103 FL (ref 78–100)
MONOCYTES # BLD AUTO: 2.2 10E3/UL (ref 0–1.3)
MONOCYTES NFR BLD AUTO: 8 %
NEUTROPHILS # BLD AUTO: 24.4 10E3/UL (ref 1.6–8.3)
NEUTROPHILS NFR BLD AUTO: 86 %
NRBC # BLD AUTO: 0 10E3/UL
NRBC BLD AUTO-RTO: 0 /100
PLATELET # BLD AUTO: 231 10E3/UL (ref 150–450)
RBC # BLD AUTO: 4.29 10E6/UL (ref 3.8–5.2)
SARS-COV-2 RNA RESP QL NAA+PROBE: NEGATIVE
WBC # BLD AUTO: 28.3 10E3/UL (ref 4–11)

## 2023-08-28 ENCOUNTER — PATIENT OUTREACH (OUTPATIENT)
Dept: CARE COORDINATION | Facility: CLINIC | Age: 68
End: 2023-08-28
Payer: COMMERCIAL

## 2023-08-28 ENCOUNTER — MYC MEDICAL ADVICE (OUTPATIENT)
Dept: FAMILY MEDICINE | Facility: CLINIC | Age: 68
End: 2023-08-28
Payer: COMMERCIAL

## 2023-08-29 ENCOUNTER — MYC MEDICAL ADVICE (OUTPATIENT)
Dept: FAMILY MEDICINE | Facility: CLINIC | Age: 68
End: 2023-08-29
Payer: COMMERCIAL

## 2023-08-29 DIAGNOSIS — D72.829 LEUKOCYTOSIS, UNSPECIFIED TYPE: Primary | ICD-10-CM

## 2023-08-30 ENCOUNTER — LAB (OUTPATIENT)
Dept: LAB | Facility: CLINIC | Age: 68
End: 2023-08-30
Payer: COMMERCIAL

## 2023-08-30 DIAGNOSIS — D72.829 LEUKOCYTOSIS, UNSPECIFIED TYPE: ICD-10-CM

## 2023-08-30 DIAGNOSIS — E03.9 HYPOTHYROIDISM: Primary | ICD-10-CM

## 2023-08-30 DIAGNOSIS — J18.9 COMMUNITY ACQUIRED PNEUMONIA, UNSPECIFIED LATERALITY: ICD-10-CM

## 2023-08-30 LAB
B BURGDOR IGG+IGM SER QL: 0.22
BASOPHILS # BLD AUTO: 0 10E3/UL (ref 0–0.2)
BASOPHILS NFR BLD AUTO: 0 %
EOSINOPHIL # BLD AUTO: 0.1 10E3/UL (ref 0–0.7)
EOSINOPHIL NFR BLD AUTO: 1 %
ERYTHROCYTE [DISTWIDTH] IN BLOOD BY AUTOMATED COUNT: 14.3 % (ref 10–15)
HCT VFR BLD AUTO: 41 % (ref 35–47)
HGB BLD-MCNC: 13.8 G/DL (ref 11.7–15.7)
IMM GRANULOCYTES # BLD: 0.1 10E3/UL
IMM GRANULOCYTES NFR BLD: 1 %
LYMPHOCYTES # BLD AUTO: 1.1 10E3/UL (ref 0.8–5.3)
LYMPHOCYTES NFR BLD AUTO: 14 %
MCH RBC QN AUTO: 34.6 PG (ref 26.5–33)
MCHC RBC AUTO-ENTMCNC: 33.7 G/DL (ref 31.5–36.5)
MCV RBC AUTO: 103 FL (ref 78–100)
MONOCYTES # BLD AUTO: 0.6 10E3/UL (ref 0–1.3)
MONOCYTES NFR BLD AUTO: 7 %
MONOCYTES NFR BLD AUTO: NEGATIVE %
NEUTROPHILS # BLD AUTO: 6.4 10E3/UL (ref 1.6–8.3)
NEUTROPHILS NFR BLD AUTO: 77 %
PLATELET # BLD AUTO: 441 10E3/UL (ref 150–450)
RBC # BLD AUTO: 3.99 10E6/UL (ref 3.8–5.2)
TSH SERPL DL<=0.005 MIU/L-ACNC: 3.42 UIU/ML (ref 0.3–4.2)
WBC # BLD AUTO: 8.3 10E3/UL (ref 4–11)

## 2023-08-30 PROCEDURE — 86618 LYME DISEASE ANTIBODY: CPT

## 2023-08-30 PROCEDURE — 86308 HETEROPHILE ANTIBODY SCREEN: CPT

## 2023-08-30 PROCEDURE — 36415 COLL VENOUS BLD VENIPUNCTURE: CPT

## 2023-08-30 PROCEDURE — 85025 COMPLETE CBC W/AUTO DIFF WBC: CPT

## 2023-08-30 PROCEDURE — 84443 ASSAY THYROID STIM HORMONE: CPT

## 2023-09-11 ENCOUNTER — PATIENT OUTREACH (OUTPATIENT)
Dept: CARE COORDINATION | Facility: CLINIC | Age: 68
End: 2023-09-11
Payer: COMMERCIAL

## 2023-09-24 DIAGNOSIS — E03.8 OTHER SPECIFIED HYPOTHYROIDISM: ICD-10-CM

## 2023-09-25 RX ORDER — LEVOTHYROXINE SODIUM 50 UG/1
50 TABLET ORAL DAILY
Qty: 90 TABLET | Refills: 3 | Status: SHIPPED | OUTPATIENT
Start: 2023-09-25 | End: 2024-09-16

## 2023-09-26 ENCOUNTER — VIRTUAL VISIT (OUTPATIENT)
Dept: FAMILY MEDICINE | Facility: CLINIC | Age: 68
End: 2023-09-26
Payer: COMMERCIAL

## 2023-09-26 DIAGNOSIS — J40 BRONCHITIS: Primary | ICD-10-CM

## 2023-09-26 PROCEDURE — 99213 OFFICE O/P EST LOW 20 MIN: CPT | Mod: VID | Performed by: NURSE PRACTITIONER

## 2023-09-26 RX ORDER — CODEINE PHOSPHATE AND GUAIFENESIN 10; 100 MG/5ML; MG/5ML
1-2 SOLUTION ORAL EVERY 4 HOURS PRN
Qty: 180 ML | Refills: 0 | Status: SHIPPED | OUTPATIENT
Start: 2023-09-26 | End: 2023-10-02

## 2023-09-26 NOTE — PROGRESS NOTES
"Helena is a 68 year old who is being evaluated via a billable video visit.      How would you like to obtain your AVS? MyChart  If the video visit is dropped, the invitation should be resent by: Text to cell phone: [phone]  Will anyone else be joining your video visit? No          Assessment & Plan     Bronchitis    - guaiFENesin-codeine (ROBITUSSIN AC) 100-10 MG/5ML solution; Take 5-10 mLs by mouth every 4 hours as needed for cough             BMI:   Estimated body mass index is 31.28 kg/m  as calculated from the following:    Height as of 8/24/23: 1.651 m (5' 5\").    Weight as of 8/24/23: 85.3 kg (188 lb).       FUTURE APPOINTMENTS:       - Follow up in 1 week for persistent symptoms, sooner for new or worsening symptoms. May message me for antibiotic in a week if not improving.     See Patient Instructions    ISRAEL Marie CNP  [facility]   Helena is a 68 year old, presenting for the following health issues:  No chief complaint on file.        9/26/2023    12:00 PM   Additional Questions   Roomed by Stacy FLEMING CMA   Accompanied by self       History of Present Illness       Reason for visit:  Cough and cold symptoms  Symptom onset:  1-3 days ago  Symptoms include:  Cough and drippy nose  Symptom intensity:  Moderate  Symptom progression:  Staying the same  Had these symptoms before:  Yes  Has tried/received treatment for these symptoms:  No  What makes it worse:  No  What makes it better:  Theraflu    She eats 2-3 servings of fruits and vegetables daily.She consumes 0 sweetened beverage(s) daily.She exercises with enough effort to increase her heart rate 9 or less minutes per day.  She exercises with enough effort to increase her heart rate 3 or less days per week.   She is taking medications regularly.         Acute Illness  Acute illness concerns: Cough  Onset/Duration: 5 days  Symptoms:  Fever: YES- low grade  Chills/Sweats: No  Headache (location?): No  Sinus " Pressure: No  Conjunctivitis:  No  Ear Pain: no  Rhinorrhea: YES  Congestion: YES  Sore Throat: YES  Cough: YES-non-productive, pain in side from so much coughing  Wheeze: No  Decreased Appetite: No  Nausea: No  Vomiting: No  Diarrhea: No  Dysuria/Freq.: No  Dysuria or Hematuria: No  Fatigue/Achiness: YES- fatigue  Sick/Strep Exposure: No  Therapies tried and outcome: Thera flu, Dayquil, Nyquil        Review of Systems   Constitutional, HEENT, cardiovascular, pulmonary, gi and gu systems are negative, except as otherwise noted.      Objective           Vitals:  No vitals were obtained today due to virtual visit.    Physical Exam   GENERAL: alert and no distress  EYES: Eyes grossly normal to inspection and conjunctivae and sclerae normal  RESP: Audible harsh frequent cough  SKIN: Visible skin clear. No significant rash, abnormal pigmentation or lesions.  NEURO: Cranial nerves grossly intact.  Mentation and speech appropriate for age.  PSYCH: Mentation appears normal, affect normal/bright, judgement and insight intact, normal speech and appearance well-groomed.                Video-Visit Details    Type of service:  Video Visit   Video Start Time: 1305  Video End Time: 1311    Originating Location (pt. Location): Home    Distant Location (provider location):  On-site  Platform used for Video Visit: Siva

## 2023-10-02 ENCOUNTER — OFFICE VISIT (OUTPATIENT)
Dept: OBGYN | Facility: CLINIC | Age: 68
End: 2023-10-02
Payer: COMMERCIAL

## 2023-10-02 VITALS
TEMPERATURE: 98.2 F | HEART RATE: 80 BPM | BODY MASS INDEX: 30.82 KG/M2 | HEIGHT: 65 IN | DIASTOLIC BLOOD PRESSURE: 67 MMHG | OXYGEN SATURATION: 97 % | SYSTOLIC BLOOD PRESSURE: 125 MMHG | WEIGHT: 185 LBS

## 2023-10-02 DIAGNOSIS — R30.0 DYSURIA: Primary | ICD-10-CM

## 2023-10-02 DIAGNOSIS — N39.0 RECURRENT UTI: ICD-10-CM

## 2023-10-02 LAB
ALBUMIN UR-MCNC: NEGATIVE MG/DL
APPEARANCE UR: CLEAR
BACTERIA #/AREA URNS HPF: ABNORMAL /HPF
BILIRUB UR QL STRIP: NEGATIVE
COLOR UR AUTO: YELLOW
GLUCOSE UR STRIP-MCNC: NEGATIVE MG/DL
HGB UR QL STRIP: ABNORMAL
KETONES UR STRIP-MCNC: NEGATIVE MG/DL
LEUKOCYTE ESTERASE UR QL STRIP: ABNORMAL
NITRATE UR QL: NEGATIVE
PH UR STRIP: 7 [PH] (ref 5–7)
RBC #/AREA URNS AUTO: ABNORMAL /HPF
SP GR UR STRIP: 1.01 (ref 1–1.03)
SQUAMOUS #/AREA URNS AUTO: ABNORMAL /LPF
UROBILINOGEN UR STRIP-ACNC: 0.2 E.U./DL
WBC #/AREA URNS AUTO: ABNORMAL /HPF

## 2023-10-02 PROCEDURE — 99213 OFFICE O/P EST LOW 20 MIN: CPT | Performed by: OBSTETRICS & GYNECOLOGY

## 2023-10-02 PROCEDURE — 87186 SC STD MICRODIL/AGAR DIL: CPT | Performed by: OBSTETRICS & GYNECOLOGY

## 2023-10-02 PROCEDURE — 87088 URINE BACTERIA CULTURE: CPT | Performed by: OBSTETRICS & GYNECOLOGY

## 2023-10-02 PROCEDURE — 87086 URINE CULTURE/COLONY COUNT: CPT | Performed by: OBSTETRICS & GYNECOLOGY

## 2023-10-02 PROCEDURE — 81001 URINALYSIS AUTO W/SCOPE: CPT | Performed by: OBSTETRICS & GYNECOLOGY

## 2023-10-02 RX ORDER — CIPROFLOXACIN 250 MG/1
250 TABLET, FILM COATED ORAL 2 TIMES DAILY
Qty: 14 TABLET | Refills: 0 | Status: SHIPPED | OUTPATIENT
Start: 2023-10-02 | End: 2023-10-09

## 2023-10-02 RX ORDER — ESTRADIOL 10 UG/1
INSERT VAGINAL
Qty: 24 TABLET | Refills: 3 | Status: SHIPPED | OUTPATIENT
Start: 2023-10-02 | End: 2024-02-27

## 2023-10-02 NOTE — NURSING NOTE
"Initial /67 (BP Location: Right arm, Patient Position: Chair, Cuff Size: Adult Regular)   Pulse 80   Temp 98.2  F (36.8  C) (Tympanic)   Ht 1.651 m (5' 5\")   Wt 83.9 kg (185 lb)   LMP 11/12/2004 (Approximate)   SpO2 97%   BMI 30.79 kg/m   Estimated body mass index is 30.79 kg/m  as calculated from the following:    Height as of this encounter: 1.651 m (5' 5\").    Weight as of this encounter: 83.9 kg (185 lb). .    Nelli Ness, [facility]    "

## 2023-10-02 NOTE — PROGRESS NOTES
Chief Complaint   Patient presents with    Urinary Problem     Follow up from seeing Dr. Cantor. Still having symptoms at night         HPI:  Mine Meléndez, 68 year old,  presents with complaints of pain with urination.  It is worse at night, but she still has it during the day.  This started in May when she had brown discharge and pain. She has had a hysterectomy with TVT sling.  She feels she empties OK.  The discharge is gone, but she is still getting pain with urination.  She has had multiple bladder infections.  The pain feels like an ache and is deep inside. No external pain.      Past Medical History:   Diagnosis Date    Herpes simplex without mention of complication     Hypertension     Malignant melanoma (H)     Obesity, unspecified     Osteomyelitis of jaw 2008    Rheumatoid arthritis(714.0)     Trochanteric bursitis of right hip 2013     Past Surgical History:   Procedure Laterality Date    COLONOSCOPY  06    COMBINED REPAIR PTOSIS WITH BLEPHAROPLASTY BILATERAL Bilateral 2023    Procedure: Bilateral upper eyelid blepharoplasty and ptosis repair;  Surgeon: Cl Pearl MD;  Location: [facility] OR    EXCISE MASS FOOT Left 10/5/2021    Procedure: EXCISION, MASS, LEFT FOOT;  Surgeon: Tommy Pelaez DPM;  Location: [facility]    HYSTERECTOMY, PAP NO LONGER INDICATED      HYSTERECTOMY, VAGINAL      TVH,TVT procedure    RELEASE CARPAL TUNNEL Right 10/11/2016    Procedure: RELEASE CARPAL TUNNEL;  Surgeon: Emely Blanca MD;  Location: [facility] OR    SURGICAL HISTORY OF -       tubal ligation    SURGICAL HISTORY OF -       excision gangliion cyst rt heel     Social History     Socioeconomic History    Marital status:      Spouse name: Not on file    Number of children: Not on file    Years of education: Not on file    Highest education level: Not on file   Occupational History    Not on file   Tobacco Use    Smoking status: Former     Years: 25.00     Types:  Cigarettes     Quit date: 2000     Years since quittin.4     Passive exposure: Never    Smokeless tobacco: Never   Vaping Use    Vaping Use: Never used   Substance and Sexual Activity    Alcohol use: Yes     Comment: 6 pack of beer on a weekend    Drug use: No    Sexual activity: Yes     Partners: Male     Birth control/protection: Surgical     Comment: [email or web address]   Other Topics Concern    Parent/sibling w/ CABG, MI or angioplasty before 65F 55M? No   Social History Narrative    Not on file     Social Determinants of Health     Financial Resource Strain: Low Risk  (2023)    Financial Resource Strain     Within the past 12 months, have you or your family members you live with been unable to get utilities (heat, electricity) when it was really needed?: No   Food Insecurity: Low Risk  (2023)    Food Insecurity     Within the past 12 months, did you worry that your food would run out before you got money to buy more?: No     Within the past 12 months, did the food you bought just not last and you didn t have money to get more?: No   Transportation Needs: Low Risk  (2023)    Transportation Needs     Within the past 12 months, has lack of transportation kept you from medical appointments, getting your medicines, non-medical meetings or appointments, work, or from getting things that you need?: No   Physical Activity: Not on file   Stress: Not on file   Social Connections: Not on file   Interpersonal Safety: Not on file   Housing Stability: Low Risk  (2023)    Housing Stability     Do you have housing? : Yes     Are you worried about losing your housing?: No     Family History   Problem Relation Age of Onset    Diabetes Father     Dementia Father     Diabetes Brother     Diabetes Sister     Hypertension Sister     Hypertension Sister         Current Outpatient Medications   Medication Sig Dispense Refill    ASPIRIN 81 MG OR TABS  100 3    ciprofloxacin (CIPRO) 250 MG tablet Take 1 tablet  "(250 mg) by mouth 2 times daily for 7 days 14 tablet 0    estradiol (VAGIFEM) 10 MCG TABS vaginal tablet Place 1 tablet in the vagina, before bed, nightly for 2 weeks, then twice weekly. 24 tablet 3    folic acid (FOLVITE) 1 MG tablet Take 1 tablet (1,000 mcg) by mouth daily 90 tablet 2    hyoscyamine (LEVSIN) 0.125 MG tablet Take 1 tablet (125 mcg) by mouth every 6 hours as needed for cramping 12 tablet 0    levothyroxine (SYNTHROID/LEVOTHROID) 50 MCG tablet TAKE 1 TABLET (50 MCG) BY MOUTH DAILY 90 tablet 3    lisinopril-hydrochlorothiazide (ZESTORETIC) 10-12.5 MG tablet Take 1 tablet by mouth daily 90 tablet 3    methotrexate sodium 2.5 MG TABS Take 8 tablets (20 mg) by mouth once a week . Take all 8 tablets on the same day of each week. 104 tablet 2    MULTI-VITAMIN OR 1 daily      Probiotic Product (PROBIOTIC PO)       simvastatin (ZOCOR) 20 MG tablet TAKE ONE TABLET BY MOUTH AT BEDTIME 90 tablet 3    acetaminophen (TYLENOL) 325 MG tablet Take 2 tablets (650 mg) by mouth every 4 hours as needed for mild pain (Patient not taking: Reported on 7/10/2023) 50 tablet 0    clindamycin (CLEOCIN 1 DOSE) 2 % vaginal cream Place in the vagina daily for three weeks, then decrease two twice weekly (Patient not taking: Reported on 10/2/2023) 5 g 12    ibuprofen (ADVIL/MOTRIN) 800 MG tablet Take 1 tablet (800 mg) by mouth every 8 hours as needed for moderate pain (Patient not taking: Reported on 7/21/2023) 90 tablet 0        Allergies:     Allergies   Allergen Reactions    Flagyl [Metronidazole] Rash    Morphine And Related Rash    Septra [Sulfamethoxazole-Trimethoprim] Itching     Puffy eyes, flushed.         ROS:  See HPI      Physical Exam:  /67 (BP Location: Right arm, Patient Position: Chair, Cuff Size: Adult Regular)   Pulse 80   Temp 98.2  F (36.8  C) (Tympanic)   Ht 1.651 m (5' 5\")   Wt 83.9 kg (185 lb)   LMP 11/12/2004 (Approximate)   SpO2 97%   BMI 30.79 kg/m       Appearance: well developed, well " nourished, no acute distress  Head Exam normocephalic, no lesions or deformities  Extremities: no edema  Psych: orientated x3    Genitourinary Exam  Vulva: normal, no lesions  Urethral meatus: normal size and location, no lesions or discharge  Urethra: no tenderness or masses  Bladder: no fullness or tenderness  Vagina: no cystocele and rectocele, atrophic, no mesh erosion  Cervix: surgically absent  Uterus: surgically absent  Adnexa: no palpable masses bilaterally    Assessment/Plan:  Encounter Diagnoses   Name Primary?    Dysuria Yes    Recurrent UTI      Will treat with Cipro.  Discussed prevention including vaginal estrogen, probiotics, cranberry, increased hydration.  Discussed antibiotic prophylaxis, but will see how she does with the others first.  We also discussed referral to urology if no improvement.            Dolly Palacio MD on 10/2/2023 at 7:28 AM

## 2023-10-04 LAB
BACTERIA UR CULT: ABNORMAL
BACTERIA UR CULT: ABNORMAL

## 2023-10-05 ENCOUNTER — TRANSFERRED RECORDS (OUTPATIENT)
Dept: HEALTH INFORMATION MANAGEMENT | Facility: CLINIC | Age: 68
End: 2023-10-05
Payer: COMMERCIAL

## 2023-10-06 DIAGNOSIS — N30.90 BLADDER INFECTION: Primary | ICD-10-CM

## 2023-11-01 ENCOUNTER — LAB (OUTPATIENT)
Dept: LAB | Facility: CLINIC | Age: 68
End: 2023-11-01
Payer: COMMERCIAL

## 2023-11-01 DIAGNOSIS — N30.90 BLADDER INFECTION: ICD-10-CM

## 2023-11-01 PROCEDURE — 81001 URINALYSIS AUTO W/SCOPE: CPT

## 2023-11-02 NOTE — RESULT ENCOUNTER NOTE
The attached results were normal. Please follow any recommendations discussed in clinic.    Dolly Palacio MD          11/2/2023 8:47 AM

## 2023-11-19 ENCOUNTER — HEALTH MAINTENANCE LETTER (OUTPATIENT)
Age: 68
End: 2023-11-19

## 2023-11-19 DIAGNOSIS — I10 ESSENTIAL HYPERTENSION WITH GOAL BLOOD PRESSURE LESS THAN 140/90: ICD-10-CM

## 2023-11-20 RX ORDER — LISINOPRIL/HYDROCHLOROTHIAZIDE 10-12.5 MG
1 TABLET ORAL DAILY
Qty: 90 TABLET | Refills: 1 | Status: SHIPPED | OUTPATIENT
Start: 2023-11-20 | End: 2024-02-27

## 2023-12-18 DIAGNOSIS — E78.5 HYPERLIPIDEMIA LDL GOAL <130: ICD-10-CM

## 2023-12-18 RX ORDER — SIMVASTATIN 20 MG
TABLET ORAL
Qty: 90 TABLET | Refills: 0 | Status: SHIPPED | OUTPATIENT
Start: 2023-12-18 | End: 2024-02-27

## 2023-12-20 ENCOUNTER — MYC MEDICAL ADVICE (OUTPATIENT)
Dept: OBGYN | Facility: CLINIC | Age: 68
End: 2023-12-20

## 2023-12-20 ENCOUNTER — E-VISIT (OUTPATIENT)
Dept: FAMILY MEDICINE | Facility: CLINIC | Age: 68
End: 2023-12-20
Payer: COMMERCIAL

## 2023-12-20 DIAGNOSIS — N39.0 ACUTE UTI (URINARY TRACT INFECTION): Primary | ICD-10-CM

## 2023-12-20 PROCEDURE — 99421 OL DIG E/M SVC 5-10 MIN: CPT | Performed by: FAMILY MEDICINE

## 2023-12-20 RX ORDER — CIPROFLOXACIN 250 MG/1
250 TABLET, FILM COATED ORAL 2 TIMES DAILY
Qty: 6 TABLET | Refills: 0 | Status: SHIPPED | OUTPATIENT
Start: 2023-12-20 | End: 2023-12-23

## 2023-12-20 NOTE — PATIENT INSTRUCTIONS
Dear Helena,     After reviewing your responses, I've been able to diagnose you with a urinary tract infection, which is a common infection of the bladder with bacteria.  Drinking lots of fluids is also helpful to clear your current infection and prevent the next one.      It would be helpful to get a urine sample to look at and to culture to make sure we are treating appropriately.  It would be helpful to get that before you start the antibiotic.  Make a lab only appointment and call the care team at [phone] option #2 if you have problems with that.    I have sent a prescription for antibiotics to your pharmacy to treat this infection.    It is important that you take all of your prescribed medication even if your symptoms are improving after a few doses.  Taking all of your medicine helps prevent the symptoms from returning.     If your symptoms worsen, you develop pain in your back or stomach, develop fevers, or are not improving in 5 days, please contact your primary care provider for an appointment or visit any of our convenient Walk-in or Urgent Care Centers to be seen, which can be found on our website here.    Given the fact that the last infection was just two months ago, if your symptoms don't resolve or come back in a short amount of time, you should be seen.      Thanks again for choosing us as your health care partner,    Britany Norton MD

## 2023-12-20 NOTE — TELEPHONE ENCOUNTER
"S-(situation): Patient requesting prescription for treatment of bladder/yeast infection    B-(background): last office visit 10/2023 with Dr. Zuluaga. Urine culture showed Klebsiella pneumoniae.    A-(assessment): Patient reports she is experiencing \" the same symptoms as she was in October at her office visit.\" Patient would like to be treated for UTI/yeast infection. Patient using the vaginal estradiol pill twice weekly.     R-(recommendations): Huddled with PA in clinic today for add on appointment. She is unable to see patient so recommends appointment in Urgent Care or trying with PCP.  Patient does not want to go to Urgent Care for this.     Do you want to treat?    Please review and advise on your recommendations.    Thank you.    Onelia FLORENCE   [facility]       "

## 2023-12-21 ENCOUNTER — LAB (OUTPATIENT)
Dept: LAB | Facility: CLINIC | Age: 68
End: 2023-12-21
Payer: COMMERCIAL

## 2023-12-21 DIAGNOSIS — Z79.899 HIGH RISK MEDICATION USE: ICD-10-CM

## 2023-12-21 DIAGNOSIS — M05.9 SEROPOSITIVE RHEUMATOID ARTHRITIS (H): ICD-10-CM

## 2023-12-21 DIAGNOSIS — N39.0 ACUTE UTI (URINARY TRACT INFECTION): ICD-10-CM

## 2023-12-21 LAB
ALBUMIN SERPL BCG-MCNC: 3.9 G/DL (ref 3.5–5.2)
ALBUMIN UR-MCNC: NEGATIVE MG/DL
ALP SERPL-CCNC: 135 U/L (ref 40–150)
ALT SERPL W P-5'-P-CCNC: 22 U/L (ref 0–50)
APPEARANCE UR: ABNORMAL
AST SERPL W P-5'-P-CCNC: 31 U/L (ref 0–45)
BACTERIA #/AREA URNS HPF: ABNORMAL /HPF
BASOPHILS # BLD AUTO: 0 10E3/UL (ref 0–0.2)
BASOPHILS NFR BLD AUTO: 0 %
BILIRUB DIRECT SERPL-MCNC: <0.2 MG/DL (ref 0–0.3)
BILIRUB SERPL-MCNC: 0.4 MG/DL
BILIRUB UR QL STRIP: NEGATIVE
COLOR UR AUTO: YELLOW
CREAT SERPL-MCNC: 0.63 MG/DL (ref 0.51–0.95)
CRP SERPL-MCNC: 12.15 MG/L
EGFRCR SERPLBLD CKD-EPI 2021: >90 ML/MIN/1.73M2
EOSINOPHIL # BLD AUTO: 0.1 10E3/UL (ref 0–0.7)
EOSINOPHIL NFR BLD AUTO: 2 %
ERYTHROCYTE [DISTWIDTH] IN BLOOD BY AUTOMATED COUNT: 12.8 % (ref 10–15)
ERYTHROCYTE [SEDIMENTATION RATE] IN BLOOD BY WESTERGREN METHOD: 14 MM/HR (ref 0–30)
GLUCOSE UR STRIP-MCNC: NEGATIVE MG/DL
HCT VFR BLD AUTO: 42.7 % (ref 35–47)
HGB BLD-MCNC: 14.4 G/DL (ref 11.7–15.7)
HGB UR QL STRIP: ABNORMAL
IMM GRANULOCYTES # BLD: 0 10E3/UL
IMM GRANULOCYTES NFR BLD: 0 %
KETONES UR STRIP-MCNC: NEGATIVE MG/DL
LEUKOCYTE ESTERASE UR QL STRIP: ABNORMAL
LYMPHOCYTES # BLD AUTO: 1.4 10E3/UL (ref 0.8–5.3)
LYMPHOCYTES NFR BLD AUTO: 18 %
MCH RBC QN AUTO: 35.6 PG (ref 26.5–33)
MCHC RBC AUTO-ENTMCNC: 33.7 G/DL (ref 31.5–36.5)
MCV RBC AUTO: 106 FL (ref 78–100)
MONOCYTES # BLD AUTO: 0.5 10E3/UL (ref 0–1.3)
MONOCYTES NFR BLD AUTO: 6 %
NEUTROPHILS # BLD AUTO: 5.9 10E3/UL (ref 1.6–8.3)
NEUTROPHILS NFR BLD AUTO: 74 %
NITRATE UR QL: POSITIVE
PH UR STRIP: 7 [PH] (ref 5–7)
PLATELET # BLD AUTO: 217 10E3/UL (ref 150–450)
PROT SERPL-MCNC: 7.3 G/DL (ref 6.4–8.3)
RBC # BLD AUTO: 4.04 10E6/UL (ref 3.8–5.2)
RBC #/AREA URNS AUTO: ABNORMAL /HPF
SP GR UR STRIP: 1.02 (ref 1–1.03)
SQUAMOUS #/AREA URNS AUTO: ABNORMAL /LPF
UROBILINOGEN UR STRIP-ACNC: 0.2 E.U./DL
WBC # BLD AUTO: 8 10E3/UL (ref 4–11)
WBC #/AREA URNS AUTO: ABNORMAL /HPF

## 2023-12-21 PROCEDURE — 80076 HEPATIC FUNCTION PANEL: CPT

## 2023-12-21 PROCEDURE — 86140 C-REACTIVE PROTEIN: CPT

## 2023-12-21 PROCEDURE — 87086 URINE CULTURE/COLONY COUNT: CPT

## 2023-12-21 PROCEDURE — 85652 RBC SED RATE AUTOMATED: CPT

## 2023-12-21 PROCEDURE — 82565 ASSAY OF CREATININE: CPT

## 2023-12-21 PROCEDURE — 85025 COMPLETE CBC W/AUTO DIFF WBC: CPT

## 2023-12-21 PROCEDURE — 87186 SC STD MICRODIL/AGAR DIL: CPT

## 2023-12-21 PROCEDURE — 36415 COLL VENOUS BLD VENIPUNCTURE: CPT

## 2023-12-21 PROCEDURE — 81001 URINALYSIS AUTO W/SCOPE: CPT

## 2023-12-22 LAB — BACTERIA UR CULT: ABNORMAL

## 2024-01-05 ENCOUNTER — VIRTUAL VISIT (OUTPATIENT)
Dept: RHEUMATOLOGY | Facility: CLINIC | Age: 69
End: 2024-01-05
Payer: COMMERCIAL

## 2024-01-05 DIAGNOSIS — M05.9 SEROPOSITIVE RHEUMATOID ARTHRITIS (H): Primary | ICD-10-CM

## 2024-01-05 DIAGNOSIS — M19.041 OSTEOARTHRITIS OF BOTH HANDS, UNSPECIFIED OSTEOARTHRITIS TYPE: ICD-10-CM

## 2024-01-05 DIAGNOSIS — M19.042 OSTEOARTHRITIS OF BOTH HANDS, UNSPECIFIED OSTEOARTHRITIS TYPE: ICD-10-CM

## 2024-01-05 DIAGNOSIS — Z79.899 HIGH RISK MEDICATION USE: ICD-10-CM

## 2024-01-05 PROCEDURE — 99214 OFFICE O/P EST MOD 30 MIN: CPT | Mod: 95 | Performed by: INTERNAL MEDICINE

## 2024-01-05 RX ORDER — FOLIC ACID 1 MG/1
1000 TABLET ORAL DAILY
Qty: 90 TABLET | Refills: 2 | Status: SHIPPED | OUTPATIENT
Start: 2024-01-05 | End: 2024-07-10

## 2024-01-05 RX ORDER — METHOTREXATE 2.5 MG/1
20 TABLET ORAL WEEKLY
Qty: 104 TABLET | Refills: 2 | Status: SHIPPED | OUTPATIENT
Start: 2024-01-05 | End: 2024-07-10

## 2024-01-05 NOTE — PROGRESS NOTES
Mine Meléndez  is a 68 year old year old who is being evaluated via a billable video visit.      How would you like to obtain your AVS? MyChart  If the video visit is dropped, the invitation should be resent by: Text to cell phone: [phone]   Will anyone else be joining your video visit? No     Rheumatology Video Visit      Mine Meléndez MRN# [id number]   YOB: 1955 Age: 68 year old      Date of visit: 1/05/24     Chief Complaint   Patient presents with:  RECHECK    Assessment and Plan     1. Seropositive Erosive Rheumatoid Arthritis (RF+, ): Dx'd with RA prior to 2002. Previously on sulfasalazine (stomatitis), hydroxychloroquine (loose stools), leflunomide (loose stools).  Currently on methotrexate 20 mg oral once weekly.  RA controlled at this time.  Check x-rays in 6 months to evaluate for progressive erosive changes.    Chronic illness, stable.      - Continue Methotrexate 20mg PO once weekly  - Continue folic acid 1mg daily  - Labs in 3 months: CBC, Creatinine, Hepatic Panel  - Labs in 6 months: CBC, Creatinine, Hepatic Panel, ESR, CRP   - X-rays in 6 months: Bilateral hand/feet    High risk medication requiring intensive toxicity monitoring at least quarterly.     2. Primary osteoarthritis of both hands: voltaren gel PRN.  Has not required Voltaren gel recently because she has been doing so well, per patient  - Continue diclofenac (VOLTAREN) 1 % topical gel; Apply up to 2 grams of 1% gel to hands up to 4 times daily as needed for joint pain (maximum: 8 g per upper extremity per day)      3.  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  This is documented here for reference only.    4. Melanoma history: documented here for historical significance.  Continue to follow with a dermatologist at least once yearly.    5.  Vaccinations: Vaccinations reviewed with Ms. Meléndez.    -  Influenza: encouraged yearly vaccination  - [id number]: up to date  - [id number]: up to date  - Shingrix: Up to date  - COVID-19: Up to date      6. Elevated blood pressure:  Helena to follow up with Primary Care provider regarding elevated blood pressure.     Total minutes spent in evaluation with patient, documentation, , and review of pertinent studies and chart notes: 16      Ms. Meléndez verbalized agreement with and understanding of the rational for the diagnosis and treatment plan.  All questions were answered to best of my ability and the patient's satisfaction. Ms. Meléndez was advised to contact the clinic with any questions that may arise after the clinic visit.      Thank you for involving me in the care of the patient    Return to clinic: 6 months, in office    HPI   Mine Meléndez is a 68 year old female with a past medical history significant for hypertension, hyperlipidemia, hypothyroidism, migraines, melanoma history, and rheumatoid arthritis who presents for follow-up of rheumatoid arthritis.     Today, 1/5/2024: An occasional twinge of pain at the left second MCP which is gripping something tightly but this goes away as soon as she starts gripping tightly.  Bony hypertrophy of the right third PIP unchanged and does not bother her; no pain.  More stiffness for less than 20 minutes.  Overall happy with how well she is doing.  No oral or nasal sores.  No rash.  No chest pain or pressure or shortness of breath.    Tobacco: Former smoker  EtOH: 4 drinks per day on the weekends  Drugs: None  Occupation: retired June 2020, was working in [employer]   12 point review of system was completed and negative except as noted in the HPI    Active Problem List     Patient Active Problem List   Diagnosis    Seropositive rheumatoid arthritis (H)    DYSPEPSIA    Obesity    Essential hypertension    Hypothyroidism    HYPERLIPIDEMIA LDL GOAL <130    Advanced directives, counseling/discussion    Dermatitis     High risk medications (not anticoagulants) long-term use    Female stress incontinence    Migraine    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)    Combined forms of age-related cataract, mild-mod, of both eyes    Dermatochalasis of both upper eyelids    Choroidal nevus of right eye    Involutional ptosis, acquired, bilateral     Past Medical History     Past Medical History:   Diagnosis Date    Herpes simplex without mention of complication     Hypertension     Malignant melanoma (H)     Obesity, unspecified     Osteomyelitis of jaw 5/22/2008    Rheumatoid arthritis(714.0)     Trochanteric bursitis of right hip 8/16/2013     Past Surgical History     Past Surgical History:   Procedure Laterality Date    COLONOSCOPY  1/20/06    COMBINED REPAIR PTOSIS WITH BLEPHAROPLASTY BILATERAL Bilateral 6/19/2023    Procedure: Bilateral upper eyelid blepharoplasty and ptosis repair;  Surgeon: Cl Pearl MD;  Location:  OR    EXCISE MASS FOOT Left 10/5/2021    Procedure: EXCISION, MASS, LEFT FOOT;  Surgeon: Tommy Pelaez DPM;  Location: [facility]    HYSTERECTOMY, PAP NO LONGER INDICATED      HYSTERECTOMY, VAGINAL  11/04    TVH,TVT procedure    RELEASE CARPAL TUNNEL Right 10/11/2016    Procedure: RELEASE CARPAL TUNNEL;  Surgeon: Emely Blanca MD;  Location: [facility] OR    SURGICAL HISTORY OF -   1988    tubal ligation    SURGICAL HISTORY OF -   2001    excision gangliion cyst rt heel     Allergy     Allergies   Allergen Reactions    Flagyl [Metronidazole] Rash    Morphine And Related Rash    Septra [Sulfamethoxazole-Trimethoprim] Itching     Puffy eyes, flushed.      Current Medication List     Current Outpatient Medications   Medication Sig    ASPIRIN 81 MG OR TABS     estradiol (VAGIFEM) 10 MCG TABS vaginal tablet Place 1 tablet in the vagina, before bed, nightly for 2 weeks, then twice weekly.    folic acid (FOLVITE) 1 MG tablet Take 1 tablet (1,000 mcg) by mouth daily     hyoscyamine (LEVSIN) 0.125 MG tablet Take 1 tablet (125 mcg) by mouth every 6 hours as needed for cramping    ibuprofen (ADVIL/MOTRIN) 800 MG tablet Take 1 tablet (800 mg) by mouth every 8 hours as needed for moderate pain    levothyroxine (SYNTHROID/LEVOTHROID) 50 MCG tablet TAKE 1 TABLET (50 MCG) BY MOUTH DAILY    lisinopril-hydrochlorothiazide (ZESTORETIC) 10-12.5 MG tablet TAKE 1 TABLET BY MOUTH DAILY    methotrexate sodium 2.5 MG TABS Take 8 tablets (20 mg) by mouth once a week . Take all 8 tablets on the same day of each week.    MULTI-VITAMIN OR 1 daily    Probiotic Product (PROBIOTIC PO)     simvastatin (ZOCOR) 20 MG tablet TAKE ONE TABLET BY MOUTH AT BEDTIME. ** VISIT DUE/LAST FILL **    acetaminophen (TYLENOL) 325 MG tablet Take 2 tablets (650 mg) by mouth every 4 hours as needed for mild pain (Patient not taking: Reported on 7/10/2023)    clindamycin (CLEOCIN 1 DOSE) 2 % vaginal cream Place in the vagina daily for three weeks, then decrease two twice weekly (Patient not taking: Reported on 10/2/2023)     Current Facility-Administered Medications   Medication    betamethasone acet & sod phos (CELESTONE) injection 6 mg    ropivacaine (NAROPIN) injection 0.5 mL     Social History   See HPI    Family History     Family History   Problem Relation Age of Onset    Diabetes Father     Dementia Father     Diabetes Brother     Diabetes Sister     Hypertension Sister     Hypertension Sister      Physical Exam     Temp Readings from Last 3 Encounters:   10/02/23 98.2  F (36.8  C) (Tympanic)   08/24/23 99.5  F (37.5  C) (Tympanic)   07/21/23 98.4  F (36.9  C) (Tympanic)     BP Readings from Last 5 Encounters:   10/02/23 125/67   08/24/23 118/70   07/21/23 (!) 141/73   07/10/23 (!) 148/62   06/19/23 132/54     Pulse Readings from Last 1 Encounters:   10/02/23 80     Resp Readings from Last 1 Encounters:   08/24/23 20     Estimated body mass index is 30.79 kg/m  as calculated from the following:    Height as of 10/2/23:  "1.651 m (5' 5\").    Weight as of 10/2/23: 83.9 kg (185 lb).    GEN: NAD. Healthy appearing adult.   HEENT:  Anicteric, noninjected sclera. No obvious external lesions of the ear and nose. Hearing intact.  PULM: No increased work of breathing  MSK:  Mild swelling without overlying erythema of the right 3rd PIP   SKIN: No rash or jaundice seen  PSYCH: Alert. Appropriate.     Labs / Imaging (select studies)     RF/CCP  Recent Labs   Lab Test 05/01/18  1538   CCPIGG 182*     CBC  Recent Labs   Lab Test 12/21/23  1350 08/30/23  1150 08/24/23  1516 08/23/21  0848 05/12/21  1158 01/12/21  1547 10/23/20  1519   WBC 8.0 8.3 28.3*   < > 6.8 7.4 7.0   RBC 4.04 3.99 4.29   < > 4.12 4.12 4.21   HGB 14.4 13.8 15.2   < > 14.0 14.5 14.4   HCT 42.7 41.0 44.2   < > 41.9 43.1 43.1   * 103* 103*   < > 102* 105* 102*   RDW 12.8 14.3 13.8   < > 14.1 13.6 13.6    441 231   < > 217 182 220   MCH 35.6* 34.6* 35.4*   < > 34.0* 35.2* 34.2*   MCHC 33.7 33.7 34.4   < > 33.4 33.6 33.4   NEUTROPHIL 74 77 86   < > 75.2 68.7 69.2   LYMPH 18 14 4   < > 17.7 19.6 22.7   MONOCYTE 6 7 8   < > 5.2 9.2 6.1   EOSINOPHIL 2 1 0   < > 1.6 2.4 1.7   BASOPHIL 0 0 1   < > 0.3 0.1 0.3   ANEU  --   --   --   --  5.1 5.1 4.9   ALYM  --   --   --   --  1.2 1.4 1.6   SHYLA  --   --   --   --  0.4 0.7 0.4   AEOS  --   --   --   --  0.1 0.2 0.1   ABAS  --   --   --   --  0.0 0.0 0.0   ANEUTAUTO 5.9 6.4 24.4*   < >  --   --   --    ALYMPAUTO 1.4 1.1 1.2   < >  --   --   --    AMONOAUTO 0.5 0.6 2.2*   < >  --   --   --    AEOSAUTO 0.1 0.1 0.0   < >  --   --   --    ABSBASO 0.0 0.0 0.2   < >  --   --   --     < > = values in this interval not displayed.     CMP  Recent Labs   Lab Test 12/21/23  1350 06/30/23  1128 06/01/23  1339 03/20/23  1334 10/26/22  1313 09/21/22  1318 08/23/21  0847 05/12/21  1158 04/05/21  1255 01/12/21  1547   NA  --   --  137  --   --  133*  --   --  132*  --    POTASSIUM  --   --  3.8  --   --  3.5  --   --  3.5  --    CHLORIDE  --  "  --  98  --   --  96*  --   --  98  --    CO2  --   --  31*  --   --  28  --   --  26  --    ANIONGAP  --   --  8  --   --  9  --   --  8  --    GLC  --   --  113*  --   --  120*  --   --  151*  --    BUN  --   --  9.5  --   --  7.4*  --   --  7  --    CR 0.63 0.62 0.50* 0.61   < > 0.57   < > 0.58 0.55 0.65   GFRESTIMATED >90 >90 >90 >90   < > >90   < > >90 >90 >90   GFRESTBLACK  --   --   --   --   --   --   --  >90 >90 >90   ROSELYN  --   --  9.5  --   --  9.0  --   --  8.7  --    BILITOTAL 0.4 0.5  --  0.6   < > 0.8   < > 0.5  --  0.7   ALBUMIN 3.9 4.2  --  4.3   < > 4.0   < > 3.5  --  3.6   PROTTOTAL 7.3 7.5  --  7.6   < > 7.0   < > 7.5  --  7.5   ALKPHOS 135 127*  --  129*   < > 125*   < > 121  --  118   AST 31 35  --  30   < > 41*   < > 23  --  28   ALT 22 32  --  20   < > 28   < > 24  --  27    < > = values in this interval not displayed.     Calcium/VitaminD  Recent Labs   Lab Test 06/01/23  1339 09/21/22  1318 04/05/21  1255   ROSELYN 9.5 9.0 8.7     ESR/CRP  Recent Labs   Lab Test 12/21/23  1350 03/20/23  1334 10/26/22  1313 07/05/22  1547 03/15/22  1439 12/09/21  0830   SED 14 15 17 12 11 12   CRP  --   --   --  7.8 8.3* 9.1*   CRPI 12.15* 16.94* 22.32*  --   --   --      Lipid Panel  Recent Labs   Lab Test 09/21/22  1318 11/09/20  0939 04/17/19  1526   CHOL 149 153 159   TRIG 80 65 102   HDL 75 74 63   LDL 58 66 76   NHDL 74 79 96     Hepatitis B  Recent Labs   Lab Test 10/23/20  1519 05/01/18  1538   HBCAB Nonreactive Nonreactive   HEPBANG Nonreactive Nonreactive     Hepatitis C  Recent Labs   Lab Test 10/23/20  1519   HCVAB Nonreactive     Lyme ab screening  Recent Labs   Lab Test 08/30/23  1150   LYMEGM 0.22     Tuberculosis Screening  Recent Labs   Lab Test 10/23/20  1519   TBRST Negative     Immunization History     Immunization History   Administered Date(s) Administered    COVID-19 12+ (2023-24) (Pfizer) 11/01/2023    COVID-19 Bivalent 18+ (Moderna) 11/21/2022    COVID-19 MONOVALENT 12+ (Pfizer)  03/08/2021, 03/29/2021, 11/24/2021    COVID-19 Monovalent 18+ (Moderna) 05/02/2022    Influenza (H1N1) 12/31/2009    Influenza (High Dose) 3 valent vaccine 11/05/2019    Influenza (IIV3) PF 11/03/2004, 10/04/2010, 10/01/2012, 10/30/2012, 10/07/2013, 10/20/2013, 10/07/2014, 10/03/2016    Influenza Vaccine 65+ (Fluzone HD) 11/09/2020, 09/30/2021, 11/11/2022    Influenza Vaccine >6 months,quad, PF 10/23/2017    Influenza Vaccine, 6+MO IM (QUADRIVALENT W/PRESERVATIVES) 10/06/2015, 09/26/2018    Influenza, seasonal, injectable, PF 10/05/2009    Pneumo Conj 13-V (2010&after) 11/12/2018    Pneumococcal 23 valent 02/19/2019    TD,PF 7+ (Tenivac) 04/28/1995    TDAP Vaccine (Adacel) 12/05/2007, 02/01/2018    Zoster recombinant adjuvanted (SHINGRIX) 04/17/2019, 07/10/2019    Zoster vaccine, live 06/07/2016          Chart documentation done in part with Dragon Voice recognition Software. Although reviewed after completion, some word and grammatical error may remain.      Video-Visit Details    Type of service:  Video Visit    Video Start Time: 11:09 AM    Video End Time: 11:19 AM    Originating Location (pt. Location): Home, [place]    Distant Location (provider location):  off site, [place]    Platform used for Video Visit: Siva Baltazar MD

## 2024-02-08 ENCOUNTER — MYC MEDICAL ADVICE (OUTPATIENT)
Dept: FAMILY MEDICINE | Facility: CLINIC | Age: 69
End: 2024-02-08
Payer: COMMERCIAL

## 2024-02-08 ENCOUNTER — E-VISIT (OUTPATIENT)
Dept: FAMILY MEDICINE | Facility: CLINIC | Age: 69
End: 2024-02-08
Payer: COMMERCIAL

## 2024-02-08 DIAGNOSIS — N39.0 ACUTE UTI (URINARY TRACT INFECTION): Primary | ICD-10-CM

## 2024-02-08 PROCEDURE — 99421 OL DIG E/M SVC 5-10 MIN: CPT | Performed by: FAMILY MEDICINE

## 2024-02-08 RX ORDER — CEFDINIR 300 MG/1
300 CAPSULE ORAL 2 TIMES DAILY
Qty: 14 CAPSULE | Refills: 0 | Status: SHIPPED | OUTPATIENT
Start: 2024-02-08 | End: 2024-02-15

## 2024-02-08 NOTE — PATIENT INSTRUCTIONS
Dear Helena,     After reviewing your responses, I've been able to diagnose you with a urinary tract infection, which is a common infection of the bladder with bacteria.    I have sent a prescription for antibiotics to your pharmacy to treat this infection.      If you're able to make a lab only appointment today and leave a urine sample BEFORE you start your antibiotic, that would be ideal.      It is important that you take all of your prescribed medication even if your symptoms are improving after a few doses.  Taking all of your medicine helps prevent the symptoms from returning.     If your symptoms worsen, you develop pain in your back or stomach, develop fevers, or are not improving in 5 days, please contact your primary care provider for an appointment or visit any of our convenient Walk-in or Urgent Care Centers to be seen, which can be found on our website here.    Thanks again for choosing us as your health care partner,    Britany Norton MD

## 2024-02-09 ENCOUNTER — LAB (OUTPATIENT)
Dept: LAB | Facility: CLINIC | Age: 69
End: 2024-02-09
Payer: COMMERCIAL

## 2024-02-09 DIAGNOSIS — N39.0 ACUTE UTI (URINARY TRACT INFECTION): ICD-10-CM

## 2024-02-09 LAB
ALBUMIN UR-MCNC: NEGATIVE MG/DL
APPEARANCE UR: CLEAR
BACTERIA #/AREA URNS HPF: ABNORMAL /HPF
BILIRUB UR QL STRIP: NEGATIVE
COLOR UR AUTO: YELLOW
GLUCOSE UR STRIP-MCNC: NEGATIVE MG/DL
HGB UR QL STRIP: ABNORMAL
KETONES UR STRIP-MCNC: NEGATIVE MG/DL
LEUKOCYTE ESTERASE UR QL STRIP: ABNORMAL
NITRATE UR QL: NEGATIVE
PH UR STRIP: 7 [PH] (ref 5–7)
RBC #/AREA URNS AUTO: ABNORMAL /HPF
SP GR UR STRIP: 1.02 (ref 1–1.03)
SQUAMOUS #/AREA URNS AUTO: ABNORMAL /LPF
UROBILINOGEN UR STRIP-ACNC: 0.2 E.U./DL
WBC #/AREA URNS AUTO: ABNORMAL /HPF

## 2024-02-09 PROCEDURE — 81001 URINALYSIS AUTO W/SCOPE: CPT

## 2024-02-27 ENCOUNTER — LAB (OUTPATIENT)
Dept: LAB | Facility: CLINIC | Age: 69
End: 2024-02-27
Payer: COMMERCIAL

## 2024-02-27 ENCOUNTER — OFFICE VISIT (OUTPATIENT)
Dept: FAMILY MEDICINE | Facility: CLINIC | Age: 69
End: 2024-02-27
Payer: COMMERCIAL

## 2024-02-27 VITALS
RESPIRATION RATE: 20 BRPM | DIASTOLIC BLOOD PRESSURE: 66 MMHG | HEART RATE: 72 BPM | SYSTOLIC BLOOD PRESSURE: 122 MMHG | BODY MASS INDEX: 32.32 KG/M2 | OXYGEN SATURATION: 93 % | TEMPERATURE: 97.6 F | HEIGHT: 65 IN | WEIGHT: 194 LBS

## 2024-02-27 DIAGNOSIS — E78.5 HYPERLIPIDEMIA LDL GOAL <130: ICD-10-CM

## 2024-02-27 DIAGNOSIS — M70.61 TROCHANTERIC BURSITIS OF RIGHT HIP: ICD-10-CM

## 2024-02-27 DIAGNOSIS — I10 ESSENTIAL HYPERTENSION WITH GOAL BLOOD PRESSURE LESS THAN 140/90: ICD-10-CM

## 2024-02-27 DIAGNOSIS — N39.0 RECURRENT UTI: ICD-10-CM

## 2024-02-27 DIAGNOSIS — R30.0 DYSURIA: Primary | ICD-10-CM

## 2024-02-27 DIAGNOSIS — M05.9 SEROPOSITIVE RHEUMATOID ARTHRITIS (H): ICD-10-CM

## 2024-02-27 DIAGNOSIS — N39.0 URINARY TRACT INFECTION, ACUTE: ICD-10-CM

## 2024-02-27 LAB
ALBUMIN UR-MCNC: ABNORMAL MG/DL
APPEARANCE UR: ABNORMAL
BACTERIA #/AREA URNS HPF: ABNORMAL /HPF
BILIRUB UR QL STRIP: NEGATIVE
CHOLEST SERPL-MCNC: 144 MG/DL
COLOR UR AUTO: YELLOW
FASTING STATUS PATIENT QL REPORTED: YES
GLUCOSE UR STRIP-MCNC: NEGATIVE MG/DL
HDLC SERPL-MCNC: 54 MG/DL
HGB UR QL STRIP: ABNORMAL
KETONES UR STRIP-MCNC: NEGATIVE MG/DL
LDLC SERPL CALC-MCNC: 61 MG/DL
LEUKOCYTE ESTERASE UR QL STRIP: ABNORMAL
NITRATE UR QL: POSITIVE
NONHDLC SERPL-MCNC: 90 MG/DL
PH UR STRIP: 6.5 [PH] (ref 5–7)
RBC #/AREA URNS AUTO: ABNORMAL /HPF
SP GR UR STRIP: 1.02 (ref 1–1.03)
SQUAMOUS #/AREA URNS AUTO: ABNORMAL /LPF
TRIGL SERPL-MCNC: 145 MG/DL
UROBILINOGEN UR STRIP-ACNC: 0.2 E.U./DL
WBC #/AREA URNS AUTO: >100 /HPF

## 2024-02-27 PROCEDURE — 20610 DRAIN/INJ JOINT/BURSA W/O US: CPT | Mod: RT | Performed by: FAMILY MEDICINE

## 2024-02-27 PROCEDURE — 87086 URINE CULTURE/COLONY COUNT: CPT | Performed by: FAMILY MEDICINE

## 2024-02-27 PROCEDURE — 99214 OFFICE O/P EST MOD 30 MIN: CPT | Mod: 25 | Performed by: FAMILY MEDICINE

## 2024-02-27 PROCEDURE — 80061 LIPID PANEL: CPT

## 2024-02-27 PROCEDURE — 36415 COLL VENOUS BLD VENIPUNCTURE: CPT

## 2024-02-27 PROCEDURE — 81001 URINALYSIS AUTO W/SCOPE: CPT | Performed by: FAMILY MEDICINE

## 2024-02-27 PROCEDURE — 87088 URINE BACTERIA CULTURE: CPT | Performed by: FAMILY MEDICINE

## 2024-02-27 PROCEDURE — 87186 SC STD MICRODIL/AGAR DIL: CPT | Performed by: FAMILY MEDICINE

## 2024-02-27 RX ORDER — LISINOPRIL/HYDROCHLOROTHIAZIDE 10-12.5 MG
1 TABLET ORAL DAILY
Qty: 90 TABLET | Refills: 1 | Status: SHIPPED | OUTPATIENT
Start: 2024-02-27

## 2024-02-27 RX ORDER — TRIAMCINOLONE ACETONIDE 40 MG/ML
40 INJECTION, SUSPENSION INTRA-ARTICULAR; INTRAMUSCULAR
Status: SHIPPED | OUTPATIENT
Start: 2024-02-27

## 2024-02-27 RX ORDER — SIMVASTATIN 20 MG
TABLET ORAL
Qty: 90 TABLET | Refills: 3 | Status: SHIPPED | OUTPATIENT
Start: 2024-02-27

## 2024-02-27 RX ORDER — CEFDINIR 300 MG/1
300 CAPSULE ORAL 2 TIMES DAILY
Qty: 14 CAPSULE | Refills: 0 | Status: SHIPPED | OUTPATIENT
Start: 2024-02-27 | End: 2024-02-29

## 2024-02-27 RX ORDER — ESTRADIOL 10 UG/1
INSERT VAGINAL
Qty: 24 TABLET | Refills: 3 | Status: SHIPPED | OUTPATIENT
Start: 2024-02-27 | End: 2024-07-15

## 2024-02-27 RX ADMIN — TRIAMCINOLONE ACETONIDE 40 MG: 40 INJECTION, SUSPENSION INTRA-ARTICULAR; INTRAMUSCULAR at 09:59

## 2024-02-27 ASSESSMENT — PAIN SCALES - GENERAL: PAINLEVEL: NO PAIN (0)

## 2024-02-29 ENCOUNTER — MYC MEDICAL ADVICE (OUTPATIENT)
Dept: FAMILY MEDICINE | Facility: CLINIC | Age: 69
End: 2024-02-29
Payer: COMMERCIAL

## 2024-02-29 LAB — BACTERIA UR CULT: ABNORMAL

## 2024-02-29 RX ORDER — CIPROFLOXACIN 250 MG/1
250 TABLET, FILM COATED ORAL 2 TIMES DAILY
Qty: 14 TABLET | Refills: 0 | Status: SHIPPED | OUTPATIENT
Start: 2024-02-29 | End: 2024-03-07

## 2024-03-18 ENCOUNTER — LAB (OUTPATIENT)
Dept: LAB | Facility: CLINIC | Age: 69
End: 2024-03-18
Payer: COMMERCIAL

## 2024-03-18 DIAGNOSIS — Z79.899 HIGH RISK MEDICATION USE: ICD-10-CM

## 2024-03-18 DIAGNOSIS — M05.9 SEROPOSITIVE RHEUMATOID ARTHRITIS (H): ICD-10-CM

## 2024-03-18 LAB
ALBUMIN SERPL BCG-MCNC: 3.9 G/DL (ref 3.5–5.2)
ALP SERPL-CCNC: 140 U/L (ref 40–150)
ALT SERPL W P-5'-P-CCNC: 25 U/L (ref 0–50)
AST SERPL W P-5'-P-CCNC: 28 U/L (ref 0–45)
BASOPHILS # BLD AUTO: 0 10E3/UL (ref 0–0.2)
BASOPHILS NFR BLD AUTO: 0 %
BILIRUB DIRECT SERPL-MCNC: <0.2 MG/DL (ref 0–0.3)
BILIRUB SERPL-MCNC: 0.5 MG/DL
CREAT SERPL-MCNC: 0.71 MG/DL (ref 0.51–0.95)
EGFRCR SERPLBLD CKD-EPI 2021: >90 ML/MIN/1.73M2
EOSINOPHIL # BLD AUTO: 0.1 10E3/UL (ref 0–0.7)
EOSINOPHIL NFR BLD AUTO: 1 %
ERYTHROCYTE [DISTWIDTH] IN BLOOD BY AUTOMATED COUNT: 14.4 % (ref 10–15)
HCT VFR BLD AUTO: 42 % (ref 35–47)
HGB BLD-MCNC: 14.1 G/DL (ref 11.7–15.7)
IMM GRANULOCYTES # BLD: 0 10E3/UL
IMM GRANULOCYTES NFR BLD: 0 %
LYMPHOCYTES # BLD AUTO: 1.1 10E3/UL (ref 0.8–5.3)
LYMPHOCYTES NFR BLD AUTO: 17 %
MCH RBC QN AUTO: 35.7 PG (ref 26.5–33)
MCHC RBC AUTO-ENTMCNC: 33.6 G/DL (ref 31.5–36.5)
MCV RBC AUTO: 106 FL (ref 78–100)
MONOCYTES # BLD AUTO: 0.6 10E3/UL (ref 0–1.3)
MONOCYTES NFR BLD AUTO: 10 %
NEUTROPHILS # BLD AUTO: 4.8 10E3/UL (ref 1.6–8.3)
NEUTROPHILS NFR BLD AUTO: 72 %
PLATELET # BLD AUTO: 198 10E3/UL (ref 150–450)
PROT SERPL-MCNC: 7 G/DL (ref 6.4–8.3)
RBC # BLD AUTO: 3.95 10E6/UL (ref 3.8–5.2)
WBC # BLD AUTO: 6.7 10E3/UL (ref 4–11)

## 2024-03-18 PROCEDURE — 80076 HEPATIC FUNCTION PANEL: CPT

## 2024-03-18 PROCEDURE — 82565 ASSAY OF CREATININE: CPT

## 2024-03-18 PROCEDURE — 36415 COLL VENOUS BLD VENIPUNCTURE: CPT

## 2024-03-18 PROCEDURE — 85025 COMPLETE CBC W/AUTO DIFF WBC: CPT

## 2024-04-02 ENCOUNTER — OFFICE VISIT (OUTPATIENT)
Dept: DERMATOLOGY | Facility: CLINIC | Age: 69
End: 2024-04-02
Payer: COMMERCIAL

## 2024-04-02 DIAGNOSIS — L81.4 LENTIGO: ICD-10-CM

## 2024-04-02 DIAGNOSIS — D18.01 ANGIOMA OF SKIN: ICD-10-CM

## 2024-04-02 DIAGNOSIS — D23.9 DERMAL NEVUS: Primary | ICD-10-CM

## 2024-04-02 DIAGNOSIS — L82.1 SEBORRHEIC KERATOSES: ICD-10-CM

## 2024-04-02 DIAGNOSIS — Z85.820 HISTORY OF MELANOMA: ICD-10-CM

## 2024-04-02 PROCEDURE — 99213 OFFICE O/P EST LOW 20 MIN: CPT | Performed by: DERMATOLOGY

## 2024-04-02 NOTE — LETTER
4/2/2024         RE: Mine Meléndez  [id number]  [facility] [address]        Dear Colleague,    Thank you for referring your patient, Mine Meléndez, to the [facility]. Please see a copy of my visit note below.    Mine Meléndez is an extremely pleasant 68 year old year old female patient here today for hx of 0.3mm melanoma on thigh. Patient has no other skin complaints today.  Remainder of the HPI, Meds, PMH, Allergies, FH, and SH was reviewed in chart.      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/2008     Rheumatoid arthritis(714.0)      Trochanteric bursitis of right hip 8/16/2013       Past Surgical History:   Procedure Laterality Date     COLONOSCOPY  1/20/06     COMBINED REPAIR PTOSIS WITH BLEPHAROPLASTY BILATERAL Bilateral 6/19/2023    Procedure: Bilateral upper eyelid blepharoplasty and ptosis repair;  Surgeon: Cl Pearl MD;  Location:  OR     EXCISE MASS FOOT Left 10/5/2021    Procedure: EXCISION, MASS, LEFT FOOT;  Surgeon: Tommy Pelaez DPM;  Location: [facility]     HYSTERECTOMY, PAP NO LONGER INDICATED       HYSTERECTOMY, VAGINAL  11/04    TVH,TVT procedure     RELEASE CARPAL TUNNEL Right 10/11/2016    Procedure: RELEASE CARPAL TUNNEL;  Surgeon: Emely Blanca MD;  Location: [facility]     SURGICAL HISTORY OF -   1988    tubal ligation     SURGICAL HISTORY OF -   2001    excision gangliion cyst rt heel        Family History   Problem Relation Age of Onset     Diabetes Father      Dementia Father      Diabetes Brother      Diabetes Sister      Hypertension Sister      Hypertension Sister        Social History     Socioeconomic History     Marital status:      Spouse name: Not on file     Number of children: Not on file     Years of education: Not on file     Highest education level: Not on file    Occupational History     Not on file   Tobacco Use     Smoking status: Former     Years: 25     Types: Cigarettes     Quit date: 2000     Years since quittin.9     Passive exposure: Never     Smokeless tobacco: Never   Vaping Use     Vaping Use: Never used   Substance and Sexual Activity     Alcohol use: Yes     Comment: 6 pack of beer on a weekend     Drug use: No     Sexual activity: Yes     Partners: Male     Birth control/protection: Surgical     Comment: [email or web address]   Other Topics Concern     Parent/sibling w/ CABG, MI or angioplasty before 65F 55M? No   Social History Narrative     Not on file     Social Determinants of Health     Financial Resource Strain: Low Risk  (2023)    Financial Resource Strain      Within the past 12 months, have you or your family members you live with been unable to get utilities (heat, electricity) when it was really needed?: No   Food Insecurity: Low Risk  (2023)    Food Insecurity      Within the past 12 months, did you worry that your food would run out before you got money to buy more?: No      Within the past 12 months, did the food you bought just not last and you didn t have money to get more?: No   Transportation Needs: Low Risk  (2023)    Transportation Needs      Within the past 12 months, has lack of transportation kept you from medical appointments, getting your medicines, non-medical meetings or appointments, work, or from getting things that you need?: No   Physical Activity: Not on file   Stress: Not on file   Social Connections: Not on file   Interpersonal Safety: Low Risk  (2024)    Interpersonal Safety      Do you feel physically and emotionally safe where you currently live?: Yes      Within the past 12 months, have you been hit, slapped, kicked or otherwise physically hurt by someone?: No      Within the past 12 months, have you been humiliated or emotionally abused in other ways by your partner or ex-partner?: No   Housing  Stability: Low Risk  (9/25/2023)    Housing Stability      Do you have housing? : Yes      Are you worried about losing your housing?: No       Outpatient Encounter Medications as of 4/2/2024   Medication Sig Dispense Refill     acetaminophen (TYLENOL) 325 MG tablet Take 2 tablets (650 mg) by mouth every 4 hours as needed for mild pain (Patient not taking: Reported on 7/10/2023) 50 tablet 0     ASPIRIN 81 MG OR TABS  100 3     clindamycin (CLEOCIN 1 DOSE) 2 % vaginal cream Place in the vagina daily for three weeks, then decrease two twice weekly (Patient not taking: Reported on 10/2/2023) 5 g 12     folic acid (FOLVITE) 1 MG tablet Take 1 tablet (1,000 mcg) by mouth daily 90 tablet 2     hyoscyamine (LEVSIN) 0.125 MG tablet Take 1 tablet (125 mcg) by mouth every 6 hours as needed for cramping 12 tablet 0     ibuprofen (ADVIL/MOTRIN) 800 MG tablet Take 1 tablet (800 mg) by mouth every 8 hours as needed for moderate pain 90 tablet 0     levothyroxine (SYNTHROID/LEVOTHROID) 50 MCG tablet TAKE 1 TABLET (50 MCG) BY MOUTH DAILY 90 tablet 3     lisinopril-hydrochlorothiazide (ZESTORETIC) 10-12.5 MG tablet Take 1 tablet by mouth daily 90 tablet 1     methotrexate 2.5 MG tablet Take 8 tablets (20 mg) by mouth once a week . Take all 8 tablets on the same day of each week. 104 tablet 2     MULTI-VITAMIN OR 1 daily       Probiotic Product (PROBIOTIC PO)        simvastatin (ZOCOR) 20 MG tablet TAKE ONE TABLET BY MOUTH AT BEDTIME. 90 tablet 3     Facility-Administered Encounter Medications as of 4/2/2024   Medication Dose Route Frequency Provider Last Rate Last Admin     betamethasone acet & sod phos (CELESTONE) injection 6 mg  6 mg   Michael Loredo MD   6 mg at 10/01/21 1510     ropivacaine (NAROPIN) injection 0.5 mL  0.5 mL   Michael Loredo MD   0.5 mL at 10/01/21 1510     triamcinolone (KENALOG-40) injection 40 mg  40 mg   Britany Norton MD   40 mg at 02/27/24 0959             O:   NAD, WDWN, Alert & Oriented, Mood  & Affect wnl, Vitals stable   General appearance normal   Vitals stable   Alert, oriented and in no acute distress      Following lymph nodes palpated: Occipital, Cervical, Supraclavicular , inguinal no lad      Stuck on papules and brown macules on trunk and ext   Red papules on trunk  Flesh colored papules on trunk     The remainder of the full exam was normal; the following areas were examined:  conjunctiva/lids, , neck, peripheral vascular system, abdomen, lymph nodes, digits/nails, eccrine and apocrine glands, scalp/hair, face, neck, chest, abdomen, buttocks, back, RUE, LUE, RLE, LLE       Eyes: Conjunctivae/lids:Normal     ENT: Lips, buccal mucosa, tongue: normal    MSK:Normal    Cardiovascular: peripheral edema none    Pulm: Breathing Normal    Lymph Nodes: No Head and Neck Lymphadenopathy     Neuro/Psych: Orientation:Alert and Orientedx3 ; Mood/Affect:normal       A/P:  1. Seborrheic keratosis, lentigo, angioma, dermal nevus, hx of melanoma   It was a pleasure speaking to Mine Meléndez today.  Previous clinic notes and pertinent laboratory tests were reviewed prior to Mine Meléndez's visit.  Nature and genetics of benign skin lesions dicussed with patient.  Signs and Symptoms of skin cancer discussed with patient.  Patient encouraged to perform monthly skin exams.  UV precautions reviewed with patient.  Return to clinic 12 months      Again, thank you for allowing me to participate in the care of your patient.        Sincerely,        Pankaj Castro MD

## 2024-04-02 NOTE — PROGRESS NOTES
Mine Meléndez is an extremely pleasant 68 year old year old female patient here today for hx of 0.3mm melanoma on thigh. Patient has no other skin complaints today.  Remainder of the HPI, Meds, PMH, Allergies, FH, and SH was reviewed in chart.      Past Medical History:   Diagnosis Date    Herpes simplex without mention of complication     Hypertension     Malignant melanoma (H)     Obesity, unspecified     Osteomyelitis of jaw 2008    Rheumatoid arthritis(714.0)     Trochanteric bursitis of right hip 2013       Past Surgical History:   Procedure Laterality Date    COLONOSCOPY  06    COMBINED REPAIR PTOSIS WITH BLEPHAROPLASTY BILATERAL Bilateral 2023    Procedure: Bilateral upper eyelid blepharoplasty and ptosis repair;  Surgeon: Cl Pearl MD;  Location: [facility]    EXCISE MASS FOOT Left 10/5/2021    Procedure: EXCISION, MASS, LEFT FOOT;  Surgeon: Tommy Pelaez DPM;  Location: [facility]    HYSTERECTOMY, PAP NO LONGER INDICATED      HYSTERECTOMY, VAGINAL      TVH,TVT procedure    RELEASE CARPAL TUNNEL Right 10/11/2016    Procedure: RELEASE CARPAL TUNNEL;  Surgeon: Emely Blanca MD;  Location: [facility]    SURGICAL HISTORY OF -       tubal ligation    SURGICAL HISTORY OF -       excision gangliion cyst rt heel        Family History   Problem Relation Age of Onset    Diabetes Father     Dementia Father     Diabetes Brother     Diabetes Sister     Hypertension Sister     Hypertension Sister        Social History     Socioeconomic History    Marital status:      Spouse name: Not on file    Number of children: Not on file    Years of education: Not on file    Highest education level: Not on file   Occupational History    Not on file   Tobacco Use    Smoking status: Former     Years: 25     Types: Cigarettes     Quit date: 2000     Years since quittin.9     Passive exposure: Never    Smokeless tobacco: Never   Vaping Use    Vaping Use: Never used   Substance  and Sexual Activity    Alcohol use: Yes     Comment: 6 pack of beer on a weekend    Drug use: No    Sexual activity: Yes     Partners: Male     Birth control/protection: Surgical     Comment: [email or web address]   Other Topics Concern    Parent/sibling w/ CABG, MI or angioplasty before 65F 55M? No   Social History Narrative    Not on file     Social Determinants of Health     Financial Resource Strain: Low Risk  (9/25/2023)    Financial Resource Strain     Within the past 12 months, have you or your family members you live with been unable to get utilities (heat, electricity) when it was really needed?: No   Food Insecurity: Low Risk  (9/25/2023)    Food Insecurity     Within the past 12 months, did you worry that your food would run out before you got money to buy more?: No     Within the past 12 months, did the food you bought just not last and you didn t have money to get more?: No   Transportation Needs: Low Risk  (9/25/2023)    Transportation Needs     Within the past 12 months, has lack of transportation kept you from medical appointments, getting your medicines, non-medical meetings or appointments, work, or from getting things that you need?: No   Physical Activity: Not on file   Stress: Not on file   Social Connections: Not on file   Interpersonal Safety: Low Risk  (2/27/2024)    Interpersonal Safety     Do you feel physically and emotionally safe where you currently live?: Yes     Within the past 12 months, have you been hit, slapped, kicked or otherwise physically hurt by someone?: No     Within the past 12 months, have you been humiliated or emotionally abused in other ways by your partner or ex-partner?: No   Housing Stability: Low Risk  (9/25/2023)    Housing Stability     Do you have housing? : Yes     Are you worried about losing your housing?: No       Outpatient Encounter Medications as of 4/2/2024   Medication Sig Dispense Refill    acetaminophen (TYLENOL) 325 MG tablet Take 2 tablets (650 mg) by mouth  every 4 hours as needed for mild pain (Patient not taking: Reported on 7/10/2023) 50 tablet 0    ASPIRIN 81 MG OR TABS  100 3    clindamycin (CLEOCIN 1 DOSE) 2 % vaginal cream Place in the vagina daily for three weeks, then decrease two twice weekly (Patient not taking: Reported on 10/2/2023) 5 g 12    folic acid (FOLVITE) 1 MG tablet Take 1 tablet (1,000 mcg) by mouth daily 90 tablet 2    hyoscyamine (LEVSIN) 0.125 MG tablet Take 1 tablet (125 mcg) by mouth every 6 hours as needed for cramping 12 tablet 0    ibuprofen (ADVIL/MOTRIN) 800 MG tablet Take 1 tablet (800 mg) by mouth every 8 hours as needed for moderate pain 90 tablet 0    levothyroxine (SYNTHROID/LEVOTHROID) 50 MCG tablet TAKE 1 TABLET (50 MCG) BY MOUTH DAILY 90 tablet 3    lisinopril-hydrochlorothiazide (ZESTORETIC) 10-12.5 MG tablet Take 1 tablet by mouth daily 90 tablet 1    methotrexate 2.5 MG tablet Take 8 tablets (20 mg) by mouth once a week . Take all 8 tablets on the same day of each week. 104 tablet 2    MULTI-VITAMIN OR 1 daily      Probiotic Product (PROBIOTIC PO)       simvastatin (ZOCOR) 20 MG tablet TAKE ONE TABLET BY MOUTH AT BEDTIME. 90 tablet 3     Facility-Administered Encounter Medications as of 4/2/2024   Medication Dose Route Frequency Provider Last Rate Last Admin    betamethasone acet & sod phos (CELESTONE) injection 6 mg  6 mg   Michael Loredo MD   6 mg at 10/01/21 1510    ropivacaine (NAROPIN) injection 0.5 mL  0.5 mL   Michael Loredo MD   0.5 mL at 10/01/21 1510    triamcinolone (KENALOG-40) injection 40 mg  40 mg   Britany Norton MD   40 mg at 02/27/24 0959             O:   NAD, WDWN, Alert & Oriented, Mood & Affect wnl, Vitals stable   General appearance normal   Vitals stable   Alert, oriented and in no acute distress      Following lymph nodes palpated: Occipital, Cervical, Supraclavicular , inguinal no lad      Stuck on papules and brown macules on trunk and ext   Red papules on trunk  Flesh colored papules on  trunk     The remainder of the full exam was normal; the following areas were examined:  conjunctiva/lids, , neck, peripheral vascular system, abdomen, lymph nodes, digits/nails, eccrine and apocrine glands, scalp/hair, face, neck, chest, abdomen, buttocks, back, RUE, LUE, RLE, LLE       Eyes: Conjunctivae/lids:Normal     ENT: Lips, buccal mucosa, tongue: normal    MSK:Normal    Cardiovascular: peripheral edema none    Pulm: Breathing Normal    Lymph Nodes: No Head and Neck Lymphadenopathy     Neuro/Psych: Orientation:Alert and Orientedx3 ; Mood/Affect:normal       A/P:  1. Seborrheic keratosis, lentigo, angioma, dermal nevus, hx of melanoma   It was a pleasure speaking to Mine Meléndez today.  Previous clinic notes and pertinent laboratory tests were reviewed prior to Mine Meléndez's visit.  Nature and genetics of benign skin lesions dicussed with patient.  Signs and Symptoms of skin cancer discussed with patient.  Patient encouraged to perform monthly skin exams.  UV precautions reviewed with patient.  Return to clinic 12 months

## 2024-04-12 ENCOUNTER — E-VISIT (OUTPATIENT)
Dept: FAMILY MEDICINE | Facility: CLINIC | Age: 69
End: 2024-04-12
Payer: COMMERCIAL

## 2024-04-12 DIAGNOSIS — N39.0 URINARY TRACT INFECTION, ACUTE: Primary | ICD-10-CM

## 2024-04-12 PROCEDURE — 99421 OL DIG E/M SVC 5-10 MIN: CPT | Performed by: FAMILY MEDICINE

## 2024-04-12 RX ORDER — CIPROFLOXACIN 250 MG/1
250 TABLET, FILM COATED ORAL 2 TIMES DAILY
Qty: 10 TABLET | Refills: 0 | Status: SHIPPED | OUTPATIENT
Start: 2024-04-12 | End: 2024-04-17

## 2024-05-03 DIAGNOSIS — Z12.11 COLON CANCER SCREENING: ICD-10-CM

## 2024-05-17 ENCOUNTER — ORDERS ONLY (AUTO-RELEASED) (OUTPATIENT)
Dept: FAMILY MEDICINE | Facility: CLINIC | Age: 69
End: 2024-05-17
Payer: COMMERCIAL

## 2024-05-17 DIAGNOSIS — Z12.11 COLON CANCER SCREENING: ICD-10-CM

## 2024-06-05 LAB — NONINV COLON CA DNA+OCC BLD SCRN STL QL: NEGATIVE

## 2024-06-07 ENCOUNTER — MYC MEDICAL ADVICE (OUTPATIENT)
Dept: FAMILY MEDICINE | Facility: CLINIC | Age: 69
End: 2024-06-07
Payer: COMMERCIAL

## 2024-06-19 ENCOUNTER — LAB (OUTPATIENT)
Dept: LAB | Facility: CLINIC | Age: 69
End: 2024-06-19
Payer: COMMERCIAL

## 2024-06-19 DIAGNOSIS — M05.9 SEROPOSITIVE RHEUMATOID ARTHRITIS (H): ICD-10-CM

## 2024-06-19 DIAGNOSIS — Z79.899 HIGH RISK MEDICATION USE: ICD-10-CM

## 2024-06-19 LAB
ALBUMIN SERPL BCG-MCNC: 3.9 G/DL (ref 3.5–5.2)
ALP SERPL-CCNC: 117 U/L (ref 40–150)
ALT SERPL W P-5'-P-CCNC: 19 U/L (ref 0–50)
AST SERPL W P-5'-P-CCNC: 26 U/L (ref 0–45)
BASOPHILS # BLD AUTO: 0 10E3/UL (ref 0–0.2)
BASOPHILS NFR BLD AUTO: 1 %
BILIRUB DIRECT SERPL-MCNC: <0.2 MG/DL (ref 0–0.3)
BILIRUB SERPL-MCNC: 0.5 MG/DL
CREAT SERPL-MCNC: 0.62 MG/DL (ref 0.51–0.95)
CRP SERPL-MCNC: 8.56 MG/L
EGFRCR SERPLBLD CKD-EPI 2021: >90 ML/MIN/1.73M2
EOSINOPHIL # BLD AUTO: 0.1 10E3/UL (ref 0–0.7)
EOSINOPHIL NFR BLD AUTO: 2 %
ERYTHROCYTE [DISTWIDTH] IN BLOOD BY AUTOMATED COUNT: 13.4 % (ref 10–15)
ERYTHROCYTE [SEDIMENTATION RATE] IN BLOOD BY WESTERGREN METHOD: 11 MM/HR (ref 0–30)
HCT VFR BLD AUTO: 41.9 % (ref 35–47)
HGB BLD-MCNC: 14.2 G/DL (ref 11.7–15.7)
IMM GRANULOCYTES # BLD: 0 10E3/UL
IMM GRANULOCYTES NFR BLD: 0 %
LYMPHOCYTES # BLD AUTO: 1.4 10E3/UL (ref 0.8–5.3)
LYMPHOCYTES NFR BLD AUTO: 23 %
MCH RBC QN AUTO: 35.7 PG (ref 26.5–33)
MCHC RBC AUTO-ENTMCNC: 33.9 G/DL (ref 31.5–36.5)
MCV RBC AUTO: 105 FL (ref 78–100)
MONOCYTES # BLD AUTO: 0.5 10E3/UL (ref 0–1.3)
MONOCYTES NFR BLD AUTO: 8 %
NEUTROPHILS # BLD AUTO: 3.9 10E3/UL (ref 1.6–8.3)
NEUTROPHILS NFR BLD AUTO: 65 %
PLATELET # BLD AUTO: 212 10E3/UL (ref 150–450)
PROT SERPL-MCNC: 7 G/DL (ref 6.4–8.3)
RBC # BLD AUTO: 3.98 10E6/UL (ref 3.8–5.2)
WBC # BLD AUTO: 5.9 10E3/UL (ref 4–11)

## 2024-06-19 PROCEDURE — 80076 HEPATIC FUNCTION PANEL: CPT

## 2024-06-19 PROCEDURE — 36415 COLL VENOUS BLD VENIPUNCTURE: CPT

## 2024-06-19 PROCEDURE — 86140 C-REACTIVE PROTEIN: CPT

## 2024-06-19 PROCEDURE — 82565 ASSAY OF CREATININE: CPT

## 2024-06-19 PROCEDURE — 85025 COMPLETE CBC W/AUTO DIFF WBC: CPT

## 2024-06-19 PROCEDURE — 85652 RBC SED RATE AUTOMATED: CPT

## 2024-07-05 ENCOUNTER — E-VISIT (OUTPATIENT)
Dept: FAMILY MEDICINE | Facility: CLINIC | Age: 69
End: 2024-07-05
Payer: COMMERCIAL

## 2024-07-05 ENCOUNTER — ANCILLARY PROCEDURE (OUTPATIENT)
Dept: GENERAL RADIOLOGY | Facility: CLINIC | Age: 69
End: 2024-07-05
Attending: INTERNAL MEDICINE
Payer: COMMERCIAL

## 2024-07-05 DIAGNOSIS — N39.0 ACUTE UTI (URINARY TRACT INFECTION): Primary | ICD-10-CM

## 2024-07-05 DIAGNOSIS — M05.9 SEROPOSITIVE RHEUMATOID ARTHRITIS (H): ICD-10-CM

## 2024-07-05 PROCEDURE — 99421 OL DIG E/M SVC 5-10 MIN: CPT | Performed by: FAMILY MEDICINE

## 2024-07-05 PROCEDURE — 73630 X-RAY EXAM OF FOOT: CPT | Mod: TC | Performed by: RADIOLOGY

## 2024-07-05 PROCEDURE — 73130 X-RAY EXAM OF HAND: CPT | Mod: TC | Performed by: RADIOLOGY

## 2024-07-05 RX ORDER — NITROFURANTOIN 25; 75 MG/1; MG/1
100 CAPSULE ORAL 2 TIMES DAILY
Qty: 10 CAPSULE | Refills: 0 | Status: SHIPPED | OUTPATIENT
Start: 2024-07-05 | End: 2024-07-10

## 2024-07-05 NOTE — PATIENT INSTRUCTIONS
Dear Mine Meléndez    After reviewing your responses, I've been able to diagnose you with a urinary tract infection, which is a common infection of the bladder with bacteria.  This is not a sexually transmitted infection, though urinating immediately after intercourse can help prevent infections.  Drinking lots of fluids is also helpful to clear your current infection and prevent the next one.      I have sent a prescription for antibiotics to your pharmacy to treat this infection.    It is important that you take all of your prescribed medication even if your symptoms are improving after a few doses.  Taking all of your medicine helps prevent the symptoms from returning.     If your symptoms worsen, you develop pain in your back or stomach, develop fevers, or are not improving in 5 days, please contact your primary care provider for an appointment or visit any of our convenient Walk-in or Urgent Care Centers to be seen, which can be found on our website here.    Thanks again for choosing us as your health care partner,    Britany Norton MD

## 2024-07-10 ENCOUNTER — OFFICE VISIT (OUTPATIENT)
Dept: RHEUMATOLOGY | Facility: CLINIC | Age: 69
End: 2024-07-10
Payer: COMMERCIAL

## 2024-07-10 VITALS
HEART RATE: 77 BPM | WEIGHT: 191.4 LBS | RESPIRATION RATE: 20 BRPM | BODY MASS INDEX: 31.85 KG/M2 | OXYGEN SATURATION: 95 % | SYSTOLIC BLOOD PRESSURE: 136 MMHG | DIASTOLIC BLOOD PRESSURE: 74 MMHG

## 2024-07-10 DIAGNOSIS — Z79.899 HIGH RISK MEDICATION USE: ICD-10-CM

## 2024-07-10 DIAGNOSIS — M05.9 SEROPOSITIVE RHEUMATOID ARTHRITIS (H): Primary | ICD-10-CM

## 2024-07-10 PROCEDURE — 99214 OFFICE O/P EST MOD 30 MIN: CPT | Performed by: INTERNAL MEDICINE

## 2024-07-10 PROCEDURE — G2211 COMPLEX E/M VISIT ADD ON: HCPCS | Performed by: INTERNAL MEDICINE

## 2024-07-10 RX ORDER — FOLIC ACID 1 MG/1
1000 TABLET ORAL DAILY
Qty: 90 TABLET | Refills: 2 | Status: SHIPPED | OUTPATIENT
Start: 2024-07-10

## 2024-07-10 RX ORDER — METHOTREXATE 2.5 MG/1
25 TABLET ORAL WEEKLY
Qty: 130 TABLET | Refills: 0 | Status: SHIPPED | OUTPATIENT
Start: 2024-07-10

## 2024-07-10 NOTE — NURSING NOTE
Blood pressure rechecked after visit    136/74  Rachel Uriarte CMA Rheumatology  7/10/2024                                 RAPID3 (0-30) Cumulative Score  3.2          RAPID3 Weighted Score (divide #4 by 3 and that is the weighted score)  1.0

## 2024-07-10 NOTE — PROGRESS NOTES
Rheumatology Clinic Visit      Mine Meléndez MRN# [id number]   YOB: 1955 Age: 69 year old      Date of visit: 7/10/24     Chief Complaint   Patient presents with:  Rheumatoid Arthritis: Doing good    Assessment and Plan     1. Seropositive Erosive (but no erosions seen on 7/5/2024 x-rays) Rheumatoid Arthritis (RF+, ): Dx'd with RA prior to 2002. Previously on sulfasalazine (stomatitis), hydroxychloroquine (loose stools), leflunomide (loose stools).  Currently on methotrexate 20 mg oral once weekly.  Mild synovitis of the right second MCP and synovial swelling without tenderness to palpation at the right third PIP and left second PIP.  X-rays did not show evidence of progressive erosive changes, and in fact did not show any erosions.  Chronic illness, progressive.      - Increase Methotrexate from 20mg PO once weekly to 25 mg oral once weekly (taken within the same 24 hour period of each week, in divided doses)  - Continue folic acid 1mg daily  - Labs monthly [id number]: CBC, Cr, Hepatic Panel  - Labs in 3 months: CBC, Creatinine, Hepatic Panel, ESR, CRP    High risk medication requiring intensive toxicity monitoring at least quarterly.     2. Primary osteoarthritis of both hands: voltaren gel PRN.  Has not required Voltaren gel recently because she has been doing so well, per patient  - Continue diclofenac (VOLTAREN) 1 % topical gel; Apply up to 2 grams of 1% gel to hands up to 4 times daily as needed for joint pain (maximum: 8 g per upper extremity per day)      3.  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  This is documented here for reference only.    4. Melanoma history: documented here for historical significance.  Continue to follow with a dermatologist at least once yearly.    5.  Vaccinations: Vaccinations reviewed with Ms. Meléndez.    - Influenza: encouraged yearly  vaccination  - [id number]: up to date  - [id number]: up to date  - Shingrix: Up to date  - COVID-19: Up to date      Total minutes spent in evaluation with patient, documentation, , and review of pertinent studies and chart notes: 14  The longitudinal plan of care for the rheumatology problem(s) were addressed during this visit.  Due to added complexity of care, we will continue to support the patient and the subsequent management of this condition with ongoing continuity of care.         Ms. Meléndez verbalized agreement with and understanding of the rational for the diagnosis and treatment plan.  All questions were answered to best of my ability and the patient's satisfaction. Ms. Meléndez was advised to contact the clinic with any questions that may arise after the clinic visit.      Thank you for involving me in the care of the patient    Return to clinic: 3 months, in office    HPI   Mine Meléndez is a 69 year old female with a past medical history significant for hypertension, hyperlipidemia, hypothyroidism, migraines, melanoma history, and rheumatoid arthritis who presents for follow-up of rheumatoid arthritis.     1/5/2024: An occasional twinge of pain at the left second MCP which is gripping something tightly but this goes away as soon as she starts gripping tightly.  Bony hypertrophy of the right third PIP unchanged and does not bother her; no pain.  Morning stiffness for less than 20 minutes.  Overall happy with how well she is doing.  No oral or nasal sores.  No rash.  No chest pain or pressure or shortness of breath.    Today, 7/10/2024: Multiple times a day she has a sharp pain in the right second MCP that resolves after a few seconds.  Chronic synovial hypertrophy at the right second MCP.  Chronic swelling of the right third and left second PIPs.  Morning stiffness for about 30 minutes.  Other joints are doing well.  Taking methotrexate in split doses within the same 24-hour period of each  week.    Tobacco: Former smoker  EtOH: 4 drinks per day on the weekends  Drugs: None  Occupation: retired June 2020, was working in [employer]   12 point review of system was completed and negative except as noted in the HPI    Active Problem List     Patient Active Problem List   Diagnosis    Seropositive rheumatoid arthritis (H)    DYSPEPSIA    Obesity    Essential hypertension    Hypothyroidism    HYPERLIPIDEMIA LDL GOAL <130    Dermatitis    High risk medications (not anticoagulants) long-term use    Female stress incontinence    Migraine    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)    Combined forms of age-related cataract, mild-mod, of both eyes    Dermatochalasis of both upper eyelids    Choroidal nevus of right eye    Involutional ptosis, acquired, bilateral     Past Medical History     Past Medical History:   Diagnosis Date    Herpes simplex without mention of complication     Hypertension     Malignant melanoma (H)     Obesity, unspecified     Osteomyelitis of jaw 5/22/2008    Rheumatoid arthritis(714.0)     Trochanteric bursitis of right hip 8/16/2013     Past Surgical History     Past Surgical History:   Procedure Laterality Date    COLONOSCOPY  1/20/06    COMBINED REPAIR PTOSIS WITH BLEPHAROPLASTY BILATERAL Bilateral 6/19/2023    Procedure: Bilateral upper eyelid blepharoplasty and ptosis repair;  Surgeon: Cl Pearl MD;  Location:  OR    EXCISE MASS FOOT Left 10/5/2021    Procedure: EXCISION, MASS, LEFT FOOT;  Surgeon: Tommy Pelaez DPM;  Location: [facility]    HYSTERECTOMY, PAP NO LONGER INDICATED      HYSTERECTOMY, VAGINAL  11/04    TVH,TVT procedure    RELEASE CARPAL TUNNEL Right 10/11/2016    Procedure: RELEASE CARPAL TUNNEL;  Surgeon: Emely Blanca MD;  Location: [facility]    SURGICAL HISTORY OF -   1988    tubal ligation    SURGICAL HISTORY OF -   2001    excision gangliion cyst rt heel     Allergy     Allergies   Allergen Reactions     Cefdinir      Face flushing and blotchy rash    Flagyl [Metronidazole] Rash    Morphine And Codeine Rash    Septra [Sulfamethoxazole-Trimethoprim] Itching     Puffy eyes, flushed.      Current Medication List     Current Outpatient Medications   Medication Sig Dispense Refill    ASPIRIN 81 MG OR TABS  100 3    folic acid (FOLVITE) 1 MG tablet Take 1 tablet (1,000 mcg) by mouth daily 90 tablet 2    hyoscyamine (LEVSIN) 0.125 MG tablet Take 1 tablet (125 mcg) by mouth every 6 hours as needed for cramping 12 tablet 0    ibuprofen (ADVIL/MOTRIN) 800 MG tablet Take 1 tablet (800 mg) by mouth every 8 hours as needed for moderate pain 90 tablet 0    levothyroxine (SYNTHROID/LEVOTHROID) 50 MCG tablet TAKE 1 TABLET (50 MCG) BY MOUTH DAILY 90 tablet 3    lisinopril-hydrochlorothiazide (ZESTORETIC) 10-12.5 MG tablet Take 1 tablet by mouth daily 90 tablet 1    methotrexate 2.5 MG tablet Take 8 tablets (20 mg) by mouth once a week . Take all 8 tablets on the same day of each week. 104 tablet 2    MULTI-VITAMIN OR 1 daily      Probiotic Product (PROBIOTIC PO)       simvastatin (ZOCOR) 20 MG tablet TAKE ONE TABLET BY MOUTH AT BEDTIME. 90 tablet 3    acetaminophen (TYLENOL) 325 MG tablet Take 2 tablets (650 mg) by mouth every 4 hours as needed for mild pain (Patient not taking: Reported on 7/10/2023) 50 tablet 0    clindamycin (CLEOCIN 1 DOSE) 2 % vaginal cream Place in the vagina daily for three weeks, then decrease two twice weekly (Patient not taking: Reported on 10/2/2023) 5 g 12    nitroFURantoin macrocrystal-monohydrate (MACROBID) 100 MG capsule Take 1 capsule (100 mg) by mouth 2 times daily for 5 days (Patient not taking: Reported on 7/10/2024) 10 capsule 0     Current Facility-Administered Medications   Medication Dose Route Frequency Provider Last Rate Last Admin    betamethasone acet & sod phos (CELESTONE) injection 6 mg  6 mg   Michael Loredo MD   6 mg at 10/01/21 1510    ropivacaine (NAROPIN) injection 0.5 mL  0.5  "mL   Michael Loredo MD   0.5 mL at 10/01/21 1510    triamcinolone (KENALOG-40) injection 40 mg  40 mg   Britany Norton MD   40 mg at 02/27/24 0959     Social History   See HPI    Family History     Family History   Problem Relation Age of Onset    Diabetes Father     Dementia Father     Diabetes Brother     Diabetes Sister     Hypertension Sister     Hypertension Sister      Physical Exam     Temp Readings from Last 3 Encounters:   02/27/24 97.6  F (36.4  C) (Tympanic)   10/02/23 98.2  F (36.8  C) (Tympanic)   08/24/23 99.5  F (37.5  C) (Tympanic)     BP Readings from Last 5 Encounters:   07/10/24 (!) 144/77   02/27/24 122/66   10/02/23 125/67   08/24/23 118/70   07/21/23 (!) 141/73     Pulse Readings from Last 1 Encounters:   07/10/24 77     Resp Readings from Last 1 Encounters:   07/10/24 20     Estimated body mass index is 31.85 kg/m  as calculated from the following:    Height as of 2/27/24: 1.651 m (5' 5\").    Weight as of this encounter: 86.8 kg (191 lb 6.4 oz).    GEN: NAD. Healthy appearing adult.   HEENT:  Anicteric, noninjected sclera. No obvious external lesions of the ear and nose. Hearing intact.  PULM: No increased work of breathing  MSK:  Mild swelling without overlying erythema of the right 3rd PIP   SKIN: No rash or jaundice seen  PSYCH: Alert. Appropriate.     Labs / Imaging (select studies)     RF/CCP  Recent Labs   Lab Test 05/01/18  1538   CCPIGG 182*     CBC  Recent Labs   Lab Test 06/19/24  1118 03/18/24  1345 12/21/23  1350 08/23/21  0848 05/12/21  1158 01/12/21  1547 10/23/20  1519   WBC 5.9 6.7 8.0   < > 6.8 7.4 7.0   RBC 3.98 3.95 4.04   < > 4.12 4.12 4.21   HGB 14.2 14.1 14.4   < > 14.0 14.5 14.4   HCT 41.9 42.0 42.7   < > 41.9 43.1 43.1   * 106* 106*   < > 102* 105* 102*   RDW 13.4 14.4 12.8   < > 14.1 13.6 13.6    198 217   < > 217 182 220   MCH 35.7* 35.7* 35.6*   < > 34.0* 35.2* 34.2*   MCHC 33.9 33.6 33.7   < > 33.4 33.6 33.4   NEUTROPHIL 65 72 74   < > 75.2 68.7 " 69.2   LYMPH 23 17 18   < > 17.7 19.6 22.7   MONOCYTE 8 10 6   < > 5.2 9.2 6.1   EOSINOPHIL 2 1 2   < > 1.6 2.4 1.7   BASOPHIL 1 0 0   < > 0.3 0.1 0.3   ANEU  --   --   --   --  5.1 5.1 4.9   ALYM  --   --   --   --  1.2 1.4 1.6   SHYLA  --   --   --   --  0.4 0.7 0.4   AEOS  --   --   --   --  0.1 0.2 0.1   ABAS  --   --   --   --  0.0 0.0 0.0   ANEUTAUTO 3.9 4.8 5.9   < >  --   --   --    ALYMPAUTO 1.4 1.1 1.4   < >  --   --   --    AMONOAUTO 0.5 0.6 0.5   < >  --   --   --    AEOSAUTO 0.1 0.1 0.1   < >  --   --   --    ABSBASO 0.0 0.0 0.0   < >  --   --   --     < > = values in this interval not displayed.     CMP  Recent Labs   Lab Test 06/19/24  1118 03/18/24  1345 12/21/23  1350 06/30/23  1128 06/01/23  1339 10/26/22  1313 09/21/22  1318 08/23/21  0847 05/12/21  1158 04/05/21  1255 01/12/21  1547   NA  --   --   --   --  137  --  133*  --   --  132*  --    POTASSIUM  --   --   --   --  3.8  --  3.5  --   --  3.5  --    CHLORIDE  --   --   --   --  98  --  96*  --   --  98  --    CO2  --   --   --   --  31*  --  28  --   --  26  --    ANIONGAP  --   --   --   --  8  --  9  --   --  8  --    GLC  --   --   --   --  113*  --  120*  --   --  151*  --    BUN  --   --   --   --  9.5  --  7.4*  --   --  7  --    CR 0.62 0.71 0.63   < > 0.50*   < > 0.57   < > 0.58 0.55 0.65   GFRESTIMATED >90 >90 >90   < > >90   < > >90   < > >90 >90 >90   GFRESTBLACK  --   --   --   --   --   --   --   --  >90 >90 >90   ROSELYN  --   --   --   --  9.5  --  9.0  --   --  8.7  --    BILITOTAL 0.5 0.5 0.4   < >  --    < > 0.8   < > 0.5  --  0.7   ALBUMIN 3.9 3.9 3.9   < >  --    < > 4.0   < > 3.5  --  3.6   PROTTOTAL 7.0 7.0 7.3   < >  --    < > 7.0   < > 7.5  --  7.5   ALKPHOS 117 140 135   < >  --    < > 125*   < > 121  --  118   AST 26 28 31   < >  --    < > 41*   < > 23  --  28   ALT 19 25 22   < >  --    < > 28   < > 24  --  27    < > = values in this interval not displayed.     Calcium/VitaminD  Recent Labs   Lab Test 06/01/23  0152  09/21/22  1318 04/05/21  1255   ROSELYN 9.5 9.0 8.7     ESR/CRP  Recent Labs   Lab Test 06/19/24  1118 12/21/23  1350 03/20/23  1334 10/26/22  1313 07/05/22  1547 03/15/22  1439 12/09/21  0830   SED 11 14 15   < > 12 11 12   CRP  --   --   --   --  7.8 8.3* 9.1*   CRPI 8.56* 12.15* 16.94*   < >  --   --   --     < > = values in this interval not displayed.     Lipid Panel  Recent Labs   Lab Test 02/27/24  0930 09/21/22  1318 11/09/20  0939   CHOL 144 149 153   TRIG 145 80 65   HDL 54 75 74   LDL 61 58 66   NHDL 90 74 79     Hepatitis B  Recent Labs   Lab Test 10/23/20  1519 05/01/18  1538   HBCAB Nonreactive Nonreactive   HEPBANG Nonreactive Nonreactive     Hepatitis C  Recent Labs   Lab Test 10/23/20  1519   HCVAB Nonreactive     Lyme ab screening  Recent Labs   Lab Test 08/30/23  1150   LYMEGM 0.22     Tuberculosis Screening  Recent Labs   Lab Test 10/23/20  1519   TBRST Negative     Immunization History     Immunization History   Administered Date(s) Administered    COVID-19 12+ (2023-24) (Pfizer) 11/01/2023    COVID-19 Bivalent 18+ (Moderna) 11/21/2022    COVID-19 MONOVALENT 12+ (Pfizer) 03/08/2021, 03/29/2021, 11/24/2021    COVID-19 Monovalent 18+ (Moderna) 05/02/2022    Influenza (H1N1) 12/31/2009    Influenza (High Dose) 3 valent vaccine 11/05/2019    Influenza (IIV3) PF 11/03/2004, 10/04/2010, 10/01/2012, 10/30/2012, 10/07/2013, 10/20/2013, 10/07/2014, 10/03/2016    Influenza Vaccine 65+ (Fluzone HD) 11/09/2020, 09/30/2021, 11/11/2022    Influenza Vaccine >6 months,quad, PF 10/23/2017    Influenza Vaccine, 6+MO IM (QUADRIVALENT W/PRESERVATIVES) 10/06/2015, 09/26/2018    Influenza, seasonal, injectable, PF 10/05/2009    Pneumo Conj 13-V (2010&after) 11/12/2018    Pneumococcal 23 valent 02/19/2019    TD,PF 7+ (Tenivac) 04/28/1995    TDAP Vaccine (Adacel) 12/05/2007, 02/01/2018    Zoster recombinant adjuvanted (SHINGRIX) 04/17/2019, 07/10/2019    Zoster vaccine, live 06/07/2016          Chart documentation done in  part with Dragon Voice recognition Software. Although reviewed after completion, some word and grammatical error may remain.    Nasir Baltazar MD

## 2024-07-10 NOTE — PATIENT INSTRUCTIONS
RHEUMATOLOGY    [facility] [place]  [address] [place] [address]  [place] [address]    Phone number: [phone]  Fax number: [phone]    If you need a medication refill, please contact us as you may need lab work and/or a follow up visit prior to your refill.      Thank you for choosing [facility]!    Rachel Uriarte CMA Rheumatology

## 2024-07-15 ENCOUNTER — OFFICE VISIT (OUTPATIENT)
Dept: FAMILY MEDICINE | Facility: CLINIC | Age: 69
End: 2024-07-15
Payer: COMMERCIAL

## 2024-07-15 VITALS
RESPIRATION RATE: 22 BRPM | WEIGHT: 191 LBS | SYSTOLIC BLOOD PRESSURE: 122 MMHG | TEMPERATURE: 98 F | OXYGEN SATURATION: 95 % | BODY MASS INDEX: 31.82 KG/M2 | HEART RATE: 68 BPM | DIASTOLIC BLOOD PRESSURE: 66 MMHG | HEIGHT: 65 IN

## 2024-07-15 DIAGNOSIS — N39.0 ACUTE UTI (URINARY TRACT INFECTION): Primary | ICD-10-CM

## 2024-07-15 DIAGNOSIS — R82.81 STERILE PYURIA: ICD-10-CM

## 2024-07-15 DIAGNOSIS — N39.0 RECURRENT UTI: ICD-10-CM

## 2024-07-15 LAB
ALBUMIN UR-MCNC: NEGATIVE MG/DL
APPEARANCE UR: ABNORMAL
BACTERIA #/AREA URNS HPF: ABNORMAL /HPF
BILIRUB UR QL STRIP: NEGATIVE
COLOR UR AUTO: YELLOW
GLUCOSE UR STRIP-MCNC: NEGATIVE MG/DL
HGB UR QL STRIP: ABNORMAL
KETONES UR STRIP-MCNC: NEGATIVE MG/DL
LEUKOCYTE ESTERASE UR QL STRIP: ABNORMAL
NITRATE UR QL: NEGATIVE
PH UR STRIP: 7 [PH] (ref 5–7)
RBC #/AREA URNS AUTO: ABNORMAL /HPF
SP GR UR STRIP: 1.01 (ref 1–1.03)
SQUAMOUS #/AREA URNS AUTO: ABNORMAL /LPF
UROBILINOGEN UR STRIP-ACNC: 0.2 E.U./DL
WBC #/AREA URNS AUTO: >100 /HPF

## 2024-07-15 PROCEDURE — 81001 URINALYSIS AUTO W/SCOPE: CPT | Performed by: FAMILY MEDICINE

## 2024-07-15 PROCEDURE — 87086 URINE CULTURE/COLONY COUNT: CPT | Performed by: FAMILY MEDICINE

## 2024-07-15 PROCEDURE — 99213 OFFICE O/P EST LOW 20 MIN: CPT | Performed by: FAMILY MEDICINE

## 2024-07-15 RX ORDER — RESPIRATORY SYNCYTIAL VIRUS VACCINE 120MCG/0.5
0.5 KIT INTRAMUSCULAR ONCE
Qty: 1 EACH | Refills: 0 | Status: CANCELLED | OUTPATIENT
Start: 2024-07-15 | End: 2024-07-15

## 2024-07-15 RX ORDER — CIPROFLOXACIN 250 MG/1
250 TABLET, FILM COATED ORAL 2 TIMES DAILY
Qty: 6 TABLET | Refills: 0 | Status: SHIPPED | OUTPATIENT
Start: 2024-07-15 | End: 2024-07-15

## 2024-07-15 RX ORDER — ESTRADIOL 10 UG/1
10 INSERT VAGINAL
Status: SHIPPED
Start: 2024-07-15

## 2024-07-15 RX ORDER — CIPROFLOXACIN 250 MG/1
250 TABLET, FILM COATED ORAL 2 TIMES DAILY
Qty: 14 TABLET | Refills: 0 | Status: SHIPPED | OUTPATIENT
Start: 2024-07-15 | End: 2024-07-22

## 2024-07-15 ASSESSMENT — PAIN SCALES - GENERAL: PAINLEVEL: NO PAIN (0)

## 2024-07-15 NOTE — PATIENT INSTRUCTIONS
Urinary Tract Infection (UTI) in Women: Care Instructions  Overview     A urinary tract infection (UTI) is an infection caused by bacteria. It can happen anywhere in the urinary tract. A UTI can happen in the:  Kidneys.  Ureters, the tubes that connect the kidneys to the bladder.  Bladder.  Urethra, where the urine comes out.  Most UTIs are bladder infections. They often cause pain or burning when you urinate.  Most UTIs can be cured with antibiotics. If you are prescribed antibiotics, be sure to complete your treatment so that the infection does not get worse.  Follow-up care is a key part of your treatment and safety. Be sure to make and go to all appointments, and call your doctor if you are having problems. It's also a good idea to know your test results and keep a list of the medicines you take.  How can you care for yourself at home?  Take your antibiotics as directed. Do not stop taking them just because you feel better. You need to take the full course of antibiotics.  Drink extra water and other fluids for the next day or two. This will help make the urine less concentrated and help wash out the bacteria that are causing the infection. (If you have kidney, heart, or liver disease and have to limit fluids, talk with your doctor before you increase the amount of fluids you drink.)  Avoid drinks that are carbonated or have caffeine. They can irritate the bladder.  Urinate often. Try to empty your bladder each time.  To relieve pain, take a hot bath or lay a heating pad set on low over your lower belly or genital area. Never go to sleep with a heating pad in place.  To prevent UTIs  Drink plenty of water each day. This helps you urinate often, which clears bacteria from your system. (If you have kidney, heart, or liver disease and have to limit fluids, talk with your doctor before you increase the amount of fluids you drink.)  Urinate when you need to.  If you are sexually active, urinate right after you have  "sex.  Change sanitary pads often.  Avoid douches, bubble baths, feminine hygiene sprays, and other feminine hygiene products that have deodorants.  After going to the bathroom, wipe from front to back.  When should you call for help?   Call your doctor now or seek immediate medical care if:    You have new or worse fever, chills, nausea, or vomiting.     You have new pain in your back just below your rib cage. This is called flank pain.     There is new blood or pus in your urine.     You have any problems with your antibiotic medicine.   Watch closely for changes in your health, and be sure to contact your doctor if:    You are not getting better after taking an antibiotic for 2 days.     Your symptoms go away but then come back.   Where can you learn more?  Go to https://www.[email or web address].net/patiented  Enter K848 in the search box to learn more about \"Urinary Tract Infection (UTI) in Women: Care Instructions.\"  Current as of: November 15, 2023               Content Version: 14.0    [id number] [facility].   Care instructions adapted under license by your healthcare professional. If you have questions about a medical condition or this instruction, always ask your healthcare professional. [facility] disclaims any warranty or liability for your use of this information.      "

## 2024-07-15 NOTE — PROGRESS NOTES
"  Assessment & Plan     Acute UTI (urinary tract infection)  Macrobid prescribed recently, did take away symptoms temporarily.   As this is bacteriostatic rather than bacteriocidal, I'm not surprised really that she's had a return of symptoms.   Based on her last culture from Feb and her allergies, we'll go with Ciprofloxacin this time.    - ciprofloxacin (CIPRO) 250 MG tablet; Take 1 tablet (250 mg) by mouth 2 times daily for 7 days    Recurrent UTI  She did restart this the last time I saw her  She has been using this regularly  We also discussed her doing daily cranberry pill  - estradiol (VAGIFEM) 10 MCG TABS vaginal tablet; Place 1 tablet (10 mcg) vaginally twice a week          BMI  Estimated body mass index is 31.78 kg/m  as calculated from the following:    Height as of this encounter: 1.651 m (5' 5\").    Weight as of this encounter: 86.6 kg (191 lb).             Subjective   Helena is a 69 year old, presenting for the following health issues:  UTI        7/15/2024     2:06 PM   Additional Questions   Roomed by Stacy FLEMING CMA   Accompanied by Self     History of Present Illness       Reason for visit:  UTI    She eats 2-3 servings of fruits and vegetables daily.She consumes 0 sweetened beverage(s) daily.She exercises with enough effort to increase her heart rate 9 or less minutes per day.  She exercises with enough effort to increase her heart rate 3 or less days per week.   She is taking medications regularly.         Genitourinary - Female  Onset/Duration: 4 days  Description:   Painful urination (Dysuria): YES           Frequency: No  Blood in urine (Hematuria): No  Delay in urine (Hesitency): No  Intensity: moderate  Progression of Symptoms:  worsening  Accompanying Signs & Symptoms:  Fever/chills: No  Flank pain: No  Nausea and vomiting: No  Vaginal symptoms: none  Abdominal/Pelvic Pain: No  History:   History of frequent UTI s: YES  History of kidney stones: No  Sexually Active: No  Possibility of pregnancy: " "No  Precipitating or alleviating factors: None  Therapies tried and outcome: Increase fluid intake        Review of Systems  Constitutional, HEENT, cardiovascular, pulmonary, gi and gu systems are negative, except as otherwise noted.      Objective    /66   Pulse 68   Temp 98  F (36.7  C) (Tympanic)   Resp 22   Ht 1.651 m (5' 5\")   Wt 86.6 kg (191 lb)   LMP 11/12/2004 (Approximate)   SpO2 95%   BMI 31.78 kg/m    Body mass index is 31.78 kg/m .  Physical Exam   GENERAL: alert and no distress    Results for orders placed or performed in visit on 07/15/24   UA Macroscopic with reflex to Microscopic and Culture - Clinic Collect     Status: Abnormal    Specimen: Urine, Clean Catch   Result Value Ref Range    Color Urine Yellow Colorless, Straw, Light Yellow, Yellow    Appearance Urine Cloudy (A) Clear    Glucose Urine Negative Negative mg/dL    Bilirubin Urine Negative Negative    Ketones Urine Negative Negative mg/dL    Specific Gravity Urine 1.015 1.003 - 1.035    Blood Urine Small (A) Negative    pH Urine 7.0 5.0 - 7.0    Protein Albumin Urine Negative Negative mg/dL    Urobilinogen Urine 0.2 0.2, 1.0 E.U./dL    Nitrite Urine Negative Negative    Leukocyte Esterase Urine Large (A) Negative   Urine Microscopic Exam     Status: Abnormal   Result Value Ref Range    Bacteria Urine Few (A) None Seen /HPF    RBC Urine 5-10 (A) 0-2 /HPF /HPF    WBC Urine >100 (A) 0-5 /HPF /HPF    Squamous Epithelials Urine Few (A) None Seen /LPF             Signed Electronically by: Britany Norton MD    "

## 2024-07-17 ENCOUNTER — MYC MEDICAL ADVICE (OUTPATIENT)
Dept: FAMILY MEDICINE | Facility: CLINIC | Age: 69
End: 2024-07-17
Payer: COMMERCIAL

## 2024-07-17 LAB — BACTERIA UR CULT: NORMAL

## 2024-08-07 ENCOUNTER — LAB (OUTPATIENT)
Dept: LAB | Facility: CLINIC | Age: 69
End: 2024-08-07
Payer: COMMERCIAL

## 2024-08-07 DIAGNOSIS — E03.9 HYPOTHYROIDISM: Primary | ICD-10-CM

## 2024-08-07 DIAGNOSIS — M05.9 SEROPOSITIVE RHEUMATOID ARTHRITIS (H): ICD-10-CM

## 2024-08-07 DIAGNOSIS — R82.81 STERILE PYURIA: ICD-10-CM

## 2024-08-07 DIAGNOSIS — Z79.899 HIGH RISK MEDICATION USE: ICD-10-CM

## 2024-08-07 LAB
ALBUMIN SERPL BCG-MCNC: 4 G/DL (ref 3.5–5.2)
ALBUMIN UR-MCNC: NEGATIVE MG/DL
ALP SERPL-CCNC: 124 U/L (ref 40–150)
ALT SERPL W P-5'-P-CCNC: 23 U/L (ref 0–50)
AMORPH CRY #/AREA URNS HPF: ABNORMAL /HPF
APPEARANCE UR: CLEAR
AST SERPL W P-5'-P-CCNC: 39 U/L (ref 0–45)
BACTERIA #/AREA URNS HPF: ABNORMAL /HPF
BASOPHILS # BLD AUTO: 0 10E3/UL (ref 0–0.2)
BASOPHILS NFR BLD AUTO: 0 %
BILIRUB DIRECT SERPL-MCNC: <0.2 MG/DL (ref 0–0.3)
BILIRUB SERPL-MCNC: 0.6 MG/DL
BILIRUB UR QL STRIP: NEGATIVE
COLOR UR AUTO: YELLOW
CREAT SERPL-MCNC: 0.73 MG/DL (ref 0.51–0.95)
EGFRCR SERPLBLD CKD-EPI 2021: 87 ML/MIN/1.73M2
EOSINOPHIL # BLD AUTO: 0.2 10E3/UL (ref 0–0.7)
EOSINOPHIL NFR BLD AUTO: 2 %
ERYTHROCYTE [DISTWIDTH] IN BLOOD BY AUTOMATED COUNT: 13.6 % (ref 10–15)
GLUCOSE UR STRIP-MCNC: NEGATIVE MG/DL
HCT VFR BLD AUTO: 40.7 % (ref 35–47)
HGB BLD-MCNC: 13.8 G/DL (ref 11.7–15.7)
HGB UR QL STRIP: NEGATIVE
IMM GRANULOCYTES # BLD: 0 10E3/UL
IMM GRANULOCYTES NFR BLD: 0 %
KETONES UR STRIP-MCNC: NEGATIVE MG/DL
LEUKOCYTE ESTERASE UR QL STRIP: ABNORMAL
LYMPHOCYTES # BLD AUTO: 1.4 10E3/UL (ref 0.8–5.3)
LYMPHOCYTES NFR BLD AUTO: 17 %
MCH RBC QN AUTO: 36.1 PG (ref 26.5–33)
MCHC RBC AUTO-ENTMCNC: 33.9 G/DL (ref 31.5–36.5)
MCV RBC AUTO: 107 FL (ref 78–100)
MONOCYTES # BLD AUTO: 0.7 10E3/UL (ref 0–1.3)
MONOCYTES NFR BLD AUTO: 9 %
NEUTROPHILS # BLD AUTO: 5.8 10E3/UL (ref 1.6–8.3)
NEUTROPHILS NFR BLD AUTO: 72 %
NITRATE UR QL: NEGATIVE
PH UR STRIP: 7 [PH] (ref 5–7)
PLATELET # BLD AUTO: 198 10E3/UL (ref 150–450)
PROT SERPL-MCNC: 6.8 G/DL (ref 6.4–8.3)
RBC # BLD AUTO: 3.82 10E6/UL (ref 3.8–5.2)
RBC #/AREA URNS AUTO: ABNORMAL /HPF
SP GR UR STRIP: 1.02 (ref 1–1.03)
SQUAMOUS #/AREA URNS AUTO: ABNORMAL /LPF
TSH SERPL DL<=0.005 MIU/L-ACNC: 2.15 UIU/ML (ref 0.3–4.2)
UROBILINOGEN UR STRIP-ACNC: 0.2 E.U./DL
WBC # BLD AUTO: 8.1 10E3/UL (ref 4–11)
WBC #/AREA URNS AUTO: ABNORMAL /HPF

## 2024-08-07 PROCEDURE — 84443 ASSAY THYROID STIM HORMONE: CPT

## 2024-08-07 PROCEDURE — 81001 URINALYSIS AUTO W/SCOPE: CPT

## 2024-08-07 PROCEDURE — 82565 ASSAY OF CREATININE: CPT

## 2024-08-07 PROCEDURE — 80076 HEPATIC FUNCTION PANEL: CPT

## 2024-08-07 PROCEDURE — 36415 COLL VENOUS BLD VENIPUNCTURE: CPT

## 2024-08-07 PROCEDURE — 85025 COMPLETE CBC W/AUTO DIFF WBC: CPT

## 2024-09-05 ENCOUNTER — E-VISIT (OUTPATIENT)
Dept: FAMILY MEDICINE | Facility: CLINIC | Age: 69
End: 2024-09-05
Payer: COMMERCIAL

## 2024-09-05 DIAGNOSIS — L30.9 DERMATITIS: Primary | ICD-10-CM

## 2024-09-05 DIAGNOSIS — D69.2 SENILE PURPURA (H): ICD-10-CM

## 2024-09-05 PROCEDURE — 99207 PR NO BILLABLE SERVICE THIS VISIT: CPT | Performed by: FAMILY MEDICINE

## 2024-09-11 ENCOUNTER — LAB (OUTPATIENT)
Dept: LAB | Facility: CLINIC | Age: 69
End: 2024-09-11
Payer: COMMERCIAL

## 2024-09-11 DIAGNOSIS — M05.9 SEROPOSITIVE RHEUMATOID ARTHRITIS (H): ICD-10-CM

## 2024-09-11 DIAGNOSIS — Z79.899 HIGH RISK MEDICATION USE: ICD-10-CM

## 2024-09-11 LAB
ALBUMIN SERPL BCG-MCNC: 3.9 G/DL (ref 3.5–5.2)
ALP SERPL-CCNC: 128 U/L (ref 40–150)
ALT SERPL W P-5'-P-CCNC: 25 U/L (ref 0–50)
AST SERPL W P-5'-P-CCNC: 37 U/L (ref 0–45)
BASOPHILS # BLD AUTO: 0 10E3/UL (ref 0–0.2)
BASOPHILS NFR BLD AUTO: 0 %
BILIRUB DIRECT SERPL-MCNC: <0.2 MG/DL (ref 0–0.3)
BILIRUB SERPL-MCNC: 0.6 MG/DL
CREAT SERPL-MCNC: 0.66 MG/DL (ref 0.51–0.95)
EGFRCR SERPLBLD CKD-EPI 2021: >90 ML/MIN/1.73M2
EOSINOPHIL # BLD AUTO: 0.2 10E3/UL (ref 0–0.7)
EOSINOPHIL NFR BLD AUTO: 2 %
ERYTHROCYTE [DISTWIDTH] IN BLOOD BY AUTOMATED COUNT: 13.7 % (ref 10–15)
HCT VFR BLD AUTO: 40.9 % (ref 35–47)
HGB BLD-MCNC: 13.7 G/DL (ref 11.7–15.7)
IMM GRANULOCYTES # BLD: 0 10E3/UL
IMM GRANULOCYTES NFR BLD: 0 %
LYMPHOCYTES # BLD AUTO: 1.5 10E3/UL (ref 0.8–5.3)
LYMPHOCYTES NFR BLD AUTO: 19 %
MCH RBC QN AUTO: 35.4 PG (ref 26.5–33)
MCHC RBC AUTO-ENTMCNC: 33.5 G/DL (ref 31.5–36.5)
MCV RBC AUTO: 106 FL (ref 78–100)
MONOCYTES # BLD AUTO: 0.8 10E3/UL (ref 0–1.3)
MONOCYTES NFR BLD AUTO: 10 %
NEUTROPHILS # BLD AUTO: 5.3 10E3/UL (ref 1.6–8.3)
NEUTROPHILS NFR BLD AUTO: 68 %
PLATELET # BLD AUTO: 192 10E3/UL (ref 150–450)
PROT SERPL-MCNC: 6.9 G/DL (ref 6.4–8.3)
RBC # BLD AUTO: 3.87 10E6/UL (ref 3.8–5.2)
WBC # BLD AUTO: 7.8 10E3/UL (ref 4–11)

## 2024-09-11 PROCEDURE — 85025 COMPLETE CBC W/AUTO DIFF WBC: CPT

## 2024-09-11 PROCEDURE — 80076 HEPATIC FUNCTION PANEL: CPT

## 2024-09-11 PROCEDURE — 82565 ASSAY OF CREATININE: CPT

## 2024-09-11 PROCEDURE — 36415 COLL VENOUS BLD VENIPUNCTURE: CPT

## 2024-09-15 DIAGNOSIS — E03.8 OTHER SPECIFIED HYPOTHYROIDISM: ICD-10-CM

## 2024-09-16 ENCOUNTER — E-VISIT (OUTPATIENT)
Dept: FAMILY MEDICINE | Facility: CLINIC | Age: 69
End: 2024-09-16
Payer: COMMERCIAL

## 2024-09-16 DIAGNOSIS — N39.0 ACUTE UTI (URINARY TRACT INFECTION): ICD-10-CM

## 2024-09-16 PROCEDURE — 99421 OL DIG E/M SVC 5-10 MIN: CPT | Performed by: FAMILY MEDICINE

## 2024-09-16 RX ORDER — LEVOTHYROXINE SODIUM 50 UG/1
50 TABLET ORAL DAILY
Qty: 90 TABLET | Refills: 1 | Status: SHIPPED | OUTPATIENT
Start: 2024-09-16

## 2024-09-16 RX ORDER — CIPROFLOXACIN 250 MG/1
250 TABLET, FILM COATED ORAL 2 TIMES DAILY
Qty: 6 TABLET | Refills: 0 | Status: SHIPPED | OUTPATIENT
Start: 2024-09-16

## 2024-09-16 NOTE — PATIENT INSTRUCTIONS
Dear Helena,     Sounds like you have a UTI.    I have sent a prescription for antibiotics to your pharmacy to treat this infection, but I would like you to come in and do a lab only visit for urinalysis and urine culture.     It is important that you take all of your prescribed medication even if your symptoms are improving after a few doses.  Taking all of your medicine helps prevent the symptoms from returning.     If your symptoms worsen, you develop pain in your back or stomach, develop fevers, or are not improving in 5 days, please contact your primary care provider for an appointment or visit any of our convenient Walk-in or Urgent Care Centers to be seen, which can be found on our website here.    Thanks again for choosing us as your health care partner,    Britany Norton MD

## 2024-09-17 ENCOUNTER — LAB (OUTPATIENT)
Dept: LAB | Facility: CLINIC | Age: 69
End: 2024-09-17
Payer: COMMERCIAL

## 2024-09-17 DIAGNOSIS — N39.0 ACUTE UTI (URINARY TRACT INFECTION): ICD-10-CM

## 2024-09-17 LAB
ALBUMIN UR-MCNC: NEGATIVE MG/DL
AMORPH CRY #/AREA URNS HPF: ABNORMAL /HPF
APPEARANCE UR: CLEAR
BACTERIA #/AREA URNS HPF: ABNORMAL /HPF
BILIRUB UR QL STRIP: NEGATIVE
COLOR UR AUTO: YELLOW
GLUCOSE UR STRIP-MCNC: NEGATIVE MG/DL
HGB UR QL STRIP: ABNORMAL
KETONES UR STRIP-MCNC: NEGATIVE MG/DL
LEUKOCYTE ESTERASE UR QL STRIP: ABNORMAL
NITRATE UR QL: NEGATIVE
PH UR STRIP: 7 [PH] (ref 5–7)
RBC #/AREA URNS AUTO: ABNORMAL /HPF
SP GR UR STRIP: 1.02 (ref 1–1.03)
UROBILINOGEN UR STRIP-ACNC: 0.2 E.U./DL
WBC #/AREA URNS AUTO: ABNORMAL /HPF

## 2024-09-17 PROCEDURE — 87086 URINE CULTURE/COLONY COUNT: CPT

## 2024-09-17 PROCEDURE — 81001 URINALYSIS AUTO W/SCOPE: CPT

## 2024-09-18 LAB — BACTERIA UR CULT: NO GROWTH

## 2024-10-08 ENCOUNTER — LAB (OUTPATIENT)
Dept: LAB | Facility: CLINIC | Age: 69
End: 2024-10-08
Payer: COMMERCIAL

## 2024-10-08 DIAGNOSIS — I10 ESSENTIAL HYPERTENSION: Primary | ICD-10-CM

## 2024-10-08 DIAGNOSIS — M05.9 SEROPOSITIVE RHEUMATOID ARTHRITIS (H): ICD-10-CM

## 2024-10-08 DIAGNOSIS — Z79.899 HIGH RISK MEDICATION USE: ICD-10-CM

## 2024-10-08 LAB
ALBUMIN SERPL BCG-MCNC: 4.1 G/DL (ref 3.5–5.2)
ALP SERPL-CCNC: 128 U/L (ref 40–150)
ALT SERPL W P-5'-P-CCNC: 26 U/L (ref 0–50)
ANION GAP SERPL CALCULATED.3IONS-SCNC: 12 MMOL/L (ref 7–15)
AST SERPL W P-5'-P-CCNC: 33 U/L (ref 0–45)
BASOPHILS # BLD AUTO: 0 10E3/UL (ref 0–0.2)
BASOPHILS NFR BLD AUTO: 1 %
BILIRUB DIRECT SERPL-MCNC: 0.23 MG/DL (ref 0–0.3)
BILIRUB SERPL-MCNC: 0.7 MG/DL
BUN SERPL-MCNC: 10.6 MG/DL (ref 8–23)
CALCIUM SERPL-MCNC: 9.4 MG/DL (ref 8.8–10.4)
CHLORIDE SERPL-SCNC: 95 MMOL/L (ref 98–107)
CREAT SERPL-MCNC: 0.7 MG/DL (ref 0.51–0.95)
CRP SERPL-MCNC: 7.42 MG/L
EGFRCR SERPLBLD CKD-EPI 2021: >90 ML/MIN/1.73M2
EOSINOPHIL # BLD AUTO: 0.1 10E3/UL (ref 0–0.7)
EOSINOPHIL NFR BLD AUTO: 2 %
ERYTHROCYTE [DISTWIDTH] IN BLOOD BY AUTOMATED COUNT: 14.3 % (ref 10–15)
ERYTHROCYTE [SEDIMENTATION RATE] IN BLOOD BY WESTERGREN METHOD: 11 MM/HR (ref 0–30)
GLUCOSE SERPL-MCNC: 118 MG/DL (ref 70–99)
HCO3 SERPL-SCNC: 28 MMOL/L (ref 22–29)
HCT VFR BLD AUTO: 41.9 % (ref 35–47)
HGB BLD-MCNC: 14.3 G/DL (ref 11.7–15.7)
IMM GRANULOCYTES # BLD: 0 10E3/UL
IMM GRANULOCYTES NFR BLD: 0 %
LYMPHOCYTES # BLD AUTO: 1.7 10E3/UL (ref 0.8–5.3)
LYMPHOCYTES NFR BLD AUTO: 26 %
MCH RBC QN AUTO: 36.4 PG (ref 26.5–33)
MCHC RBC AUTO-ENTMCNC: 34.1 G/DL (ref 31.5–36.5)
MCV RBC AUTO: 107 FL (ref 78–100)
MONOCYTES # BLD AUTO: 0.7 10E3/UL (ref 0–1.3)
MONOCYTES NFR BLD AUTO: 11 %
NEUTROPHILS # BLD AUTO: 4 10E3/UL (ref 1.6–8.3)
NEUTROPHILS NFR BLD AUTO: 61 %
PLATELET # BLD AUTO: 210 10E3/UL (ref 150–450)
POTASSIUM SERPL-SCNC: 3.8 MMOL/L (ref 3.4–5.3)
PROT SERPL-MCNC: 7.2 G/DL (ref 6.4–8.3)
RBC # BLD AUTO: 3.93 10E6/UL (ref 3.8–5.2)
SODIUM SERPL-SCNC: 135 MMOL/L (ref 135–145)
WBC # BLD AUTO: 6.6 10E3/UL (ref 4–11)

## 2024-10-08 PROCEDURE — 85025 COMPLETE CBC W/AUTO DIFF WBC: CPT

## 2024-10-08 PROCEDURE — 85652 RBC SED RATE AUTOMATED: CPT

## 2024-10-08 PROCEDURE — 80053 COMPREHEN METABOLIC PANEL: CPT

## 2024-10-08 PROCEDURE — 86140 C-REACTIVE PROTEIN: CPT

## 2024-10-08 PROCEDURE — 82248 BILIRUBIN DIRECT: CPT

## 2024-10-08 PROCEDURE — 36415 COLL VENOUS BLD VENIPUNCTURE: CPT

## 2024-10-09 ENCOUNTER — OFFICE VISIT (OUTPATIENT)
Dept: RHEUMATOLOGY | Facility: CLINIC | Age: 69
End: 2024-10-09
Payer: COMMERCIAL

## 2024-10-09 VITALS
DIASTOLIC BLOOD PRESSURE: 61 MMHG | WEIGHT: 193.4 LBS | OXYGEN SATURATION: 95 % | HEART RATE: 87 BPM | SYSTOLIC BLOOD PRESSURE: 110 MMHG | BODY MASS INDEX: 32.18 KG/M2

## 2024-10-09 DIAGNOSIS — M05.9 SEROPOSITIVE RHEUMATOID ARTHRITIS (H): Primary | ICD-10-CM

## 2024-10-09 DIAGNOSIS — Z79.899 HIGH RISK MEDICATION USE: ICD-10-CM

## 2024-10-09 DIAGNOSIS — M54.50 CHRONIC MIDLINE LOW BACK PAIN WITHOUT SCIATICA: ICD-10-CM

## 2024-10-09 DIAGNOSIS — G89.29 CHRONIC MIDLINE LOW BACK PAIN WITHOUT SCIATICA: ICD-10-CM

## 2024-10-09 PROCEDURE — G2211 COMPLEX E/M VISIT ADD ON: HCPCS | Performed by: INTERNAL MEDICINE

## 2024-10-09 PROCEDURE — 99214 OFFICE O/P EST MOD 30 MIN: CPT | Performed by: INTERNAL MEDICINE

## 2024-10-09 RX ORDER — METHOTREXATE 2.5 MG/1
25 TABLET ORAL WEEKLY
Qty: 130 TABLET | Refills: 0 | Status: SHIPPED | OUTPATIENT
Start: 2024-10-09

## 2024-10-09 NOTE — PROGRESS NOTES
Rheumatology Clinic Visit      Mine Meléndez MRN# [id number]   YOB: 1955 Age: 69 year old      Date of visit: 10/09/24     Chief Complaint   Patient presents with:  RECHECK: RA    Assessment and Plan     1. Seropositive Erosive (but no erosions seen on 7/5/2024 x-rays) Rheumatoid Arthritis (RF+, ): Dx'd with RA prior to 2002. Previously on sulfasalazine (stomatitis), hydroxychloroquine (loose stools), leflunomide (loose stools).  Currently on methotrexate 25 mg oral once weekly.  Resolution of synovitis since increasing methotrexate from 20 mg once weekly to 25 mg once weekly.  Chronic illness, stable.      - Increase Methotrexate from 20mg PO once weekly to 25 mg oral once weekly (taken within the same 24 hour period of each week, in divided doses)  - Continue folic acid 1mg daily  - Labs in 3 months: CBC, Creatinine, Hepatic Panel, ESR, CRP    High risk medication requiring intensive toxicity monitoring at least quarterly.     2. Primary osteoarthritis of both hands: voltaren gel PRN.  Has not required Voltaren gel recently because she has been doing so well, per patient  - Continue diclofenac (VOLTAREN) 1 % topical gel; Apply up to 2 grams of 1% gel to hands up to 4 times daily as needed for joint pain (maximum: 8 g per upper extremity per day)      3.  Chronic nonradiating midline low back pain: Occurs when standing for a longer period of time and resolves after sitting for about 3 minutes.  Does not occur every time she stands for a longer period of time.  Degenerative changes of the lumbar spine seen on the 5/12/2023 CT abdomen/pelvis and this was reviewed with the patient today; also question if there is a component of spinal stenosis.  Start physical therapy.  If no improvement then consider checking x-rays and possibly MRI.  - Physical therapy referral    3.  QuantiFERON-TB gold plus: Note that in 2020 she had a positive QuantiFERON-TB gold plus, but later the lab notified me that  the TB test was actually negative.  This is documented here for reference only.    4. Melanoma history: documented here for historical significance.  Continue to follow with a dermatologist at least once yearly.    5.  Vaccinations: Vaccinations reviewed with Ms. Meléndez.    - Influenza: encouraged yearly vaccination  - Wetlygh25: up to date  - [id number]: up to date  - Shingrix: Up to date  - COVID-19: Up to date      Total minutes spent in evaluation with patient, documentation, , and review of pertinent studies and chart notes: 14  The longitudinal plan of care for the rheumatology problem(s) were addressed during this visit.  Due to added complexity of care, we will continue to support the patient and the subsequent management of this condition with ongoing continuity of care.         Ms. Meléndez verbalized agreement with and understanding of the rational for the diagnosis and treatment plan.  All questions were answered to best of my ability and the patient's satisfaction. Ms. Meléndez was advised to contact the clinic with any questions that may arise after the clinic visit.      Thank you for involving me in the care of the patient    Return to clinic: 3 months, in office    HPI   Mine Meléndez is a 69 year old female with a past medical history significant for hypertension, hyperlipidemia, hypothyroidism, migraines, melanoma history, and rheumatoid arthritis who presents for follow-up of rheumatoid arthritis.     1/5/2024: An occasional twinge of pain at the left second MCP which is gripping something tightly but this goes away as soon as she starts gripping tightly.  Bony hypertrophy of the right third PIP unchanged and does not bother her; no pain.  Morning stiffness for less than 20 minutes.  Overall happy with how well she is doing.  No oral or nasal sores.  No rash.  No chest pain or pressure or shortness of breath.    7/10/2024: Multiple times a day she has a sharp pain in the right second MCP  that resolves after a few seconds.  Chronic synovial hypertrophy at the right second MCP.  Chronic swelling of the right third and left second PIPs.  Morning stiffness for about 30 minutes.  Other joints are doing well.  Taking methotrexate in split doses within the same 24-hour period of each week.    Today, 10/9/2024: Improvement with the higher dose of methotrexate.  No peripheral joint pain or swelling.  No morning stiffness or gelling phenomenon.  She reports having nonradiating midline low back pain that occurs after standing for a long period of time, but not every time she stands for a longer period of time, and resolves within 3 minutes of sitting.  No GI upset.  No constipation or diarrhea.  No chest pain or pressure or shortness of breath.  No palpitations.  No oral or nasal sores.    Tobacco: Former smoker  EtOH: 4 drinks per day on the weekends  Drugs: None  Occupation: retired June 2020, was working in "[employer]   12 point review of system was completed and negative except as noted in the HPI    Active Problem List     Patient Active Problem List   Diagnosis    Seropositive rheumatoid arthritis (H)    DYSPEPSIA    Obesity    Essential hypertension    Hypothyroidism    HYPERLIPIDEMIA LDL GOAL <130    Dermatitis    High risk medications (not anticoagulants) long-term use    Female stress incontinence    Migraine    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)    Combined forms of age-related cataract, mild-mod, of both eyes    Dermatochalasis of both upper eyelids    Choroidal nevus of right eye    Involutional ptosis, acquired, bilateral     Past Medical History     Past Medical History:   Diagnosis Date    Herpes simplex without mention of complication     Hypertension     Malignant melanoma (H)     Obesity, unspecified     Osteomyelitis of jaw 5/22/2008    Rheumatoid arthritis(714.0)     Trochanteric bursitis of right hip 8/16/2013     Past Surgical History      Past Surgical History:   Procedure Laterality Date    COLONOSCOPY  1/20/06    COMBINED REPAIR PTOSIS WITH BLEPHAROPLASTY BILATERAL Bilateral 6/19/2023    Procedure: Bilateral upper eyelid blepharoplasty and ptosis repair;  Surgeon: Cl Pearl MD;  Location: [facility] OR    EXCISE MASS FOOT Left 10/5/2021    Procedure: EXCISION, MASS, LEFT FOOT;  Surgeon: Tommy Pelaez DPM;  Location: [facility]    HYSTERECTOMY, PAP NO LONGER INDICATED      HYSTERECTOMY, VAGINAL  11/04    TVH,TVT procedure    RELEASE CARPAL TUNNEL Right 10/11/2016    Procedure: RELEASE CARPAL TUNNEL;  Surgeon: Emely Blanca MD;  Location: [facility] OR    SURGICAL HISTORY OF -   1988    tubal ligation    SURGICAL HISTORY OF -   2001    excision gangliion cyst rt heel     Allergy     Allergies   Allergen Reactions    Cefdinir      Face flushing and blotchy rash    Flagyl [Metronidazole] Rash    Morphine And Codeine Rash    Septra [Sulfamethoxazole-Trimethoprim] Itching     Puffy eyes, flushed.      Current Medication List     Current Outpatient Medications   Medication Sig Dispense Refill    folic acid (FOLVITE) 1 MG tablet Take 1 tablet (1,000 mcg) by mouth daily 90 tablet 2    methotrexate 2.5 MG tablet Take 10 tablets (25 mg) by mouth once a week . Methotrexate is a once weekly medication, taken within the same 24 hour period of each week. 130 tablet 0    ASPIRIN 81 MG OR TABS  100 3    ciprofloxacin (CIPRO) 250 MG tablet Take 1 tablet (250 mg) by mouth 2 times daily. 6 tablet 0    estradiol (VAGIFEM) 10 MCG TABS vaginal tablet Place 1 tablet (10 mcg) vaginally twice a week      hyoscyamine (LEVSIN) 0.125 MG tablet Take 1 tablet (125 mcg) by mouth every 6 hours as needed for cramping 12 tablet 0    ibuprofen (ADVIL/MOTRIN) 800 MG tablet Take 1 tablet (800 mg) by mouth every 8 hours as needed for moderate pain 90 tablet 0    levothyroxine (SYNTHROID/LEVOTHROID) 50 MCG tablet TAKE ONE TABLET BY MOUTH ONCE DAILY 90 tablet 1     "lisinopril-hydrochlorothiazide (ZESTORETIC) 10-12.5 MG tablet Take 1 tablet by mouth daily 90 tablet 1    MULTI-VITAMIN OR 1 daily      Probiotic Product (PROBIOTIC PO)       simvastatin (ZOCOR) 20 MG tablet TAKE ONE TABLET BY MOUTH AT BEDTIME. 90 tablet 3     Current Facility-Administered Medications   Medication Dose Route Frequency Provider Last Rate Last Admin    betamethasone acet & sod phos (CELESTONE) injection 6 mg  6 mg   Michael Loredo MD   6 mg at 10/01/21 1510    ropivacaine (NAROPIN) injection 0.5 mL  0.5 mL   Michael Loredo MD   0.5 mL at 10/01/21 1510    triamcinolone (KENALOG-40) injection 40 mg  40 mg   Britany Norton MD   40 mg at 02/27/24 0959     Social History   See HPI    Family History     Family History   Problem Relation Age of Onset    Diabetes Father     Dementia Father     Diabetes Brother     Diabetes Sister     Hypertension Sister     Hypertension Sister      Physical Exam     Temp Readings from Last 3 Encounters:   07/15/24 98  F (36.7  C) (Tympanic)   02/27/24 97.6  F (36.4  C) (Tympanic)   10/02/23 98.2  F (36.8  C) (Tympanic)     BP Readings from Last 5 Encounters:   10/09/24 (!) 146/79   07/15/24 122/66   07/10/24 136/74   02/27/24 122/66   10/02/23 125/67     Pulse Readings from Last 1 Encounters:   10/09/24 87     Resp Readings from Last 1 Encounters:   07/15/24 22     Estimated body mass index is 32.18 kg/m  as calculated from the following:    Height as of 7/15/24: 1.651 m (5' 5\").    Weight as of this encounter: 87.7 kg (193 lb 6.4 oz).      GEN: NAD. Healthy appearing adult.   HEENT:  Anicteric, noninjected sclera. No obvious external lesions of the ear and nose. Hearing intact.  CV: S1, S2. RRR. No m/r/g  PULM: No increased work of breathing. CTA bilaterally   MSK: MCPs, PIPs, DIPs without swelling or tenderness to palpation.  Wrists without swelling or tenderness to palpation.  Elbows and shoulders without swelling or tenderness to palpation.  Knees, ankles, and " MTPs without swelling or tenderness to palpation.  Spine nontender to palpation.  SKIN: No rash or jaundice seen  PSYCH: Alert. Appropriate.      Labs / Imaging (select studies)     RF/CCP  Recent Labs   Lab Test 05/01/18  1538   CCPIGG 182*     CBC  Recent Labs   Lab Test 10/08/24  1452 09/11/24  1322 08/07/24  1330 08/23/21  0848 05/12/21  1158 01/12/21  1547 10/23/20  1519   WBC 6.6 7.8 8.1   < > 6.8 7.4 7.0   RBC 3.93 3.87 3.82   < > 4.12 4.12 4.21   HGB 14.3 13.7 13.8   < > 14.0 14.5 14.4   HCT 41.9 40.9 40.7   < > 41.9 43.1 43.1   * 106* 107*   < > 102* 105* 102*   RDW 14.3 13.7 13.6   < > 14.1 13.6 13.6    192 198   < > 217 182 220   MCH 36.4* 35.4* 36.1*   < > 34.0* 35.2* 34.2*   MCHC 34.1 33.5 33.9   < > 33.4 33.6 33.4   NEUTROPHIL 61 68 72   < > 75.2 68.7 69.2   LYMPH 26 19 17   < > 17.7 19.6 22.7   MONOCYTE 11 10 9   < > 5.2 9.2 6.1   EOSINOPHIL 2 2 2   < > 1.6 2.4 1.7   BASOPHIL 1 0 0   < > 0.3 0.1 0.3   ANEU  --   --   --   --  5.1 5.1 4.9   ALYM  --   --   --   --  1.2 1.4 1.6   SHYLA  --   --   --   --  0.4 0.7 0.4   AEOS  --   --   --   --  0.1 0.2 0.1   ABAS  --   --   --   --  0.0 0.0 0.0   ANEUTAUTO 4.0 5.3 5.8   < >  --   --   --    ALYMPAUTO 1.7 1.5 1.4   < >  --   --   --    AMONOAUTO 0.7 0.8 0.7   < >  --   --   --    AEOSAUTO 0.1 0.2 0.2   < >  --   --   --    ABSBASO 0.0 0.0 0.0   < >  --   --   --     < > = values in this interval not displayed.     CMP  Recent Labs   Lab Test 10/08/24  1452 09/11/24  1322 08/07/24  1330 06/30/23  1128 06/01/23  1339 10/26/22  1313 09/21/22  1318 08/23/21  0847 05/12/21  1158 04/05/21  1255 01/12/21  1547     --   --   --  137  --  133*  --   --  132*  --    POTASSIUM 3.8  --   --   --  3.8  --  3.5  --   --  3.5  --    CHLORIDE 95*  --   --   --  98  --  96*  --   --  98  --    CO2 28  --   --   --  31*  --  28  --   --  26  --    ANIONGAP 12  --   --   --  8  --  9  --   --  8  --    *  --   --   --  113*  --  120*  --   --  151*   --    BUN 10.6  --   --   --  9.5  --  7.4*  --   --  7  --    CR 0.70 0.66 0.73   < > 0.50*   < > 0.57   < > 0.58 0.55 0.65   GFRESTIMATED >90 >90 87   < > >90   < > >90   < > >90 >90 >90   GFRESTBLACK  --   --   --   --   --   --   --   --  >90 >90 >90   ROSELYN 9.4  --   --   --  9.5  --  9.0  --   --  8.7  --    BILITOTAL 0.7 0.6 0.6   < >  --    < > 0.8   < > 0.5  --  0.7   ALBUMIN 4.1 3.9 4.0   < >  --    < > 4.0   < > 3.5  --  3.6   PROTTOTAL 7.2 6.9 6.8   < >  --    < > 7.0   < > 7.5  --  7.5   ALKPHOS 128 128 124   < >  --    < > 125*   < > 121  --  118   AST 33 37 39   < >  --    < > 41*   < > 23  --  28   ALT 26 25 23   < >  --    < > 28   < > 24  --  27    < > = values in this interval not displayed.     Calcium/VitaminD  Recent Labs   Lab Test 10/08/24  1452 06/01/23  1339 09/21/22  1318   ROSELYN 9.4 9.5 9.0     ESR/CRP  Recent Labs   Lab Test 10/08/24  1452 06/19/24  1118 12/21/23  1350 10/26/22  1313 07/05/22  1547 03/15/22  1439 12/09/21  0830   SED 11 11 14   < > 12 11 12   CRP  --   --   --   --  7.8 8.3* 9.1*   CRPI 7.42* 8.56* 12.15*   < >  --   --   --     < > = values in this interval not displayed.     Lipid Panel  Recent Labs   Lab Test 02/27/24  0930 09/21/22  1318 11/09/20  0939   CHOL 144 149 153   TRIG 145 80 65   HDL 54 75 74   LDL 61 58 66   NHDL 90 74 79     Hepatitis B  Recent Labs   Lab Test 10/23/20  1519 05/01/18  1538   HBCAB Nonreactive Nonreactive   HEPBANG Nonreactive Nonreactive     Hepatitis C  Recent Labs   Lab Test 10/23/20  1519   HCVAB Nonreactive     Lyme ab screening  Recent Labs   Lab Test 08/30/23  1150   LYMEGM 0.22     Tuberculosis Screening  Recent Labs   Lab Test 10/23/20  1519   TBRST Negative     Immunization History     Immunization History   Administered Date(s) Administered    COVID-19 12+ (Pfizer) 11/01/2023    COVID-19 Bivalent 18+ (Moderna) 11/21/2022    COVID-19 MONOVALENT 12+ (Pfizer) 03/08/2021, 03/29/2021, 11/24/2021    COVID-19 Monovalent 18+ (Moderna)  05/02/2022    Influenza (H1N1) 12/31/2009    Influenza (High Dose) Trivalent,PF (Fluzone) 11/05/2019    Influenza (IIV3) PF 11/03/2004, 10/04/2010, 10/01/2012, 10/30/2012, 10/07/2013, 10/20/2013, 10/07/2014, 10/03/2016    Influenza Vaccine 65+ (Fluzone HD) 11/09/2020, 09/30/2021, 11/11/2022    Influenza Vaccine >6 months,quad, PF 10/23/2017    Influenza Vaccine, 6+MO IM (QUADRIVALENT W/PRESERVATIVES) 10/06/2015, 09/26/2018    Influenza, seasonal, injectable, PF 10/05/2009    Pneumo Conj 13-V (2010&after) 11/12/2018    Pneumococcal 23 valent 02/19/2019    TD,PF 7+ (Tenivac) 04/28/1995    TDAP Vaccine (Adacel) 12/05/2007, 02/01/2018    Zoster recombinant adjuvanted (SHINGRIX) 04/17/2019, 07/10/2019    Zoster vaccine, live 06/07/2016          Chart documentation done in part with Dragon Voice recognition Software. Although reviewed after completion, some word and grammatical error may remain.    Nasir Baltazar MD

## 2024-10-09 NOTE — NURSING NOTE
RAPID3 (0-30) Cumulative Score  3.2          RAPID3 Weighted Score (divide #4 by 3 and that is the weighted score)  1.0

## 2024-10-15 ENCOUNTER — THERAPY VISIT (OUTPATIENT)
Dept: PHYSICAL THERAPY | Facility: CLINIC | Age: 69
End: 2024-10-15
Attending: INTERNAL MEDICINE
Payer: COMMERCIAL

## 2024-10-15 DIAGNOSIS — M54.50 CHRONIC MIDLINE LOW BACK PAIN WITHOUT SCIATICA: ICD-10-CM

## 2024-10-15 DIAGNOSIS — G89.29 CHRONIC MIDLINE LOW BACK PAIN WITHOUT SCIATICA: ICD-10-CM

## 2024-10-15 PROCEDURE — 97140 MANUAL THERAPY 1/> REGIONS: CPT | Mod: GP

## 2024-10-15 PROCEDURE — 97110 THERAPEUTIC EXERCISES: CPT | Mod: GP

## 2024-10-15 PROCEDURE — 97161 PT EVAL LOW COMPLEX 20 MIN: CPT | Mod: GP

## 2024-10-15 NOTE — PROGRESS NOTES
"PHYSICAL THERAPY EVALUATION  Type of Visit: Evaluation       Fall Risk Screen:  Fall screen completed by: PT  Have you fallen 2 or more times in the past year?: No  Have you fallen and had an injury in the past year?: No  Timed Up and Go score (seconds): 10  Is patient a fall risk?: No    Subjective       Presenting condition or subjective complaint: Lower back pain. Started hurting 2 months ago. Pt denies accident or injury. Hurts when she stands > 15 min, walking > 3/4 block make it worse. Sitting makes it better.  Date of onset: 08/15/24    Relevant medical history:     Dates & types of surgery:      Prior diagnostic imaging/testing results: X-ray     Prior therapy history for the same diagnosis, illness or injury: No      Prior Level of Function  Independent    Living Environment  Social support: With a significant other or spouse   Type of home: House   Stairs to enter the home: Yes 3 Is there a railing: Yes     Ramp: No   Stairs inside the home: Yes 11 Is there a railing: Yes     Help at home: None  Equipment owned:       Employment: No    Hobbies/Interests: Reading, sewing, [employer], [employer]    Patient goals for therapy: Stand or walk longer distances    Pain assessment: Pain present  Location: LBP/Ratin/10     Objective   LUMBAR SPINE EVALUATION  PAIN: Pain Level at Rest: 2/10  Pain Level with Use: 9/10 standing or walking  Pain Location: lumbar spine  Pain Quality: Aching  Pain Frequency: constant  Pain is Worst: with activity  Pain is Exacerbated By: standing > 15 min, walking > 3/4 block   Pain is Relieved By: otc medications and rest  Pain Progression: Unchanged  POSTURE: Standing Posture: Forward head  GAIT:   normal  BALANCE/PROPRIOCEPTION: Single Leg Stance Eyes Open (seconds): R 6\", L 10\"  ROM:    Left AROM Right AROM   Lumbar Rotation WFL WFL   Lumbar Side glide WFL ++ LBP WFL ++ LBP   Lumbar Flexion Fingers to lower shin -no pain   Lumbar Extension WFL   PELVIC/SI SCREEN: Dru (March): neg, " Sacroiliac Provocation Test: neg  STRENGTH: WNL, except R hip IR 4/5 with pain in R buttock;  plank hold - unable due to incr LBP  FLEXIBILITY: tight gastrocsoleus B  LUMBAR/HIP Special Tests:    Left Right   DALE Negative  Negative    FADIR/Labrum/ANNE Negative  Incr L LBP   SLR Negative  Negative    Slump Negative  Negative       PELVIS/SI SPECIAL TESTS:  pain with PA glide to SI jts L>R; R PSIS deep, R GLORIA deep  PALPATION:  tender L piriformis, nontender psaos, sacrotuberous lig, lumbar paraspinals, QL, gluts  SPINAL SEGMENTAL CONCLUSIONS:  L on L sacral torsion      Assessment & Plan   CLINICAL IMPRESSIONS  Medical Diagnosis: Chronic midline low back pain without sciatica    Treatment Diagnosis: LBP. Signs and sxs suggest L on L sacral torsion and weak core contributing to LBP.   Impression/Assessment: Patient is a 69 year old female with LBP complaints.  The following significant findings have been identified: Pain, Decreased strength, Impaired balance, and Decreased activity tolerance. These impairments interfere with their ability to perform household chores and community mobility as compared to previous level of function.     Clinical Decision Making (Complexity):  Clinical Presentation: Stable/Uncomplicated  Clinical Presentation Rationale: based on medical and personal factors listed in PT evaluation  Clinical Decision Making (Complexity): Low complexity    PLAN OF CARE  Treatment Interventions:  Interventions: Manual Therapy, Therapeutic Activity, Therapeutic Exercise    Long Term Goals     PT Goal 1  Goal Identifier: 1  Goal Description: Pt will be able to walk > 1 block with < 3/10 pain.  Target Date: 11/26/24  PT Goal 2  Goal Identifier: 2  Goal Description: Pt will be able to stand > 15 min with < 3/10 pain.  Target Date: 01/07/25  PT Goal 3  Goal Identifier: 3  Goal Description: Pt will be independent with HEP for optimal functional recovery.  Target Date: 01/07/25      Frequency of Treatment:  1x/week  Duration of Treatment: 12 weeks    Education Assessment:   Learner/Method: Patient;Listening;Demonstration;Pictures/Video;No Barriers to Learning    Risks and benefits of evaluation/treatment have been explained.   Patient/Family/caregiver agrees with Plan of Care.     Evaluation Time:     PT Eval, Low Complexity Minutes (95741): 20       Signing Clinician: Rosalina Uriarte PT        [facility]                                                                                   OUTPATIENT PHYSICAL THERAPY      PLAN OF TREATMENT FOR OUTPATIENT REHABILITATION   Patient's Last Name, First Name, Mine Mar YOB: 1955   Provider's Name   [facility]   Medical Record No.  [id number]     Onset Date: 08/15/24  Start of Care Date: 10/15/24     Medical Diagnosis:  Chronic midline low back pain without sciatica      PT Treatment Diagnosis:  LBP. Signs and sxs suggest L on L sacral torsion and weak core contributing to LBP. Plan of Treatment  Frequency/Duration: 1x/week/ 12 weeks    Certification date from 10/15/24 to 01/07/25         See note for plan of treatment details and functional goals     Rosalina Uriarte, PT                         I CERTIFY THE NEED FOR THESE SERVICES FURNISHED UNDER        THIS PLAN OF TREATMENT AND WHILE UNDER MY CARE     (Physician attestation of this document indicates review and certification of the therapy plan).              Referring Provider:  Nasir Baltazar    Initial Assessment  See Epic Evaluation- Start of Care Date: 10/15/24

## 2024-10-22 ENCOUNTER — THERAPY VISIT (OUTPATIENT)
Dept: PHYSICAL THERAPY | Facility: CLINIC | Age: 69
End: 2024-10-22
Attending: INTERNAL MEDICINE
Payer: COMMERCIAL

## 2024-10-22 DIAGNOSIS — M54.50 CHRONIC MIDLINE LOW BACK PAIN WITHOUT SCIATICA: Primary | ICD-10-CM

## 2024-10-22 DIAGNOSIS — G89.29 CHRONIC MIDLINE LOW BACK PAIN WITHOUT SCIATICA: Primary | ICD-10-CM

## 2024-10-22 PROCEDURE — 97140 MANUAL THERAPY 1/> REGIONS: CPT | Mod: GP

## 2024-10-22 PROCEDURE — 97110 THERAPEUTIC EXERCISES: CPT | Mod: GP

## 2024-10-24 DIAGNOSIS — N39.0 RECURRENT UTI: ICD-10-CM

## 2024-10-24 NOTE — TELEPHONE ENCOUNTER
Pending Prescriptions:                       Disp   Refills    estradiol (VAGIFEM) 10 MCG TABS vaginal t*24 tab*             Sig: Place 1 tablet (10 mcg) vaginally twice a week.    Routing refill request to provider for review/approval because:  Medication is reported/historical  Medication indicated for associated diagnosis         Esvin Alfaro RN

## 2024-10-25 RX ORDER — ESTRADIOL 10 UG/1
10 INSERT VAGINAL
Qty: 24 TABLET | Refills: 3 | Status: SHIPPED | OUTPATIENT
Start: 2024-10-28

## 2024-10-29 ENCOUNTER — THERAPY VISIT (OUTPATIENT)
Dept: PHYSICAL THERAPY | Facility: CLINIC | Age: 69
End: 2024-10-29
Attending: INTERNAL MEDICINE
Payer: COMMERCIAL

## 2024-10-29 DIAGNOSIS — M54.50 CHRONIC MIDLINE LOW BACK PAIN WITHOUT SCIATICA: Primary | ICD-10-CM

## 2024-10-29 DIAGNOSIS — G89.29 CHRONIC MIDLINE LOW BACK PAIN WITHOUT SCIATICA: Primary | ICD-10-CM

## 2024-10-29 PROCEDURE — 97110 THERAPEUTIC EXERCISES: CPT | Mod: GP

## 2024-11-11 ENCOUNTER — E-VISIT (OUTPATIENT)
Dept: FAMILY MEDICINE | Facility: CLINIC | Age: 69
End: 2024-11-11
Payer: COMMERCIAL

## 2024-11-11 ENCOUNTER — LAB (OUTPATIENT)
Dept: LAB | Facility: CLINIC | Age: 69
End: 2024-11-11
Payer: COMMERCIAL

## 2024-11-11 DIAGNOSIS — R30.0 DYSURIA: Primary | ICD-10-CM

## 2024-11-11 DIAGNOSIS — R30.0 DYSURIA: ICD-10-CM

## 2024-11-11 DIAGNOSIS — N39.0 ACUTE UTI (URINARY TRACT INFECTION): ICD-10-CM

## 2024-11-11 LAB
ALBUMIN UR-MCNC: 30 MG/DL
APPEARANCE UR: CLEAR
BACTERIA #/AREA URNS HPF: ABNORMAL /HPF
BILIRUB UR QL STRIP: NEGATIVE
COLOR UR AUTO: YELLOW
GLUCOSE UR STRIP-MCNC: NEGATIVE MG/DL
HGB UR QL STRIP: ABNORMAL
KETONES UR STRIP-MCNC: NEGATIVE MG/DL
LEUKOCYTE ESTERASE UR QL STRIP: ABNORMAL
NITRATE UR QL: NEGATIVE
PH UR STRIP: 8.5 [PH] (ref 5–7)
RBC #/AREA URNS AUTO: ABNORMAL /HPF
SP GR UR STRIP: 1.02 (ref 1–1.03)
SQUAMOUS #/AREA URNS AUTO: ABNORMAL /LPF
UROBILINOGEN UR STRIP-ACNC: 1 E.U./DL
WBC #/AREA URNS AUTO: ABNORMAL /HPF

## 2024-11-11 PROCEDURE — 81001 URINALYSIS AUTO W/SCOPE: CPT

## 2024-11-11 PROCEDURE — 87086 URINE CULTURE/COLONY COUNT: CPT

## 2024-11-11 RX ORDER — CIPROFLOXACIN 250 MG/1
250 TABLET, FILM COATED ORAL 2 TIMES DAILY
Qty: 6 TABLET | Refills: 0 | Status: SHIPPED | OUTPATIENT
Start: 2024-11-11

## 2024-11-11 NOTE — PATIENT INSTRUCTIONS
"Dear Helena,      After reviewing your responses, I would like you to come in for a urine test to make sure we treat you correctly. This urine test is to evaluate you for a possible urinary tract infection, and should be scheduled for today or tomorrow. Schedule a Lab Only appointment here.   I'd like to make sure that there appears to be an infection before we just treat with antibiotics, the last urine culture we have from the end of September did not show any growth of bacteria.    If you have a hard time scheduling the lab test for urine, please call our care team at [phone] and say \"care team\".     You will receive instructions with your results in [employer] once they are available.     If your symptoms worsen, you develop pain in your back or stomach, develop fevers, or are not improving in 5 days, please contact your primary care provider for an appointment or visit a Walk-in or Urgent Care Center to be seen.     Thanks again for choosing us as your health care partner,     Britany Norton MD  "

## 2024-11-12 LAB — BACTERIA UR CULT: NORMAL

## 2024-11-18 DIAGNOSIS — I10 ESSENTIAL HYPERTENSION WITH GOAL BLOOD PRESSURE LESS THAN 140/90: ICD-10-CM

## 2024-11-18 RX ORDER — LISINOPRIL AND HYDROCHLOROTHIAZIDE 10; 12.5 MG/1; MG/1
1 TABLET ORAL DAILY
Qty: 90 TABLET | Refills: 1 | Status: SHIPPED | OUTPATIENT
Start: 2024-11-18

## 2024-12-12 ENCOUNTER — VIRTUAL VISIT (OUTPATIENT)
Dept: FAMILY MEDICINE | Facility: CLINIC | Age: 69
End: 2024-12-12
Payer: COMMERCIAL

## 2024-12-12 ENCOUNTER — LAB (OUTPATIENT)
Dept: LAB | Facility: CLINIC | Age: 69
End: 2024-12-12
Payer: COMMERCIAL

## 2024-12-12 ENCOUNTER — HOSPITAL ENCOUNTER (OUTPATIENT)
Dept: CT IMAGING | Facility: CLINIC | Age: 69
Discharge: HOME | End: 2024-12-12
Attending: FAMILY MEDICINE
Payer: COMMERCIAL

## 2024-12-12 DIAGNOSIS — R10.32 LLQ ABDOMINAL PAIN: ICD-10-CM

## 2024-12-12 DIAGNOSIS — R30.0 DYSURIA: ICD-10-CM

## 2024-12-12 DIAGNOSIS — R82.81 STERILE PYURIA: ICD-10-CM

## 2024-12-12 DIAGNOSIS — N39.0 ACUTE UTI (URINARY TRACT INFECTION): ICD-10-CM

## 2024-12-12 DIAGNOSIS — N39.0 RECURRENT UTI: ICD-10-CM

## 2024-12-12 DIAGNOSIS — N39.0 URINARY TRACT INFECTION, ACUTE: Primary | ICD-10-CM

## 2024-12-12 LAB
ALBUMIN UR-MCNC: 100 MG/DL
APPEARANCE UR: ABNORMAL
BACTERIA #/AREA URNS HPF: ABNORMAL /HPF
BILIRUB UR QL STRIP: NEGATIVE
COLOR UR AUTO: YELLOW
CREAT BLD-MCNC: 0.7 MG/DL (ref 0.5–1)
EGFRCR SERPLBLD CKD-EPI 2021: >60 ML/MIN/1.73M2
GLUCOSE UR STRIP-MCNC: NEGATIVE MG/DL
HGB UR QL STRIP: ABNORMAL
KETONES UR STRIP-MCNC: NEGATIVE MG/DL
LEUKOCYTE ESTERASE UR QL STRIP: ABNORMAL
NITRATE UR QL: POSITIVE
PH UR STRIP: 6.5 [PH] (ref 5–7)
RBC #/AREA URNS AUTO: ABNORMAL /HPF
SP GR UR STRIP: >=1.03 (ref 1–1.03)
SQUAMOUS #/AREA URNS AUTO: ABNORMAL /LPF
UROBILINOGEN UR STRIP-ACNC: 0.2 E.U./DL
WBC #/AREA URNS AUTO: >100 /HPF

## 2024-12-12 PROCEDURE — 250N000009 HC RX 250: Performed by: RADIOLOGY

## 2024-12-12 PROCEDURE — 74177 CT ABD & PELVIS W/CONTRAST: CPT

## 2024-12-12 PROCEDURE — 250N000011 HC RX IP 250 OP 636: Performed by: RADIOLOGY

## 2024-12-12 PROCEDURE — 82565 ASSAY OF CREATININE: CPT

## 2024-12-12 RX ORDER — METHENAMINE HIPPURATE 1000 MG/1
1 TABLET ORAL 2 TIMES DAILY
Qty: 180 TABLET | Refills: 3 | Status: CANCELLED | OUTPATIENT
Start: 2024-12-12

## 2024-12-12 RX ORDER — CIPROFLOXACIN 250 MG/1
250 TABLET, FILM COATED ORAL 2 TIMES DAILY
Qty: 6 TABLET | Refills: 0 | Status: SHIPPED | OUTPATIENT
Start: 2024-12-12 | End: 2024-12-17

## 2024-12-12 RX ORDER — IOPAMIDOL 755 MG/ML
94 INJECTION, SOLUTION INTRAVASCULAR ONCE
Status: COMPLETED | OUTPATIENT
Start: 2024-12-12 | End: 2024-12-12

## 2024-12-12 RX ADMIN — SODIUM CHLORIDE 64 ML: 9 INJECTION, SOLUTION INTRAVENOUS at 15:18

## 2024-12-12 RX ADMIN — IOPAMIDOL 94 ML: 755 INJECTION, SOLUTION INTRAVENOUS at 15:18

## 2024-12-12 NOTE — PROGRESS NOTES
"Helena is a 69 year old who is being evaluated via a billable video visit.    How would you like to obtain your AVS? MyChart  If the video visit is dropped, the invitation should be resent by: Text to cell phone: [phone]  Will anyone else be joining your video visit? No      Assessment & Plan     Urinary tract infection, acute  LLQ abdominal pain  Recurrent UTI  Sterile pyuria    Patient has had at least FIVE episodes of pelvic pain and sterile pyuria in the past 5-6 months.      Looking at her chart a bit closer, I see a CT scan of her abdomen/pelvis from last year done for possible colovesicualr fistula.  Negative for fistula, but it did show that there was acute/acute on chronic diverticulitis that was very close to the vaginal cuff.  I question if this sterile pyuria represents a chronic or acute diverticulitis that is incompletely treated with the ciprofloxacin that we've been using to treat the presumed UTIs.      IF the CT Scan is negative for diverticulitis, then I would like to have her see urology for the sterile pyuria.          - CT Abdomen Pelvis w Contrast; Future    Dysuria    - UA Macroscopic with reflex to Microscopic and Culture - Clinic Collect; Future      BMI  Estimated body mass index is 32.18 kg/m  as calculated from the following:    Height as of 7/15/24: 1.651 m (5' 5\").    Weight as of 10/9/24: 87.7 kg (193 lb 6.4 oz).             Subjective   Helena is a 69 year old, presenting for the following health issues:  Video Visit (UTI)        12/12/2024    11:07 AM   Additional Questions   Roomed by Stacy FLEMING CMA   Accompanied by Self       Video Start Time:  1355    History of Present Illness       Reason for visit:  UTI symptoms    She eats 0-1 servings of fruits and vegetables daily.She consumes 0 sweetened beverage(s) daily.She exercises with enough effort to increase her heart rate 9 or less minutes per day.  She exercises with enough effort to increase her heart rate 3 or less days per week. "   She is taking medications regularly.       Genitourinary - Female  Onset/Duration: 7 days  Description:   Painful urination (Dysuria): YES           Frequency: YES  Blood in urine (Hematuria): No  Delay in urine (Hesitency): YES  Intensity: moderate  Progression of Symptoms:  intermittent  Accompanying Signs & Symptoms:  Fever/chills: No  Flank pain: No  Nausea and vomiting: No  Vaginal symptoms: none  Abdominal/Pelvic Pain: yes, pelvic discomfort/pain  History:   History of frequent UTI s: YES  History of kidney stones: No  Sexually Active: No  Possibility of pregnancy: No  Precipitating or alleviating factors: None  Therapies tried and outcome: Increased fluids, Advil        Review of Systems  Constitutional, HEENT, cardiovascular, pulmonary, gi and gu systems are negative, except as otherwise noted.      Objective           Vitals:  No vitals were obtained today due to virtual visit.    Physical Exam   GENERAL: alert and no distress  EYES: Eyes grossly normal to inspection.  No discharge or erythema, or obvious scleral/conjunctival abnormalities.  RESP: No audible wheeze, cough, or visible cyanosis.    SKIN: Visible skin clear. No significant rash, abnormal pigmentation or lesions.  NEURO: Cranial nerves grossly intact.  Mentation and speech appropriate for age.  PSYCH: Appropriate affect, tone, and pace of words    Results for orders placed or performed in visit on 12/12/24   UA Macroscopic with reflex to Microscopic and Culture - Clinic Collect     Status: Abnormal    Specimen: Urine, Clean Catch   Result Value Ref Range    Color Urine Yellow Colorless, Straw, Light Yellow, Yellow    Appearance Urine Cloudy (A) Clear    Glucose Urine Negative Negative mg/dL    Bilirubin Urine Negative Negative    Ketones Urine Negative Negative mg/dL    Specific Gravity Urine >=1.030 1.003 - 1.035    Blood Urine Trace (A) Negative    pH Urine 6.5 5.0 - 7.0    Protein Albumin Urine 100 (A) Negative mg/dL    Urobilinogen Urine  0.2 0.2, 1.0 E.U./dL    Nitrite Urine Positive (A) Negative    Leukocyte Esterase Urine Large (A) Negative   Urine Microscopic Exam     Status: Abnormal   Result Value Ref Range    Bacteria Urine Few (A) None Seen /HPF    RBC Urine 2-5 (A) 0-2 /HPF /HPF    WBC Urine >100 (A) 0-5 /HPF /HPF    Squamous Epithelials Urine Few (A) None Seen /LPF           Video-Visit Details    Type of service:  Video Visit   Video End Time:2:04 PM  Originating Location (pt. Location): Home    Distant Location (provider location):  On-site  Platform used for Video Visit: Taisha  Signed Electronically by: Britany Norton MD

## 2024-12-12 NOTE — RESULT ENCOUNTER NOTE
Called patient with results.   Cannot completely exclude colo-vesicular fistula but nothing definite on the imaging.     Will treat with cipro 250 mg twice daily x 5 days while we await the urine culture.   Urology referral placed due to recurrent infection vs sterile pyuria.    Britany Norton M.D.

## 2024-12-19 ENCOUNTER — TELEPHONE (OUTPATIENT)
Dept: FAMILY MEDICINE | Facility: CLINIC | Age: 69
End: 2024-12-19
Payer: COMMERCIAL

## 2024-12-19 NOTE — TELEPHONE ENCOUNTER
Patient Quality Outreach    Patient is due for the following:   None    Action(s) Taken:   - Schedule wellness visit with fasting labs  - Vaccine eupdate    Type of outreach:    Sent [employer] message.    Questions for provider review:    None           Stacy Jj, CMA

## 2024-12-29 ENCOUNTER — HEALTH MAINTENANCE LETTER (OUTPATIENT)
Age: 69
End: 2024-12-29

## 2025-01-07 ENCOUNTER — LAB (OUTPATIENT)
Dept: LAB | Facility: CLINIC | Age: 70
End: 2025-01-07
Payer: COMMERCIAL

## 2025-01-07 DIAGNOSIS — M05.9 SEROPOSITIVE RHEUMATOID ARTHRITIS (H): ICD-10-CM

## 2025-01-07 DIAGNOSIS — Z79.899 HIGH RISK MEDICATION USE: ICD-10-CM

## 2025-01-07 LAB
ALBUMIN SERPL BCG-MCNC: 3.9 G/DL (ref 3.5–5.2)
ALP SERPL-CCNC: 130 U/L (ref 40–150)
ALT SERPL W P-5'-P-CCNC: 25 U/L (ref 0–50)
AST SERPL W P-5'-P-CCNC: 30 U/L (ref 0–45)
BASOPHILS # BLD AUTO: 0 10E3/UL (ref 0–0.2)
BASOPHILS NFR BLD AUTO: 1 %
BILIRUB DIRECT SERPL-MCNC: <0.2 MG/DL (ref 0–0.3)
BILIRUB SERPL-MCNC: 0.5 MG/DL
CREAT SERPL-MCNC: 0.65 MG/DL (ref 0.51–0.95)
CRP SERPL-MCNC: 9.67 MG/L
EGFRCR SERPLBLD CKD-EPI 2021: >90 ML/MIN/1.73M2
EOSINOPHIL # BLD AUTO: 0.2 10E3/UL (ref 0–0.7)
EOSINOPHIL NFR BLD AUTO: 2 %
ERYTHROCYTE [DISTWIDTH] IN BLOOD BY AUTOMATED COUNT: 13.9 % (ref 10–15)
ERYTHROCYTE [SEDIMENTATION RATE] IN BLOOD BY WESTERGREN METHOD: 12 MM/HR (ref 0–30)
HCT VFR BLD AUTO: 41.1 % (ref 35–47)
HGB BLD-MCNC: 14.1 G/DL (ref 11.7–15.7)
IMM GRANULOCYTES # BLD: 0 10E3/UL
IMM GRANULOCYTES NFR BLD: 0 %
LYMPHOCYTES # BLD AUTO: 1.4 10E3/UL (ref 0.8–5.3)
LYMPHOCYTES NFR BLD AUTO: 21 %
MCH RBC QN AUTO: 36.2 PG (ref 26.5–33)
MCHC RBC AUTO-ENTMCNC: 34.3 G/DL (ref 31.5–36.5)
MCV RBC AUTO: 106 FL (ref 78–100)
MONOCYTES # BLD AUTO: 0.8 10E3/UL (ref 0–1.3)
MONOCYTES NFR BLD AUTO: 13 %
NEUTROPHILS # BLD AUTO: 4.2 10E3/UL (ref 1.6–8.3)
NEUTROPHILS NFR BLD AUTO: 63 %
PLATELET # BLD AUTO: 234 10E3/UL (ref 150–450)
PROT SERPL-MCNC: 6.9 G/DL (ref 6.4–8.3)
RBC # BLD AUTO: 3.89 10E6/UL (ref 3.8–5.2)
WBC # BLD AUTO: 6.7 10E3/UL (ref 4–11)

## 2025-01-07 PROCEDURE — 36415 COLL VENOUS BLD VENIPUNCTURE: CPT

## 2025-01-07 PROCEDURE — 82565 ASSAY OF CREATININE: CPT

## 2025-01-07 PROCEDURE — 80076 HEPATIC FUNCTION PANEL: CPT

## 2025-01-07 PROCEDURE — 85652 RBC SED RATE AUTOMATED: CPT

## 2025-01-07 PROCEDURE — 85025 COMPLETE CBC W/AUTO DIFF WBC: CPT

## 2025-01-07 PROCEDURE — 86140 C-REACTIVE PROTEIN: CPT

## 2025-01-14 ENCOUNTER — OFFICE VISIT (OUTPATIENT)
Dept: RHEUMATOLOGY | Facility: CLINIC | Age: 70
End: 2025-01-14
Payer: COMMERCIAL

## 2025-01-14 VITALS
DIASTOLIC BLOOD PRESSURE: 78 MMHG | HEART RATE: 76 BPM | WEIGHT: 197.4 LBS | OXYGEN SATURATION: 98 % | BODY MASS INDEX: 32.85 KG/M2 | SYSTOLIC BLOOD PRESSURE: 126 MMHG | RESPIRATION RATE: 22 BRPM

## 2025-01-14 DIAGNOSIS — Z79.899 HIGH RISK MEDICATION USE: ICD-10-CM

## 2025-01-14 DIAGNOSIS — Z78.0 POST-MENOPAUSAL: ICD-10-CM

## 2025-01-14 DIAGNOSIS — R68.2 DRY MOUTH: ICD-10-CM

## 2025-01-14 DIAGNOSIS — Z13.820 OSTEOPOROSIS SCREENING: ICD-10-CM

## 2025-01-14 DIAGNOSIS — M05.9 SEROPOSITIVE RHEUMATOID ARTHRITIS (H): Primary | ICD-10-CM

## 2025-01-14 DIAGNOSIS — H04.123 DRY EYES: ICD-10-CM

## 2025-01-14 PROCEDURE — 99214 OFFICE O/P EST MOD 30 MIN: CPT | Performed by: INTERNAL MEDICINE

## 2025-01-14 PROCEDURE — G2211 COMPLEX E/M VISIT ADD ON: HCPCS | Performed by: INTERNAL MEDICINE

## 2025-01-14 RX ORDER — METHOTREXATE 2.5 MG/1
25 TABLET ORAL WEEKLY
Qty: 130 TABLET | Refills: 2 | Status: SHIPPED | OUTPATIENT
Start: 2025-01-14

## 2025-01-14 RX ORDER — FOLIC ACID 1 MG/1
1000 TABLET ORAL DAILY
Qty: 90 TABLET | Refills: 2 | Status: SHIPPED | OUTPATIENT
Start: 2025-01-14

## 2025-01-14 NOTE — PATIENT INSTRUCTIONS
Try using a preservative free artificial tear such as Systane Complete Preservative Free, 2-4 times per day.  If not improving your eye symptoms then please see an ophthalmologist for evaluation.   -------------------------------------------    RHEUMATOLOGY    [facility] [place]  [address] [place] [address]  [place] [address]    Phone number: [phone]  Fax number: [phone]    If you need a medication refill, please contact us as you may need lab work and/or a follow up visit prior to your refill.      Thank you for choosing [facility]!    Rachel Uriarte [facility]

## 2025-01-14 NOTE — PROGRESS NOTES
Rheumatology Clinic Visit      Mine Meléndez MRN# [id number]   YOB: 1955 Age: 69 year old      Date of visit: 1/14/25     Chief Complaint   Patient presents with:  Rheumatoid Arthritis: Aches and pains but nothing she can't handle    Assessment and Plan     1. Seropositive Erosive (but no erosions seen on 7/5/2024 x-rays) Rheumatoid Arthritis (RF+, ): Dx'd with RA prior to 2002. Previously on sulfasalazine (stomatitis), hydroxychloroquine (loose stools), leflunomide (loose stools).  Currently on methotrexate 25 mg oral once weekly.   Chronic illness, stable.      - Continue Methotrexate o 25 mg oral once weekly (taken within the same 24 hour period of each week, in divided doses)  - Continue folic acid 1mg daily  - Labs in 3 months: CBC, Creatinine, Hepatic Panel  - Labs in 6 months: CBC, Creatinine, Hepatic Panel, ESR, CRP     High risk medication requiring intensive toxicity monitoring at least quarterly.     2. Primary osteoarthritis of both hands: voltaren gel PRN.  Has not required Voltaren gel recently because she has been doing so well, per patient  - Continue diclofenac (VOLTAREN) 1 % topical gel; Apply up to 2 grams of 1% gel to hands up to 4 times daily as needed for joint pain (maximum: 8 g per upper extremity per day)      3.  Chronic nonradiating midline low back pain: Occurs when standing for a longer period of time and resolves after sitting for about 3 minutes.  Does not occur every time she stands for a longer period of time.  Degenerative changes of the lumbar spine seen on the 5/12/2023 CT abdomen/pelvis and this was reviewed with the patient today; also question if there is a component of spinal stenosis.  Referred to physical therapy previously.  Doing well today.    4.  Dry eyes and dry mouth: Suspect watery eyes are due to dryness and therefore he advised using preservative-free artificial tears 2-4 times per day and if no improvement then to see ophthalmology for  evaluation.  Dry mouth controlled with frequent sips of water and seeing a dentist regularly; she reports having good dentition and not getting frequent dental caries.  Check labs in 3 months  - Labs in 3 months: LXU, SSA, SSB    5.  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  This is documented here for reference only.    6.  Melanoma history: documented here for historical significance.  Continue to follow with a dermatologist at least once yearly.    7.  Vaccinations: Vaccinations reviewed with Ms. Meléndez.    - Influenza: encouraged yearly vaccination  - [id number]: up to date  - [id number]: up to date  - Shingrix: Up to date  - COVID-19: Up to date      8. Bone Health: check DEXA.   - DEXA ordered; advised to call to schedule.     Total minutes spent in evaluation with patient, documentation, , and review of pertinent studies and chart notes: 18  The longitudinal plan of care for the rheumatology problem(s) were addressed during this visit.  Due to added complexity of care, we will continue to support the patient and the subsequent management of this condition with ongoing continuity of care.     Ms. Meléndez verbalized agreement with and understanding of the rational for the diagnosis and treatment plan.  All questions were answered to best of my ability and the patient's satisfaction. Ms. Meléndez was advised to contact the clinic with any questions that may arise after the clinic visit.      Thank you for involving me in the care of the patient    Return to clinic: 6 months    HPI   Mine Meléndez is a 69 year old female with a past medical history significant for hypertension, hyperlipidemia, hypothyroidism, migraines, melanoma history, and rheumatoid arthritis who presents for follow-up of rheumatoid arthritis.     1/5/2024: An occasional twinge of pain at the left second MCP which is  gripping something tightly but this goes away as soon as she starts gripping tightly.  Bony hypertrophy of the right third PIP unchanged and does not bother her; no pain.  Morning stiffness for less than 20 minutes.  Overall happy with how well she is doing.  No oral or nasal sores.  No rash.  No chest pain or pressure or shortness of breath.    7/10/2024: Multiple times a day she has a sharp pain in the right second MCP that resolves after a few seconds.  Chronic synovial hypertrophy at the right second MCP.  Chronic swelling of the right third and left second PIPs.  Morning stiffness for about 30 minutes.  Other joints are doing well.  Taking methotrexate in split doses within the same 24-hour period of each week.    10/9/2024: Improvement with the higher dose of methotrexate.  No peripheral joint pain or swelling.  No morning stiffness or gelling phenomenon.  She reports having nonradiating midline low back pain that occurs after standing for a long period of time, but not every time she stands for a longer period of time, and resolves within 3 minutes of sitting.  No GI upset.  No constipation or diarrhea.  No chest pain or pressure or shortness of breath.  No palpitations.  No oral or nasal sores.    Today, 1/14/2025: Currently reports that she is doing well.  Methotrexate is effective.  No joint pain or swelling.  No morning stiffness or joint phenomenon.  Notes that in the past month she has had increased tearing in her eyes that is worse in the morning.  Dry mouth.  Reports good oral dentition and not getting frequent dental caries.    Tobacco: Former smoker  EtOH: 4 drinks per day on the weekends  Drugs: None  Occupation: retired June 2020, was working in [employer]   12 point review of system was completed and negative except as noted in the HPI    Active Problem List     Patient Active Problem List   Diagnosis    Seropositive rheumatoid arthritis (H)    DYSPEPSIA    Obesity    Essential hypertension     Hypothyroidism    HYPERLIPIDEMIA LDL GOAL <130    Dermatitis    High risk medications (not anticoagulants) long-term use    Female stress incontinence    Migraine    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)    Combined forms of age-related cataract, mild-mod, of both eyes    Dermatochalasis of both upper eyelids    Choroidal nevus of right eye    Involutional ptosis, acquired, bilateral    Chronic midline low back pain without sciatica     Past Medical History     Past Medical History:   Diagnosis Date    Herpes simplex without mention of complication     Hypertension     Malignant melanoma (H)     Obesity, unspecified     Osteomyelitis of jaw 5/22/2008    Rheumatoid arthritis(714.0)     Trochanteric bursitis of right hip 8/16/2013     Past Surgical History     Past Surgical History:   Procedure Laterality Date    COLONOSCOPY  1/20/06    COMBINED REPAIR PTOSIS WITH BLEPHAROPLASTY BILATERAL Bilateral 6/19/2023    Procedure: Bilateral upper eyelid blepharoplasty and ptosis repair;  Surgeon: Cl Pearl MD;  Location:  OR    EXCISE MASS FOOT Left 10/5/2021    Procedure: EXCISION, MASS, LEFT FOOT;  Surgeon: Tommy Pelaez DPM;  Location: [facility] OR    HYSTERECTOMY, PAP NO LONGER INDICATED      HYSTERECTOMY, VAGINAL  11/04    TVH,TVT procedure    RELEASE CARPAL TUNNEL Right 10/11/2016    Procedure: RELEASE CARPAL TUNNEL;  Surgeon: Emely Blanca MD;  Location: [facility] OR    SURGICAL HISTORY OF -   1988    tubal ligation    SURGICAL HISTORY OF -   2001    excision gangliion cyst rt heel     Allergy     Allergies   Allergen Reactions    Cefdinir      Face flushing and blotchy rash    Flagyl [Metronidazole] Rash    Morphine And Codeine Rash    Septra [Sulfamethoxazole-Trimethoprim] Itching     Puffy eyes, flushed.      Current Medication List     Current Outpatient Medications   Medication Sig Dispense Refill    ASPIRIN 81 MG OR TABS  100 3    estradiol (VAGIFEM) 10 MCG  TABS vaginal tablet Place 1 tablet (10 mcg) vaginally twice a week. 24 tablet 3    folic acid (FOLVITE) 1 MG tablet Take 1 tablet (1,000 mcg) by mouth daily. 90 tablet 2    hyoscyamine (LEVSIN) 0.125 MG tablet Take 1 tablet (125 mcg) by mouth every 6 hours as needed for cramping 12 tablet 0    ibuprofen (ADVIL/MOTRIN) 800 MG tablet Take 1 tablet (800 mg) by mouth every 8 hours as needed for moderate pain 90 tablet 0    levothyroxine (SYNTHROID/LEVOTHROID) 50 MCG tablet TAKE ONE TABLET BY MOUTH ONCE DAILY 90 tablet 1    lisinopril-hydrochlorothiazide (ZESTORETIC) 10-12.5 MG tablet TAKE 1 TABLET BY MOUTH ONCE DAILY 90 tablet 1    methenamine hippurate (HIPREX) 1 g tablet Take 1 tablet (1 g) by mouth 2 times daily. 180 tablet 1    methotrexate 2.5 MG tablet Take 10 tablets (25 mg) by mouth once a week. . Methotrexate is a once weekly medication, taken within the same 24 hour period of each week. 130 tablet 2    MULTI-VITAMIN OR 1 daily      Probiotic Product (PROBIOTIC PO)       simvastatin (ZOCOR) 20 MG tablet TAKE ONE TABLET BY MOUTH AT BEDTIME. 90 tablet 3     Current Facility-Administered Medications   Medication Dose Route Frequency Provider Last Rate Last Admin    betamethasone acet & sod phos (CELESTONE) injection 6 mg  6 mg   Michael Loredo MD   6 mg at 10/01/21 1510    ropivacaine (NAROPIN) injection 0.5 mL  0.5 mL   Michael Loredo MD   0.5 mL at 10/01/21 1510    triamcinolone (KENALOG-40) injection 40 mg  40 mg   Britany Norton MD   40 mg at 02/27/24 0959     Social History   See HPI    Family History     Family History   Problem Relation Age of Onset    Diabetes Father     Dementia Father     Diabetes Brother     Diabetes Sister     Hypertension Sister     Hypertension Sister      Physical Exam     Temp Readings from Last 3 Encounters:   07/15/24 98  F (36.7  C) (Tympanic)   02/27/24 97.6  F (36.4  C) (Tympanic)   10/02/23 98.2  F (36.8  C) (Tympanic)     BP Readings from Last 5 Encounters:  "  01/14/25 126/78   12/27/24 (!) 158/78   10/09/24 110/61   07/15/24 122/66   07/10/24 136/74     Pulse Readings from Last 1 Encounters:   01/14/25 76     Resp Readings from Last 1 Encounters:   01/14/25 22     Estimated body mass index is 32.85 kg/m  as calculated from the following:    Height as of 12/27/24: 1.651 m (5' 5\").    Weight as of this encounter: 89.5 kg (197 lb 6.4 oz).    GEN: NAD.   HEENT:  Anicteric, noninjected sclera. No obvious external lesions of the ear and nose. Hearing intact.  CV: S1, S2. RRR. No m/r/g  PULM: No increased work of breathing. CTA bilaterally   MSK: Mild synovial swelling versus nodule over the right third PIP that was mildly tender to palpation but no increased warmth or overlying erythema.  Other PIPs without swelling or tenderness to palpation.  Mild synovial hypertrophy of the right second MCP that was nontender to palpation and without increased warmth or overlying erythema.  Other MCPs without swelling or tenderness to palpation.  DIPs nontender to palpation.  Wrists without swelling or tenderness to palpation.  Elbows and shoulders without swelling or tenderness to palpation.  Knees, ankles, and MTPs without swelling or tenderness to palpation.  Spine nontender to palpation.  SKIN: No rash or jaundice seen  PSYCH: Alert. Appropriate.      Labs / Imaging (select studies)     RF/CCP  Recent Labs   Lab Test 05/01/18  1538   CCPIGG 182*     CBC  Recent Labs   Lab Test 01/07/25  1311 10/08/24  1452 09/11/24  1322 08/23/21  0848 05/12/21  1158 01/12/21  1547 10/23/20  1519   WBC 6.7 6.6 7.8   < > 6.8 7.4 7.0   RBC 3.89 3.93 3.87   < > 4.12 4.12 4.21   HGB 14.1 14.3 13.7   < > 14.0 14.5 14.4   HCT 41.1 41.9 40.9   < > 41.9 43.1 43.1   * 107* 106*   < > 102* 105* 102*   RDW 13.9 14.3 13.7   < > 14.1 13.6 13.6    210 192   < > 217 182 220   MCH 36.2* 36.4* 35.4*   < > 34.0* 35.2* 34.2*   MCHC 34.3 34.1 33.5   < > 33.4 33.6 33.4   NEUTROPHIL 63 61 68   < > 75.2 68.7 " 69.2   LYMPH 21 26 19   < > 17.7 19.6 22.7   MONOCYTE 13 11 10   < > 5.2 9.2 6.1   EOSINOPHIL 2 2 2   < > 1.6 2.4 1.7   BASOPHIL 1 1 0   < > 0.3 0.1 0.3   ANEU  --   --   --   --  5.1 5.1 4.9   ALYM  --   --   --   --  1.2 1.4 1.6   SHYLA  --   --   --   --  0.4 0.7 0.4   AEOS  --   --   --   --  0.1 0.2 0.1   ABAS  --   --   --   --  0.0 0.0 0.0   ANEUTAUTO 4.2 4.0 5.3   < >  --   --   --    ALYMPAUTO 1.4 1.7 1.5   < >  --   --   --    AMONOAUTO 0.8 0.7 0.8   < >  --   --   --    AEOSAUTO 0.2 0.1 0.2   < >  --   --   --    ABSBASO 0.0 0.0 0.0   < >  --   --   --     < > = values in this interval not displayed.     CMP  Recent Labs   Lab Test 01/07/25  1311 12/12/24  1514 10/08/24  1452 09/11/24  1322 06/30/23  1128 06/01/23  1339 10/26/22  1313 09/21/22  1318 08/23/21  0847 05/12/21  1158 04/05/21  1255 01/12/21  1547   NA  --   --  135  --   --  137  --  133*  --   --  132*  --    POTASSIUM  --   --  3.8  --   --  3.8  --  3.5  --   --  3.5  --    CHLORIDE  --   --  95*  --   --  98  --  96*  --   --  98  --    CO2  --   --  28  --   --  31*  --  28  --   --  26  --    ANIONGAP  --   --  12  --   --  8  --  9  --   --  8  --    GLC  --   --  118*  --   --  113*  --  120*  --   --  151*  --    BUN  --   --  10.6  --   --  9.5  --  7.4*  --   --  7  --    CR 0.65 0.7 0.70 0.66   < > 0.50*   < > 0.57   < > 0.58 0.55 0.65   GFRESTIMATED >90 >60 >90 >90   < > >90   < > >90   < > >90 >90 >90   GFRESTBLACK  --   --   --   --   --   --   --   --   --  >90 >90 >90   ROSELYN  --   --  9.4  --   --  9.5  --  9.0  --   --  8.7  --    BILITOTAL 0.5  --  0.7 0.6   < >  --    < > 0.8   < > 0.5  --  0.7   ALBUMIN 3.9  --  4.1 3.9   < >  --    < > 4.0   < > 3.5  --  3.6   PROTTOTAL 6.9  --  7.2 6.9   < >  --    < > 7.0   < > 7.5  --  7.5   ALKPHOS 130  --  128 128   < >  --    < > 125*   < > 121  --  118   AST 30  --  33 37   < >  --    < > 41*   < > 23  --  28   ALT 25  --  26 25   < >  --    < > 28   < > 24  --  27    < > = values in  this interval not displayed.     Calcium/VitaminD  Recent Labs   Lab Test 10/08/24  1452 06/01/23  1339 09/21/22  1318   ROSELYN 9.4 9.5 9.0     ESR/CRP  Recent Labs   Lab Test 01/07/25  1311 10/08/24  1452 06/19/24  1118 10/26/22  1313 07/05/22  1547 03/15/22  1439 12/09/21  0830   SED 12 11 11   < > 12 11 12   CRP  --   --   --   --  7.8 8.3* 9.1*   CRPI 9.67* 7.42* 8.56*   < >  --   --   --     < > = values in this interval not displayed.     Lipid Panel  Recent Labs   Lab Test 02/27/24  0930 09/21/22  1318 11/09/20  0939   CHOL 144 149 153   TRIG 145 80 65   HDL 54 75 74   LDL 61 58 66   NHDL 90 74 79     Hepatitis B  Recent Labs   Lab Test 10/23/20  1519 05/01/18  1538   HBCAB Nonreactive Nonreactive   HEPBANG Nonreactive Nonreactive     Hepatitis C  Recent Labs   Lab Test 10/23/20  1519   HCVAB Nonreactive     Lyme ab screening  Recent Labs   Lab Test 08/30/23  1150   LYMEGM 0.22       Tuberculosis Screening  Recent Labs   Lab Test 10/23/20  1519   TBRST Negative       Immunization History     Immunization History   Administered Date(s) Administered    COVID-19 12+ (Pfizer) 11/01/2023, 10/14/2024    COVID-19 Bivalent 18+ (Moderna) 11/21/2022    COVID-19 MONOVALENT 12+ (Pfizer) 03/08/2021, 03/29/2021, 11/24/2021    COVID-19 Monovalent 18+ (Moderna) 05/02/2022    Influenza (H1N1) 12/31/2009    Influenza (High Dose) Trivalent,PF (Fluzone) 11/05/2019, 10/14/2024    Influenza (IIV3) PF 11/03/2004, 10/04/2010, 10/01/2012, 10/30/2012, 10/07/2013, 10/20/2013, 10/07/2014, 10/03/2016    Influenza (prior to 2024) 10/05/2009    Influenza Vaccine 65+ (Fluzone HD) 11/09/2020, 09/30/2021, 11/11/2022, 11/01/2023    Influenza Vaccine >6 months,quad, PF 10/23/2017    Influenza Vaccine, 6+MO IM (QUADRIVALENT W/PRESERVATIVES) 10/06/2015, 09/26/2018    Pneumo Conj 13-V (2010&after) 11/12/2018    Pneumococcal 23 valent 02/19/2019    TD,PF 7+ (Tenivac) 04/28/1995    TDAP Vaccine (Adacel) 12/05/2007, 02/01/2018    Zoster recombinant  adjuvanted (SHINGRIX) 04/17/2019, 07/10/2019    Zoster vaccine, live 06/07/2016          Chart documentation done in part with Dragon Voice recognition Software. Although reviewed after completion, some word and grammatical error may remain.    Nasir Baltazar MD

## 2025-01-14 NOTE — NURSING NOTE
Blood pressure rechecked after visit    126/78  Rachel Uriarte CMA Rheumatology  1/14/2025

## 2025-01-15 ENCOUNTER — PATIENT OUTREACH (OUTPATIENT)
Dept: CARE COORDINATION | Facility: CLINIC | Age: 70
End: 2025-01-15
Payer: COMMERCIAL

## 2025-02-10 DIAGNOSIS — E78.5 HYPERLIPIDEMIA LDL GOAL <130: ICD-10-CM

## 2025-02-10 RX ORDER — SIMVASTATIN 20 MG
TABLET ORAL
Qty: 90 TABLET | Refills: 0 | Status: SHIPPED | OUTPATIENT
Start: 2025-02-10

## 2025-03-05 ENCOUNTER — E-VISIT (OUTPATIENT)
Dept: FAMILY MEDICINE | Facility: CLINIC | Age: 70
End: 2025-03-05
Payer: COMMERCIAL

## 2025-03-05 DIAGNOSIS — N39.0 ACUTE UTI (URINARY TRACT INFECTION): Primary | ICD-10-CM

## 2025-03-06 ENCOUNTER — LAB (OUTPATIENT)
Dept: LAB | Facility: CLINIC | Age: 70
End: 2025-03-06
Payer: COMMERCIAL

## 2025-03-06 DIAGNOSIS — N39.0 ACUTE UTI (URINARY TRACT INFECTION): ICD-10-CM

## 2025-03-06 LAB
ALBUMIN UR-MCNC: NEGATIVE MG/DL
AMORPH CRY #/AREA URNS HPF: ABNORMAL /HPF
APPEARANCE UR: ABNORMAL
BACTERIA #/AREA URNS HPF: ABNORMAL /HPF
BILIRUB UR QL STRIP: NEGATIVE
COLOR UR AUTO: YELLOW
GLUCOSE UR STRIP-MCNC: NEGATIVE MG/DL
HGB UR QL STRIP: ABNORMAL
KETONES UR STRIP-MCNC: NEGATIVE MG/DL
LEUKOCYTE ESTERASE UR QL STRIP: ABNORMAL
NITRATE UR QL: NEGATIVE
PH UR STRIP: 7 [PH] (ref 5–7)
RBC #/AREA URNS AUTO: ABNORMAL /HPF
SP GR UR STRIP: 1.02 (ref 1–1.03)
SQUAMOUS #/AREA URNS AUTO: ABNORMAL /LPF
UROBILINOGEN UR STRIP-ACNC: 0.2 E.U./DL
WBC #/AREA URNS AUTO: ABNORMAL /HPF
WBC CLUMPS #/AREA URNS HPF: PRESENT /HPF

## 2025-03-06 RX ORDER — CIPROFLOXACIN 250 MG/1
250 TABLET, FILM COATED ORAL 2 TIMES DAILY
Qty: 6 TABLET | Refills: 0 | Status: SHIPPED | OUTPATIENT
Start: 2025-03-06 | End: 2025-03-09

## 2025-03-09 DIAGNOSIS — E03.8 OTHER SPECIFIED HYPOTHYROIDISM: ICD-10-CM

## 2025-03-10 RX ORDER — LEVOTHYROXINE SODIUM 50 UG/1
50 TABLET ORAL DAILY
Qty: 90 TABLET | Refills: 0 | Status: SHIPPED | OUTPATIENT
Start: 2025-03-10

## 2025-04-15 ENCOUNTER — LAB (OUTPATIENT)
Dept: LAB | Facility: CLINIC | Age: 70
End: 2025-04-15
Payer: COMMERCIAL

## 2025-04-15 DIAGNOSIS — N39.0 RECURRENT UTI: ICD-10-CM

## 2025-04-15 DIAGNOSIS — R68.2 DRY MOUTH: ICD-10-CM

## 2025-04-15 DIAGNOSIS — E78.5 HYPERLIPIDEMIA LDL GOAL <130: Primary | ICD-10-CM

## 2025-04-15 DIAGNOSIS — H04.123 DRY EYES: ICD-10-CM

## 2025-04-15 DIAGNOSIS — Z79.899 HIGH RISK MEDICATION USE: ICD-10-CM

## 2025-04-15 DIAGNOSIS — M05.9 SEROPOSITIVE RHEUMATOID ARTHRITIS (H): ICD-10-CM

## 2025-04-15 LAB
ALBUMIN SERPL BCG-MCNC: 3.9 G/DL (ref 3.5–5.2)
ALP SERPL-CCNC: 135 U/L (ref 40–150)
ALT SERPL W P-5'-P-CCNC: 21 U/L (ref 0–50)
ANION GAP SERPL CALCULATED.3IONS-SCNC: 14 MMOL/L (ref 7–15)
AST SERPL W P-5'-P-CCNC: 39 U/L (ref 0–45)
BASOPHILS # BLD AUTO: 0 10E3/UL (ref 0–0.2)
BASOPHILS NFR BLD AUTO: 1 %
BILIRUB DIRECT SERPL-MCNC: 0.17 MG/DL (ref 0–0.3)
BILIRUB SERPL-MCNC: 0.6 MG/DL
BUN SERPL-MCNC: 8.3 MG/DL (ref 8–23)
CALCIUM SERPL-MCNC: 9.4 MG/DL (ref 8.8–10.4)
CHLORIDE SERPL-SCNC: 95 MMOL/L (ref 98–107)
CHOLEST SERPL-MCNC: 141 MG/DL
CREAT SERPL-MCNC: 0.61 MG/DL (ref 0.51–0.95)
EGFRCR SERPLBLD CKD-EPI 2021: >90 ML/MIN/1.73M2
EOSINOPHIL # BLD AUTO: 0.2 10E3/UL (ref 0–0.7)
EOSINOPHIL NFR BLD AUTO: 4 %
ERYTHROCYTE [DISTWIDTH] IN BLOOD BY AUTOMATED COUNT: 14.4 % (ref 10–15)
FASTING STATUS PATIENT QL REPORTED: NO
FASTING STATUS PATIENT QL REPORTED: NO
GLUCOSE SERPL-MCNC: 100 MG/DL (ref 70–99)
HCO3 SERPL-SCNC: 25 MMOL/L (ref 22–29)
HCT VFR BLD AUTO: 40.9 % (ref 35–47)
HDLC SERPL-MCNC: 66 MG/DL
HGB BLD-MCNC: 14 G/DL (ref 11.7–15.7)
IMM GRANULOCYTES # BLD: 0 10E3/UL
IMM GRANULOCYTES NFR BLD: 0 %
LDLC SERPL CALC-MCNC: 61 MG/DL
LYMPHOCYTES # BLD AUTO: 1 10E3/UL (ref 0.8–5.3)
LYMPHOCYTES NFR BLD AUTO: 15 %
MCH RBC QN AUTO: 35.5 PG (ref 26.5–33)
MCHC RBC AUTO-ENTMCNC: 34.2 G/DL (ref 31.5–36.5)
MCV RBC AUTO: 104 FL (ref 78–100)
MONOCYTES # BLD AUTO: 0.6 10E3/UL (ref 0–1.3)
MONOCYTES NFR BLD AUTO: 10 %
NEUTROPHILS # BLD AUTO: 4.4 10E3/UL (ref 1.6–8.3)
NEUTROPHILS NFR BLD AUTO: 70 %
NONHDLC SERPL-MCNC: 75 MG/DL
PLATELET # BLD AUTO: 209 10E3/UL (ref 150–450)
POTASSIUM SERPL-SCNC: 4.1 MMOL/L (ref 3.4–5.3)
PROT SERPL-MCNC: 7.3 G/DL (ref 6.4–8.3)
RBC # BLD AUTO: 3.94 10E6/UL (ref 3.8–5.2)
SODIUM SERPL-SCNC: 134 MMOL/L (ref 135–145)
TRIGL SERPL-MCNC: 69 MG/DL
WBC # BLD AUTO: 6.2 10E3/UL (ref 4–11)

## 2025-04-15 PROCEDURE — 80053 COMPREHEN METABOLIC PANEL: CPT

## 2025-04-15 PROCEDURE — 86038 ANTINUCLEAR ANTIBODIES: CPT

## 2025-04-15 PROCEDURE — 36415 COLL VENOUS BLD VENIPUNCTURE: CPT

## 2025-04-15 PROCEDURE — 86235 NUCLEAR ANTIGEN ANTIBODY: CPT

## 2025-04-15 PROCEDURE — 80061 LIPID PANEL: CPT

## 2025-04-15 PROCEDURE — 82248 BILIRUBIN DIRECT: CPT

## 2025-04-15 PROCEDURE — 85025 COMPLETE CBC W/AUTO DIFF WBC: CPT

## 2025-04-16 LAB
ANA SER QL IF: NEGATIVE
ENA SS-A AB SER IA-ACNC: <0.5 U/ML
ENA SS-A AB SER IA-ACNC: NEGATIVE
ENA SS-B IGG SER IA-ACNC: <0.6 U/ML
ENA SS-B IGG SER IA-ACNC: NEGATIVE

## 2025-04-17 ENCOUNTER — PATIENT OUTREACH (OUTPATIENT)
Dept: CARE COORDINATION | Facility: CLINIC | Age: 70
End: 2025-04-17
Payer: COMMERCIAL

## 2025-04-21 ENCOUNTER — E-VISIT (OUTPATIENT)
Dept: FAMILY MEDICINE | Facility: CLINIC | Age: 70
End: 2025-04-21
Payer: COMMERCIAL

## 2025-04-21 DIAGNOSIS — J06.9 VIRAL URI WITH COUGH: Primary | ICD-10-CM

## 2025-04-21 RX ORDER — BENZONATATE 200 MG/1
200 CAPSULE ORAL 3 TIMES DAILY PRN
Qty: 30 CAPSULE | Refills: 0 | Status: SHIPPED | OUTPATIENT
Start: 2025-04-21

## 2025-04-21 NOTE — PATIENT INSTRUCTIONS
Viral Respiratory Infection: Care Instructions  Overview     A viral respiratory infection is an infection of the nose, sinuses, or throat caused by a virus. Colds and the flu are common types of viral respiratory infections.  The symptoms of a viral respiratory infection often start quickly. They include a fever, sore throat, and runny nose. You may also just not feel well. Or you may not want to eat much.  Most viral infections can be treated with home care. This may include drinking lots of fluids and taking over-the-counter pain medicine. You will probably feel better in 4 to 10 days.  Antibiotics are not used to treat a viral infection. Antibiotics don't kill viruses, so they won't help cure a viral illness.  In some cases, a doctor may prescribe antiviral medicine to help your body fight a serious viral infection.  Follow-up care is a key part of your treatment and safety. Be sure to make and go to all appointments, and call your doctor if you are having problems. It's also a good idea to know your test results and keep a list of the medicines you take.  How can you care for yourself at home?  To prevent dehydration, drink plenty of fluids. Choose water and other clear liquids until you feel better. If you have kidney, heart, or liver disease and have to limit fluids, talk with your doctor before you increase the amount of fluids you drink.  Ask your doctor if you can take an over-the-counter pain medicine, such as acetaminophen (Tylenol), ibuprofen (Advil, Motrin), or naproxen (Aleve). Be safe with medicines. Read and follow all instructions on the label. No one younger than 20 should take aspirin. It has been linked to Reye syndrome, a serious illness.  Be careful when taking over-the-counter cold or flu medicines and Tylenol at the same time. Many of these medicines have acetaminophen, which is Tylenol. Read the labels to make sure that you are not taking more than the recommended dose. Too much  "acetaminophen (Tylenol) can be harmful.  Get plenty of rest.  Use saline (saltwater) nasal washes to help keep your nasal passages open and wash out mucus and allergens. You can buy saline nose sprays at a grocery store or drugstore. Follow the instructions on the package. Or you can make your own at home. Add 1 teaspoon of non-iodized salt and 1 teaspoon of baking soda to 2 cups of distilled or boiled and cooled water. Fill a squeeze bottle or neti pot with the nasal wash. Then put the tip into your nostril, and lean over the sink. With your mouth open, gently squirt the liquid. Repeat on the other side.  Use a vaporizer or humidifier to add moisture to your bedroom. Follow the instructions for cleaning the machine.  Do not smoke or allow others to smoke around you. If you need help quitting, talk to your doctor about stop-smoking programs and medicines. These can increase your chances of quitting for good.  When should you call for help?   Call 911 anytime you think you may need emergency care. For example, call if:    You have severe trouble breathing.   Call your doctor now or seek immediate medical care if:    You have a new or higher fever.     Your fever lasts more than 48 hours.     You have trouble breathing.     You have a fever with a stiff neck or a severe headache.     You are sensitive to light.     You feel very sleepy or confused.   Watch closely for changes in your health, and be sure to contact your doctor if:    You do not get better as expected.   Where can you learn more?  Go to https://www.[employer].net/patiented  Enter Q795 in the search box to learn more about \"Viral Respiratory Infection: Care Instructions.\"  Current as of: April 30, 2024  Content Version: 14.4    4705-2076 [employer].   Care instructions adapted under license by your healthcare professional. If you have questions about a medical condition or this instruction, always ask your healthcare professional. [employer] disclaims any warranty or liability for your use of this information.

## 2025-05-05 ENCOUNTER — E-VISIT (OUTPATIENT)
Dept: FAMILY MEDICINE | Facility: CLINIC | Age: 70
End: 2025-05-05
Payer: COMMERCIAL

## 2025-05-05 DIAGNOSIS — N39.0 ACUTE UTI (URINARY TRACT INFECTION): Primary | ICD-10-CM

## 2025-05-06 ENCOUNTER — HOSPITAL ENCOUNTER (OUTPATIENT)
Dept: BONE DENSITY | Facility: CLINIC | Age: 70
Discharge: HOME | End: 2025-05-06
Attending: INTERNAL MEDICINE | Admitting: INTERNAL MEDICINE
Payer: COMMERCIAL

## 2025-05-06 ENCOUNTER — LAB (OUTPATIENT)
Dept: LAB | Facility: CLINIC | Age: 70
End: 2025-05-06
Payer: COMMERCIAL

## 2025-05-06 DIAGNOSIS — N39.0 ACUTE UTI (URINARY TRACT INFECTION): ICD-10-CM

## 2025-05-06 DIAGNOSIS — Z13.820 OSTEOPOROSIS SCREENING: ICD-10-CM

## 2025-05-06 DIAGNOSIS — Z79.899 HIGH RISK MEDICATION USE: ICD-10-CM

## 2025-05-06 DIAGNOSIS — M05.9 SEROPOSITIVE RHEUMATOID ARTHRITIS (H): ICD-10-CM

## 2025-05-06 DIAGNOSIS — Z78.0 POST-MENOPAUSAL: ICD-10-CM

## 2025-05-06 LAB
ALBUMIN UR-MCNC: 30 MG/DL
APPEARANCE UR: ABNORMAL
BACTERIA #/AREA URNS HPF: ABNORMAL /HPF
BILIRUB UR QL STRIP: NEGATIVE
COLOR UR AUTO: YELLOW
GLUCOSE UR STRIP-MCNC: NEGATIVE MG/DL
HGB UR QL STRIP: ABNORMAL
KETONES UR STRIP-MCNC: NEGATIVE MG/DL
LEUKOCYTE ESTERASE UR QL STRIP: ABNORMAL
NITRATE UR QL: POSITIVE
PH UR STRIP: 7 [PH] (ref 5–7)
RBC #/AREA URNS AUTO: ABNORMAL /HPF
SP GR UR STRIP: 1.01 (ref 1–1.03)
SQUAMOUS #/AREA URNS AUTO: ABNORMAL /LPF
UROBILINOGEN UR STRIP-ACNC: 1 E.U./DL
WBC #/AREA URNS AUTO: >100 /HPF
WBC CLUMPS #/AREA URNS HPF: PRESENT /HPF

## 2025-05-06 PROCEDURE — 87088 URINE BACTERIA CULTURE: CPT

## 2025-05-06 PROCEDURE — 87086 URINE CULTURE/COLONY COUNT: CPT

## 2025-05-06 PROCEDURE — 81001 URINALYSIS AUTO W/SCOPE: CPT

## 2025-05-06 PROCEDURE — 77080 DXA BONE DENSITY AXIAL: CPT

## 2025-05-06 PROCEDURE — 87186 SC STD MICRODIL/AGAR DIL: CPT

## 2025-05-06 RX ORDER — CIPROFLOXACIN 250 MG/1
250 TABLET, FILM COATED ORAL 2 TIMES DAILY
Qty: 10 TABLET | Refills: 0 | Status: SHIPPED | OUTPATIENT
Start: 2025-05-06 | End: 2025-05-11

## 2025-05-07 ENCOUNTER — RESULTS FOLLOW-UP (OUTPATIENT)
Dept: FAMILY MEDICINE | Facility: CLINIC | Age: 70
End: 2025-05-07

## 2025-05-07 NOTE — RESULT ENCOUNTER NOTE
Urine definitely looks infected, hopefully you've picked up the prescription and will be feeling better soon.    Britany Norton M.D.

## 2025-05-08 LAB — BACTERIA UR CULT: ABNORMAL

## 2025-05-08 NOTE — RESULT ENCOUNTER NOTE
Culture shows a bacteria that is sensitive to the ciprofloxacin.  Hopefully you are improving.    Britany Norton M.D.

## 2025-05-09 ENCOUNTER — RESULTS FOLLOW-UP (OUTPATIENT)
Dept: RHEUMATOLOGY | Facility: CLINIC | Age: 70
End: 2025-05-09

## 2025-05-12 ENCOUNTER — OFFICE VISIT (OUTPATIENT)
Dept: UROLOGY | Facility: CLINIC | Age: 70
End: 2025-05-12
Payer: COMMERCIAL

## 2025-05-12 ENCOUNTER — OFFICE VISIT (OUTPATIENT)
Dept: DERMATOLOGY | Facility: CLINIC | Age: 70
End: 2025-05-12
Payer: COMMERCIAL

## 2025-05-12 ENCOUNTER — PATIENT OUTREACH (OUTPATIENT)
Dept: CARE COORDINATION | Facility: CLINIC | Age: 70
End: 2025-05-12

## 2025-05-12 VITALS
HEART RATE: 88 BPM | SYSTOLIC BLOOD PRESSURE: 127 MMHG | RESPIRATION RATE: 18 BRPM | TEMPERATURE: 98.4 F | DIASTOLIC BLOOD PRESSURE: 59 MMHG | OXYGEN SATURATION: 99 %

## 2025-05-12 DIAGNOSIS — L81.4 LENTIGO: ICD-10-CM

## 2025-05-12 DIAGNOSIS — N39.0 RECURRENT UTI: Primary | ICD-10-CM

## 2025-05-12 DIAGNOSIS — I10 ESSENTIAL HYPERTENSION WITH GOAL BLOOD PRESSURE LESS THAN 140/90: ICD-10-CM

## 2025-05-12 DIAGNOSIS — D23.9 DERMAL NEVUS: ICD-10-CM

## 2025-05-12 DIAGNOSIS — D18.01 ANGIOMA OF SKIN: ICD-10-CM

## 2025-05-12 DIAGNOSIS — Z85.820 HISTORY OF MELANOMA: ICD-10-CM

## 2025-05-12 DIAGNOSIS — D22.9 MULTIPLE BENIGN NEVI: Primary | ICD-10-CM

## 2025-05-12 DIAGNOSIS — E78.5 HYPERLIPIDEMIA LDL GOAL <130: ICD-10-CM

## 2025-05-12 DIAGNOSIS — L82.1 SEBORRHEIC KERATOSES: ICD-10-CM

## 2025-05-12 PROCEDURE — 99213 OFFICE O/P EST LOW 20 MIN: CPT | Performed by: DERMATOLOGY

## 2025-05-12 PROCEDURE — G2211 COMPLEX E/M VISIT ADD ON: HCPCS | Performed by: DERMATOLOGY

## 2025-05-12 RX ORDER — SIMVASTATIN 20 MG
20 TABLET ORAL AT BEDTIME
Qty: 90 TABLET | Refills: 1 | Status: SHIPPED | OUTPATIENT
Start: 2025-05-12

## 2025-05-12 NOTE — LETTER
5/12/2025      Mine Meléndez  [id number] [facility] [id number]      Dear Colleague,    Thank you for referring your patient, Mine Meléndez, to the [facility]. Please see a copy of my visit note below.    Mine Meléndez is an extremely pleasant 69 year old year old female patient here today for hx of melanoma.  Patient has no other skin complaints today.  Remainder of the HPI, Meds, PMH, Allergies, FH, and SH was reviewed in chart.      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/2008     Rheumatoid arthritis(714.0)      Trochanteric bursitis of right hip 8/16/2013       Past Surgical History:   Procedure Laterality Date     COLONOSCOPY  1/20/06     COMBINED REPAIR PTOSIS WITH BLEPHAROPLASTY BILATERAL Bilateral 6/19/2023    Procedure: Bilateral upper eyelid blepharoplasty and ptosis repair;  Surgeon: Cl Pearl MD;  Location:  OR     EXCISE MASS FOOT Left 10/5/2021    Procedure: EXCISION, MASS, LEFT FOOT;  Surgeon: Tommy Pelaez DPM;  Location: [facility] OR     HYSTERECTOMY, PAP NO LONGER INDICATED       HYSTERECTOMY, VAGINAL  11/04    TVH,TVT procedure     RELEASE CARPAL TUNNEL Right 10/11/2016    Procedure: RELEASE CARPAL TUNNEL;  Surgeon: Emely Blanca MD;  Location: [facility] OR     SURGICAL HISTORY OF -   1988    tubal ligation     SURGICAL HISTORY OF -   2001    excision gangliion cyst rt heel        Family History   Problem Relation Age of Onset     Diabetes Father      Dementia Father      Diabetes Brother      Diabetes Sister      Hypertension Sister      Hypertension Sister        Social History     Socioeconomic History     Marital status:      Spouse name: Not on file     Number of children: Not on file     Years of education: Not on file     Highest education level: Not on file   Occupational History      Not on file   Tobacco Use     Smoking status: Former     Current packs/day: 0.00     Types: Cigarettes     Start date: 1975     Quit date: 2000     Years since quittin.0     Passive exposure: Never     Smokeless tobacco: Never   Vaping Use     Vaping status: Never Used   Substance and Sexual Activity     Alcohol use: Yes     Comment: 6 pack of beer on a weekend     Drug use: No     Sexual activity: Yes     Partners: Male     Birth control/protection: Surgical     Comment: [email or web address]   Other Topics Concern     Parent/sibling w/ CABG, MI or angioplasty before 65F 55M? No   Social History Narrative     Not on file     Social Drivers of Health     Financial Resource Strain: Low Risk  (2023)    Financial Resource Strain      Within the past 12 months, have you or your family members you live with been unable to get utilities (heat, electricity) when it was really needed?: No   Food Insecurity: Low Risk  (2023)    Food Insecurity      Within the past 12 months, did you worry that your food would run out before you got money to buy more?: No      Within the past 12 months, did the food you bought just not last and you didn t have money to get more?: No   Transportation Needs: Low Risk  (2023)    Transportation Needs      Within the past 12 months, has lack of transportation kept you from medical appointments, getting your medicines, non-medical meetings or appointments, work, or from getting things that you need?: No   Physical Activity: Not on file   Stress: Not on file   Social Connections: Not on file   Interpersonal Safety: Low Risk  (10/15/2024)    Interpersonal Safety      Do you feel physically and emotionally safe where you currently live?: Yes      Within the past 12 months, have you been hit, slapped, kicked or otherwise physically hurt by someone?: No      Within the past 12 months, have you been humiliated or emotionally abused in other ways by your partner or ex-partner?: No    Housing Stability: Low Risk  (2023)    Housing Stability      Do you have housing? : Yes      Are you worried about losing your housing?: No       Outpatient Encounter Medications as of 2025   Medication Sig Dispense Refill     ASPIRIN 81 MG OR TABS  100 3     benzonatate (TESSALON) 200 MG capsule Take 1 capsule (200 mg) by mouth 3 times daily as needed for cough. 30 capsule 0     [] ciprofloxacin (CIPRO) 250 MG tablet Take 1 tablet (250 mg) by mouth 2 times daily for 5 days. 10 tablet 0     estradiol (VAGIFEM) 10 MCG TABS vaginal tablet Place 1 tablet (10 mcg) vaginally twice a week. 24 tablet 3     folic acid (FOLVITE) 1 MG tablet Take 1 tablet (1,000 mcg) by mouth daily. 90 tablet 2     hyoscyamine (LEVSIN) 0.125 MG tablet Take 1 tablet (125 mcg) by mouth every 6 hours as needed for cramping 12 tablet 0     ibuprofen (ADVIL/MOTRIN) 800 MG tablet Take 1 tablet (800 mg) by mouth every 8 hours as needed for moderate pain 90 tablet 0     levothyroxine (SYNTHROID/LEVOTHROID) 50 MCG tablet TAKE ONE TABLET BY MOUTH ONCE DAILY 90 tablet 0     lisinopril-hydrochlorothiazide (ZESTORETIC) 10-12.5 MG tablet TAKE 1 TABLET BY MOUTH ONCE DAILY 90 tablet 1     methenamine hippurate (HIPREX) 1 g tablet Take 1 tablet (1 g) by mouth 2 times daily. 180 tablet 1     methotrexate 2.5 MG tablet Take 10 tablets (25 mg) by mouth once a week. . Methotrexate is a once weekly medication, taken within the same 24 hour period of each week. 130 tablet 2     MULTI-VITAMIN OR 1 daily       Probiotic Product (PROBIOTIC PO)        simvastatin (ZOCOR) 20 MG tablet TAKE ONE TABLET BY MOUTH EVERY NIGHT AT BEDTIME 90 tablet 0     No facility-administered encounter medications on file as of 2025.             O:   NAD, WDWN, Alert & Oriented, Mood & Affect wnl, Vitals stable   General appearance normal   Vitals stable   Alert, oriented and in no acute distress      Following lymph nodes palpated: Occipital, Cervical,  Supraclavicular no lad  pigmented macules on trunk and ext with regular borders and pigment networks        Stuck on papules and brown macules on trunk and ext   Red papules on trunk  Flesh colored papules on trunk     The remainder of the full exam was normal; the following areas were examined:  conjunctiva/lids, , neck, peripheral vascular system, abdomen, lymph nodes, digits/nails, eccrine and apocrine glands, scalp/hair, face, neck, chest, abdomen, buttocks, back, RUE, LUE, RLE, LLE       Eyes: Conjunctivae/lids:Normal     ENT: Lips, mucosa: normal    MSK:Normal    Cardiovascular: peripheral edema none    Pulm: Breathing Normal    Lymph Nodes: No Head and Neck Lymphadenopathy     Neuro/Psych: Orientation:Alert and Orientedx3 ; Mood/Affect:normal       A/P:  1. Seborrheic keratosis, lentigo, angioma, dermal nevus, nevi   It was a pleasure speaking to Mine Meléndez today.  Previous clinic notes and pertinent laboratory tests were reviewed prior to Mine Meléndez's visit.  Signs and Symptoms of skin cancer discussed with patient.  Patient encouraged to perform monthly skin exams.  UV precautions reviewed with patient.  Risks of non-melanoma skin cancer discussed with patient   Return to clinic 12 months      Again, thank you for allowing me to participate in the care of your patient.        Sincerely,        Pankaj Castro MD    Electronically signed

## 2025-05-12 NOTE — PROGRESS NOTES
UROLOGY FOLLOW-UP NOTE          Chief Complaint:   Today I had the pleasure of seeing Ms. Mine Meléndez in follow-up for a chief complaint of frequent UTIs         Interval Update   Mine Meléndez is a very pleasant 69 year old female with a history of HLD, HTN, and rheumatoid arthritis     Brief History: Ms. Mine Meléndez has followed with urology for frequent UTIs. She uses Vagifem suppositories and was later started on methenamine.      Today's notes: She has had two UTIs since starting methenamine. She is consistent with the vaginal estrogen suppositories and methenamine.          Physical Exam:   Patient is a 69 year old  female   Vitals: Blood pressure 127/59, pulse 88, temperature 98.4  F (36.9  C), temperature source Tympanic, resp. rate 18, last menstrual period 11/12/2004, SpO2 99%, not currently breastfeeding.  General: Alert and oriented x 3, no acute distress.  Respiratory: Non-labored breathing.  Cardiac: Regular rate.      Labs and Pathology:    I personally reviewed all applicable laboratory data and went over findings with patient  Significant for:    CBC RESULTS:  Recent Labs   Lab Test 04/15/25  1321 01/07/25  1311 10/08/24  1452 09/11/24  1322   WBC 6.2 6.7 6.6 7.8   HGB 14.0 14.1 14.3 13.7    234 210 192        BMP RESULTS:  Recent Labs   Lab Test 04/15/25  1321 01/07/25  1311 12/12/24  1514 10/08/24  1452 06/30/23  1128 06/01/23  1339 10/26/22  1313 09/21/22  1318 08/23/21  0847 05/12/21  1158 04/05/21  1255 01/12/21  1547 11/09/20  0939   *  --   --  135  --  137  --  133*  --   --  132*  --  137   POTASSIUM 4.1  --   --  3.8  --  3.8  --  3.5  --   --  3.5  --  3.7   CHLORIDE 95*  --   --  95*  --  98  --  96*  --   --  98  --  104   CO2 25  --   --  28  --  31*  --  28  --   --  26  --  30   ANIONGAP 14  --   --  12  --  8  --  9  --   --  8  --  3   *  --   --  118*  --  113*  --  120*  --   --  151*  --  115*   BUN 8.3  --   --  10.6  --  9.5  --  7.4*  --   --  7   --  10   CR 0.61 0.65 0.7 0.70   < > 0.50*   < > 0.57   < > 0.58 0.55 0.65 0.57   GFRESTIMATED >90 >90 >60 >90   < > >90   < > >90   < > >90 >90 >90 >90   GFRESTBLACK  --   --   --   --   --   --   --   --   --  >90 >90 >90 >90   ROSELYN 9.4  --   --  9.4  --  9.5  --  9.0  --   --  8.7  --  9.3    < > = values in this interval not displayed.       UA RESULTS:   Recent Labs   Lab Test 05/06/25  1428 03/06/25  1216 12/12/24  1255   SG 1.015 1.020 >=1.030   URINEPH 7.0 7.0 6.5   NITRITE Positive* Negative Positive*   RBCU 2-5* 10-25* 2-5*   WBCU >100* 25-50* >100*              Assessment/Plan   69 year old female seen in follow up for frequent UTIs. She uses Vagifem suppositories and was later started on methenamine.      She has had two UTIs since starting methenamine. She is consistent with the vaginal estrogen suppositories and methenamine. We discussed taking vitamin C twice daily to acidify the urine, which may make the methenamine more effective. She is agreeable.     Review of previous imaging shows air in the bladder, with potential concern for a colo-vesical fistula given history of diverticulitis and colo-colonic fistula. The patient is not aware she had diverticulitis. I think it would be reasonable to schedule a cystoscopy to evaluate for any anatomic abnormalities before changing regimen. The patient is agreeable.     Plan:  Continue Vagifem suppositories two nights per week.   Continue methenamine 1 g BID. Start vitamin C 500-1000 mg twice daily.   Cystoscopy given potential suspicion for fistula. Will obtain urine culture 5 days prior.            Past Medical History:     Past Medical History:   Diagnosis Date    Herpes simplex without mention of complication     Hypertension     Malignant melanoma (H)     Obesity, unspecified     Osteomyelitis of jaw 5/22/2008    Rheumatoid arthritis(714.0)     Trochanteric bursitis of right hip 8/16/2013            Past Surgical History:     Past Surgical History:    Procedure Laterality Date    COLONOSCOPY  1/20/06    COMBINED REPAIR PTOSIS WITH BLEPHAROPLASTY BILATERAL Bilateral 6/19/2023    Procedure: Bilateral upper eyelid blepharoplasty and ptosis repair;  Surgeon: Cl Pearl MD;  Location: [facility] OR    EXCISE MASS FOOT Left 10/5/2021    Procedure: EXCISION, MASS, LEFT FOOT;  Surgeon: Tommy Pelaez DPM;  Location: [facility] OR    HYSTERECTOMY, PAP NO LONGER INDICATED      HYSTERECTOMY, VAGINAL  11/04    TVH,TVT procedure    RELEASE CARPAL TUNNEL Right 10/11/2016    Procedure: RELEASE CARPAL TUNNEL;  Surgeon: Emely Blanca MD;  Location: [facility] OR    SURGICAL HISTORY OF -   1988    tubal ligation    SURGICAL HISTORY OF -   2001    excision gangliion cyst rt heel            Medications     Current Outpatient Medications   Medication Sig Dispense Refill    ASPIRIN 81 MG OR TABS  100 3    benzonatate (TESSALON) 200 MG capsule Take 1 capsule (200 mg) by mouth 3 times daily as needed for cough. 30 capsule 0    estradiol (VAGIFEM) 10 MCG TABS vaginal tablet Place 1 tablet (10 mcg) vaginally twice a week. 24 tablet 3    folic acid (FOLVITE) 1 MG tablet Take 1 tablet (1,000 mcg) by mouth daily. 90 tablet 2    hyoscyamine (LEVSIN) 0.125 MG tablet Take 1 tablet (125 mcg) by mouth every 6 hours as needed for cramping 12 tablet 0    ibuprofen (ADVIL/MOTRIN) 800 MG tablet Take 1 tablet (800 mg) by mouth every 8 hours as needed for moderate pain 90 tablet 0    levothyroxine (SYNTHROID/LEVOTHROID) 50 MCG tablet TAKE ONE TABLET BY MOUTH ONCE DAILY 90 tablet 0    lisinopril-hydrochlorothiazide (ZESTORETIC) 10-12.5 MG tablet TAKE 1 TABLET BY MOUTH ONCE DAILY 90 tablet 1    methenamine hippurate (HIPREX) 1 g tablet Take 1 tablet (1 g) by mouth 2 times daily. 180 tablet 1    methotrexate 2.5 MG tablet Take 10 tablets (25 mg) by mouth once a week. . Methotrexate is a once weekly medication, taken within the same 24 hour period of each week. 130 tablet 2    MULTI-VITAMIN OR 1 daily       Probiotic Product (PROBIOTIC PO)       simvastatin (ZOCOR) 20 MG tablet TAKE ONE TABLET BY MOUTH EVERY NIGHT AT BEDTIME 90 tablet 0     No current facility-administered medications for this visit.            Family History:     Family History   Problem Relation Age of Onset    Diabetes Father     Dementia Father     Diabetes Brother     Diabetes Sister     Hypertension Sister     Hypertension Sister             Social History:     Social History     Socioeconomic History    Marital status:      Spouse name: Not on file    Number of children: Not on file    Years of education: Not on file    Highest education level: Not on file   Occupational History    Not on file   Tobacco Use    Smoking status: Former     Current packs/day: 0.00     Types: Cigarettes     Start date: 1975     Quit date: 2000     Years since quittin.0     Passive exposure: Never    Smokeless tobacco: Never   Vaping Use    Vaping status: Never Used   Substance and Sexual Activity    Alcohol use: Yes     Comment: 6 pack of beer on a weekend    Drug use: No    Sexual activity: Yes     Partners: Male     Birth control/protection: Surgical     Comment: [email or web address].partial   Other Topics Concern    Parent/sibling w/ CABG, MI or angioplasty before 65F 55M? No   Social History Narrative    Not on file     Social Drivers of Health     Financial Resource Strain: Low Risk  (2023)    Financial Resource Strain     Within the past 12 months, have you or your family members you live with been unable to get utilities (heat, electricity) when it was really needed?: No   Food Insecurity: Low Risk  (2023)    Food Insecurity     Within the past 12 months, did you worry that your food would run out before you got money to buy more?: No     Within the past 12 months, did the food you bought just not last and you didn t have money to get more?: No   Transportation Needs: Low Risk  (2023)    Transportation Needs     Within the past 12  months, has lack of transportation kept you from medical appointments, getting your medicines, non-medical meetings or appointments, work, or from getting things that you need?: No   Physical Activity: Not on file   Stress: Not on file   Social Connections: Not on file   Interpersonal Safety: Low Risk  (10/15/2024)    Interpersonal Safety     Do you feel physically and emotionally safe where you currently live?: Yes     Within the past 12 months, have you been hit, slapped, kicked or otherwise physically hurt by someone?: No     Within the past 12 months, have you been humiliated or emotionally abused in other ways by your partner or ex-partner?: No   Housing Stability: Low Risk  (9/25/2023)    Housing Stability     Do you have housing? : Yes     Are you worried about losing your housing?: No            Allergies:   Cefdinir, Flagyl [metronidazole], Morphine and codeine, and Septra [sulfamethoxazole-trimethoprim]         Review of Systems:  From intake questionnaire   Negative 14 system review except as noted on HPI, nurse's note.        Allison Osullivan PA-C  Department of Urology

## 2025-05-12 NOTE — PROGRESS NOTES
"Initial /59 (BP Location: Right arm)   Pulse 88   Temp 98.4  F (36.9  C) (Tympanic)   Resp 18   LMP 11/12/2004 (Approximate)   SpO2 99%  Estimated body mass index is 32.85 kg/m  as calculated from the following:    Height as of 12/27/24: 1.651 m (5' 5\").    Weight as of 1/14/25: 89.5 kg (197 lb 6.4 oz). .    "

## 2025-05-12 NOTE — PROGRESS NOTES
Mine Meléndez is an extremely pleasant 69 year old year old female patient here today for hx of melanoma.  Patient has no other skin complaints today.  Remainder of the HPI, Meds, PMH, Allergies, FH, and SH was reviewed in chart.      Past Medical History:   Diagnosis Date    Herpes simplex without mention of complication     Hypertension     Malignant melanoma (H)     Obesity, unspecified     Osteomyelitis of jaw 2008    Rheumatoid arthritis(714.0)     Trochanteric bursitis of right hip 2013       Past Surgical History:   Procedure Laterality Date    COLONOSCOPY  06    COMBINED REPAIR PTOSIS WITH BLEPHAROPLASTY BILATERAL Bilateral 2023    Procedure: Bilateral upper eyelid blepharoplasty and ptosis repair;  Surgeon: Cl Pearl MD;  Location: [facility] OR    EXCISE MASS FOOT Left 10/5/2021    Procedure: EXCISION, MASS, LEFT FOOT;  Surgeon: Tommy Pelaez DPM;  Location: [facility]    HYSTERECTOMY, PAP NO LONGER INDICATED      HYSTERECTOMY, VAGINAL      TVH,TVT procedure    RELEASE CARPAL TUNNEL Right 10/11/2016    Procedure: RELEASE CARPAL TUNNEL;  Surgeon: Emely Blanca MD;  Location: [facility] OR    SURGICAL HISTORY OF -       tubal ligation    SURGICAL HISTORY OF -       excision gangliion cyst rt heel        Family History   Problem Relation Age of Onset    Diabetes Father     Dementia Father     Diabetes Brother     Diabetes Sister     Hypertension Sister     Hypertension Sister        Social History     Socioeconomic History    Marital status:      Spouse name: Not on file    Number of children: Not on file    Years of education: Not on file    Highest education level: Not on file   Occupational History    Not on file   Tobacco Use    Smoking status: Former     Current packs/day: 0.00     Types: Cigarettes     Start date: 1975     Quit date: 2000     Years since quittin.0     Passive exposure: Never    Smokeless tobacco: Never   Vaping Use    Vaping  status: Never Used   Substance and Sexual Activity    Alcohol use: Yes     Comment: 6 pack of beer on a weekend    Drug use: No    Sexual activity: Yes     Partners: Male     Birth control/protection: Surgical     Comment: [email or web address].partial   Other Topics Concern    Parent/sibling w/ CABG, MI or angioplasty before 65F 55M? No   Social History Narrative    Not on file     Social Drivers of Health     Financial Resource Strain: Low Risk  (9/25/2023)    Financial Resource Strain     Within the past 12 months, have you or your family members you live with been unable to get utilities (heat, electricity) when it was really needed?: No   Food Insecurity: Low Risk  (9/25/2023)    Food Insecurity     Within the past 12 months, did you worry that your food would run out before you got money to buy more?: No     Within the past 12 months, did the food you bought just not last and you didn t have money to get more?: No   Transportation Needs: Low Risk  (9/25/2023)    Transportation Needs     Within the past 12 months, has lack of transportation kept you from medical appointments, getting your medicines, non-medical meetings or appointments, work, or from getting things that you need?: No   Physical Activity: Not on file   Stress: Not on file   Social Connections: Not on file   Interpersonal Safety: Low Risk  (10/15/2024)    Interpersonal Safety     Do you feel physically and emotionally safe where you currently live?: Yes     Within the past 12 months, have you been hit, slapped, kicked or otherwise physically hurt by someone?: No     Within the past 12 months, have you been humiliated or emotionally abused in other ways by your partner or ex-partner?: No   Housing Stability: Low Risk  (9/25/2023)    Housing Stability     Do you have housing? : Yes     Are you worried about losing your housing?: No       Outpatient Encounter Medications as of 5/12/2025   Medication Sig Dispense Refill    ASPIRIN 81 MG OR TABS  100 3     benzonatate (TESSALON) 200 MG capsule Take 1 capsule (200 mg) by mouth 3 times daily as needed for cough. 30 capsule 0    [] ciprofloxacin (CIPRO) 250 MG tablet Take 1 tablet (250 mg) by mouth 2 times daily for 5 days. 10 tablet 0    estradiol (VAGIFEM) 10 MCG TABS vaginal tablet Place 1 tablet (10 mcg) vaginally twice a week. 24 tablet 3    folic acid (FOLVITE) 1 MG tablet Take 1 tablet (1,000 mcg) by mouth daily. 90 tablet 2    hyoscyamine (LEVSIN) 0.125 MG tablet Take 1 tablet (125 mcg) by mouth every 6 hours as needed for cramping 12 tablet 0    ibuprofen (ADVIL/MOTRIN) 800 MG tablet Take 1 tablet (800 mg) by mouth every 8 hours as needed for moderate pain 90 tablet 0    levothyroxine (SYNTHROID/LEVOTHROID) 50 MCG tablet TAKE ONE TABLET BY MOUTH ONCE DAILY 90 tablet 0    lisinopril-hydrochlorothiazide (ZESTORETIC) 10-12.5 MG tablet TAKE 1 TABLET BY MOUTH ONCE DAILY 90 tablet 1    methenamine hippurate (HIPREX) 1 g tablet Take 1 tablet (1 g) by mouth 2 times daily. 180 tablet 1    methotrexate 2.5 MG tablet Take 10 tablets (25 mg) by mouth once a week. . Methotrexate is a once weekly medication, taken within the same 24 hour period of each week. 130 tablet 2    MULTI-VITAMIN OR 1 daily      Probiotic Product (PROBIOTIC PO)       simvastatin (ZOCOR) 20 MG tablet TAKE ONE TABLET BY MOUTH EVERY NIGHT AT BEDTIME 90 tablet 0     No facility-administered encounter medications on file as of 2025.             O:   NAD, WDWN, Alert & Oriented, Mood & Affect wnl, Vitals stable   General appearance normal   Vitals stable   Alert, oriented and in no acute distress      Following lymph nodes palpated: Occipital, Cervical, Supraclavicular no lad  pigmented macules on trunk and ext with regular borders and pigment networks        Stuck on papules and brown macules on trunk and ext   Red papules on trunk  Flesh colored papules on trunk     The remainder of the full exam was normal; the following areas were examined:   conjunctiva/lids, , neck, peripheral vascular system, abdomen, lymph nodes, digits/nails, eccrine and apocrine glands, scalp/hair, face, neck, chest, abdomen, buttocks, back, RUE, LUE, RLE, LLE       Eyes: Conjunctivae/lids:Normal     ENT: Lips, mucosa: normal    MSK:Normal    Cardiovascular: peripheral edema none    Pulm: Breathing Normal    Lymph Nodes: No Head and Neck Lymphadenopathy     Neuro/Psych: Orientation:Alert and Orientedx3 ; Mood/Affect:normal       A/P:  1. Seborrheic keratosis, lentigo, angioma, dermal nevus, nevi   It was a pleasure speaking to Mine Meléndez today.  Previous clinic notes and pertinent laboratory tests were reviewed prior to Mine Meléndez's visit.  Signs and Symptoms of skin cancer discussed with patient.  Patient encouraged to perform monthly skin exams.  UV precautions reviewed with patient.  Risks of non-melanoma skin cancer discussed with patient   Return to clinic 12 months

## 2025-05-12 NOTE — PATIENT INSTRUCTIONS
Offered pill vs topicals vs over the counter Listerine with Alcohol in it- squirt under the nail with a syringe daily.    Proper skin care from [facility]:    -Eliminate harsh soaps as they strip the natural oils from the skin, often resulting in dry itchy skin ( i.e. Dial, Zest, Greek Spring)  -Use mild soaps such as Cetaphil or Dove Sensitive Skin in the shower. You do not need to use soap on arms, legs, and trunk every time you shower unless visibly soiled.   -Avoid hot or cold showers.  -After showering, lightly dry off and apply moisturizing within 2-3 minutes. This will help trap moisture in the skin.   -Aggressive use of a moisturizer at least 1-2 times a day to the entire body (including -Vanicream, Cetaphil, Aquaphor or Cerave) and moisturize hands after every washing.  -We recommend using moisturizers that come in a tub that needs to be scooped out, not a pump. This has more of an oil base. It will hold moisture in your skin much better than a water base moisturizer. The above recommended are non-pore clogging.      Wear a sunscreen with at least SPF 30 on your face, ears, neck and V of the chest daily. Wear sunscreen on other areas of the body if those areas are exposed to the sun throughout the day. Sunscreens can contain physical and/or chemical blockers. Physical blockers are less likely to clog pores, these include zinc oxide and titanium dioxide. Reapply every two hour and after swimming.     Sunscreen examples: https://www.ewg.org/sunscreen/    UV radiation  UVA radiation remains constant throughout the day and throughout the year. It is a longer wavelength than UVB and therefore penetrates deeper into the skin leading to immediate and delayed tanning, photoaging, and skin cancer. 70-80% of UVA and UVB radiation occurs between the hours of 10am-2pm.  UVB radiation  UVB radiation causes the most harmful effects and is more significant during the summer months. However, snow and ice can  reflect UVB radiation leading to skin damage during the winter months as well. UVB radiation is responsible for tanning, burning, inflammation, delayed erythema (pinkness), pigmentation (brown spots), and skin cancer.     I recommend self monthly full body exams and yearly full body exams with a dermatology provider. If you develop a new or changing lesion please follow up for examination. Most skin cancers are pink and scaly or pink and pearly. However, we do see blue/brown/black skin cancers.  Consider the ABCDEs of melanoma when giving yourself your monthly full body exam ( don't forget the groin, buttocks, feet, toes, etc). A-asymmetry, B-borders, C-color, D-diameter, E-elevation or evolving. If you see any of these changes please follow up in clinic. If you cannot see your back I recommend purchasing a hand held mirror to use with a larger wall mirror.       Checking for Skin Cancer  You can find cancer early by checking your skin each month. There are 3 kinds of skin cancer. They are melanoma, basal cell carcinoma, and squamous cell carcinoma. Doing monthly skin checks is the best way to find new marks or skin changes. Follow the instructions below for checking your skin.   The ABCDEs of checking moles for melanoma   Check your moles or growths for signs of melanoma using ABCDE:   Asymmetry: the sides of the mole or growth don t match  Border: the edges are ragged, notched, or blurred  Color: the color within the mole or growth varies  Diameter: the mole or growth is larger than 6 mm (size of a pencil eraser)  Evolving: the size, shape, or color of the mole or growth is changing (evolving is not shown in the images below)    Checking for other types of skin cancer  Basal cell carcinoma or squamous cell carcinoma have symptoms such as:     A spot or mole that looks different from all other marks on your skin  Changes in how an area feels, such as itching, tenderness, or pain  Changes in the skin's surface, such  as oozing, bleeding, or scaliness  A sore that does not heal  New swelling or redness beyond the border of a mole    Who s at risk?  Anyone can get skin cancer. But you are at greater risk if you have:   Fair skin, light-colored hair, or light-colored eyes  Many moles or abnormal moles on your skin  A history of sunburns from sunlight or tanning beds  A family history of skin cancer  A history of exposure to radiation or chemicals  A weakened immune system  If you have had skin cancer in the past, you are at risk for recurring skin cancer.   How to check your skin  Do your monthly skin checkups in front of a full-length mirror. Check all parts of your body, including your:   Head (ears, face, neck, and scalp)  Torso (front, back, and sides)  Arms (tops, undersides, upper, and lower armpits)  Hands (palms, backs, and fingers, including under the nails)  Buttocks and genitals  Legs (front, back, and sides)  Feet (tops, soles, toes, including under the nails, and between toes)  If you have a lot of moles, take digital photos of them each month. Make sure to take photos both up close and from a distance. These can help you see if any moles change over time.   Most skin changes are not cancer. But if you see any changes in your skin, call your doctor right away. Only he or she can diagnose a problem. If you have skin cancer, seeing your doctor can be the first step toward getting the treatment that could save your life.   [employer] last reviewed this educational content on 4/1/2019 2000-2020 [employer]. [address] [place] [address], [place] [address]. All rights reserved. This information is not intended as a substitute for professional medical care. Always follow your healthcare professional's instructions.       When should I call my doctor?  If you are worsening or not improving, please, contact us or seek urgent care as noted below.     Who should I call with questions (adults)?    [facility]  [facility] [place] [phone]  For urgent needs outside of business hours call the [facility] at [phone] and ask for the dermatology resident on call to be paged  If this is a medical emergency and you are unable to reach an ER, Call [phone]      If you need a prescription refill, please contact your pharmacy. Refills are approved or denied by our Physicians during normal business hours, Monday through Friday.  Per office policy, refills will not be granted if you have not been seen within the past year (or sooner depending on the condition).

## 2025-05-12 NOTE — TELEPHONE ENCOUNTER
GFR Estimate   Date Value Ref Range Status   04/15/2025 >90 >60 mL/min/1.73m2 Final     Comment:     eGFR calculated using 2021 CKD-EPI equation.   05/12/2021 >90 >60 mL/min/[1.73_m2] Final     Comment:     Non  GFR Calc  Starting 12/18/2018, serum creatinine based estimated GFR (eGFR) will be   calculated using the Chronic Kidney Disease Epidemiology Collaboration   (CKD-EPI) equation.       GFR, ESTIMATED POCT   Date Value Ref Range Status   12/12/2024 >60 >60 mL/min/1.73m2 Final     Potassium   Date Value Ref Range Status   04/15/2025 4.1 3.4 - 5.3 mmol/L Final   04/05/2021 3.5 3.4 - 5.3 mmol/L Final     LDL Cholesterol Calculated   Date Value Ref Range Status   04/15/2025 61 <100 mg/dL Final   11/09/2020 66 <100 mg/dL Final     Comment:     Desirable:       <100 mg/dl

## 2025-05-13 ENCOUNTER — OFFICE VISIT (OUTPATIENT)
Dept: FAMILY MEDICINE | Facility: CLINIC | Age: 70
End: 2025-05-13
Payer: COMMERCIAL

## 2025-05-13 ENCOUNTER — ANCILLARY PROCEDURE (OUTPATIENT)
Dept: GENERAL RADIOLOGY | Facility: CLINIC | Age: 70
End: 2025-05-13
Attending: FAMILY MEDICINE
Payer: COMMERCIAL

## 2025-05-13 VITALS
DIASTOLIC BLOOD PRESSURE: 70 MMHG | HEART RATE: 110 BPM | RESPIRATION RATE: 22 BRPM | OXYGEN SATURATION: 95 % | TEMPERATURE: 99.2 F | SYSTOLIC BLOOD PRESSURE: 132 MMHG

## 2025-05-13 DIAGNOSIS — J20.9 ACUTE BRONCHITIS WITH SYMPTOMS > 10 DAYS: Primary | ICD-10-CM

## 2025-05-13 DIAGNOSIS — R05.1 ACUTE COUGH: ICD-10-CM

## 2025-05-13 PROCEDURE — 3078F DIAST BP <80 MM HG: CPT | Performed by: FAMILY MEDICINE

## 2025-05-13 PROCEDURE — 1126F AMNT PAIN NOTED NONE PRSNT: CPT | Performed by: FAMILY MEDICINE

## 2025-05-13 PROCEDURE — 3075F SYST BP GE 130 - 139MM HG: CPT | Performed by: FAMILY MEDICINE

## 2025-05-13 PROCEDURE — 71046 X-RAY EXAM CHEST 2 VIEWS: CPT | Mod: TC | Performed by: RADIOLOGY

## 2025-05-13 PROCEDURE — 99214 OFFICE O/P EST MOD 30 MIN: CPT | Performed by: FAMILY MEDICINE

## 2025-05-13 RX ORDER — LISINOPRIL AND HYDROCHLOROTHIAZIDE 10; 12.5 MG/1; MG/1
1 TABLET ORAL DAILY
Qty: 90 TABLET | Refills: 1 | Status: SHIPPED | OUTPATIENT
Start: 2025-05-13

## 2025-05-13 RX ORDER — AZITHROMYCIN 250 MG/1
TABLET, FILM COATED ORAL
Qty: 6 TABLET | Refills: 0 | Status: SHIPPED | OUTPATIENT
Start: 2025-05-13 | End: 2025-05-18

## 2025-05-13 RX ORDER — PREDNISONE 20 MG/1
40 TABLET ORAL DAILY
Qty: 10 TABLET | Refills: 0 | Status: SHIPPED | OUTPATIENT
Start: 2025-05-13 | End: 2025-05-18

## 2025-05-13 ASSESSMENT — ENCOUNTER SYMPTOMS: COUGH: 1

## 2025-05-13 ASSESSMENT — PAIN SCALES - GENERAL: PAINLEVEL_OUTOF10: NO PAIN (0)

## 2025-05-13 NOTE — PROGRESS NOTES
"  Assessment & Plan     Acute bronchitis with symptoms > 10 days  Acute cough  No evidence of CAP.  Given nature of cough, other co-factors including RA/METHOTREXATE use  Will cover with azithromycin and prednisone     - azithromycin (ZITHROMAX) 250 MG tablet; Take 2 tablets (500 mg) by mouth daily for 1 day, THEN 1 tablet (250 mg) daily for 4 days.  - predniSONE (DELTASONE) 20 MG tablet; Take 2 tablets (40 mg) by mouth daily for 5 days.      - XR Chest 2 Views; Future          BMI  Estimated body mass index is 32.85 kg/m  as calculated from the following:    Height as of 12/27/24: 1.651 m (5' 5\").    Weight as of 1/14/25: 89.5 kg (197 lb 6.4 oz).             Audrey Malik is a 69 year old, presenting for the following health issues:  Cough        5/13/2025     8:11 AM   Additional Questions   Roomed by Stacy FLEMING CMA   Accompanied by Self     Cough    History of Present Illness       Reason for visit:  My cough    She eats 2-3 servings of fruits and vegetables daily.She consumes 0 sweetened beverage(s) daily.She exercises with enough effort to increase her heart rate 9 or less minutes per day.  She exercises with enough effort to increase her heart rate 3 or less days per week.   She is taking medications regularly.          Acute Illness  Acute illness concerns: cough  Onset/Duration: 1.5 weeks  Symptoms:  Fever: No  Chills/Sweats: No  Headache (location?): No  Sinus Pressure: No  Conjunctivitis:  No  Ear Pain: no  Rhinorrhea: YES  Congestion: YES  Sore Throat: No  Cough: YES-non-productive  Wheeze: No  Decreased Appetite: YES  Nausea: No  Vomiting: No  Diarrhea: No  Dysuria/Freq.: No  Dysuria or Hematuria: No  Fatigue/Achiness: YES- Fatigue  Sick/Strep Exposure: No  Therapies tried and outcome: None      Patient with RA, is on methotrexate.     No cough prior to this.     Social:  just found out he has cancer - lump on neck.  Meeting with ENT next week.       Review of Systems  Constitutional, HEENT, " cardiovascular, pulmonary, gi and gu systems are negative, except as otherwise noted.      Objective    /70   Pulse 110   Temp 99.2  F (37.3  C) (Tympanic)   Resp 22   LMP 11/12/2004 (Approximate)   SpO2 95%   There is no height or weight on file to calculate BMI.  Physical Exam   GENERAL: alert and no distress  NECK: no adenopathy, no asymmetry, masses, or scars  RESP: coarse/harsh sounding cough, no wheezing, no rales.   CV: regular rate and rhythm, normal S1 S2, no S3 or S4, no murmur, click or rub, no peripheral edema  ABDOMEN: soft, nontender, no hepatosplenomegaly, no masses and bowel sounds normal  MS: no gross musculoskeletal defects noted, no edema  PSYCH: mentation appears normal, tearful, judgement and insight intact, and appearance well groomed    CXR - Reviewed and interpreted by me Normal- no infiltrates, effusions, pneumothoraces, cardiomegaly or masses        Signed Electronically by: Britany Norton MD

## 2025-05-15 ENCOUNTER — PATIENT OUTREACH (OUTPATIENT)
Dept: CARE COORDINATION | Facility: CLINIC | Age: 70
End: 2025-05-15
Payer: COMMERCIAL

## 2025-05-20 SDOH — HEALTH STABILITY: PHYSICAL HEALTH: ON AVERAGE, HOW MANY MINUTES DO YOU ENGAGE IN EXERCISE AT THIS LEVEL?: 0 MIN

## 2025-05-20 SDOH — HEALTH STABILITY: PHYSICAL HEALTH: ON AVERAGE, HOW MANY DAYS PER WEEK DO YOU ENGAGE IN MODERATE TO STRENUOUS EXERCISE (LIKE A BRISK WALK)?: 0 DAYS

## 2025-05-20 ASSESSMENT — SOCIAL DETERMINANTS OF HEALTH (SDOH): HOW OFTEN DO YOU GET TOGETHER WITH FRIENDS OR RELATIVES?: TWICE A WEEK

## 2025-05-21 ENCOUNTER — RESULTS FOLLOW-UP (OUTPATIENT)
Dept: FAMILY MEDICINE | Facility: CLINIC | Age: 70
End: 2025-05-21

## 2025-05-21 ENCOUNTER — OFFICE VISIT (OUTPATIENT)
Dept: FAMILY MEDICINE | Facility: CLINIC | Age: 70
End: 2025-05-21
Payer: COMMERCIAL

## 2025-05-21 VITALS
SYSTOLIC BLOOD PRESSURE: 139 MMHG | BODY MASS INDEX: 31.07 KG/M2 | TEMPERATURE: 98.9 F | RESPIRATION RATE: 22 BRPM | OXYGEN SATURATION: 94 % | WEIGHT: 186.5 LBS | HEART RATE: 91 BPM | HEIGHT: 65 IN | DIASTOLIC BLOOD PRESSURE: 70 MMHG

## 2025-05-21 DIAGNOSIS — E03.8 OTHER SPECIFIED HYPOTHYROIDISM: ICD-10-CM

## 2025-05-21 DIAGNOSIS — Z00.00 ENCOUNTER FOR MEDICARE ANNUAL WELLNESS EXAM: Primary | ICD-10-CM

## 2025-05-21 DIAGNOSIS — E78.5 HYPERLIPIDEMIA LDL GOAL <130: ICD-10-CM

## 2025-05-21 DIAGNOSIS — R73.01 ELEVATED FASTING GLUCOSE: ICD-10-CM

## 2025-05-21 DIAGNOSIS — I10 ESSENTIAL HYPERTENSION WITH GOAL BLOOD PRESSURE LESS THAN 140/90: ICD-10-CM

## 2025-05-21 LAB
ALBUMIN SERPL BCG-MCNC: 3.7 G/DL (ref 3.5–5.2)
ALP SERPL-CCNC: 107 U/L (ref 40–150)
ALT SERPL W P-5'-P-CCNC: 41 U/L (ref 0–50)
ANION GAP SERPL CALCULATED.3IONS-SCNC: 14 MMOL/L (ref 7–15)
AST SERPL W P-5'-P-CCNC: 31 U/L (ref 0–45)
BILIRUB SERPL-MCNC: 0.7 MG/DL
BUN SERPL-MCNC: 8.7 MG/DL (ref 8–23)
CALCIUM SERPL-MCNC: 9 MG/DL (ref 8.8–10.4)
CHLORIDE SERPL-SCNC: 91 MMOL/L (ref 98–107)
CHOLEST SERPL-MCNC: 139 MG/DL
CREAT SERPL-MCNC: 0.57 MG/DL (ref 0.51–0.95)
EGFRCR SERPLBLD CKD-EPI 2021: >90 ML/MIN/1.73M2
EST. AVERAGE GLUCOSE BLD GHB EST-MCNC: 117 MG/DL
FASTING STATUS PATIENT QL REPORTED: NO
FASTING STATUS PATIENT QL REPORTED: NO
GLUCOSE SERPL-MCNC: 153 MG/DL (ref 70–99)
HBA1C MFR BLD: 5.7 % (ref 0–5.6)
HCO3 SERPL-SCNC: 27 MMOL/L (ref 22–29)
HDLC SERPL-MCNC: 57 MG/DL
LDLC SERPL CALC-MCNC: 71 MG/DL
NONHDLC SERPL-MCNC: 82 MG/DL
POTASSIUM SERPL-SCNC: 3.9 MMOL/L (ref 3.4–5.3)
PROT SERPL-MCNC: 6.8 G/DL (ref 6.4–8.3)
SODIUM SERPL-SCNC: 132 MMOL/L (ref 135–145)
TRIGL SERPL-MCNC: 53 MG/DL
TSH SERPL DL<=0.005 MIU/L-ACNC: 1.95 UIU/ML (ref 0.3–4.2)

## 2025-05-21 PROCEDURE — 83036 HEMOGLOBIN GLYCOSYLATED A1C: CPT | Performed by: FAMILY MEDICINE

## 2025-05-21 PROCEDURE — 84443 ASSAY THYROID STIM HORMONE: CPT | Performed by: FAMILY MEDICINE

## 2025-05-21 PROCEDURE — 36415 COLL VENOUS BLD VENIPUNCTURE: CPT | Performed by: FAMILY MEDICINE

## 2025-05-21 PROCEDURE — 3075F SYST BP GE 130 - 139MM HG: CPT | Performed by: FAMILY MEDICINE

## 2025-05-21 PROCEDURE — G0439 PPPS, SUBSEQ VISIT: HCPCS | Performed by: FAMILY MEDICINE

## 2025-05-21 PROCEDURE — 1126F AMNT PAIN NOTED NONE PRSNT: CPT | Performed by: FAMILY MEDICINE

## 2025-05-21 PROCEDURE — 80061 LIPID PANEL: CPT | Performed by: FAMILY MEDICINE

## 2025-05-21 PROCEDURE — 3078F DIAST BP <80 MM HG: CPT | Performed by: FAMILY MEDICINE

## 2025-05-21 PROCEDURE — 3044F HG A1C LEVEL LT 7.0%: CPT | Performed by: FAMILY MEDICINE

## 2025-05-21 PROCEDURE — 99214 OFFICE O/P EST MOD 30 MIN: CPT | Mod: 25 | Performed by: FAMILY MEDICINE

## 2025-05-21 PROCEDURE — 80053 COMPREHEN METABOLIC PANEL: CPT | Performed by: FAMILY MEDICINE

## 2025-05-21 RX ORDER — LEVOTHYROXINE SODIUM 50 UG/1
50 TABLET ORAL DAILY
Qty: 90 TABLET | Refills: 3 | Status: SHIPPED | OUTPATIENT
Start: 2025-05-21

## 2025-05-21 RX ORDER — SIMVASTATIN 20 MG
20 TABLET ORAL AT BEDTIME
Qty: 90 TABLET | Refills: 3 | Status: SHIPPED | OUTPATIENT
Start: 2025-05-21

## 2025-05-21 RX ORDER — LISINOPRIL AND HYDROCHLOROTHIAZIDE 10; 12.5 MG/1; MG/1
1 TABLET ORAL DAILY
Qty: 90 TABLET | Refills: 3 | Status: SHIPPED | OUTPATIENT
Start: 2025-05-21

## 2025-05-21 ASSESSMENT — PAIN SCALES - GENERAL: PAINLEVEL_OUTOF10: NO PAIN (0)

## 2025-05-21 NOTE — PATIENT INSTRUCTIONS
Patient Education   Preventive Care Advice   This is general advice given by our system to help you stay healthy. However, your care team may have specific advice just for you. Please talk to your care team about your preventive care needs.  Nutrition  Eat 5 or more servings of fruits and vegetables each day.  Try wheat bread, brown rice and whole grain pasta (instead of white bread, rice, and pasta).  Get enough calcium and vitamin D. Check the label on foods and aim for 100% of the RDA (recommended daily allowance).  Lifestyle  Exercise at least 150 minutes each week  (30 minutes a day, 5 days a week).  Do muscle strengthening activities 2 days a week. These help control your weight and prevent disease.  No smoking.  Wear sunscreen to prevent skin cancer.  Have a dental exam and cleaning every 6 months.  Yearly exams  See your health care team every year to talk about:  Any changes in your health.  Any medicines your care team has prescribed.  Preventive care, family planning, and ways to prevent chronic diseases.  Shots (vaccines)   HPV shots (up to age 26), if you've never had them before.  Hepatitis B shots (up to age 59), if you've never had them before.  COVID-19 shot: Get this shot when it's due.  Flu shot: Get a flu shot every year.  Tetanus shot: Get a tetanus shot every 10 years.  Pneumococcal, hepatitis A, and RSV shots: Ask your care team if you need these based on your risk.  Shingles shot (for age 50 and up)  General health tests  Diabetes screening:  Starting at age 35, Get screened for diabetes at least every 3 years.  If you are younger than age 35, ask your care team if you should be screened for diabetes.  Cholesterol test: At age 39, start having a cholesterol test every 5 years, or more often if advised.  Bone density scan (DEXA): At age 50, ask your care team if you should have this scan for osteoporosis (brittle bones).  Hepatitis C: Get tested at least once in your life.  STIs (sexually  transmitted infections)  Before age 24: Ask your care team if you should be screened for STIs.  After age 24: Get screened for STIs if you're at risk. You are at risk for STIs (including HIV) if:  You are sexually active with more than one person.  You don't use condoms every time.  You or a partner was diagnosed with a sexually transmitted infection.  If you are at risk for HIV, ask about PrEP medicine to prevent HIV.  Get tested for HIV at least once in your life, whether you are at risk for HIV or not.  Cancer screening tests  Cervical cancer screening: If you have a cervix, begin getting regular cervical cancer screening tests starting at age 21.  Breast cancer scan (mammogram): If you've ever had breasts, begin having regular mammograms starting at age 40. This is a scan to check for breast cancer.  Colon cancer screening: It is important to start screening for colon cancer at age 45.  Have a colonoscopy test every 10 years (or more often if you're at risk) Or, ask your provider about stool tests like a FIT test every year or Cologuard test every 3 years.  To learn more about your testing options, visit:   .  For help making a decision, visit:   https://bit.ly/mc42001.  Prostate cancer screening test: If you have a prostate, ask your care team if a prostate cancer screening test (PSA) at age 55 is right for you.  Lung cancer screening: If you are a current or former smoker ages 50 to 80, ask your care team if ongoing lung cancer screenings are right for you.  For informational purposes only. Not to replace the advice of your health care provider. Copyright   2023 [facility]. All rights reserved. Clinically reviewed by the [facility] Transitions Program. [employer] [id number] - REV 01/24.  Hearing Loss: Care Instructions  Overview     Hearing loss is a sudden or slow decrease in how well you hear. It can range from slight to profound. Permanent hearing loss can occur with aging. It also can  happen when you are exposed long-term to loud noise. Examples include listening to loud music, riding motorcycles, or being around other loud machines.  Hearing loss can affect your work and home life. It can make you feel lonely or depressed. You may feel that you have lost your independence. But hearing aids and other devices can help you hear better and feel connected to others.  Follow-up care is a key part of your treatment and safety. Be sure to make and go to all appointments, and call your doctor if you are having problems. It's also a good idea to know your test results and keep a list of the medicines you take.  How can you care for yourself at home?  Avoid loud noises whenever possible. This helps keep your hearing from getting worse.  Always wear hearing protection around loud noises.  Wear a hearing aid as directed.  A professional can help you pick a hearing aid that will work best for you.  You can also get hearing aids over the counter for mild to moderate hearing loss.  Have hearing tests as your doctor suggests. They can show whether your hearing has changed. Your hearing aid may need to be adjusted.  Use other devices as needed. These may include:  Telephone amplifiers and hearing aids that can connect to a television, stereo, radio, or microphone.  Devices that use lights or vibrations. These alert you to the doorbell, a ringing telephone, or a baby monitor.  Television closed-captioning. This shows the words at the bottom of the screen. Most new TVs can do this.  TTY (text telephone). This lets you type messages back and forth on the telephone instead of talking or listening. These devices are also called TDD. When messages are typed on the keyboard, they are sent over the phone line to a receiving TTY. The message is shown on a monitor.  Use text messaging, social media, and email if it is hard for you to communicate by telephone.  Try to learn a listening technique called speechreading. It is  "not lipreading. You pay attention to people's gestures, expressions, posture, and tone of voice. These clues can help you understand what a person is saying. Face the person you are talking to, and have them face you. Make sure the lighting is good. You need to see the other person's face clearly.  Think about counseling if you need help to adjust to your hearing loss.  When should you call for help?  Watch closely for changes in your health, and be sure to contact your doctor if:    You think your hearing is getting worse.     You have new symptoms, such as dizziness or nausea.   Where can you learn more?  Go to https://www.[employer].net/patiented  Enter R798 in the search box to learn more about \"Hearing Loss: Care Instructions.\"  Current as of: October 27, 2024  Content Version: 14.4    5698-5284 [employer].   Care instructions adapted under license by your healthcare professional. If you have questions about a medical condition or this instruction, always ask your healthcare professional. [employer] disclaims any warranty or liability for your use of this information.    Bladder Training: Care Instructions  Your Care Instructions     Bladder training is used to treat urge incontinence and stress incontinence. Urge incontinence means that the need to urinate comes on so fast that you can't get to a toilet in time. Stress incontinence means that you leak urine because of pressure on your bladder. For example, it may happen when you laugh, cough, or lift something heavy.  Bladder training can increase how long you can wait before you have to urinate. It can also help your bladder hold more urine. And it can give you better control over the urge to urinate.  It is important to remember that bladder training takes a few weeks to a few months to make a difference. You may not see results right away, but don't give up.  Follow-up care is a key part of your treatment and safety. Be sure to make " and go to all appointments, and call your doctor if you are having problems. It's also a good idea to know your test results and keep a list of the medicines you take.  How can you care for yourself at home?  Work with your doctor to come up with a bladder training program that is right for you. You may use one or more of the following methods.  Delayed urination  In the beginning, try to keep from urinating for 5 minutes after you first feel the need to go.  While you wait, take deep, slow breaths to relax. Kegel exercises can also help you delay the need to go to the bathroom.  After some practice, when you can easily wait 5 minutes to urinate, try to wait 10 minutes before you urinate.  Slowly increase the waiting period until you are able to control when you have to urinate.  Scheduled urination  Empty your bladder when you first wake up in the morning.  Schedule times throughout the day when you will urinate.  Start by going to the bathroom every hour, even if you don't need to go.  Slowly increase the time between trips to the bathroom.  When you have found a schedule that works well for you, keep doing it.  If you wake up during the night and have to urinate, do it. Apply your schedule to waking hours only.  Kegel exercises  These tighten and strengthen pelvic muscles, which can help you control the flow of urine. (If doing these exercises causes pain, stop doing them and talk with your doctor.) To do Kegel exercises:  Squeeze your muscles as if you were trying not to pass gas. Or squeeze your muscles as if you were stopping the flow of urine. Your belly, legs, and buttocks shouldn't move.  Hold the squeeze for 3 seconds, then relax for 5 to 10 seconds.  Start with 3 seconds, then add 1 second each week until you are able to squeeze for 10 seconds.  Repeat the exercise 10 times a session. Do 3 to 8 sessions a day.  When should you call for help?  Watch closely for changes in your health, and be sure to  "contact your doctor if:    Your incontinence is getting worse.     You do not get better as expected.   Where can you learn more?  Go to https://www.[employer].net/patiented  Enter V684 in the search box to learn more about \"Bladder Training: Care Instructions.\"  Current as of: April 30, 2024  Content Version: 14.4    9105-4754 [employer].   Care instructions adapted under license by your healthcare professional. If you have questions about a medical condition or this instruction, always ask your healthcare professional. [employer] disclaims any warranty or liability for your use of this information.    9 Ways to Cut Back on Drinking  Maybe you've found yourself drinking more alcohol than you'd prefer. If you want to cut back, here are some ideas to try.    Think before you drink.  Do you really want a drink, or is it just a habit? If you're used to having a drink at a certain time, try doing something else then.     Look for substitutes.  Find some no-alcohol drinks that you enjoy, like flavored seltzer water, tea with honey, or tonic with a slice of lime. Or try alcohol-free beer or \"virgin\" cocktails (without the alcohol).     Drink more water.  Use water to quench your thirst. Drink a glass of water before you have any alcohol. Have another glass along with every drink or between drinks.     Shrink your drink.  For example, have a bottle of beer instead of a pint. Use a smaller glass for wine. Choose drinks with lower alcohol content (ABV%). Or use less liquor and more mixer in cocktails.     Slow down.  It's easy to drink quickly and without thinking about it. Pay attention, and make each drink last longer.     Do the math.  Total up how much you spend on alcohol each month. How much is that a year? If you cut back, what could you do with the money you save?     Take a break.  Choose a day or two each week when you won't drink at all. Notice how you feel on those days, physically and " "emotionally. How did you sleep? Do you feel better? Over time, add more break days.     Count calories.  Would you like to lose some weight? For some people that's a good motivator for cutting back. Figure out how many calories are in each drink. How many does that add up to in a day? In a week? In a month?     Practice saying no.  Be ready when someone offers you a drink. Try: \"Thanks, I've had enough.\" Or \"Thanks, but I'm cutting back.\" Or \"No, thanks. I feel better when I drink less.\"   Current as of: August 20, 2024  Content Version: 14.4    [id number] [employer].   Care instructions adapted under license by your healthcare professional. If you have questions about a medical condition or this instruction, always ask your healthcare professional. [employer] disclaims any warranty or liability for your use of this information.     "

## 2025-05-21 NOTE — RESULT ENCOUNTER NOTE
Sodium is a bit low.  Blood sugar was a bit high, your sodium (when corrected for the mildly elevated blood sugar) is 133.  So still a bit low, but not terrible. This seems to be chronic over time.     Cholesterol looks good.   Thyroid is in normal range.     Britany Norton M.D.

## 2025-05-21 NOTE — PROGRESS NOTES
"Preventive Care Visit  [facility]  Britany Norton MD, Family Medicine  May 21, 2025      Assessment & Plan     Encounter for Medicare annual wellness exam       Other specified hypothyroidism  Clinically euthyroid  - levothyroxine (SYNTHROID/LEVOTHROID) 50 MCG tablet; Take 1 tablet (50 mcg) by mouth daily.  - TSH with free T4 reflex; Future  - TSH with free T4 reflex    Essential hypertension with goal blood pressure less than 140/90  Well controlled  - lisinopril-hydrochlorothiazide (ZESTORETIC) 10-12.5 MG tablet; Take 1 tablet by mouth daily.  - Comprehensive metabolic panel (BMP + Alb, Alk Phos, ALT, AST, Total. Bili, TP); Future  - Comprehensive metabolic panel (BMP + Alb, Alk Phos, ALT, AST, Total. Bili, TP)    Hyperlipidemia LDL goal <130     - simvastatin (ZOCOR) 20 MG tablet; Take 1 tablet (20 mg) by mouth at bedtime.  - Comprehensive metabolic panel (BMP + Alb, Alk Phos, ALT, AST, Total. Bili, TP); Future  - Lipid panel reflex to direct LDL Non-fasting; Future  - Comprehensive metabolic panel (BMP + Alb, Alk Phos, ALT, AST, Total. Bili, TP)  - Lipid panel reflex to direct LDL Non-fasting    Elevated fasting glucose     - Hemoglobin A1c; Future  - Hemoglobin A1c    Patient has been advised of split billing requirements and indicates understanding: Yes        BMI  Estimated body mass index is 31.04 kg/m  as calculated from the following:    Height as of this encounter: 1.651 m (5' 5\").    Weight as of this encounter: 84.6 kg (186 lb 8 oz).   Weight management plan: Patient referred to endocrine and/or weight management specialty    Counseling  Appropriate preventive services were addressed with this patient via screening, questionnaire, or discussion as appropriate for fall prevention, nutrition, physical activity, Tobacco-use cessation, social engagement, weight loss and cognition.  Checklist reviewing preventive services available has been given to the patient.  Reviewed " patient's diet, addressing concerns and/or questions.   The patient was instructed to see the dentist every 6 months.   The patient reports drinking more than 3 alcoholic drinks per day and/or more than 7 drhnks per week. The patient was counseled and given information about possible harmful effects of excessive alcohol intake.The patient was provided with written information regarding signs of hearing loss.   Information on urinary incontinence and treatment options given to patient.           Audrey Malik is a 69 year old, presenting for the following:  Medicare Visit        5/21/2025    11:15 AM   Additional Questions   Roomed by Stacy FLEMING CMA   Accompanied by Self          HPI     - Her  was recently dx throat cancer, his next appt is this Friday to find out more information    Hyperlipidemia Follow-Up  Simvastatin 20mg qhs  Are you regularly taking any medication or supplement to lower your cholesterol?   Yes- statin  Are you having muscle aches or other side effects that you think could be caused by your cholesterol lowering medication?  No    Hypertension Follow-up  Lisinopril-hydrochlorothiazide 10-12.5mg qd  Do you check your blood pressure regularly outside of the clinic? No   Are you following a low salt diet? No  Are your blood pressures ever more than 140 on the top number (systolic) OR more   than 90 on the bottom number (diastolic), for example 140/90? N/A    BP Readings from Last 6 Encounters:   05/21/25 139/70   05/13/25 132/70   05/12/25 127/59   01/14/25 126/78   12/27/24 (!) 158/78   10/09/24 110/61     Hypothyroidism Follow-up  Levothyroxine 50mcg qd  Since last visit, patient describes the following symptoms: hair loss    TSH   Date Value Ref Range Status   08/07/2024 2.15 0.30 - 4.20 uIU/mL Final   04/05/2021 1.30 0.40 - 4.00 mU/L Final     T4 Free   Date Value Ref Range Status   09/11/2014 0.95 0.76 - 1.46 ng/dL Final     Comment:     Effective 7/30/2014, the reference range for this  assay has changed to reflect   new instrumentation/methodology.         Advance Care Planning    Discussed advance care planning with patient; however, patient declined at this time.        5/20/2025   General Health   How would you rate your overall physical health? (!) FAIR   Feel stress (tense, anxious, or unable to sleep) Not at all         5/20/2025   Nutrition   Diet: Regular (no restrictions)         5/20/2025   Exercise   Days per week of moderate/strenous exercise 0 days   Average minutes spent exercising at this level 0 min   (!) EXERCISE CONCERN      5/20/2025   Social Factors   Frequency of gathering with friends or relatives Twice a week   Worry food won't last until get money to buy more No   Food not last or not have enough money for food? No   Do you have housing? (Housing is defined as stable permanent housing and does not include staying outside in a car, in a tent, in an abandoned building, in an overnight shelter, or couch-surfing.) Yes   Are you worried about losing your housing? No   Lack of transportation? No   Unable to get utilities (heat,electricity)? Patient declined         5/20/2025   Fall Risk   Fallen 2 or more times in the past year? No   Trouble with walking or balance? No          5/20/2025   Activities of Daily Living- Home Safety   Needs help with the following daily activites None of the above   Safety concerns in the home None of the above         5/20/2025   Dental   Dentist two times every year? (!) NO         5/20/2025   Hearing Screening   Hearing concerns? (!) I NEED TO ASK PEOPLE TO SPEAK UP OR REPEAT THEMSELVES.         5/20/2025   Driving Risk Screening   Patient/family members have concerns about driving No         5/20/2025   General Alertness/Fatigue Screening   Have you been more tired than usual lately? No         5/20/2025   Urinary Incontinence Screening   Bothered by leaking urine in past 6 months Yes         Today's PHQ-2 Score:       5/20/2025    10:04 AM    PHQ-2 (  [employer])   Q1: Little interest or pleasure in doing things 0   Q2: Feeling down, depressed or hopeless 0   PHQ-2 Score 0    Q1: Little interest or pleasure in doing things Not at all   Q2: Feeling down, depressed or hopeless Not at all   PHQ-2 Score 0       Patient-reported           2025   Substance Use   Alcohol more than 3/day or more than 7/wk Yes   How often do you have a drink containing alcohol 2 to 3 times a week   How many alcohol drinks on typical day 3 or 4   How often do you have 5+ drinks at one occasion Less than monthly   Audit 2/3 Score 2   How often not able to stop drinking once started Never   How often failed to do what normally expected Never   How often needed first drink in am after a heavy drinking session Never   How often feeling of guilt or remorse after drinking Never   How often unable to remember what happened the night before Never   Have you or someone else been injured because of your drinking No   Has anyone been concerned or suggested you cut down on drinking No   TOTAL SCORE - AUDIT 5   Do you have a current opioid prescription? No   How severe/bad is pain from 1 to 10? 3/10   Do you use any other substances recreationally? No     Social History     Tobacco Use    Smoking status: Former     Current packs/day: 0.00     Types: Cigarettes     Start date: 1975     Quit date: 2000     Years since quittin.0     Passive exposure: Never    Smokeless tobacco: Never   Vaping Use    Vaping status: Never Used   Substance Use Topics    Alcohol use: Yes     Comment: 6 pack of beer on a weekend    Drug use: No           2023   LAST FHS-7 RESULTS   1st degree relative breast or ovarian cancer No   Any relative bilateral breast cancer No   Any male have breast cancer No   Any ONE woman have BOTH breast AND ovarian cancer No   Any woman with breast cancer before 50yrs No   2 or more relatives with breast AND/OR ovarian cancer No   2 or more relatives with breast  AND/OR bowel cancer No        Mammogram Screening - Mammogram every 1-2 years updated in Health Maintenance based on mutual decision making    ASCVD Risk   The 10-year ASCVD risk score (Arline FELDMAN, et al., 2019) is: 14.6%    Values used to calculate the score:      Age: 69 years      Sex: Female      Is Non- : No      Diabetic: No      Tobacco smoker: No      Systolic Blood Pressure: 139 mmHg      Is BP treated: Yes      HDL Cholesterol: 66 mg/dL      Total Cholesterol: 141 mg/dL            Reviewed and updated as needed this visit by Provider                      Current providers sharing in care for this patient include:  Patient Care Team:  Britany Norton MD as PCP - General (Family Practice)  Britany Norton MD as Assigned PCP  Nasir Baltazar MD as Assigned Rheumatology Provider  Cl Pearl MD as MD (Ophthalmology)  Allison Osullivan PA-C as Physician Assistant (Urology)  Allison Osullivan PA-C as Assigned Surgical Provider  Pankaj Castro MD as Assigned Dermatology Provider    The following health maintenance items are reviewed in Epic and correct as of today:  Health Maintenance   Topic Date Due    RSV VACCINE (1 - Risk 60-74 years 1-dose series) Never done    COVID-19 Vaccine (8 - Pfizer risk 2024-25 season) 04/14/2025    MAMMO SCREENING  05/17/2025    ANNUAL REVIEW OF HM ORDERS  07/15/2025    TSH W/FREE T4 REFLEX  08/07/2025    BMP  04/15/2026    LIPID  04/15/2026    MEDICARE ANNUAL WELLNESS VISIT  05/21/2026    FALL RISK ASSESSMENT  05/21/2026    COLORECTAL CANCER SCREENING  05/30/2027    DTAP/TDAP/TD IMMUNIZATION (3 - Td or Tdap) 02/01/2028    DIABETES SCREENING  04/15/2028    ADVANCE CARE PLANNING  05/21/2030    DEXA  05/06/2040    HEPATITIS C SCREENING  Completed    MIGRAINE ACTION PLAN  Completed    PHQ-2 (once per calendar year)  Completed    INFLUENZA VACCINE  Completed    Pneumococcal Vaccine: 50+ Years  Completed    ZOSTER IMMUNIZATION   "Completed    HPV IMMUNIZATION  Aged Out    MENINGITIS IMMUNIZATION  Aged Out         Review of Systems  Constitutional, HEENT, cardiovascular, pulmonary, gi and gu systems are negative, except as otherwise noted.     Objective    Exam  /70   Pulse 91   Temp 98.9  F (37.2  C) (Tympanic)   Resp 22   Ht 1.651 m (5' 5\")   Wt 84.6 kg (186 lb 8 oz)   LMP 11/12/2004 (Approximate)   SpO2 94%   BMI 31.04 kg/m     Estimated body mass index is 31.04 kg/m  as calculated from the following:    Height as of this encounter: 1.651 m (5' 5\").    Weight as of this encounter: 84.6 kg (186 lb 8 oz).    Physical Exam  GENERAL: alert and no distress  NECK: no adenopathy, no asymmetry, masses, or scars  RESP: lungs clear to auscultation - no rales, rhonchi or wheezes  CV: regular rate and rhythm, normal S1 S2, no S3 or S4, no murmur, click or rub, no peripheral edema  ABDOMEN: soft, nontender, no hepatosplenomegaly, no masses and bowel sounds normal  MS: no gross musculoskeletal defects noted, no edema         5/21/2025   Mini Cog   Clock Draw Score 2 Normal   3 Item Recall 2 objects recalled   Mini Cog Total Score 4              Signed Electronically by: Britany Norton MD    "

## 2025-05-21 NOTE — LETTER
May 27, 2025      Helena Meléndez  [address] [place] [address]        Dear ,    We are writing to inform you of your test results.    Sodium is a bit low.  Blood sugar was a bit high, your sodium (when corrected for the mildly elevated blood sugar) is 133.  So still a bit low, but not terrible. This seems to be chronic over time.      Cholesterol looks good. Thyroid is in normal range.     Resulted Orders   TSH with free T4 reflex   Result Value Ref Range    TSH 1.95 0.30 - 4.20 uIU/mL   Comprehensive metabolic panel (BMP + Alb, Alk Phos, ALT, AST, Total. Bili, TP)   Result Value Ref Range    Sodium 132 (L) 135 - 145 mmol/L    Potassium 3.9 3.4 - 5.3 mmol/L    Carbon Dioxide (CO2) 27 22 - 29 mmol/L    Anion Gap 14 7 - 15 mmol/L    Urea Nitrogen 8.7 8.0 - 23.0 mg/dL    Creatinine 0.57 0.51 - 0.95 mg/dL    GFR Estimate >90 >60 mL/min/1.73m2      Comment:      eGFR calculated using 2021 CKD-EPI equation.    Calcium 9.0 8.8 - 10.4 mg/dL    Chloride 91 (L) 98 - 107 mmol/L    Glucose 153 (H) 70 - 99 mg/dL    Alkaline Phosphatase 107 40 - 150 U/L    AST 31 0 - 45 U/L    ALT 41 0 - 50 U/L    Protein Total 6.8 6.4 - 8.3 g/dL    Albumin 3.7 3.5 - 5.2 g/dL    Bilirubin Total 0.7 <=1.2 mg/dL    Patient Fasting > 8hrs? No    Lipid panel reflex to direct LDL Non-fasting   Result Value Ref Range    Cholesterol 139 <200 mg/dL    Triglycerides 53 <150 mg/dL    Direct Measure HDL 57 >=50 mg/dL    LDL Cholesterol Calculated 71 <100 mg/dL    Non HDL Cholesterol 82 <130 mg/dL    Patient Fasting > 8hrs? No     Narrative    Cholesterol  Desirable: < 200 mg/dL  Borderline High: 200 - 239 mg/dL  High: >= 240 mg/dL    Triglycerides  Normal: < 150 mg/dL  Borderline High: 150 - 199 mg/dL  High: 200-499 mg/dL  Very High: >= 500 mg/dL    Direct Measure HDL  Female: >= 50 mg/dL   Male: >= 40 mg/dL    LDL Cholesterol  Desirable: < 100 mg/dL  Above Desirable: 100 - 129 mg/dL   Borderline High: 130 - 159 mg/dL   High:  160 - 189  mg/dL   Very High: >= 190 mg/dL    Non HDL Cholesterol  Desirable: < 130 mg/dL  Above Desirable: 130 - 159 mg/dL  Borderline High: 160 - 189 mg/dL  High: 190 - 219 mg/dL  Very High: >= 220 mg/dL   Hemoglobin A1c   Result Value Ref Range    Estimated Average Glucose 117 (H) <117 mg/dL    Hemoglobin A1C 5.7 (H) 0.0 - 5.6 %      Comment:      Normal <5.7%   Prediabetes 5.7-6.4%    Diabetes 6.5% or higher     Note: Adopted from ADA consensus guidelines.       If you have any questions or concerns, please call the clinic at the number listed above.       Sincerely,          Electronically signed

## 2025-06-11 ENCOUNTER — ANCILLARY PROCEDURE (OUTPATIENT)
Dept: MAMMOGRAPHY | Facility: CLINIC | Age: 70
End: 2025-06-11
Attending: FAMILY MEDICINE
Payer: COMMERCIAL

## 2025-06-11 DIAGNOSIS — Z12.31 VISIT FOR SCREENING MAMMOGRAM: ICD-10-CM

## 2025-06-11 PROCEDURE — 77067 SCR MAMMO BI INCL CAD: CPT | Mod: TC | Performed by: STUDENT IN AN ORGANIZED HEALTH CARE EDUCATION/TRAINING PROGRAM

## 2025-06-11 PROCEDURE — 77063 BREAST TOMOSYNTHESIS BI: CPT | Mod: TC | Performed by: STUDENT IN AN ORGANIZED HEALTH CARE EDUCATION/TRAINING PROGRAM

## 2025-06-18 DIAGNOSIS — N39.0 RECURRENT UTI: ICD-10-CM

## 2025-06-18 RX ORDER — METHENAMINE HIPPURATE 1000 MG/1
1 TABLET ORAL 2 TIMES DAILY
Qty: 180 TABLET | Refills: 1 | Status: SHIPPED | OUTPATIENT
Start: 2025-06-18

## 2025-06-18 NOTE — TELEPHONE ENCOUNTER
Patient seen on 5/12/25. Per note:    Plan:  Continue Vagifem suppositories two nights per week.   Continue methenamine 1 g BID. Start vitamin C 500-1000 mg twice daily.   Cystoscopy given potential suspicion for fistula. Will obtain urine culture 5 days prior.       Refilled methenamine per specialty scope of practice.    Christal Cope RN on 6/18/2025 at 11:56 AM

## 2025-06-18 NOTE — TELEPHONE ENCOUNTER
Requested Prescriptions   Pending Prescriptions Disp Refills    methenamine hippurate (HIPREX) 1 g tablet 180 tablet 1     Sig: Take 1 tablet (1 g) by mouth 2 times daily.       There is no refill protocol information for this order          Last office visit: 5/12/2025 ; last virtual visit: Visit date not found with prescribing provider:  Allison Osullivan     Future Office Visit:        Cheryle Ma   Clinic Station    [facility]  [phone]

## 2025-06-25 ENCOUNTER — TELEPHONE (OUTPATIENT)
Dept: UROLOGY | Facility: CLINIC | Age: 70
End: 2025-06-25

## 2025-06-25 ENCOUNTER — RESULTS FOLLOW-UP (OUTPATIENT)
Dept: FAMILY MEDICINE | Facility: CLINIC | Age: 70
End: 2025-06-25

## 2025-06-25 ENCOUNTER — E-VISIT (OUTPATIENT)
Dept: FAMILY MEDICINE | Facility: CLINIC | Age: 70
End: 2025-06-25
Payer: COMMERCIAL

## 2025-06-25 ENCOUNTER — LAB (OUTPATIENT)
Dept: LAB | Facility: CLINIC | Age: 70
End: 2025-06-25
Payer: COMMERCIAL

## 2025-06-25 DIAGNOSIS — N39.0 URINARY TRACT INFECTION, ACUTE: ICD-10-CM

## 2025-06-25 DIAGNOSIS — R30.0 DYSURIA: Primary | ICD-10-CM

## 2025-06-25 DIAGNOSIS — R30.0 DYSURIA: ICD-10-CM

## 2025-06-25 LAB
ALBUMIN UR-MCNC: NEGATIVE MG/DL
APPEARANCE UR: ABNORMAL
BACTERIA #/AREA URNS HPF: ABNORMAL /HPF
BILIRUB UR QL STRIP: NEGATIVE
COLOR UR AUTO: YELLOW
GLUCOSE UR STRIP-MCNC: NEGATIVE MG/DL
HGB UR QL STRIP: ABNORMAL
KETONES UR STRIP-MCNC: NEGATIVE MG/DL
LEUKOCYTE ESTERASE UR QL STRIP: ABNORMAL
NITRATE UR QL: POSITIVE
PH UR STRIP: 7 [PH] (ref 5–7)
RBC #/AREA URNS AUTO: ABNORMAL /HPF
SP GR UR STRIP: 1.01 (ref 1–1.03)
SQUAMOUS #/AREA URNS AUTO: ABNORMAL /LPF
UROBILINOGEN UR STRIP-ACNC: 0.2 E.U./DL
WBC #/AREA URNS AUTO: ABNORMAL /HPF

## 2025-06-25 PROCEDURE — 81001 URINALYSIS AUTO W/SCOPE: CPT

## 2025-06-25 PROCEDURE — 87086 URINE CULTURE/COLONY COUNT: CPT

## 2025-06-25 PROCEDURE — 87186 SC STD MICRODIL/AGAR DIL: CPT

## 2025-06-25 RX ORDER — CIPROFLOXACIN 250 MG/1
250 TABLET, FILM COATED ORAL 2 TIMES DAILY
Qty: 10 TABLET | Refills: 0 | Status: SHIPPED | OUTPATIENT
Start: 2025-06-25 | End: 2025-06-30

## 2025-06-25 NOTE — TELEPHONE ENCOUNTER
Spoke with patient and read Allison's message to her. She states that the 5 days of antibx treatment usually does the trick for her UTI's. She would like to play it by ear. States she will call us on Monday, 6/30, if she feels like her sx's aren't improving and then may want to extend her antibx's as Allison mentioned.  Christal Cope RN on 6/25/2025 at 2:58 PM

## 2025-06-25 NOTE — TELEPHONE ENCOUNTER
Patient had a UA/UC done thru her PCP and was told to start:     ciprofloxacin (CIPRO) 250 MG tablet 10 tablet 0 6/25/2025 6/30/2025 No   Sig - Route: Take 1 tablet (250 mg) by mouth 2 times daily for 5 days.     (Final culture still pending)    Patient is scheduled for a Cysto on 7/2/25. Ok to keep as scheduled or should she reschedule this appointment?    Christal Cope RN on 6/25/2025 at 2:40 PM

## 2025-06-25 NOTE — TELEPHONE ENCOUNTER
[facility] Call Center    Phone Message    May a detailed message be left on voicemail: yes     Reason for Call: pt is needing a call back to discuss UTI. Pt had a urine test done today and would like to discuss if she is able to have the procedure 07/02/25 r will this need to be pushed out/rescheduled? Please call pt back to discuss. Pt needs to know within 24 hours. Thanks     Action Taken: Message routed to:  Other: [place] uro    Travel Screening: Not Applicable     Date of Service:

## 2025-06-25 NOTE — TELEPHONE ENCOUNTER
She can keep her cysto appointment as long as UTI symptoms are resolved. We could extend her abx course through July 2 to make sure she doesn't run into issues with infection if she would prefer.

## 2025-06-26 LAB — BACTERIA UR CULT: ABNORMAL

## 2025-07-02 ENCOUNTER — OFFICE VISIT (OUTPATIENT)
Dept: UROLOGY | Facility: CLINIC | Age: 70
End: 2025-07-02
Payer: COMMERCIAL

## 2025-07-02 VITALS
DIASTOLIC BLOOD PRESSURE: 78 MMHG | WEIGHT: 186 LBS | BODY MASS INDEX: 30.99 KG/M2 | OXYGEN SATURATION: 96 % | HEIGHT: 65 IN | HEART RATE: 68 BPM | SYSTOLIC BLOOD PRESSURE: 137 MMHG

## 2025-07-02 DIAGNOSIS — N39.0 RECURRENT UTI: Primary | ICD-10-CM

## 2025-07-02 RX ORDER — CIPROFLOXACIN 500 MG/1
500 TABLET, FILM COATED ORAL ONCE
Status: CANCELLED | OUTPATIENT
Start: 2025-07-02 | End: 2025-07-02

## 2025-07-02 RX ORDER — CIPROFLOXACIN 500 MG/1
500 TABLET, FILM COATED ORAL ONCE
Status: COMPLETED | OUTPATIENT
Start: 2025-07-02 | End: 2025-07-02

## 2025-07-02 RX ADMIN — CIPROFLOXACIN 500 MG: 500 TABLET, FILM COATED ORAL at 09:30

## 2025-07-02 ASSESSMENT — PAIN SCALES - GENERAL: PAINLEVEL_OUTOF10: NO PAIN (0)

## 2025-07-02 NOTE — PROGRESS NOTES
Reason for cystoscopy: evaluate for fistula    Brief History:  Mine Meléndez is a very pleasant AGE: 70 year old year old person HLD, HTN, and rheumatoid arthritis   followed with urology for frequent UTIs. She uses Vagifem suppositories and was later started on methenamine.      5/12/2025  two UTIs since starting methenamine. She is consistent with the vaginal estrogen suppositories and methenamine.   Review of previous imaging shows air in the bladder, with potential concern for a colo-vesical fistula given history of diverticulitis and colo-colonic fistula.     The following distinct labs were reviewed   6/25/2025 Urine culture with E Coli pan sensitive    Imaging  12/12/2024  IMPRESSION:   1.  Findings suggestive of cystitis with single focus of internal gas  within the bladder. While this can be seen in the setting of urinary  tract infection, given prior diverticulitis and colo-colonic fistula  in this region, colovesicular fistula is possible, although not  definitively visualized on today's exam.  2.  Nonobstructing 2 mm stone in the lower pole of the left kidney. No  ureterolithiasis or hydronephrosis.          CYSTOSCOPY  After informed consent was obtained, the patient was brought to the procedure room where she was placed in the lithotomy position with all pressure points well padded.  She was prepped and draped in a sterile fashion. A flexible cystoscope was introduced through a well-lubricated urethra.  The bladder was examined carefully and systematically. The scope was retroflexed. There were no tumors or stones present in the bladder.  There is no evidence of any erythema or concerning fistulous tract in the bladder after thorough examination.  There is some generalized erythema which I think is more related to her recurrent urinary tract infections  The scope was removed slowly to examine the urethra which was normal.     PLAN:  70-year-old female who is presenting for cystoscopy due to recurrent  urinary tract infection and a small droplet of air in the bladder near the dome to evaluate for fistula.    #1 recurrent urinary tract infection  Cystoscopy today was unremarkable and thorough examination did not reveal any evidence of a fistula  She is already on Hip-Negro and vaginal estrogen and vitamin C.  She takes a daily probiotic  I think next step's if she continues getting infection will be a prolonged course of prophylactic antibiotic along with a probiotic to see if we can reset the normal genitourinary sharifa and if that fails we could try to remove the nonobstructing stone in the lower pole of the left kidney to see if that helps  We will update referring provider Allison Su MD

## 2025-07-15 ENCOUNTER — OFFICE VISIT (OUTPATIENT)
Dept: RHEUMATOLOGY | Facility: CLINIC | Age: 70
End: 2025-07-15
Payer: COMMERCIAL

## 2025-07-15 VITALS
BODY MASS INDEX: 30.99 KG/M2 | DIASTOLIC BLOOD PRESSURE: 56 MMHG | HEART RATE: 78 BPM | TEMPERATURE: 97.7 F | SYSTOLIC BLOOD PRESSURE: 116 MMHG | OXYGEN SATURATION: 96 % | WEIGHT: 186.2 LBS

## 2025-07-15 DIAGNOSIS — M05.9 SEROPOSITIVE RHEUMATOID ARTHRITIS (H): Primary | ICD-10-CM

## 2025-07-15 DIAGNOSIS — Z79.899 HIGH RISK MEDICATION USE: ICD-10-CM

## 2025-07-15 PROCEDURE — 3074F SYST BP LT 130 MM HG: CPT | Performed by: INTERNAL MEDICINE

## 2025-07-15 PROCEDURE — 99214 OFFICE O/P EST MOD 30 MIN: CPT | Performed by: INTERNAL MEDICINE

## 2025-07-15 PROCEDURE — G2211 COMPLEX E/M VISIT ADD ON: HCPCS | Performed by: INTERNAL MEDICINE

## 2025-07-15 PROCEDURE — 3078F DIAST BP <80 MM HG: CPT | Performed by: INTERNAL MEDICINE

## 2025-07-15 RX ORDER — FOLIC ACID 1 MG/1
1000 TABLET ORAL DAILY
Qty: 90 TABLET | Refills: 2 | Status: SHIPPED | OUTPATIENT
Start: 2025-07-15

## 2025-07-15 RX ORDER — METHOTREXATE 2.5 MG/1
25 TABLET ORAL WEEKLY
Qty: 130 TABLET | Refills: 2 | Status: SHIPPED | OUTPATIENT
Start: 2025-07-15

## 2025-07-15 NOTE — PATIENT INSTRUCTIONS
RHEUMATOLOGY    [facility]  [address]  [place] [address]    Phone number: [phone]  Fax number: [phone]    If you need a medication refill, please contact us as you may need lab work and/or a follow up visit prior to your refill.      Thank you for choosing [facility]!    CLARITA Isaac RN 7/15/2025 12:28 PM

## 2025-08-19 ENCOUNTER — E-VISIT (OUTPATIENT)
Dept: FAMILY MEDICINE | Facility: CLINIC | Age: 70
End: 2025-08-19
Payer: COMMERCIAL

## 2025-08-19 DIAGNOSIS — L30.9 ECZEMA, UNSPECIFIED TYPE: Primary | ICD-10-CM

## 2025-08-19 RX ORDER — MOMETASONE FUROATE 1 MG/G
OINTMENT TOPICAL 2 TIMES DAILY
Qty: 45 G | Refills: 1 | Status: SHIPPED | OUTPATIENT
Start: 2025-08-19

## 2025-08-25 ENCOUNTER — LAB (OUTPATIENT)
Dept: LAB | Facility: CLINIC | Age: 70
End: 2025-08-25
Payer: COMMERCIAL

## 2025-08-25 ENCOUNTER — E-VISIT (OUTPATIENT)
Dept: FAMILY MEDICINE | Facility: CLINIC | Age: 70
End: 2025-08-25
Payer: COMMERCIAL

## 2025-08-25 DIAGNOSIS — N39.0 URINARY TRACT INFECTION, ACUTE: ICD-10-CM

## 2025-08-25 DIAGNOSIS — R30.0 DYSURIA: ICD-10-CM

## 2025-08-25 DIAGNOSIS — R30.0 DYSURIA: Primary | ICD-10-CM

## 2025-08-25 LAB
ALBUMIN UR-MCNC: NEGATIVE MG/DL
APPEARANCE UR: CLEAR
BACTERIA #/AREA URNS HPF: ABNORMAL /HPF
BILIRUB UR QL STRIP: NEGATIVE
COLOR UR AUTO: YELLOW
GLUCOSE UR STRIP-MCNC: NEGATIVE MG/DL
HGB UR QL STRIP: ABNORMAL
KETONES UR STRIP-MCNC: NEGATIVE MG/DL
LEUKOCYTE ESTERASE UR QL STRIP: ABNORMAL
NITRATE UR QL: NEGATIVE
PH UR STRIP: 6.5 [PH] (ref 5–7)
RBC #/AREA URNS AUTO: ABNORMAL /HPF
SP GR UR STRIP: 1.01 (ref 1–1.03)
SQUAMOUS #/AREA URNS AUTO: ABNORMAL /LPF
UROBILINOGEN UR STRIP-ACNC: 0.2 E.U./DL
WBC #/AREA URNS AUTO: ABNORMAL /HPF

## 2025-08-25 PROCEDURE — 81001 URINALYSIS AUTO W/SCOPE: CPT

## 2025-08-25 PROCEDURE — 87086 URINE CULTURE/COLONY COUNT: CPT

## 2025-08-25 RX ORDER — CIPROFLOXACIN 250 MG/1
250 TABLET, FILM COATED ORAL 2 TIMES DAILY
Qty: 10 TABLET | Refills: 0 | Status: SHIPPED | OUTPATIENT
Start: 2025-08-25

## 2025-08-26 LAB — BACTERIA UR CULT: NORMAL

## (undated) DEVICE — DRSG XEROFORM 1X8"

## (undated) DEVICE — PACK MINOR EYE

## (undated) DEVICE — DRSG JUMPSTART ANTIMICROBIAL 4X4" ABS-4004

## (undated) DEVICE — SYR 10ML LL W/O NDL

## (undated) DEVICE — DRSG GAUZE 4X4" TRAY

## (undated) DEVICE — DRSG TELFA 2X3"

## (undated) DEVICE — GLOVE PROTEXIS BLUE W/NEU-THERA 6.5  2D73EB65

## (undated) DEVICE — GEL ULTRASOUND AQUASONIC 20GM 01-01

## (undated) DEVICE — GLOVE PROTEXIS BLUE W/NEU-THERA 8.0  2D73EB80

## (undated) DEVICE — DRAPE EXTREMITY W/ARMBOARD 29405

## (undated) DEVICE — GOWN LG DISP 9515

## (undated) DEVICE — SOL WATER IRRIG 1000ML BOTTLE 07139-09

## (undated) DEVICE — PREP CHLORAPREP 26ML TINTED ORANGE  260815

## (undated) DEVICE — DRAPE SHEET REV FOLD 3/4 9349

## (undated) DEVICE — DRAPE STOCKINETTE IMPERVIOUS 08" LATEX 1586

## (undated) DEVICE — ESU ELEC NDL 1" COATED/INSULATED E1465

## (undated) DEVICE — SYR 03ML LL W/O NDL

## (undated) DEVICE — SU ETHILON 4-0 PS-2 18" 1667G

## (undated) DEVICE — PACK EXTREMITY LATEX FREE SOP32HFFCS

## (undated) DEVICE — GLOVE PROTEXIS W/NEU-THERA 6.5  2D73TE65

## (undated) DEVICE — ESU EYE HIGH TEMP 65410-183

## (undated) DEVICE — BNDG ELASTIC 3"X5YDS UNSTERILE 6611-30

## (undated) DEVICE — DECANTER VIAL 2006S

## (undated) DEVICE — GLOVE PROTEXIS W/NEU-THERA 8.0  2D73TE80

## (undated) DEVICE — NDL 30GA 0.5" 305106

## (undated) DEVICE — GOWN XLG DISP 9545

## (undated) DEVICE — CAST PADDING 4" STERILE 9044S

## (undated) DEVICE — NDL 18GA 1.5" 305196

## (undated) DEVICE — SU VICRYL 3-0 SH 27" UND J416H

## (undated) DEVICE — SU VICRYL 6-0 P-1 18" UND J489G

## (undated) DEVICE — SU PLAIN 6-0 G-1DA 18" 770G

## (undated) DEVICE — MARKER SKIN DOUBLE TIP W/FLEXI-RULER W/LABELS

## (undated) DEVICE — NDL 25GA 1.5" 305127

## (undated) DEVICE — PREP PAD ALCOHOL 6818

## (undated) DEVICE — GLOVE BIOGEL PI MICRO SZ 7.5 48575

## (undated) DEVICE — SOL NACL 0.9% IRRIG 1000ML BOTTLE 07138-09

## (undated) RX ORDER — ACETAMINOPHEN 325 MG/1
TABLET ORAL
Status: DISPENSED
Start: 2021-10-05

## (undated) RX ORDER — PROPOFOL 10 MG/ML
INJECTION, EMULSION INTRAVENOUS
Status: DISPENSED
Start: 2021-10-05

## (undated) RX ORDER — GABAPENTIN 100 MG/1
CAPSULE ORAL
Status: DISPENSED
Start: 2021-10-05

## (undated) RX ORDER — FENTANYL CITRATE 50 UG/ML
INJECTION, SOLUTION INTRAMUSCULAR; INTRAVENOUS
Status: DISPENSED
Start: 2023-06-19

## (undated) RX ORDER — MAGNESIUM SULFATE HEPTAHYDRATE 40 MG/ML
INJECTION, SOLUTION INTRAVENOUS
Status: DISPENSED
Start: 2021-10-05

## (undated) RX ORDER — BUPIVACAINE HYDROCHLORIDE 5 MG/ML
INJECTION, SOLUTION PERINEURAL
Status: DISPENSED
Start: 2021-10-05

## (undated) RX ORDER — CEFAZOLIN SODIUM 2 G/100ML
INJECTION, SOLUTION INTRAVENOUS
Status: DISPENSED
Start: 2021-10-05

## (undated) RX ORDER — LIDOCAINE HYDROCHLORIDE 10 MG/ML
INJECTION, SOLUTION INFILTRATION; PERINEURAL
Status: DISPENSED
Start: 2021-10-05

## (undated) RX ORDER — ONDANSETRON 2 MG/ML
INJECTION INTRAMUSCULAR; INTRAVENOUS
Status: DISPENSED
Start: 2021-10-05

## (undated) RX ORDER — KETOROLAC TROMETHAMINE 30 MG/ML
INJECTION, SOLUTION INTRAMUSCULAR; INTRAVENOUS
Status: DISPENSED
Start: 2021-10-05

## (undated) RX ORDER — FENTANYL CITRATE 50 UG/ML
INJECTION, SOLUTION INTRAMUSCULAR; INTRAVENOUS
Status: DISPENSED
Start: 2021-10-05

## (undated) RX ORDER — DEXAMETHASONE SODIUM PHOSPHATE 4 MG/ML
INJECTION, SOLUTION INTRA-ARTICULAR; INTRALESIONAL; INTRAMUSCULAR; INTRAVENOUS; SOFT TISSUE
Status: DISPENSED
Start: 2021-10-05

## (undated) RX ORDER — ACETAMINOPHEN 325 MG/1
TABLET ORAL
Status: DISPENSED
Start: 2023-06-19